# Patient Record
Sex: MALE | Race: WHITE | HISPANIC OR LATINO | Employment: OTHER | URBAN - METROPOLITAN AREA
[De-identification: names, ages, dates, MRNs, and addresses within clinical notes are randomized per-mention and may not be internally consistent; named-entity substitution may affect disease eponyms.]

---

## 2023-03-08 PROBLEM — I10 BENIGN ESSENTIAL HYPERTENSION: Status: ACTIVE | Noted: 2023-03-08

## 2023-03-08 PROBLEM — M19.049 CMC ARTHRITIS: Status: ACTIVE | Noted: 2023-03-08

## 2023-03-08 PROBLEM — J01.90 ACUTE SINUSITIS: Status: ACTIVE | Noted: 2023-03-08

## 2023-03-08 PROBLEM — B07.0 PLANTAR WARTS: Status: ACTIVE | Noted: 2023-03-08

## 2023-03-08 PROBLEM — E78.5 HYPERLIPIDEMIA: Status: ACTIVE | Noted: 2023-03-08

## 2023-03-08 PROBLEM — R35.1 NOCTURIA: Status: ACTIVE | Noted: 2023-03-08

## 2023-03-08 PROBLEM — G56.01 CARPAL TUNNEL SYNDROME OF RIGHT WRIST: Status: ACTIVE | Noted: 2023-03-08

## 2023-03-08 PROBLEM — G56.21 ULNAR NEUROPATHY AT ELBOW, RIGHT: Status: ACTIVE | Noted: 2023-03-08

## 2023-03-08 PROBLEM — E11.9 TYPE 2 DIABETES MELLITUS (MULTI): Status: ACTIVE | Noted: 2023-03-08

## 2023-03-08 PROBLEM — E80.4 GILBERT SYNDROME: Status: ACTIVE | Noted: 2023-03-08

## 2023-03-08 PROBLEM — E78.1 HYPERTRIGLYCERIDEMIA: Status: ACTIVE | Noted: 2023-03-08

## 2023-03-08 PROBLEM — I48.91 ATRIAL FIBRILLATION (MULTI): Status: ACTIVE | Noted: 2023-03-08

## 2023-03-08 PROBLEM — G47.33 OBSTRUCTIVE SLEEP APNEA: Status: ACTIVE | Noted: 2023-03-08

## 2023-03-08 RX ORDER — PEN NEEDLE, DIABETIC 30 GX3/16"
1 NEEDLE, DISPOSABLE MISCELLANEOUS 2 TIMES DAILY
COMMUNITY
End: 2023-11-28 | Stop reason: SDUPTHER

## 2023-03-08 RX ORDER — TAMSULOSIN HYDROCHLORIDE 0.4 MG/1
0.4 CAPSULE ORAL DAILY
COMMUNITY
End: 2023-05-01 | Stop reason: SDUPTHER

## 2023-03-08 RX ORDER — AMLODIPINE BESYLATE 5 MG/1
2 TABLET ORAL DAILY
COMMUNITY
Start: 2020-07-07 | End: 2023-05-19 | Stop reason: SDUPTHER

## 2023-03-08 RX ORDER — BLOOD SUGAR DIAGNOSTIC
1 STRIP MISCELLANEOUS 2 TIMES DAILY
COMMUNITY
Start: 2014-10-13 | End: 2023-03-14 | Stop reason: ALTCHOICE

## 2023-03-08 RX ORDER — HYDROCHLOROTHIAZIDE 12.5 MG/1
1 TABLET ORAL DAILY
COMMUNITY
Start: 2020-06-11 | End: 2023-05-19 | Stop reason: ALTCHOICE

## 2023-03-08 RX ORDER — ATORVASTATIN CALCIUM 40 MG/1
1 TABLET, FILM COATED ORAL NIGHTLY
COMMUNITY
Start: 2017-01-10 | End: 2023-05-19 | Stop reason: SDUPTHER

## 2023-03-08 RX ORDER — PEN NEEDLE, DIABETIC 30 GX3/16"
NEEDLE, DISPOSABLE MISCELLANEOUS
COMMUNITY
End: 2023-03-14 | Stop reason: ALTCHOICE

## 2023-03-08 RX ORDER — BLOOD SUGAR DIAGNOSTIC
1 STRIP MISCELLANEOUS 2 TIMES DAILY
COMMUNITY
End: 2023-10-09 | Stop reason: WASHOUT

## 2023-03-08 RX ORDER — METFORMIN HYDROCHLORIDE 500 MG/1
2 TABLET, EXTENDED RELEASE ORAL 2 TIMES DAILY
COMMUNITY
Start: 2016-10-24 | End: 2023-11-28 | Stop reason: SDUPTHER

## 2023-03-08 RX ORDER — OLMESARTAN MEDOXOMIL 40 MG/1
1 TABLET ORAL DAILY
COMMUNITY
Start: 2018-12-20 | End: 2023-05-19 | Stop reason: SDUPTHER

## 2023-03-08 RX ORDER — LANCETS
1 EACH MISCELLANEOUS 2 TIMES DAILY
COMMUNITY
End: 2023-05-19 | Stop reason: ALTCHOICE

## 2023-03-08 RX ORDER — FLECAINIDE ACETATE 50 MG/1
50 TABLET ORAL 2 TIMES DAILY
COMMUNITY
End: 2023-10-10 | Stop reason: ALTCHOICE

## 2023-03-08 RX ORDER — INSULIN ASPART 100 [IU]/ML
38 INJECTION, SUSPENSION SUBCUTANEOUS 2 TIMES DAILY
COMMUNITY
Start: 2021-06-08 | End: 2023-11-28 | Stop reason: SDUPTHER

## 2023-03-08 RX ORDER — FENOFIBRATE 145 MG/1
1 TABLET, FILM COATED ORAL DAILY
COMMUNITY
Start: 2020-12-17 | End: 2023-12-13 | Stop reason: SDUPTHER

## 2023-03-12 ASSESSMENT — ENCOUNTER SYMPTOMS
HEMATURIA: 0
NAUSEA: 1
VOMITING: 0
WEIGHT LOSS: 0
HEMATOCHEZIA: 0
ABDOMINAL PAIN: 1
FREQUENCY: 1
DIARRHEA: 1
FLATUS: 1
MYALGIAS: 1
ARTHRALGIAS: 0
ANOREXIA: 1
CONSTIPATION: 0
BELCHING: 1
HEADACHES: 1
FEVER: 0
DYSURIA: 1

## 2023-03-14 ENCOUNTER — OFFICE VISIT (OUTPATIENT)
Dept: PRIMARY CARE | Facility: CLINIC | Age: 68
End: 2023-03-14
Payer: MEDICARE

## 2023-03-14 VITALS
DIASTOLIC BLOOD PRESSURE: 70 MMHG | BODY MASS INDEX: 28.56 KG/M2 | HEART RATE: 98 BPM | SYSTOLIC BLOOD PRESSURE: 138 MMHG | HEIGHT: 71 IN | WEIGHT: 204 LBS

## 2023-03-14 DIAGNOSIS — R10.12 LEFT UPPER QUADRANT ABDOMINAL PAIN: ICD-10-CM

## 2023-03-14 DIAGNOSIS — K46.9 HERNIA OF ABDOMINAL CAVITY: ICD-10-CM

## 2023-03-14 DIAGNOSIS — K21.9 GASTROESOPHAGEAL REFLUX DISEASE, UNSPECIFIED WHETHER ESOPHAGITIS PRESENT: Primary | ICD-10-CM

## 2023-03-14 PROCEDURE — 1159F MED LIST DOCD IN RCRD: CPT | Performed by: STUDENT IN AN ORGANIZED HEALTH CARE EDUCATION/TRAINING PROGRAM

## 2023-03-14 PROCEDURE — 99214 OFFICE O/P EST MOD 30 MIN: CPT | Performed by: STUDENT IN AN ORGANIZED HEALTH CARE EDUCATION/TRAINING PROGRAM

## 2023-03-14 PROCEDURE — 3075F SYST BP GE 130 - 139MM HG: CPT | Performed by: STUDENT IN AN ORGANIZED HEALTH CARE EDUCATION/TRAINING PROGRAM

## 2023-03-14 PROCEDURE — 3078F DIAST BP <80 MM HG: CPT | Performed by: STUDENT IN AN ORGANIZED HEALTH CARE EDUCATION/TRAINING PROGRAM

## 2023-03-14 PROCEDURE — 3066F NEPHROPATHY DOC TX: CPT | Performed by: STUDENT IN AN ORGANIZED HEALTH CARE EDUCATION/TRAINING PROGRAM

## 2023-03-14 PROCEDURE — 4010F ACE/ARB THERAPY RXD/TAKEN: CPT | Performed by: STUDENT IN AN ORGANIZED HEALTH CARE EDUCATION/TRAINING PROGRAM

## 2023-03-14 RX ORDER — OMEPRAZOLE 20 MG/1
20 TABLET, DELAYED RELEASE ORAL 2 TIMES DAILY
Qty: 60 TABLET | Refills: 0 | COMMUNITY
Start: 2023-03-14 | End: 2023-03-15 | Stop reason: SDUPTHER

## 2023-03-14 ASSESSMENT — ENCOUNTER SYMPTOMS
BELCHING: 1
DYSURIA: 1
ANOREXIA: 1
ABDOMINAL PAIN: 1
ARTHRALGIAS: 0
FLATUS: 1
HEADACHES: 1
DIARRHEA: 1
HEMATURIA: 0
FREQUENCY: 1
CONSTIPATION: 0
NAUSEA: 1
HEMATOCHEZIA: 0
WEIGHT LOSS: 0
MYALGIAS: 1
FEVER: 0
VOMITING: 0

## 2023-03-14 NOTE — PROGRESS NOTES
Subjective   Patient ID: Bunny Abarca is a 67 y.o. male who presents for No chief complaint on file..  Today he is accompanied by alone.     Abdominal Pain  This is a chronic problem. The current episode started more than 1 year ago. The onset quality is gradual. The problem occurs 2 to 4 times per day. The most recent episode lasted 2 hours. The problem has been gradually worsening. The pain is located in the LLQ, LUQ, epigastric region, generalized abdominal region, periumbilical region, suprapubic region and left flank. The pain is at a severity of 8/10. The quality of the pain is aching, cramping, sharp and tearing. The abdominal pain radiates to the LUQ, epigastric region, periumbilical region, back, left flank and scrotum. Associated symptoms include anorexia, belching, diarrhea, dysuria, flatus, frequency, headaches, myalgias and nausea. Pertinent negatives include no arthralgias, constipation, fever, hematochezia, hematuria, melena, vomiting or weight loss. The pain is aggravated by certain positions, coughing, drinking alcohol, eating, palpation, NSAIDs and urination. The pain is relieved by Belching, certain positions, passing flatus and sitting up. Prior diagnostic workup includes lower endoscopy.       LUQ Abdominal Pain/Hernia  History of previous left inguinal and umbilical hernia repairs.   Complaining of pain in his LUQ since the summer after her struck his abdomen with the handle of a saw.   Pain relieved with advil or tylenol.   Is concerned for another hernia as he occasional with feel a bulge in the area.     GERD  Complaining of several month history of chest burning, LUQ pain and occasional difficulty swallowing.  Symptoms worsen after coffee, spicy foods, or tomatoes.   Symptoms relieved somewhat with Rolaids or famotidine OTC.    Seeking further treatment for his symptoms.     Current Outpatient Medications on File Prior to Visit   Medication Sig Dispense Refill    amLODIPine (Norvasc) 5  "mg tablet Take 2 tablets (10 mg) by mouth once daily.      atorvastatin (Lipitor) 40 mg tablet Take 1 tablet (40 mg) by mouth once daily at bedtime.      blood sugar diagnostic (Accu-Chek Guide test strips) strip 1 each  in the morning and 1 each before bedtime.      blood sugar diagnostic (ReliOn Prime Test Strips) strip 1 strip  in the morning and 1 strip before bedtime.      empagliflozin (Jardiance) 10 mg Take 1 tablet (10 mg) by mouth once daily.      fenofibrate (Tricor) 145 mg tablet Take 1 tablet (145 mg) by mouth once daily. WITH FOOD      flecainide (Tambocor) 50 mg tablet Take 1 tablet (50 mg) by mouth in the morning and 1 tablet (50 mg) before bedtime.      hydroCHLOROthiazide (HYDRODiuril) 12.5 mg tablet Take 1 tablet (12.5 mg) by mouth once daily.      insulin asp prt-insulin aspart (NovoLOG Mix 70-30FlexPen U-100) 100 unit/mL (70-30) injection Inject 38 Units under the skin in the morning and 38 Units before bedtime.      lancets (Accu-Chek Fastclix Lancet Drum) misc 1 each  in the morning and 1 each before bedtime.      metFORMIN XR (Glucophage-XR) 500 mg 24 hr tablet Take 2 tablets (1,000 mg) by mouth in the morning and 2 tablets (1,000 mg) before bedtime.      olmesartan (BENIcar) 40 mg tablet Take 1 tablet (40 mg) by mouth once daily.      pen needle, diabetic (BD Ultra-Fine Short Pen Needle) 31 gauge x 5/16\" needle 1 each  in the morning and 1 each before bedtime.      pen needle, diabetic (Pen Needle) 31 gauge x 5/16\" needle Use once a day with Levemir      tamsulosin (Flomax) 0.4 mg 24 hr capsule Take 1 capsule (0.4 mg) by mouth once daily.       No current facility-administered medications on file prior to visit.          Review of Systems   Constitutional:  Negative for fever and weight loss.   Gastrointestinal:  Positive for abdominal pain, anorexia, diarrhea, flatus and nausea. Negative for constipation, hematochezia, melena and vomiting.   Genitourinary:  Positive for dysuria and " frequency. Negative for hematuria.   Musculoskeletal:  Positive for myalgias. Negative for arthralgias.   Neurological:  Positive for headaches.     All pertinent positive symptoms are included in the history of present illness.  All other systems have been reviewed and are negative and noncontributory to this patient's current ailments.     Objective   There were no vitals taken for this visit.  BSA: There is no height or weight on file to calculate BSA.  No visits with results within 1 Month(s) from this visit.   Latest known visit with results is:   [facility] Encounter on 12/22/2022   Component Date Value Ref Range Status    POCT Glucose 12/22/2022 166 (H)  74 - 99 mg/dL Final       Physical Exam  CONSTITUTIONAL - well nourished, well developed, looks like stated age, in no acute distress, not ill-appearing, and not tired appearing  SKIN - normal skin color and pigmentation, normal skin turgor without rash, lesions, or nodules visualized  HEAD - no trauma, normocephalic  EYES - pupils are equal and reactive to light, extraocular muscles are intact, and normal external exam  NECK - supple without rigidity, no neck mass was observed,  CHEST - clear to auscultation, no wheezing, no crackles and no rales, good effort  CARDIAC - regular rate and regular rhythm, no skipped beats, no murmur  ABDOMEN - no organomegaly, soft, tender LUQ, nondistended, normal bowel sounds, no guarding/rebound/rigidity, negative McBurney sign and negative Patel sign  EXTREMITIES - no edema, no deformities  NEUROLOGICAL - normal gait, normal balance, normal motor, no ataxia  PSYCHIATRIC - alert, pleasant and cordial, age-appropriate      Assessment/Plan   LUQ Abdominal Pain/Hernia  We provided you with a referral for a general surgeon today to evaluate for your possible hernia.  Please schedule with them at your earliest mutual convenience.     GERD  We prescribed you omeprazole 20 mg to take once daily.   Please let us know how this changes  your symptoms.     Please call the office if you have any questions or concerns regarding your care.

## 2023-03-15 RX ORDER — OMEPRAZOLE 20 MG/1
20 TABLET, DELAYED RELEASE ORAL 2 TIMES DAILY
Qty: 60 TABLET | Refills: 0 | Status: SHIPPED | OUTPATIENT
Start: 2023-03-15 | End: 2023-03-16

## 2023-03-16 DIAGNOSIS — K21.9 GASTROESOPHAGEAL REFLUX DISEASE WITHOUT ESOPHAGITIS: Primary | ICD-10-CM

## 2023-03-16 RX ORDER — OMEPRAZOLE 20 MG/1
20 CAPSULE, DELAYED RELEASE ORAL 2 TIMES DAILY
Qty: 60 CAPSULE | Refills: 1 | Status: SHIPPED | OUTPATIENT
Start: 2023-03-16 | End: 2023-03-17 | Stop reason: SDUPTHER

## 2023-03-17 DIAGNOSIS — K21.9 GASTROESOPHAGEAL REFLUX DISEASE WITHOUT ESOPHAGITIS: ICD-10-CM

## 2023-03-17 RX ORDER — OMEPRAZOLE 20 MG/1
20 CAPSULE, DELAYED RELEASE ORAL 2 TIMES DAILY
Qty: 60 CAPSULE | Refills: 1 | Status: SHIPPED | OUTPATIENT
Start: 2023-03-17 | End: 2023-05-23

## 2023-05-01 DIAGNOSIS — R35.1 NOCTURIA: ICD-10-CM

## 2023-05-01 RX ORDER — TAMSULOSIN HYDROCHLORIDE 0.4 MG/1
0.4 CAPSULE ORAL DAILY
Qty: 30 CAPSULE | Refills: 0 | Status: SHIPPED | OUTPATIENT
Start: 2023-05-01 | End: 2023-05-19 | Stop reason: ALTCHOICE

## 2023-05-12 LAB
ANION GAP IN SER/PLAS: 13 MMOL/L (ref 10–20)
CALCIUM (MG/DL) IN SER/PLAS: 9.5 MG/DL (ref 8.6–10.3)
CARBON DIOXIDE, TOTAL (MMOL/L) IN SER/PLAS: 27 MMOL/L (ref 21–32)
CHLORIDE (MMOL/L) IN SER/PLAS: 100 MMOL/L (ref 98–107)
CREATININE (MG/DL) IN SER/PLAS: 1.39 MG/DL (ref 0.5–1.3)
ESTIMATED AVERAGE GLUCOSE FOR HBA1C: 177 MG/DL
GFR MALE: 56 ML/MIN/1.73M2
GLUCOSE (MG/DL) IN SER/PLAS: 95 MG/DL (ref 74–99)
HEMOGLOBIN A1C/HEMOGLOBIN TOTAL IN BLOOD: 7.8 %
POTASSIUM (MMOL/L) IN SER/PLAS: 4 MMOL/L (ref 3.5–5.3)
SODIUM (MMOL/L) IN SER/PLAS: 136 MMOL/L (ref 136–145)
UREA NITROGEN (MG/DL) IN SER/PLAS: 24 MG/DL (ref 6–23)

## 2023-05-15 LAB — CARCINOEMBRYONIC AG (NG/ML) IN SER/PLAS: <0.5 UG/L

## 2023-05-18 ENCOUNTER — APPOINTMENT (OUTPATIENT)
Dept: PRIMARY CARE | Facility: CLINIC | Age: 68
End: 2023-05-18
Payer: MEDICARE

## 2023-05-19 ENCOUNTER — OFFICE VISIT (OUTPATIENT)
Dept: PRIMARY CARE | Facility: CLINIC | Age: 68
End: 2023-05-19
Payer: MEDICARE

## 2023-05-19 VITALS
HEIGHT: 71 IN | BODY MASS INDEX: 28.48 KG/M2 | DIASTOLIC BLOOD PRESSURE: 70 MMHG | WEIGHT: 203.4 LBS | HEART RATE: 100 BPM | SYSTOLIC BLOOD PRESSURE: 120 MMHG

## 2023-05-19 DIAGNOSIS — J02.9 PHARYNGITIS, UNSPECIFIED ETIOLOGY: Primary | ICD-10-CM

## 2023-05-19 DIAGNOSIS — E78.2 MIXED HYPERLIPIDEMIA: ICD-10-CM

## 2023-05-19 DIAGNOSIS — R35.1 NOCTURIA: ICD-10-CM

## 2023-05-19 DIAGNOSIS — C80.1 ADENOCARCINOMA (MULTI): ICD-10-CM

## 2023-05-19 DIAGNOSIS — I48.91 ATRIAL FIBRILLATION, UNSPECIFIED TYPE (MULTI): ICD-10-CM

## 2023-05-19 DIAGNOSIS — I10 HYPERTENSION, UNSPECIFIED TYPE: ICD-10-CM

## 2023-05-19 DIAGNOSIS — E11.9 TYPE 2 DIABETES MELLITUS WITHOUT COMPLICATION, WITHOUT LONG-TERM CURRENT USE OF INSULIN (MULTI): ICD-10-CM

## 2023-05-19 PROBLEM — D12.1 ADENOMA OF APPENDIX: Status: ACTIVE | Noted: 2023-05-19

## 2023-05-19 PROBLEM — D12.1 ADENOMA OF APPENDIX: Status: RESOLVED | Noted: 2023-05-19 | Resolved: 2023-05-19

## 2023-05-19 PROCEDURE — 3074F SYST BP LT 130 MM HG: CPT | Performed by: STUDENT IN AN ORGANIZED HEALTH CARE EDUCATION/TRAINING PROGRAM

## 2023-05-19 PROCEDURE — 1036F TOBACCO NON-USER: CPT | Performed by: STUDENT IN AN ORGANIZED HEALTH CARE EDUCATION/TRAINING PROGRAM

## 2023-05-19 PROCEDURE — 3066F NEPHROPATHY DOC TX: CPT | Performed by: STUDENT IN AN ORGANIZED HEALTH CARE EDUCATION/TRAINING PROGRAM

## 2023-05-19 PROCEDURE — 3051F HG A1C>EQUAL 7.0%<8.0%: CPT | Performed by: STUDENT IN AN ORGANIZED HEALTH CARE EDUCATION/TRAINING PROGRAM

## 2023-05-19 PROCEDURE — 3078F DIAST BP <80 MM HG: CPT | Performed by: STUDENT IN AN ORGANIZED HEALTH CARE EDUCATION/TRAINING PROGRAM

## 2023-05-19 PROCEDURE — 99215 OFFICE O/P EST HI 40 MIN: CPT | Performed by: STUDENT IN AN ORGANIZED HEALTH CARE EDUCATION/TRAINING PROGRAM

## 2023-05-19 PROCEDURE — 1159F MED LIST DOCD IN RCRD: CPT | Performed by: STUDENT IN AN ORGANIZED HEALTH CARE EDUCATION/TRAINING PROGRAM

## 2023-05-19 PROCEDURE — 4010F ACE/ARB THERAPY RXD/TAKEN: CPT | Performed by: STUDENT IN AN ORGANIZED HEALTH CARE EDUCATION/TRAINING PROGRAM

## 2023-05-19 RX ORDER — OLMESARTAN MEDOXOMIL 40 MG/1
40 TABLET ORAL DAILY
Qty: 90 TABLET | Refills: 1 | Status: SHIPPED | OUTPATIENT
Start: 2023-05-19 | End: 2023-12-13 | Stop reason: SDUPTHER

## 2023-05-19 RX ORDER — AMLODIPINE BESYLATE 5 MG/1
10 TABLET ORAL DAILY
Qty: 180 TABLET | Refills: 1 | Status: SHIPPED | OUTPATIENT
Start: 2023-05-19 | End: 2023-10-10 | Stop reason: WASHOUT

## 2023-05-19 RX ORDER — ATORVASTATIN CALCIUM 40 MG/1
40 TABLET, FILM COATED ORAL NIGHTLY
Qty: 90 TABLET | Refills: 1 | Status: SHIPPED | OUTPATIENT
Start: 2023-05-19 | End: 2023-12-13 | Stop reason: SDUPTHER

## 2023-05-19 RX ORDER — DOXAZOSIN 1 MG/1
1 TABLET ORAL NIGHTLY
Qty: 90 TABLET | Refills: 0 | Status: SHIPPED | OUTPATIENT
Start: 2023-05-19 | End: 2023-07-18 | Stop reason: SDUPTHER

## 2023-05-19 RX ORDER — AMOXICILLIN AND CLAVULANATE POTASSIUM 875; 125 MG/1; MG/1
875 TABLET, FILM COATED ORAL 2 TIMES DAILY
Qty: 14 TABLET | Refills: 0 | Status: CANCELLED | OUTPATIENT
Start: 2023-05-19 | End: 2023-05-26

## 2023-05-19 NOTE — PROGRESS NOTES
Subjective   Patient ID: Bunny Abarca is a 67 y.o. male who presents for Medication discussion.  Today he is accompanied by alone.     HPI     1. Hypertension  Currently on Amlodipine 5 mg BID, HCTZ 12.5 mg, and Olmesartan 40 mg. Tolerating well.   BP today in triage showed 120/70.  Home blood pressure readings showed systolic around 110-135 and diastolic around 69-80.   Denies any headaches, dizziness, vision changes, SOB, GARDUNO, LE edema, or any other complaints.  He does get some chest pressure and palpitations due to his atrial fibrillation.     2. Hyperlipidemia and hypertriglyceridemia  Currently on Atorvastatin 40 mg daily and fenofibrate 145 mg daily.  Tolerating well and denies side effects.     3. Type II Diabetes Mellitus  Takes Metformin 500 mg 2 tablets twice a day and on NovoLog.  He also takes Jardiance 25 mg daily.  Follows with Endocrinology.  Last A1c in May showing 7.8%.     4. Atrial fibrillation  Follows with cardiology regularly who discontinued metoprolol due to his bradycardia.  He is currently on Flecainide Acetate 50 mg twice daily.  He is tolerating the medication well.     5. Nocturia  He is experiencing insomnia due to having to get up to urinate several times throughout the night.  He believes this is due to Jardiance and HCTZ.  He was prescribed Flomax in Dec. 2022 which did not help.  Desires to discontinue hydrochlorothiazide if his blood pressure will allow it.    6. Adenocarcinoma of Appendix  Follows with colorectal surgeon.  Currently scheduled to get repeat CT C/A/P and to see a genetic specialist.  Right Hemicolectomy is being discussed with Colorectal surgeon scheduled tentatively scheduled for June 2023.    7. Pharyngitis  Had a sore throat last week that migrated to his right maxillary and frontal sinuses. Denies fever or chills.  Feels his symptoms have slightly improved.    Current Outpatient Medications on File Prior to Visit   Medication Sig Dispense Refill    blood  "sugar diagnostic (ReliOn Prime Test Strips) strip 1 strip  in the morning and 1 strip before bedtime.      empagliflozin (Jardiance) 10 mg Take 1 tablet (10 mg) by mouth once daily.      fenofibrate (Tricor) 145 mg tablet Take 1 tablet (145 mg) by mouth once daily. WITH FOOD      flecainide (Tambocor) 50 mg tablet Take 1 tablet (50 mg) by mouth in the morning and 1 tablet (50 mg) before bedtime.      insulin asp prt-insulin aspart (NovoLOG Mix 70-30FlexPen U-100) 100 unit/mL (70-30) injection Inject 38 Units under the skin in the morning and 38 Units before bedtime.      lancets (Accu-Chek Fastclix Lancet Drum) misc 1 each  in the morning and 1 each before bedtime.      metFORMIN XR (Glucophage-XR) 500 mg 24 hr tablet Take 2 tablets (1,000 mg) by mouth in the morning and 2 tablets (1,000 mg) before bedtime.      omeprazole (PriLOSEC) 20 mg DR capsule Take 1 capsule (20 mg) by mouth in the morning and 1 capsule (20 mg) before bedtime. Do not crush or chew.. 60 capsule 1    pen needle, diabetic (BD Ultra-Fine Short Pen Needle) 31 gauge x 5/16\" needle 1 each  in the morning and 1 each before bedtime.      [DISCONTINUED] amLODIPine (Norvasc) 5 mg tablet Take 2 tablets (10 mg) by mouth once daily.      [DISCONTINUED] atorvastatin (Lipitor) 40 mg tablet Take 1 tablet (40 mg) by mouth once daily at bedtime.      [DISCONTINUED] hydroCHLOROthiazide (HYDRODiuril) 12.5 mg tablet Take 1 tablet (12.5 mg) by mouth once daily.      [DISCONTINUED] olmesartan (BENIcar) 40 mg tablet Take 1 tablet (40 mg) by mouth once daily.      [DISCONTINUED] tamsulosin (Flomax) 0.4 mg 24 hr capsule Take 1 capsule (0.4 mg) by mouth once daily. 30 capsule 0     No current facility-administered medications on file prior to visit.        Allergies   Allergen Reactions    Bydureon [Exenatide Microspheres] Itching       Immunization History   Administered Date(s) Administered    Influenza, seasonal, injectable 10/04/2022    Moderna SARS-CoV-2 " "Vaccination 04/16/2022    Moderna Sars-cov-2 Bivalent Booster 10/11/2022    Pfizer Purple Cap SARS-CoV-2 03/05/2021, 03/25/2021, 10/21/2021    Tdap 11/10/2017    Zoster, Recombinant 08/24/2021, 10/25/2021         Review of Systems  All pertinent positive symptoms are included in the history of present illness.  All other systems have been reviewed and are negative and noncontributory to this patient's current ailments.     Objective   /70 (BP Location: Left arm, Patient Position: Sitting, BP Cuff Size: Adult)   Pulse 100   Ht 1.803 m (5' 11\")   Wt 92.3 kg (203 lb 6.4 oz)   BMI 28.37 kg/m²   BSA: 2.15 meters squared  Orders Only on 05/15/2023   Component Date Value Ref Range Status    CARCINOEMBRYONIC AG (NG/ML) IN SER* 05/15/2023 <0.5  ug/L Final    Comment:  CEA testing is performed by chemiluminescent immunoassay   using the Siemens Atellica. Values obtained with   different analytic methods cannot be used interchangeably.  .   Serum CEA measurement is intended for use as an aid in   the management of patients previously treated for cancer.   This assay is not intended for screening or diagnosis of   cancer in the general population. The results must not   be used as the sole means for clinical diagnosis or   patient management decisions.  REF VALUES  NONSMOKERS 0-2.5  SMOKERS    0-5.0     Orders Only on 05/12/2023   Component Date Value Ref Range Status    Hemoglobin A1C 05/12/2023 7.8 (A)  % Final    Comment:      Diagnosis of Diabetes-Adults   Non-Diabetic: < or = 5.6%   Increased risk for developing diabetes: 5.7-6.4%   Diagnostic of diabetes: > or = 6.5%  .       Monitoring of Diabetes                Age (y)     Therapeutic Goal (%)   Adults:          >18           <7.0   Pediatrics:    13-18           <7.5                   7-12           <8.0                   0- 6            7.5-8.5   American Diabetes Association. Diabetes Care 33(S1), Jan 2010.      Estimated Average Glucose 05/12/2023 177  " MG/DL Final   Orders Only on 05/12/2023   Component Date Value Ref Range Status    Glucose 05/12/2023 95  74 - 99 mg/dL Final    Sodium 05/12/2023 136  136 - 145 mmol/L Final    Potassium 05/12/2023 4.0  3.5 - 5.3 mmol/L Final    Chloride 05/12/2023 100  98 - 107 mmol/L Final    Bicarbonate 05/12/2023 27  21 - 32 mmol/L Final    Anion Gap 05/12/2023 13  10 - 20 mmol/L Final    Urea Nitrogen 05/12/2023 24 (H)  6 - 23 mg/dL Final    Creatinine 05/12/2023 1.39 (H)  0.50 - 1.30 mg/dL Final    GFR MALE 05/12/2023 56 (A)  >90 mL/min/1.73m2 Final    Comment:  CALCULATIONS OF ESTIMATED GFR ARE PERFORMED   USING THE 2021 CKD-EPI STUDY REFIT EQUATION   WITHOUT THE RACE VARIABLE FOR THE IDMS-TRACEABLE   CREATININE METHODS.    https://jasn.asnjournals.org/content/early/2021/09/22/ASN.9370057922      Calcium 05/12/2023 9.5  8.6 - 10.3 mg/dL Final   [facility]acy Encounter on 05/05/2023   Component Date Value Ref Range Status    Pathology Report 05/05/2023    Final                    Value:Name TORREY HITCHCOCK                                                                                                   Accession #: [id number]            Pathologist:                   KYUNG UMANZOR MD  Date of Procedure:    5/5/2023  Date Received:          5/5/2023  Date Reported           5/10/2023  Submitting Physician:   KIMBERLI CACERES MD  Location:                    [facility]   Copy To/Referring/Attending:  GISELLE DUARTE MD Other External #     Procedures/Addenda Present                                                               FINAL DIAGNOSIS  A.  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                                                                                                                                                                                                                                                                                                                                                                                                                                                                                                                Electronically Signed Out By KYUNG UMANZOR MD/[facility]  By the signature on this report, the individual or group listed as making the  Final Interpretation/Diagnosis certifies that they have reviewed this case.  Diagnostic interpretation performed at [facility] [address] [facility] [address]           Addendum/Procedures:  Special Oncology Report     Date Ordered:     5/10/2023     Status:  Signed Out       Date Complete:     5/10/2023             Date Reported:     5/10/2023                  Addendum Diagnosis       MISMATCH REPAIR PROTEIN EXPRESSION:     Tumor type/ specimen source:      Cecal mass  Paraffin block number:      A1     Protein:        Result:  MLH-1           Intact Nuclear Expression  PMS-2           Intact Nuclear Expression  MSH-2           Intact Nuclear Expression  MSH-6           Intact Nuclear                           Expression     INTERPRETATION: Colon neoplasm with normal mismatch  repair protein expression.     The immunohistochemistry study of DNA mismatch repair  protein expression reveals the normal presence of hMLH-1,  hMSH2, hMSH6 and hPMS2 in the tumor (normal internal  controls stain appropriately).     The findings do not exclude underlying Rodriguez Syndrome  (HNPCC) because some mutations may result in intact  protein expression. In addition, rare alterations can exist  in other mismatch proteins, which have not been tested.  In addition, some hereditary colorectal cancers are caused  by alteration in other pathways unrelated to DNA Mismatch Repair.     NOTE:  Immunohistochemical staining (IHC) is used to  determine the presence or absence of protein expression  MLH1, MSH2, MSH6 and PMS2.  Lymphocytes and  normal epithelial cells exhibit strong nuclear  staining  and serve as positive internal controls for staining  of these proteins. Infiltrating carcinoma cells exhibiting  nuclear staini                          ng show intact nuclear expression.     The stated protein mouse and rabbit monoclonal  antibodies (MLH1-clone M1([employer]),  MSH2- clone [id number]([employer]), MSH6-clone 44  ([employer]), and  PMS2- clone  lone [id number]([employer])) staining are performed  on formalin fixed paraffin embedded specimens.  The method employed was  a standard peroxidase  labeled-polymer detection system from  [employer] (ultraView Holden DAB).  Each assay was performed using appropriate  positive and negative controls, as well as evaluation  of internal controls.     LDT:  One or more of the reagents used to perform  assays  on this specimen MAY have contained components  considered to be Laboratory Developed Tests (LDT).  LDT's have not been cleared or approved by the  U.S.Food and Drug Administration. These assays/tests  were developed and their performance characteristics  determined by the Department of Pathology  Immunohistochemistry Lab at [facility]. The FDA does not require this test to go  through premarket FDA review. This test is used for  clinical purposes. It should not be regarded as  investigational or for research. This laboratory is certified  under the Clinical Laboratory Improvement Amendments  (CLIA) as qualified to perform high complexity clinical  laboratory testing. The assays/tests were performed  with appropriate positive and negative controls, which  stained appropriately.     CAUTIONS:  Test results should be interpreted in context  of clinical findings, family history, and other laboratory  data.  If results obtained do not match other clinical or  laboratory findings, please contact the laboratory  for possible interpretation.   "Misinterpretation of results  may occur if the information provided is inaccurate  or incomplete.     REFERENCE:  1. Lukasz Cabrera. Immunohistochemistry versus microsatellite  microsatellite instability testing for screening colorectal  CANCER patients at                           risk for Hereditary Nonpolyposis  Colorectal Cancer Syndrome.  Part1:  The utility of immunohistochemistry.  J  Mol Diag 10(4):293-300, 2008.     2. CLYDE Varela, [facility].  Colorectal cancer due to  deficiency in DNA mismatch repair function:  a review.  Adv. Izabel Pathol 16: 405-17, 2009.             Electronically Signed Out By KYUNG UMANZOR MD/[facility] [place]  By the signature on this report, the individual or group listed as making the  Final Interpretation/Diagnosis certifies that they have reviewed this case.  Addendum signed out at [place] [address].           Clinical History:  abdominal pain in the right lower quadrant, abnormal CT of the GI tract     Specimens Submitted As:  A: COLON, CECUM MASS, APPENDICEAL ORIFICE MASS, COLD FORCEP     Gross Description:  Received in formalin, labeled with the patient's name and hospital number and  \"A, colon, cecum mass, appendiceal orifice mass, cold forcep\", are multiple  fragments of tan, soft tissue ag                          gregating to 1.6 x 0.2 x 0.2 cm. The specimen  is submitted in toto in one cassette.  AE      aey/5/5/2023           The assays/tests were performed with appropriate positive and negative controls  which stained appropriately.    [facility]  Department of Pathology   [address] [place] [address]           Physical Exam  CONSTITUTIONAL - INAD. Not ill appearing.  SKIN - No lesions or rashes visualized. Good skin turgor.  HEAD - Atraumatic, normocephalic..  RESP - CTAB. No wheezing, rhonchi, or crackles.   CARDIAC - RRR. No murmurs, gallops, or rubs.      Assessment/Plan   1. " Hypertension  We will discontinue the hydrocholrothiazide and start Doxazosin 1 mg daily.  Continue your olmesartan 40 mg daily and Amlodipine 5 mg twice daily.  Recommend to continue monitoring your blood pressure at home.  Follow up in 1 month.     2. Hyperlipidemia and hypertriglyceridemia  Your last lipid panel was great.  Continue Atorvastatin 40 mg and fenofibrate 145 mg daily.     3. Type II Diabetes Mellitus  Recommend to continue to follow with your endocrinologist.  Your A1c did show improvement.     4. Atrial fibrillation  Recommend to continue to follow with your cardiologist.  No changes recommended.     5. Nocturia  We will discontinue the tamsulosin and the hydrochlorothiazide.  Start Doxazosin 1 mg daily.    6. Adenocarcinoma of Appendix  Recommend to continue to follow up with your colorectal surgeon.  Let us know if there is anything we can do to help.    7. Pharyngitis  You do have slight erythema of your throat and tenderness over your right maxillary sinsus.   Please continue monitoring symptoms and notify us if this gets worse 100    Follow up in 1 month.

## 2023-05-22 DIAGNOSIS — K21.9 GASTROESOPHAGEAL REFLUX DISEASE WITHOUT ESOPHAGITIS: ICD-10-CM

## 2023-05-23 RX ORDER — OMEPRAZOLE 20 MG/1
20 CAPSULE, DELAYED RELEASE ORAL 2 TIMES DAILY
Qty: 180 CAPSULE | Refills: 1 | Status: SHIPPED | OUTPATIENT
Start: 2023-05-23 | End: 2023-10-10 | Stop reason: WASHOUT

## 2023-06-06 LAB
ANION GAP IN SER/PLAS: 14 MMOL/L (ref 10–20)
BASOPHILS (10*3/UL) IN BLOOD BY AUTOMATED COUNT: 0.04 X10E9/L (ref 0–0.1)
BASOPHILS/100 LEUKOCYTES IN BLOOD BY AUTOMATED COUNT: 0.7 % (ref 0–2)
CALCIUM (MG/DL) IN SER/PLAS: 9.3 MG/DL (ref 8.6–10.3)
CARBON DIOXIDE, TOTAL (MMOL/L) IN SER/PLAS: 26 MMOL/L (ref 21–32)
CHLORIDE (MMOL/L) IN SER/PLAS: 102 MMOL/L (ref 98–107)
CREATININE (MG/DL) IN SER/PLAS: 1.27 MG/DL (ref 0.5–1.3)
EOSINOPHILS (10*3/UL) IN BLOOD BY AUTOMATED COUNT: 0.15 X10E9/L (ref 0–0.7)
EOSINOPHILS/100 LEUKOCYTES IN BLOOD BY AUTOMATED COUNT: 2.8 % (ref 0–6)
ERYTHROCYTE DISTRIBUTION WIDTH (RATIO) BY AUTOMATED COUNT: 13.6 % (ref 11.5–14.5)
ERYTHROCYTE MEAN CORPUSCULAR HEMOGLOBIN CONCENTRATION (G/DL) BY AUTOMATED: 31.8 G/DL (ref 32–36)
ERYTHROCYTE MEAN CORPUSCULAR VOLUME (FL) BY AUTOMATED COUNT: 85 FL (ref 80–100)
ERYTHROCYTES (10*6/UL) IN BLOOD BY AUTOMATED COUNT: 5.38 X10E12/L (ref 4.5–5.9)
GFR MALE: 62 ML/MIN/1.73M2
GLUCOSE (MG/DL) IN SER/PLAS: 123 MG/DL (ref 74–99)
HEMATOCRIT (%) IN BLOOD BY AUTOMATED COUNT: 45.6 % (ref 41–52)
HEMOGLOBIN (G/DL) IN BLOOD: 14.5 G/DL (ref 13.5–17.5)
IMMATURE GRANULOCYTES/100 LEUKOCYTES IN BLOOD BY AUTOMATED COUNT: 0.4 % (ref 0–0.9)
LEUKOCYTES (10*3/UL) IN BLOOD BY AUTOMATED COUNT: 5.4 X10E9/L (ref 4.4–11.3)
LYMPHOCYTES (10*3/UL) IN BLOOD BY AUTOMATED COUNT: 1.49 X10E9/L (ref 1.2–4.8)
LYMPHOCYTES/100 LEUKOCYTES IN BLOOD BY AUTOMATED COUNT: 27.4 % (ref 13–44)
MONOCYTES (10*3/UL) IN BLOOD BY AUTOMATED COUNT: 0.42 X10E9/L (ref 0.1–1)
MONOCYTES/100 LEUKOCYTES IN BLOOD BY AUTOMATED COUNT: 7.7 % (ref 2–10)
NEUTROPHILS (10*3/UL) IN BLOOD BY AUTOMATED COUNT: 3.32 X10E9/L (ref 1.2–7.7)
NEUTROPHILS/100 LEUKOCYTES IN BLOOD BY AUTOMATED COUNT: 61 % (ref 40–80)
PLATELETS (10*3/UL) IN BLOOD AUTOMATED COUNT: 379 X10E9/L (ref 150–450)
POTASSIUM (MMOL/L) IN SER/PLAS: 4.4 MMOL/L (ref 3.5–5.3)
SODIUM (MMOL/L) IN SER/PLAS: 138 MMOL/L (ref 136–145)
UREA NITROGEN (MG/DL) IN SER/PLAS: 24 MG/DL (ref 6–23)

## 2023-06-08 LAB — STAPH/MRSA SCREEN, CULTURE: ABNORMAL

## 2023-06-30 ENCOUNTER — PATIENT OUTREACH (OUTPATIENT)
Dept: CARE COORDINATION | Facility: CLINIC | Age: 68
End: 2023-06-30
Payer: MEDICARE

## 2023-07-18 ENCOUNTER — OFFICE VISIT (OUTPATIENT)
Dept: PRIMARY CARE | Facility: CLINIC | Age: 68
End: 2023-07-18
Payer: MEDICARE

## 2023-07-18 VITALS
BODY MASS INDEX: 28.42 KG/M2 | DIASTOLIC BLOOD PRESSURE: 70 MMHG | HEART RATE: 93 BPM | WEIGHT: 203 LBS | HEIGHT: 71 IN | SYSTOLIC BLOOD PRESSURE: 142 MMHG

## 2023-07-18 DIAGNOSIS — C80.1 ADENOCARCINOMA (MULTI): ICD-10-CM

## 2023-07-18 DIAGNOSIS — K21.9 GASTROESOPHAGEAL REFLUX DISEASE, UNSPECIFIED WHETHER ESOPHAGITIS PRESENT: ICD-10-CM

## 2023-07-18 DIAGNOSIS — E78.2 MIXED HYPERLIPIDEMIA: ICD-10-CM

## 2023-07-18 DIAGNOSIS — R35.1 NOCTURIA: ICD-10-CM

## 2023-07-18 DIAGNOSIS — I10 HYPERTENSION, UNSPECIFIED TYPE: Primary | ICD-10-CM

## 2023-07-18 PROCEDURE — 1125F AMNT PAIN NOTED PAIN PRSNT: CPT | Performed by: STUDENT IN AN ORGANIZED HEALTH CARE EDUCATION/TRAINING PROGRAM

## 2023-07-18 PROCEDURE — 3066F NEPHROPATHY DOC TX: CPT | Performed by: STUDENT IN AN ORGANIZED HEALTH CARE EDUCATION/TRAINING PROGRAM

## 2023-07-18 PROCEDURE — 4010F ACE/ARB THERAPY RXD/TAKEN: CPT | Performed by: STUDENT IN AN ORGANIZED HEALTH CARE EDUCATION/TRAINING PROGRAM

## 2023-07-18 PROCEDURE — 3078F DIAST BP <80 MM HG: CPT | Performed by: STUDENT IN AN ORGANIZED HEALTH CARE EDUCATION/TRAINING PROGRAM

## 2023-07-18 PROCEDURE — 3008F BODY MASS INDEX DOCD: CPT | Performed by: STUDENT IN AN ORGANIZED HEALTH CARE EDUCATION/TRAINING PROGRAM

## 2023-07-18 PROCEDURE — 3077F SYST BP >= 140 MM HG: CPT | Performed by: STUDENT IN AN ORGANIZED HEALTH CARE EDUCATION/TRAINING PROGRAM

## 2023-07-18 PROCEDURE — 1036F TOBACCO NON-USER: CPT | Performed by: STUDENT IN AN ORGANIZED HEALTH CARE EDUCATION/TRAINING PROGRAM

## 2023-07-18 PROCEDURE — 99214 OFFICE O/P EST MOD 30 MIN: CPT | Performed by: STUDENT IN AN ORGANIZED HEALTH CARE EDUCATION/TRAINING PROGRAM

## 2023-07-18 PROCEDURE — 1159F MED LIST DOCD IN RCRD: CPT | Performed by: STUDENT IN AN ORGANIZED HEALTH CARE EDUCATION/TRAINING PROGRAM

## 2023-07-18 PROCEDURE — 3051F HG A1C>EQUAL 7.0%<8.0%: CPT | Performed by: STUDENT IN AN ORGANIZED HEALTH CARE EDUCATION/TRAINING PROGRAM

## 2023-07-18 RX ORDER — DOXAZOSIN 2 MG/1
2 TABLET ORAL NIGHTLY
Qty: 90 TABLET | Refills: 1 | Status: SHIPPED | OUTPATIENT
Start: 2023-07-18 | End: 2023-12-13 | Stop reason: SDUPTHER

## 2023-07-18 NOTE — PROGRESS NOTES
Subjective   Patient ID: Michael Abarca is a 68 y.o. male who presents for Hypertension.  Today he is accompanied by alone.     HPI    Hypertension, unspecified  Pt started on doxazosin 1 mg daily last visit, which he is tolerating well.    Home BP log showing elevated systolic values.    Continues taking olmesartan 40 mg daily and Amlodipine 5 mg twice daily with no issues.   No reported chest pain, palpitation, or flutters.   Occasional dizziness when he stands up too quickly.     Hyperlpidemia and hypertriglyceridemia  He is still taking Atorvastatin 40 mg and fenofibrate 145 mg daily.   Continues to tolerate these medications and dosages well, no myalgia reported.     3.  Nocturia   Improved since discontinuing Flomax and hydrochlorothiazide and starting doxazosin 1 mg daily.  Wishes to discuss this further at today's visit    4. 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   Currently under consideration on what he needs to do in relation to chemotherapy and further treatments  Wishes to update me today    5.  GERD  Wishes to slowly discontinue and wean off of his omeprazole if possible  Continues to take 20 mg twice daily and now wondering if it would be appropriate to decrease to only once daily    Current Outpatient Medications on File Prior to Visit   Medication Sig Dispense Refill    amLODIPine (Norvasc) 5 mg tablet Take 2 tablets (10 mg) by mouth once daily. 180 tablet 1    atorvastatin (Lipitor) 40 mg tablet Take 1 tablet (40 mg) by mouth once daily at bedtime. 90 tablet 1    blood sugar diagnostic (ReliOn Prime Test Strips) strip 1 strip  in the morning and 1 strip before bedtime.      doxazosin (Cardura) 1 mg tablet Take 1 tablet (1 mg) by mouth once daily at bedtime. 90 tablet 0    fenofibrate (Tricor) 145 mg tablet Take 1 tablet (145 mg) by mouth once daily. WITH FOOD      flecainide (Tambocor)  "50 mg tablet Take 1 tablet (50 mg) by mouth in the morning and 1 tablet (50 mg) before bedtime.      insulin asp prt-insulin aspart (NovoLOG Mix 70-30FlexPen U-100) 100 unit/mL (70-30) injection Inject 38 Units under the skin in the morning and 38 Units before bedtime.      metFORMIN XR (Glucophage-XR) 500 mg 24 hr tablet Take 2 tablets (1,000 mg) by mouth in the morning and 2 tablets (1,000 mg) before bedtime.      olmesartan (BENIcar) 40 mg tablet Take 1 tablet (40 mg) by mouth once daily. 90 tablet 1    omeprazole (PriLOSEC) 20 mg DR capsule Take 1 capsule (20 mg) by mouth in the morning and 1 capsule (20 mg) before bedtime. Do not crush or chew.. 180 capsule 1    pen needle, diabetic (BD Ultra-Fine Short Pen Needle) 31 gauge x 5/16\" needle 1 each  in the morning and 1 each before bedtime.       No current facility-administered medications on file prior to visit.        Allergies   Allergen Reactions    Bydureon [Exenatide Microspheres] Itching       Immunization History   Administered Date(s) Administered    Influenza, seasonal, injectable 10/04/2022    Moderna SARS-CoV-2 Vaccination 04/16/2022    Moderna Sars-cov-2 Bivalent Booster 10/11/2022    Pfizer Purple Cap SARS-CoV-2 03/05/2021, 03/25/2021, 10/21/2021    Tdap 11/10/2017    Zoster, Recombinant 08/24/2021, 10/25/2021         Review of Systems  All pertinent positive symptoms are included in the history of present illness.  All other systems have been reviewed and are negative and noncontributory to this patient's current ailments.     Objective   /70 (BP Location: Left arm, Patient Position: Sitting, BP Cuff Size: Adult)   Pulse 93   Ht 1.803 m (5' 11\")   Wt 92.1 kg (203 lb)   BMI 28.31 kg/m²   BSA: 2.15 meters squared      Physical Exam  CONSTITUTIONAL - well nourished, well developed, looks like stated age, in no acute distress, not ill-appearing, and not tired appearing  SKIN - normal skin color and pigmentation, normal skin turgor without " rash, lesions, or nodules visualized  HEAD - no trauma, normocephalic  EYES - normal external exam  CHEST -no distressed breathing, good effort, heart regular rate and rhythm, no murmurs, rubs, or gallops, lungs clear to auscultation bilaterally  Abdominal -no distention, soft, healing wound  EXTREMITIES - no edema, no deformities  NEUROLOGICAL - normal balance, normal motor, no ataxia  PSYCHIATRIC - alert, pleasant and cordial, age-appropriate    Assessment/Plan   Hypertension, unspecified  We will increase doxazosin to 2 mg daily  This was sent to your local pharmacy and I also recommend you continue taking the olmesartan 40 mg alongside amlodipine 5 mg twice daily  I would like to have you monitor and record blood pressures at home  Blood pressure goal should be below 130/80, ideally 120/80    2. Hyperlpidemia and hypertriglyceridemia  Continue atorvastatin 40 mg daily  We will repeat lab work at your [facility] visit in December    3.  Nocturia   I am happy to hear the nocturia is doing much better  This should improve further when we increase the doxazosin  Please keep us in the loop    4. Adenocarcinoma of the appendix  Congratulations on the successful surgery  Please keep me in the loop on your further care/treatment at this time    5.  GERD  You may decrease omeprazole only to once daily and then ultimately after a few week you can start weaning yourself off completely    Please follow-up closely and I would like you to have your [facility] visit in December of this year

## 2023-07-23 DIAGNOSIS — I10 ESSENTIAL (PRIMARY) HYPERTENSION: ICD-10-CM

## 2023-07-27 ENCOUNTER — PATIENT OUTREACH (OUTPATIENT)
Dept: CARE COORDINATION | Facility: CLINIC | Age: 68
End: 2023-07-27
Payer: MEDICARE

## 2023-07-27 NOTE — PROGRESS NOTES
Outreach call to patient to support a smooth transition of care from recent admission.  Spoke with patient, reviewed discharge medications, discharge instructions, assessed social needs, and provided education on importance of follow-up appointment with provider. Enrolled patient in [employer] for additional support and education through transition period.  Will continue to monitor through transition period.   Engagement  Call Start Time: 1348 (7/27/2023  1:47 PM)    Medications  Medications reviewed with patient/caregiver?: Yes (7/27/2023  1:47 PM)  Is the patient having any side effects they believe may be caused by any medication additions or changes?: No (7/27/2023  1:47 PM)  Does the patient have all medications ordered at discharge?: Yes (7/27/2023  1:47 PM)  Is the patient taking all medications as directed (includes completed medication regime)?: Yes (7/27/2023  1:47 PM)    Appointments  Does the patient have a primary care provider?: Yes (7/27/2023  1:47 PM)  Care Management Interventions: Educated patient on importance of making appointment; Advised patient to make appointment (7/27/2023  1:47 PM)    Patient Teaching  Does the patient have access to their discharge instructions?: Yes (7/27/2023  1:47 PM)  What is the patient's perception of their health status since discharge?: Improving (7/27/2023  1:47 PM)  Is the patient/caregiver able to teach back the hierarchy of who to call/visit for symptoms/problems? PCP, Specialist, Home Health nurse, Urgent Care, ED, 911: Yes (7/27/2023  1:47 PM)    Wrap Up  Call End Time: 1353 (7/27/2023  1:47 PM)

## 2023-07-28 ENCOUNTER — PATIENT OUTREACH (OUTPATIENT)
Dept: CARE COORDINATION | Facility: CLINIC | Age: 68
End: 2023-07-28
Payer: MEDICARE

## 2023-07-28 NOTE — PROGRESS NOTES
Covering  for email notification of [facility] alert .   [employer] alert system is not functional.   Outreach to patient for not knowing plan for recovery.    Patient states he has appt with PCP on 8/2  and questions need for such.  Patient inquires what actions he can take to boost immune system.   answered all questions to patient understanding.     Lila MOSES RN CCM  RN Care Coordinator  [facility]  Phone [phone]

## 2023-08-02 ENCOUNTER — OFFICE VISIT (OUTPATIENT)
Dept: PRIMARY CARE | Facility: CLINIC | Age: 68
End: 2023-08-02
Payer: MEDICARE

## 2023-08-02 ENCOUNTER — LAB (OUTPATIENT)
Dept: LAB | Facility: LAB | Age: 68
End: 2023-08-02
Payer: MEDICARE

## 2023-08-02 VITALS
HEIGHT: 71 IN | HEART RATE: 71 BPM | BODY MASS INDEX: 27.16 KG/M2 | WEIGHT: 194 LBS | SYSTOLIC BLOOD PRESSURE: 132 MMHG | DIASTOLIC BLOOD PRESSURE: 70 MMHG

## 2023-08-02 DIAGNOSIS — K21.9 GASTROESOPHAGEAL REFLUX DISEASE WITHOUT ESOPHAGITIS: ICD-10-CM

## 2023-08-02 DIAGNOSIS — Z09 HOSPITAL DISCHARGE FOLLOW-UP: ICD-10-CM

## 2023-08-02 DIAGNOSIS — C80.1 ADENOCARCINOMA (MULTI): ICD-10-CM

## 2023-08-02 DIAGNOSIS — A08.4 VIRAL GASTROENTERITIS: ICD-10-CM

## 2023-08-02 DIAGNOSIS — Z09 HOSPITAL DISCHARGE FOLLOW-UP: Primary | ICD-10-CM

## 2023-08-02 DIAGNOSIS — I10 HYPERTENSION, UNSPECIFIED TYPE: ICD-10-CM

## 2023-08-02 LAB
ALANINE AMINOTRANSFERASE (SGPT) (U/L) IN SER/PLAS: 23 U/L (ref 10–52)
ALBUMIN (G/DL) IN SER/PLAS: 4.3 G/DL (ref 3.4–5)
ALKALINE PHOSPHATASE (U/L) IN SER/PLAS: 55 U/L (ref 33–136)
ANION GAP IN SER/PLAS: 17 MMOL/L (ref 10–20)
ASPARTATE AMINOTRANSFERASE (SGOT) (U/L) IN SER/PLAS: 19 U/L (ref 9–39)
BILIRUBIN TOTAL (MG/DL) IN SER/PLAS: 0.5 MG/DL (ref 0–1.2)
CALCIUM (MG/DL) IN SER/PLAS: 9.3 MG/DL (ref 8.6–10.3)
CARBON DIOXIDE, TOTAL (MMOL/L) IN SER/PLAS: 24 MMOL/L (ref 21–32)
CHLORIDE (MMOL/L) IN SER/PLAS: 102 MMOL/L (ref 98–107)
CREATININE (MG/DL) IN SER/PLAS: 1.01 MG/DL (ref 0.5–1.3)
ERYTHROCYTE DISTRIBUTION WIDTH (RATIO) BY AUTOMATED COUNT: 13.7 % (ref 11.5–14.5)
ERYTHROCYTE MEAN CORPUSCULAR HEMOGLOBIN CONCENTRATION (G/DL) BY AUTOMATED: 30.9 G/DL (ref 32–36)
ERYTHROCYTE MEAN CORPUSCULAR VOLUME (FL) BY AUTOMATED COUNT: 87 FL (ref 80–100)
ERYTHROCYTES (10*6/UL) IN BLOOD BY AUTOMATED COUNT: 4.85 X10E12/L (ref 4.5–5.9)
GFR MALE: 81 ML/MIN/1.73M2
GLUCOSE (MG/DL) IN SER/PLAS: 124 MG/DL (ref 74–99)
HEMATOCRIT (%) IN BLOOD BY AUTOMATED COUNT: 42.1 % (ref 41–52)
HEMOGLOBIN (G/DL) IN BLOOD: 13 G/DL (ref 13.5–17.5)
LEUKOCYTES (10*3/UL) IN BLOOD BY AUTOMATED COUNT: 7.9 X10E9/L (ref 4.4–11.3)
PLATELETS (10*3/UL) IN BLOOD AUTOMATED COUNT: 561 X10E9/L (ref 150–450)
POTASSIUM (MMOL/L) IN SER/PLAS: 4.5 MMOL/L (ref 3.5–5.3)
PROTEIN TOTAL: 7.6 G/DL (ref 6.4–8.2)
SODIUM (MMOL/L) IN SER/PLAS: 138 MMOL/L (ref 136–145)
UREA NITROGEN (MG/DL) IN SER/PLAS: 21 MG/DL (ref 6–23)

## 2023-08-02 PROCEDURE — 1159F MED LIST DOCD IN RCRD: CPT | Performed by: STUDENT IN AN ORGANIZED HEALTH CARE EDUCATION/TRAINING PROGRAM

## 2023-08-02 PROCEDURE — 99496 TRANSJ CARE MGMT HIGH F2F 7D: CPT | Performed by: STUDENT IN AN ORGANIZED HEALTH CARE EDUCATION/TRAINING PROGRAM

## 2023-08-02 PROCEDURE — 1036F TOBACCO NON-USER: CPT | Performed by: STUDENT IN AN ORGANIZED HEALTH CARE EDUCATION/TRAINING PROGRAM

## 2023-08-02 PROCEDURE — 85027 COMPLETE CBC AUTOMATED: CPT

## 2023-08-02 PROCEDURE — 3075F SYST BP GE 130 - 139MM HG: CPT | Performed by: STUDENT IN AN ORGANIZED HEALTH CARE EDUCATION/TRAINING PROGRAM

## 2023-08-02 PROCEDURE — 36415 COLL VENOUS BLD VENIPUNCTURE: CPT

## 2023-08-02 PROCEDURE — 3078F DIAST BP <80 MM HG: CPT | Performed by: STUDENT IN AN ORGANIZED HEALTH CARE EDUCATION/TRAINING PROGRAM

## 2023-08-02 PROCEDURE — 3008F BODY MASS INDEX DOCD: CPT | Performed by: STUDENT IN AN ORGANIZED HEALTH CARE EDUCATION/TRAINING PROGRAM

## 2023-08-02 PROCEDURE — 4010F ACE/ARB THERAPY RXD/TAKEN: CPT | Performed by: STUDENT IN AN ORGANIZED HEALTH CARE EDUCATION/TRAINING PROGRAM

## 2023-08-02 PROCEDURE — 3051F HG A1C>EQUAL 7.0%<8.0%: CPT | Performed by: STUDENT IN AN ORGANIZED HEALTH CARE EDUCATION/TRAINING PROGRAM

## 2023-08-02 PROCEDURE — 1125F AMNT PAIN NOTED PAIN PRSNT: CPT | Performed by: STUDENT IN AN ORGANIZED HEALTH CARE EDUCATION/TRAINING PROGRAM

## 2023-08-02 PROCEDURE — 80053 COMPREHEN METABOLIC PANEL: CPT

## 2023-08-02 PROCEDURE — 3066F NEPHROPATHY DOC TX: CPT | Performed by: STUDENT IN AN ORGANIZED HEALTH CARE EDUCATION/TRAINING PROGRAM

## 2023-08-02 RX ORDER — HYDROCHLOROTHIAZIDE 12.5 MG/1
12.5 TABLET ORAL DAILY
Qty: 90 TABLET | Refills: 1 | OUTPATIENT
Start: 2023-08-02

## 2023-08-02 NOTE — PROGRESS NOTES
"Subjective   Patient ID: Michael Abarca is a 68 y.o. male who presents for Hospital Follow-up.  Today he is accompanied by alone.     HPI    Hospital discharge, Adenocarcinoma of appendix, & viral gastroenteritis   H/o Stage 3 adenocarcinoma of the appendix, incidentally found during hiatal surgery 6 weeks ago.   Surgery was reverted into appendectomy with partial colon resection (cecum/ascending colon).     Hospital admit (7/23/2023) after presenting with generalized abdominal pain (\"12/10) and nausea.    Abdomen pain developing after returning from trip, during which he ate fried food and casseroles.   Labs during admit showing anemia (Hgb's of 12.5, 11.8, 12.9, and then 12.1).   BMP showing hyponatemia at 134 on 7/26/2023.   Abdomen/pelvis CT (7/24/2023) showing inflammatory changes of the distal ileum with possibility of hernia strangulation.   NG tube was attempted but ineffective \"worst part of my stay.\"   Laxative suppository helping with constipation and overall symptom improvement.   Medications were temporary discontinued during admit, pt restarting these same day as discharge.     Today reports significant improvement of pain (4/10) with pt taking Tylenol.   Following hospital instructions of smaller, simpler meals, and requesting advice on Fiber.   Reports mild fatigue since hospital visit, attributing this to ongoing recovery.     2. GERD  Requesting advice on how to wean off Prilosec 20 mg from BID to once daily.     3. Hypertension & Atrial fibrillation   Established h/o atrial fibrillation, followed by Cardiology for this with annual visit in 1 month.   BP reading of 132/70 today with pt taking amlodipine 5 mg and olmesartan 40 mg both once daily.  Systolic home BP log showing values under 150.   Denies chest pain, palpitations, or dizziness.   Shortness of breath only with exercise \"especially climbing stairs\"      Current Outpatient Medications on File Prior to Visit   Medication Sig Dispense Refill " "   amLODIPine (Norvasc) 5 mg tablet Take 2 tablets (10 mg) by mouth once daily. 180 tablet 1    atorvastatin (Lipitor) 40 mg tablet Take 1 tablet (40 mg) by mouth once daily at bedtime. 90 tablet 1    blood sugar diagnostic (ReliOn Prime Test Strips) strip 1 strip  in the morning and 1 strip before bedtime.      doxazosin (Cardura) 2 mg tablet Take 1 tablet (2 mg) by mouth once daily at bedtime. 90 tablet 1    fenofibrate (Tricor) 145 mg tablet Take 1 tablet (145 mg) by mouth once daily. WITH FOOD      flecainide (Tambocor) 50 mg tablet Take 1 tablet (50 mg) by mouth in the morning and 1 tablet (50 mg) before bedtime.      insulin asp prt-insulin aspart (NovoLOG Mix 70-30FlexPen U-100) 100 unit/mL (70-30) injection Inject 38 Units under the skin in the morning and 38 Units before bedtime.      metFORMIN XR (Glucophage-XR) 500 mg 24 hr tablet Take 2 tablets (1,000 mg) by mouth in the morning and 2 tablets (1,000 mg) before bedtime.      olmesartan (BENIcar) 40 mg tablet Take 1 tablet (40 mg) by mouth once daily. 90 tablet 1    omeprazole (PriLOSEC) 20 mg DR capsule Take 1 capsule (20 mg) by mouth in the morning and 1 capsule (20 mg) before bedtime. Do not crush or chew.. 180 capsule 1    pen needle, diabetic (BD Ultra-Fine Short Pen Needle) 31 gauge x 5/16\" needle 1 each  in the morning and 1 each before bedtime.       No current facility-administered medications on file prior to visit.        No Known Allergies      Immunization History   Administered Date(s) Administered    Influenza, seasonal, injectable 10/04/2022    Moderna COVID-19 vaccine, bivalent, blue cap/gray label *Check age/dose* 10/11/2022    Moderna SARS-CoV-2 Vaccination 04/16/2022    Pfizer Purple Cap SARS-CoV-2 03/05/2021, 03/25/2021, 10/21/2021    Tdap vaccine, age 10 years and older (BOOSTRIX) 11/10/2017    Zoster vaccine, recombinant, adult (SHINGRIX) 08/24/2021, 10/25/2021         Review of Systems  All pertinent positive symptoms are included " "in the history of present illness.  All other systems have been reviewed and are negative and noncontributory to this patient's current ailments.     Objective   /70 (BP Location: Left arm, Patient Position: Sitting, BP Cuff Size: Adult)   Pulse 71   Ht 1.803 m (5' 11\")   Wt 88 kg (194 lb)   BMI 27.06 kg/m²   BSA: 2.1 meters squared  No visits with results within 1 Month(s) from this visit.   Latest known visit with results is:   Orders Only on 06/06/2023   Component Date Value Ref Range Status    Glucose 06/06/2023 123 (H)  74 - 99 mg/dL Final    Sodium 06/06/2023 138  136 - 145 mmol/L Final    Potassium 06/06/2023 4.4  3.5 - 5.3 mmol/L Final    Chloride 06/06/2023 102  98 - 107 mmol/L Final    Bicarbonate 06/06/2023 26  21 - 32 mmol/L Final    Anion Gap 06/06/2023 14  10 - 20 mmol/L Final    Urea Nitrogen 06/06/2023 24 (H)  6 - 23 mg/dL Final    Creatinine 06/06/2023 1.27  0.50 - 1.30 mg/dL Final    GFR MALE 06/06/2023 62  >90 mL/min/1.73m2 Final    Comment:  CALCULATIONS OF ESTIMATED GFR ARE PERFORMED   USING THE 2021 CKD-EPI STUDY REFIT EQUATION   WITHOUT THE RACE VARIABLE FOR THE IDMS-TRACEABLE   CREATININE METHODS.    https://jasn.asnjournals.org/content/early/2021/09/22/ASN.0138290903      Calcium 06/06/2023 9.3  8.6 - 10.3 mg/dL Final    WBC 06/06/2023 5.4  4.4 - 11.3 x10E9/L Final    RBC 06/06/2023 5.38  4.50 - 5.90 x10E12/L Final    Hemoglobin 06/06/2023 14.5  13.5 - 17.5 g/dL Final    Hematocrit 06/06/2023 45.6  41.0 - 52.0 % Final    MCV 06/06/2023 85  80 - 100 fL Final    MCHC 06/06/2023 31.8 (L)  32.0 - 36.0 g/dL Final    Platelets 06/06/2023 379  150 - 450 x10E9/L Final    RDW 06/06/2023 13.6  11.5 - 14.5 % Final    Neutrophils % 06/06/2023 61.0  40.0 - 80.0 % Final    Immature Granulocytes %, Automated 06/06/2023 0.4  0.0 - 0.9 % Final    Comment:  Immature Granulocyte Count (IG) includes promyelocytes,    myelocytes and metamyelocytes but does not include bands.   Percent differential " counts (%) should be interpreted in the   context of the absolute cell counts (cells/L).      Lymphocytes % 06/06/2023 27.4  13.0 - 44.0 % Final    Monocytes % 06/06/2023 7.7  2.0 - 10.0 % Final    Eosinophils % 06/06/2023 2.8  0.0 - 6.0 % Final    Basophils % 06/06/2023 0.7  0.0 - 2.0 % Final    Neutrophils Absolute 06/06/2023 3.32  1.20 - 7.70 x10E9/L Final    Lymphocytes Absolute 06/06/2023 1.49  1.20 - 4.80 x10E9/L Final    Monocytes Absolute 06/06/2023 0.42  0.10 - 1.00 x10E9/L Final    Eosinophils Absolute 06/06/2023 0.15  0.00 - 0.70 x10E9/L Final    Basophils Absolute 06/06/2023 0.04  0.00 - 0.10 x10E9/L Final    Staph/MRSA Screen Culture 06/06/2023 Staphylococcus aureus (A)   Final    METHICILLIN SENSITIVE STAPHYLOCOCCUS AUREUS (MSSA)       Physical Exam  CONSTITUTIONAL - well nourished, well developed, looks like stated age, in no acute distress, not ill-appearing, and not tired appearing  SKIN - normal skin color and pigmentation, normal skin turgor without rash, lesions, or nodules visualized  HEAD - no trauma, normocephalic  EYES - normal external exam  CHEST - clear to auscultation, no wheezing, no crackles and no rales, good effort  CARDIAC - regular rate and regular rhythm, no skipped beats, no murmur  ABDOMEN - no organomegaly, soft, nontender, nondistended, normal bowel sounds, no guarding/rebound/rigidity, negative McBurney sign and negative Patel sign  EXTREMITIES - no edema, no deformities  NEUROLOGICAL - normal balance, normal motor, no ataxia  PSYCHIATRIC - alert, pleasant and cordial, age-appropriate    Assessment/Plan   Hospital discharge, Adenocarcinoma of appendix, & viral gastroenteritis   Reviewed ED/hospital course of action and discharge summary as stated above.   Confirmed pt may start introducing small amounts of fiber back into his diet as tolerated.   Continue close management of this with GI/Oncology at upcoming follow-up with them.   If Abdominal pain, nausea, vomiting, or  severe constipation returns, return to ED immediately.     2. GERD  If you would like to wean off Prilosec 20 mg from BID to once daily, I recommend taking this only in the evening as most people have acid reflux at night.   However, you may take in the morning instead pending personal preference.     3. Hypertension & Atrial fibrillation   Established h/o atrial fibrillation, followed by Cardiology for this with annual visit in 1 month.   Continue taking amlodipine 5 mg and olmesartan 40 mg both once daily.  Reviewed home BP log with systolic BP values under 150 and manual BP reading today of 132/70.  I would like to have you monitor and record blood pressures at home   Blood pressure goal should be below 130/80, ideally 120/80 .

## 2023-08-03 ENCOUNTER — PATIENT OUTREACH (OUTPATIENT)
Dept: CARE COORDINATION | Facility: CLINIC | Age: 68
End: 2023-08-03
Payer: MEDICARE

## 2023-08-03 NOTE — TELEPHONE ENCOUNTER
----- Message from Claudia Jha, DO sent at 8/3/2023  6:45 AM EDT -----  Sugar elevated which was expected at 124 but otherwise liver, kidneys, electrodes are all within normal limits    Complete blood cell count continues to show a slight anemia but overall this is almost normalized and actually shows improvement compared to 8 days ago in the hospital    I would recommend he follows up in December for his next visit but otherwise keep me in the loop on how he is doing

## 2023-08-03 NOTE — RESULT ENCOUNTER NOTE
Sugar elevated which was expected at 124 but otherwise liver, kidneys, electrodes are all within normal limits    Complete blood cell count continues to show a slight anemia but overall this is almost normalized and actually shows improvement compared to 8 days ago in the hospital    I would recommend he follows up in December for his next visit but otherwise keep me in the loop on how he is doing

## 2023-08-03 NOTE — PROGRESS NOTES
Called pt to fu on PCP fu visit after discharge, I do see in the pt's chart that he did attend his PCP appt. I lvm.

## 2023-08-22 ENCOUNTER — HOSPITAL ENCOUNTER (OUTPATIENT)
Dept: DATA CONVERSION | Facility: HOSPITAL | Age: 68
End: 2023-08-22
Attending: STUDENT IN AN ORGANIZED HEALTH CARE EDUCATION/TRAINING PROGRAM | Admitting: STUDENT IN AN ORGANIZED HEALTH CARE EDUCATION/TRAINING PROGRAM
Payer: MEDICARE

## 2023-08-22 DIAGNOSIS — C18.1 MALIGNANT NEOPLASM OF APPENDIX (MULTI): ICD-10-CM

## 2023-09-05 ENCOUNTER — PATIENT OUTREACH (OUTPATIENT)
Dept: CARE COORDINATION | Facility: CLINIC | Age: 68
End: 2023-09-05
Payer: MEDICARE

## 2023-09-05 LAB
ATRIAL RATE: 108 BPM
Q ONSET: 218 MS
QRS COUNT: 19 BEATS
QRS DURATION: 90 MS
QT INTERVAL: 440 MS
QTC CALCULATION(BAZETT): 613 MS
QTC FREDERICIA: 550 MS
R AXIS: 67 DEGREES
T AXIS: 71 DEGREES
T OFFSET: 438 MS
VENTRICULAR RATE: 117 BPM

## 2023-09-11 ENCOUNTER — PATIENT OUTREACH (OUTPATIENT)
Dept: CARE COORDINATION | Facility: CLINIC | Age: 68
End: 2023-09-11
Payer: MEDICARE

## 2023-09-28 ENCOUNTER — DOCUMENTATION (OUTPATIENT)
Dept: CARE COORDINATION | Facility: CLINIC | Age: 68
End: 2023-09-28
Payer: MEDICARE

## 2023-09-28 ENCOUNTER — PATIENT OUTREACH (OUTPATIENT)
Dept: CARE COORDINATION | Facility: CLINIC | Age: 68
End: 2023-09-28
Payer: MEDICARE

## 2023-09-28 NOTE — PROGRESS NOTES
Several outreach calls with voicemail's left for patient with no call back. Follow up with cardiology and PCP schedule for future appointments.     CHARLES

## 2023-09-29 VITALS — WEIGHT: 198.41 LBS | HEIGHT: 70 IN | BODY MASS INDEX: 28.41 KG/M2

## 2023-10-03 ENCOUNTER — TELEPHONE (OUTPATIENT)
Dept: ADMISSION | Facility: HOSPITAL | Age: 68
End: 2023-10-03
Payer: MEDICARE

## 2023-10-03 DIAGNOSIS — R06.02 SHORTNESS OF BREATH: ICD-10-CM

## 2023-10-03 DIAGNOSIS — C18.1 PRIMARY MALIGNANT NEOPLASM OF APPENDIX (MULTI): ICD-10-CM

## 2023-10-03 DIAGNOSIS — R07.9 CHEST PAIN, UNSPECIFIED TYPE: ICD-10-CM

## 2023-10-03 DIAGNOSIS — I26.99 PULMONARY EMBOLISM AND INFARCTION (MULTI): ICD-10-CM

## 2023-10-03 NOTE — TELEPHONE ENCOUNTER
The patient is calling to get his treatment scheduled, there are no orders. This RN does not have access as this time to place any scheduling orders. Pt reports he needs his chemotherapy next Monday. Please advise, this has mavis routed to the team.

## 2023-10-03 NOTE — TELEPHONE ENCOUNTER
New scheduling orders placed in [facility]. Contacted patient to provide update: Monday's appt. Is in process of being scheduled,  will reach out regarding time. 10/10 and 10/12 infusion appointments are scheduled for 02:00 at [facility]. Provided call back number if they do not see the visits scheduled in [facility] and are not informed of Monday's appt. Patient verbalized understanding.

## 2023-10-04 ENCOUNTER — TELEPHONE (OUTPATIENT)
Dept: RESEARCH | Facility: HOSPITAL | Age: 68
End: 2023-10-04

## 2023-10-04 ENCOUNTER — APPOINTMENT (OUTPATIENT)
Dept: CARDIOLOGY | Facility: CLINIC | Age: 68
End: 2023-10-04
Payer: MEDICARE

## 2023-10-04 ENCOUNTER — HOSPITAL ENCOUNTER (OUTPATIENT)
Dept: CARDIOLOGY | Facility: CLINIC | Age: 68
Discharge: HOME | End: 2023-10-04
Payer: MEDICARE

## 2023-10-04 DIAGNOSIS — I26.99 PULMONARY EMBOLISM AND INFARCTION (MULTI): ICD-10-CM

## 2023-10-04 DIAGNOSIS — R06.02 SHORTNESS OF BREATH: ICD-10-CM

## 2023-10-04 DIAGNOSIS — R07.9 CHEST PAIN, UNSPECIFIED TYPE: ICD-10-CM

## 2023-10-04 LAB — EJECTION FRACTION APICAL 4 CHAMBER: 68.2

## 2023-10-04 PROCEDURE — 93306 TTE W/DOPPLER COMPLETE: CPT | Performed by: INTERNAL MEDICINE

## 2023-10-04 PROCEDURE — 93306 TTE W/DOPPLER COMPLETE: CPT

## 2023-10-05 ASSESSMENT — PATIENT HEALTH QUESTIONNAIRE - PHQ9
10. IF YOU CHECKED OFF ANY PROBLEMS, HOW DIFFICULT HAVE THESE PROBLEMS MADE IT FOR YOU TO DO YOUR WORK, TAKE CARE OF THINGS AT HOME, OR GET ALONG WITH OTHER PEOPLE: SOMEWHAT DIFFICULT
5. POOR APPETITE OR OVEREATING: NOT AT ALL
10. IF YOU CHECKED OFF ANY PROBLEMS, HOW DIFFICULT HAVE THESE PROBLEMS MADE IT FOR YOU TO DO YOUR WORK, TAKE CARE OF THINGS AT HOME, OR GET ALONG WITH OTHER PEOPLE: SOMEWHAT DIFFICULT
7. TROUBLE CONCENTRATING ON THINGS, SUCH AS READING THE NEWSPAPER OR WATCHING TELEVISION: SEVERAL DAYS
4. FEELING TIRED OR HAVING LITTLE ENERGY: NEARLY EVERY DAY
4. FEELING TIRED OR HAVING LITTLE ENERGY: 3
1. LITTLE INTEREST OR PLEASURE IN DOING THINGS: NOT AT ALL
10. IF YOU CHECKED OFF ANY PROBLEMS, HOW DIFFICULT HAVE THESE PROBLEMS MADE IT FOR YOU TO DO YOUR WORK, TAKE CARE OF THINGS AT HOME, OR GET ALONG WITH OTHER PEOPLE: SOMEWHAT DIFFICULT
2. FEELING DOWN, DEPRESSED, IRRITABLE, OR HOPELESS: NOT AT ALL
SUM OF ALL RESPONSES TO PHQ QUESTIONS 1-9: 8
1. LITTLE INTEREST OR PLEASURE IN DOING THINGS: NOT AT ALL
4. FEELING TIRED OR HAVING LITTLE ENERGY: NEARLY EVERY DAY
7. TROUBLE CONCENTRATING ON THINGS, SUCH AS READING THE NEWSPAPER OR WATCHING TELEVISION: SEVERAL DAYS
3. TROUBLE FALLING OR STAYING ASLEEP OR SLEEPING TOO MUCH: 1
9. THOUGHTS THAT YOU WOULD BE BETTER OFF DEAD, OR OF HURTING YOURSELF: NOT AT ALL
2. FEELING DOWN, DEPRESSED, IRRITABLE, OR HOPELESS: 0
4. FEELING TIRED OR HAVING LITTLE ENERGY: NEARLY EVERY DAY
8. MOVING OR SPEAKING SO SLOWLY THAT OTHER PEOPLE COULD HAVE NOTICED. OR THE OPPOSITE, BEING SO FIGETY OR RESTLESS THAT YOU HAVE BEEN MOVING AROUND A LOT MORE THAN USUAL: NEARLY EVERY DAY
3. TROUBLE FALLING OR STAYING ASLEEP OR SLEEPING TOO MUCH: SEVERAL DAYS
9. THOUGHTS THAT YOU WOULD BE BETTER OFF DEAD, OR OF HURTING YOURSELF: NOT AT ALL
6. FEELING BAD ABOUT YOURSELF - OR THAT YOU ARE A FAILURE OR HAVE LET YOURSELF OR YOUR FAMILY DOWN: NOT AT ALL
7. TROUBLE CONCENTRATING ON THINGS, SUCH AS READING THE NEWSPAPER OR WATCHING TELEVISION: SEVERAL DAYS
1. LITTLE INTEREST OR PLEASURE IN DOING THINGS: 0
9. THOUGHTS THAT YOU WOULD BE BETTER OFF DEAD, OR OF HURTING YOURSELF: NOT AT ALL
5. POOR APPETITE OR OVEREATING: NOT AT ALL
5. POOR APPETITE OR OVEREATING: 0
SUM OF ALL RESPONSES TO PHQ QUESTIONS 1-9: 8
1. LITTLE INTEREST OR PLEASURE IN DOING THINGS: NOT AT ALL
3. TROUBLE FALLING OR STAYING ASLEEP OR SLEEPING TOO MUCH: SEVERAL DAYS
7. TROUBLE CONCENTRATING ON THINGS, SUCH AS READING THE NEWSPAPER OR WATCHING TELEVISION: 1
10. IF YOU CHECKED OFF ANY PROBLEMS, HOW DIFFICULT HAVE THESE PROBLEMS MADE IT FOR YOU TO DO YOUR WORK, TAKE CARE OF THINGS AT HOME, OR GET ALONG WITH OTHER PEOPLE: SOMEWHAT DIFFICULT
SUM OF ALL RESPONSES TO PHQ QUESTIONS 1-9: 8
2. FEELING DOWN, DEPRESSED, IRRITABLE, OR HOPELESS: 0
SUM OF ALL RESPONSES TO PHQ QUESTIONS 1-9: 8
8. MOVING OR SPEAKING SO SLOWLY THAT OTHER PEOPLE COULD HAVE NOTICED. OR THE OPPOSITE, BEING SO FIGETY OR RESTLESS THAT YOU HAVE BEEN MOVING AROUND A LOT MORE THAN USUAL: NEARLY EVERY DAY
3. TROUBLE FALLING OR STAYING ASLEEP OR SLEEPING TOO MUCH: SEVERAL DAYS
2. FEELING DOWN, DEPRESSED OR HOPELESS: NOT AT ALL
6. FEELING BAD ABOUT YOURSELF - OR THAT YOU ARE A FAILURE OR HAVE LET YOURSELF OR YOUR FAMILY DOWN: NOT AT ALL
2. FEELING DOWN, DEPRESSED OR HOPELESS: NOT AT ALL
5. POOR APPETITE OR OVEREATING: NOT AT ALL
6. FEELING BAD ABOUT YOURSELF - OR THAT YOU ARE A FAILURE OR HAVE LET YOURSELF OR YOUR FAMILY DOWN: NOT AT ALL
6. FEELING BAD ABOUT YOURSELF - OR THAT YOU ARE A FAILURE OR HAVE LET YOURSELF OR YOUR FAMILY DOWN: NOT AT ALL
1. LITTLE INTEREST OR PLEASURE IN DOING THINGS: 0
4. FEELING TIRED OR HAVING LITTLE ENERGY: 3
3. TROUBLE FALLING OR STAYING ASLEEP OR SLEEPING TOO MUCH: SEVERAL DAYS
8. MOVING OR SPEAKING SO SLOWLY THAT OTHER PEOPLE COULD HAVE NOTICED. OR THE OPPOSITE, BEING SO FIGETY OR RESTLESS THAT YOU HAVE BEEN MOVING AROUND A LOT MORE THAN USUAL: NEARLY EVERY DAY
6. FEELING BAD ABOUT YOURSELF - OR THAT YOU ARE A FAILURE OR HAVE LET YOURSELF OR YOUR FAMILY DOWN: NOT AT ALL
5. POOR APPETITE OR OVEREATING: NOT AT ALL
9. THOUGHTS THAT YOU WOULD BE BETTER OFF DEAD, OR OF HURTING YOURSELF: NOT AT ALL
5. POOR APPETITE OR OVEREATING: NOT AT ALL
1. LITTLE INTEREST OR PLEASURE IN DOING THINGS: NOT AT ALL
2. FEELING DOWN, DEPRESSED, IRRITABLE, OR HOPELESS: NOT AT ALL
3. TROUBLE FALLING OR STAYING ASLEEP OR SLEEPING TOO MUCH: 1
4. FEELING TIRED OR HAVING LITTLE ENERGY: 3
6. FEELING BAD ABOUT YOURSELF - OR THAT YOU ARE A FAILURE OR HAVE LET YOURSELF OR YOUR FAMILY DOWN: NOT AT ALL
2. FEELING DOWN, DEPRESSED OR HOPELESS: NOT AT ALL
1. LITTLE INTEREST OR PLEASURE IN DOING THINGS: NOT AT ALL
8. MOVING OR SPEAKING SO SLOWLY THAT OTHER PEOPLE COULD HAVE NOTICED. OR THE OPPOSITE, BEING SO FIGETY OR RESTLESS THAT YOU HAVE BEEN MOVING AROUND A LOT MORE THAN USUAL: NEARLY EVERY DAY
1. LITTLE INTEREST OR PLEASURE IN DOING THINGS: NOT AT ALL
6. FEELING BAD ABOUT YOURSELF - OR THAT YOU ARE A FAILURE OR HAVE LET YOURSELF OR YOUR FAMILY DOWN: 0
4. FEELING TIRED OR HAVING LITTLE ENERGY: NEARLY EVERY DAY
5. POOR APPETITE OR OVEREATING: NOT AT ALL
8. MOVING OR SPEAKING SO SLOWLY THAT OTHER PEOPLE COULD HAVE NOTICED. OR THE OPPOSITE, BEING SO FIGETY OR RESTLESS THAT YOU HAVE BEEN MOVING AROUND A LOT MORE THAN USUAL: 3
3. TROUBLE FALLING OR STAYING ASLEEP OR SLEEPING TOO MUCH: SEVERAL DAYS
10. IF YOU CHECKED OFF ANY PROBLEMS, HOW DIFFICULT HAVE THESE PROBLEMS MADE IT FOR YOU TO DO YOUR WORK, TAKE CARE OF THINGS AT HOME, OR GET ALONG WITH OTHER PEOPLE: SOMEWHAT DIFFICULT
7. TROUBLE CONCENTRATING ON THINGS, SUCH AS READING THE NEWSPAPER OR WATCHING TELEVISION: SEVERAL DAYS
4. FEELING TIRED OR HAVING LITTLE ENERGY: NEARLY EVERY DAY
6. FEELING BAD ABOUT YOURSELF - OR THAT YOU ARE A FAILURE OR HAVE LET YOURSELF OR YOUR FAMILY DOWN: NOT AT ALL
8. MOVING OR SPEAKING SO SLOWLY THAT OTHER PEOPLE COULD HAVE NOTICED. OR THE OPPOSITE, BEING SO FIGETY OR RESTLESS THAT YOU HAVE BEEN MOVING AROUND A LOT MORE THAN USUAL: NEARLY EVERY DAY
SUM OF ALL RESPONSES TO PHQ QUESTIONS 1-9: 8
5. POOR APPETITE OR OVEREATING: 0
8. MOVING OR SPEAKING SO SLOWLY THAT OTHER PEOPLE COULD HAVE NOTICED. OR THE OPPOSITE, BEING SO FIGETY OR RESTLESS THAT YOU HAVE BEEN MOVING AROUND A LOT MORE THAN USUAL: NEARLY EVERY DAY
6. FEELING BAD ABOUT YOURSELF - OR THAT YOU ARE A FAILURE OR HAVE LET YOURSELF OR YOUR FAMILY DOWN: 0
6. FEELING BAD ABOUT YOURSELF - OR THAT YOU ARE A FAILURE OR HAVE LET YOURSELF OR YOUR FAMILY DOWN: 0
SUM OF ALL RESPONSES TO PHQ QUESTIONS 1-9: 8
2. FEELING DOWN, DEPRESSED OR HOPELESS: NOT AT ALL
7. TROUBLE CONCENTRATING ON THINGS, SUCH AS READING THE NEWSPAPER OR WATCHING TELEVISION: 1
3. TROUBLE FALLING OR STAYING ASLEEP OR SLEEPING TOO MUCH: SEVERAL DAYS
10. IF YOU CHECKED OFF ANY PROBLEMS, HOW DIFFICULT HAVE THESE PROBLEMS MADE IT FOR YOU TO DO YOUR WORK, TAKE CARE OF THINGS AT HOME, OR GET ALONG WITH OTHER PEOPLE: SOMEWHAT DIFFICULT
7. TROUBLE CONCENTRATING ON THINGS, SUCH AS READING THE NEWSPAPER OR WATCHING TELEVISION: SEVERAL DAYS
4. FEELING TIRED OR HAVING LITTLE ENERGY: NEARLY EVERY DAY
3. TROUBLE FALLING OR STAYING ASLEEP OR SLEEPING TOO MUCH: 1
9. THOUGHTS THAT YOU WOULD BE BETTER OFF DEAD, OR OF HURTING YOURSELF: NOT AT ALL
9. THOUGHTS THAT YOU WOULD BE BETTER OFF DEAD, OR OF HURTING YOURSELF: NOT AT ALL
3. TROUBLE FALLING OR STAYING ASLEEP OR SLEEPING TOO MUCH: SEVERAL DAYS
2. FEELING DOWN, DEPRESSED, IRRITABLE, OR HOPELESS: NOT AT ALL
8. MOVING OR SPEAKING SO SLOWLY THAT OTHER PEOPLE COULD HAVE NOTICED. OR THE OPPOSITE, BEING SO FIGETY OR RESTLESS THAT YOU HAVE BEEN MOVING AROUND A LOT MORE THAN USUAL: NEARLY EVERY DAY
2. FEELING DOWN, DEPRESSED, IRRITABLE, OR HOPELESS: 0
7. TROUBLE CONCENTRATING ON THINGS, SUCH AS READING THE NEWSPAPER OR WATCHING TELEVISION: SEVERAL DAYS
8. MOVING OR SPEAKING SO SLOWLY THAT OTHER PEOPLE COULD HAVE NOTICED. OR THE OPPOSITE, BEING SO FIGETY OR RESTLESS THAT YOU HAVE BEEN MOVING AROUND A LOT MORE THAN USUAL: 3
7. TROUBLE CONCENTRATING ON THINGS, SUCH AS READING THE NEWSPAPER OR WATCHING TELEVISION: SEVERAL DAYS
9. THOUGHTS THAT YOU WOULD BE BETTER OFF DEAD, OR OF HURTING YOURSELF: 0
4. FEELING TIRED OR HAVING LITTLE ENERGY: NEARLY EVERY DAY
10. IF YOU CHECKED OFF ANY PROBLEMS, HOW DIFFICULT HAVE THESE PROBLEMS MADE IT FOR YOU TO DO YOUR WORK, TAKE CARE OF THINGS AT HOME, OR GET ALONG WITH OTHER PEOPLE: SOMEWHAT DIFFICULT
5. POOR APPETITE OR OVEREATING: 0
9. THOUGHTS THAT YOU WOULD BE BETTER OFF DEAD, OR OF HURTING YOURSELF: NOT AT ALL
9. THOUGHTS THAT YOU WOULD BE BETTER OFF DEAD, OR OF HURTING YOURSELF: 0
SUM OF ALL RESPONSES TO PHQ QUESTIONS 1-9: 8
1. LITTLE INTEREST OR PLEASURE IN DOING THINGS: 0
7. TROUBLE CONCENTRATING ON THINGS, SUCH AS READING THE NEWSPAPER OR WATCHING TELEVISION: 1
1. LITTLE INTEREST OR PLEASURE IN DOING THINGS: NOT AT ALL
5. POOR APPETITE OR OVEREATING: NOT AT ALL
8. MOVING OR SPEAKING SO SLOWLY THAT OTHER PEOPLE COULD HAVE NOTICED. OR THE OPPOSITE, BEING SO FIGETY OR RESTLESS THAT YOU HAVE BEEN MOVING AROUND A LOT MORE THAN USUAL: 3
9. THOUGHTS THAT YOU WOULD BE BETTER OFF DEAD, OR OF HURTING YOURSELF: 0

## 2023-10-06 ENCOUNTER — APPOINTMENT (OUTPATIENT)
Dept: CARDIOLOGY | Facility: CLINIC | Age: 68
End: 2023-10-06
Payer: MEDICARE

## 2023-10-09 ENCOUNTER — DOCUMENTATION (OUTPATIENT)
Dept: RESEARCH | Facility: HOSPITAL | Age: 68
End: 2023-10-09
Payer: MEDICARE

## 2023-10-09 ENCOUNTER — CLINICAL SUPPORT (OUTPATIENT)
Dept: HEMATOLOGY/ONCOLOGY | Facility: HOSPITAL | Age: 68
End: 2023-10-09
Payer: MEDICARE

## 2023-10-09 ENCOUNTER — OFFICE VISIT (OUTPATIENT)
Dept: HEMATOLOGY/ONCOLOGY | Facility: HOSPITAL | Age: 68
End: 2023-10-09
Payer: MEDICARE

## 2023-10-09 VITALS
TEMPERATURE: 97.9 F | HEART RATE: 111 BPM | SYSTOLIC BLOOD PRESSURE: 125 MMHG | BODY MASS INDEX: 27.93 KG/M2 | RESPIRATION RATE: 16 BRPM | HEIGHT: 71 IN | DIASTOLIC BLOOD PRESSURE: 67 MMHG | WEIGHT: 199.5 LBS | OXYGEN SATURATION: 97 %

## 2023-10-09 DIAGNOSIS — C18.1 PRIMARY MALIGNANT NEOPLASM OF APPENDIX (MULTI): ICD-10-CM

## 2023-10-09 DIAGNOSIS — C18.1 PRIMARY MALIGNANT NEOPLASM OF APPENDIX (MULTI): Primary | ICD-10-CM

## 2023-10-09 LAB
ALBUMIN SERPL BCP-MCNC: 4.3 G/DL (ref 3.4–5)
ALP SERPL-CCNC: 44 U/L (ref 33–136)
ALT SERPL W P-5'-P-CCNC: 20 U/L (ref 10–52)
ANION GAP SERPL CALC-SCNC: 16 MMOL/L (ref 10–20)
AST SERPL W P-5'-P-CCNC: 19 U/L (ref 9–39)
BASOPHILS # BLD AUTO: 0.03 X10*3/UL (ref 0–0.1)
BASOPHILS NFR BLD AUTO: 0.5 %
BILIRUB SERPL-MCNC: 0.6 MG/DL (ref 0–1.2)
BUN SERPL-MCNC: 26 MG/DL (ref 6–23)
CALCIUM SERPL-MCNC: 9.4 MG/DL (ref 8.6–10.3)
CHLORIDE SERPL-SCNC: 102 MMOL/L (ref 98–107)
CO2 SERPL-SCNC: 24 MMOL/L (ref 21–32)
CREAT SERPL-MCNC: 1.04 MG/DL (ref 0.5–1.3)
EOSINOPHIL # BLD AUTO: 0.06 X10*3/UL (ref 0–0.7)
EOSINOPHIL NFR BLD AUTO: 1 %
ERYTHROCYTE [DISTWIDTH] IN BLOOD BY AUTOMATED COUNT: 14.6 % (ref 11.5–14.5)
GFR SERPL CREATININE-BSD FRML MDRD: 79 ML/MIN/1.73M*2
GLUCOSE SERPL-MCNC: 182 MG/DL (ref 74–99)
HCT VFR BLD AUTO: 35.5 % (ref 41–52)
HGB BLD-MCNC: 11.4 G/DL (ref 13.5–17.5)
IMM GRANULOCYTES # BLD AUTO: 0.01 X10*3/UL (ref 0–0.7)
IMM GRANULOCYTES NFR BLD AUTO: 0.2 % (ref 0–0.9)
LYMPHOCYTES # BLD AUTO: 1.55 X10*3/UL (ref 1.2–4.8)
LYMPHOCYTES NFR BLD AUTO: 25.1 %
MCH RBC QN AUTO: 26.3 PG (ref 26–34)
MCHC RBC AUTO-ENTMCNC: 32.1 G/DL (ref 32–36)
MCV RBC AUTO: 82 FL (ref 80–100)
MONOCYTES # BLD AUTO: 0.5 X10*3/UL (ref 0.1–1)
MONOCYTES NFR BLD AUTO: 8.1 %
NEUTROPHILS # BLD AUTO: 4.03 X10*3/UL (ref 1.2–7.7)
NEUTROPHILS NFR BLD AUTO: 65.1 %
NRBC BLD-RTO: 0 /100 WBCS (ref 0–0)
PLATELET # BLD AUTO: 358 X10*3/UL (ref 150–450)
PMV BLD AUTO: 9.2 FL (ref 7.5–11.5)
POTASSIUM SERPL-SCNC: 4 MMOL/L (ref 3.5–5.3)
PROT SERPL-MCNC: 7.1 G/DL (ref 6.4–8.2)
RBC # BLD AUTO: 4.33 X10*6/UL (ref 4.5–5.9)
SODIUM SERPL-SCNC: 138 MMOL/L (ref 136–145)
WBC # BLD AUTO: 6.2 X10*3/UL (ref 4.4–11.3)

## 2023-10-09 PROCEDURE — 1126F AMNT PAIN NOTED NONE PRSNT: CPT | Performed by: STUDENT IN AN ORGANIZED HEALTH CARE EDUCATION/TRAINING PROGRAM

## 2023-10-09 PROCEDURE — 3074F SYST BP LT 130 MM HG: CPT | Performed by: STUDENT IN AN ORGANIZED HEALTH CARE EDUCATION/TRAINING PROGRAM

## 2023-10-09 PROCEDURE — 1160F RVW MEDS BY RX/DR IN RCRD: CPT | Performed by: STUDENT IN AN ORGANIZED HEALTH CARE EDUCATION/TRAINING PROGRAM

## 2023-10-09 PROCEDURE — 1036F TOBACCO NON-USER: CPT | Performed by: STUDENT IN AN ORGANIZED HEALTH CARE EDUCATION/TRAINING PROGRAM

## 2023-10-09 PROCEDURE — 36591 DRAW BLOOD OFF VENOUS DEVICE: CPT

## 2023-10-09 PROCEDURE — 1159F MED LIST DOCD IN RCRD: CPT | Performed by: STUDENT IN AN ORGANIZED HEALTH CARE EDUCATION/TRAINING PROGRAM

## 2023-10-09 PROCEDURE — 4010F ACE/ARB THERAPY RXD/TAKEN: CPT | Performed by: STUDENT IN AN ORGANIZED HEALTH CARE EDUCATION/TRAINING PROGRAM

## 2023-10-09 PROCEDURE — 85025 COMPLETE CBC W/AUTO DIFF WBC: CPT

## 2023-10-09 PROCEDURE — 99214 OFFICE O/P EST MOD 30 MIN: CPT | Performed by: STUDENT IN AN ORGANIZED HEALTH CARE EDUCATION/TRAINING PROGRAM

## 2023-10-09 PROCEDURE — 3051F HG A1C>EQUAL 7.0%<8.0%: CPT | Performed by: STUDENT IN AN ORGANIZED HEALTH CARE EDUCATION/TRAINING PROGRAM

## 2023-10-09 PROCEDURE — 3066F NEPHROPATHY DOC TX: CPT | Performed by: STUDENT IN AN ORGANIZED HEALTH CARE EDUCATION/TRAINING PROGRAM

## 2023-10-09 PROCEDURE — 3008F BODY MASS INDEX DOCD: CPT | Performed by: STUDENT IN AN ORGANIZED HEALTH CARE EDUCATION/TRAINING PROGRAM

## 2023-10-09 PROCEDURE — 80053 COMPREHEN METABOLIC PANEL: CPT

## 2023-10-09 PROCEDURE — 3078F DIAST BP <80 MM HG: CPT | Performed by: STUDENT IN AN ORGANIZED HEALTH CARE EDUCATION/TRAINING PROGRAM

## 2023-10-09 PROCEDURE — 96523 IRRIG DRUG DELIVERY DEVICE: CPT

## 2023-10-09 RX ORDER — ACETAMINOPHEN 325 MG/1
650 TABLET ORAL EVERY 6 HOURS PRN
COMMUNITY
End: 2023-11-05 | Stop reason: WASHOUT

## 2023-10-09 RX ORDER — HEPARIN 100 UNIT/ML
500 SYRINGE INTRAVENOUS AS NEEDED
Status: CANCELLED | OUTPATIENT
Start: 2023-10-09

## 2023-10-09 RX ORDER — EMPAGLIFLOZIN 25 MG/1
25 TABLET, FILM COATED ORAL DAILY
COMMUNITY
Start: 2023-10-01 | End: 2023-11-28 | Stop reason: SDUPTHER

## 2023-10-09 RX ORDER — HEPARIN SODIUM,PORCINE/PF 10 UNIT/ML
50 SYRINGE (ML) INTRAVENOUS AS NEEDED
Status: CANCELLED | OUTPATIENT
Start: 2023-10-09

## 2023-10-09 ASSESSMENT — ENCOUNTER SYMPTOMS
LOSS OF SENSATION IN FEET: 1
DEPRESSION: 0
OCCASIONAL FEELINGS OF UNSTEADINESS: 0

## 2023-10-09 ASSESSMENT — PATIENT HEALTH QUESTIONNAIRE - PHQ9
7. TROUBLE CONCENTRATING ON THINGS, SUCH AS READING THE NEWSPAPER OR WATCHING TELEVISION: 1
3. TROUBLE FALLING OR STAYING ASLEEP OR SLEEPING TOO MUCH: 1
SUM OF ALL RESPONSES TO PHQ QUESTIONS 1-9: 8
6. FEELING BAD ABOUT YOURSELF - OR THAT YOU ARE A FAILURE OR HAVE LET YOURSELF OR YOUR FAMILY DOWN: NOT AT ALL
1. LITTLE INTEREST OR PLEASURE IN DOING THINGS: 0
5. POOR APPETITE OR OVEREATING: NOT AT ALL
8. MOVING OR SPEAKING SO SLOWLY THAT OTHER PEOPLE COULD HAVE NOTICED. OR THE OPPOSITE, BEING SO FIGETY OR RESTLESS THAT YOU HAVE BEEN MOVING AROUND A LOT MORE THAN USUAL: 3
SUM OF ALL RESPONSES TO PHQ QUESTIONS 1-9: 8
9. THOUGHTS THAT YOU WOULD BE BETTER OFF DEAD, OR OF HURTING YOURSELF: 0
8. MOVING OR SPEAKING SO SLOWLY THAT OTHER PEOPLE COULD HAVE NOTICED. OR THE OPPOSITE, BEING SO FIGETY OR RESTLESS THAT YOU HAVE BEEN MOVING AROUND A LOT MORE THAN USUAL: NEARLY EVERY DAY
9. THOUGHTS THAT YOU WOULD BE BETTER OFF DEAD, OR OF HURTING YOURSELF: NOT AT ALL
2. FEELING DOWN, DEPRESSED, IRRITABLE, OR HOPELESS: NOT AT ALL
10. IF YOU CHECKED OFF ANY PROBLEMS, HOW DIFFICULT HAVE THESE PROBLEMS MADE IT FOR YOU TO DO YOUR WORK, TAKE CARE OF THINGS AT HOME, OR GET ALONG WITH OTHER PEOPLE: SOMEWHAT DIFFICULT
8. MOVING OR SPEAKING SO SLOWLY THAT OTHER PEOPLE COULD HAVE NOTICED. OR THE OPPOSITE, BEING SO FIGETY OR RESTLESS THAT YOU HAVE BEEN MOVING AROUND A LOT MORE THAN USUAL: NEARLY EVERY DAY
4. FEELING TIRED OR HAVING LITTLE ENERGY: 3
8. MOVING OR SPEAKING SO SLOWLY THAT OTHER PEOPLE COULD HAVE NOTICED. OR THE OPPOSITE, BEING SO FIGETY OR RESTLESS THAT YOU HAVE BEEN MOVING AROUND A LOT MORE THAN USUAL: NEARLY EVERY DAY
3. TROUBLE FALLING OR STAYING ASLEEP OR SLEEPING TOO MUCH: SEVERAL DAYS
2. FEELING DOWN, DEPRESSED OR HOPELESS: NOT AT ALL
SUM OF ALL RESPONSES TO PHQ QUESTIONS 1-9: 8
8. MOVING OR SPEAKING SO SLOWLY THAT OTHER PEOPLE COULD HAVE NOTICED. OR THE OPPOSITE, BEING SO FIGETY OR RESTLESS THAT YOU HAVE BEEN MOVING AROUND A LOT MORE THAN USUAL: 3
7. TROUBLE CONCENTRATING ON THINGS, SUCH AS READING THE NEWSPAPER OR WATCHING TELEVISION: 1
2. FEELING DOWN, DEPRESSED, IRRITABLE, OR HOPELESS: 0
2. FEELING DOWN, DEPRESSED, IRRITABLE, OR HOPELESS: NOT AT ALL
1. LITTLE INTEREST OR PLEASURE IN DOING THINGS: NOT AT ALL
7. TROUBLE CONCENTRATING ON THINGS, SUCH AS READING THE NEWSPAPER OR WATCHING TELEVISION: SEVERAL DAYS
10. IF YOU CHECKED OFF ANY PROBLEMS, HOW DIFFICULT HAVE THESE PROBLEMS MADE IT FOR YOU TO DO YOUR WORK, TAKE CARE OF THINGS AT HOME, OR GET ALONG WITH OTHER PEOPLE: SOMEWHAT DIFFICULT
7. TROUBLE CONCENTRATING ON THINGS, SUCH AS READING THE NEWSPAPER OR WATCHING TELEVISION: SEVERAL DAYS
1. LITTLE INTEREST OR PLEASURE IN DOING THINGS: NOT AT ALL
3. TROUBLE FALLING OR STAYING ASLEEP OR SLEEPING TOO MUCH: SEVERAL DAYS
9. THOUGHTS THAT YOU WOULD BE BETTER OFF DEAD, OR OF HURTING YOURSELF: NOT AT ALL
SUM OF ALL RESPONSES TO PHQ QUESTIONS 1-9: 8
5. POOR APPETITE OR OVEREATING: 0
6. FEELING BAD ABOUT YOURSELF - OR THAT YOU ARE A FAILURE OR HAVE LET YOURSELF OR YOUR FAMILY DOWN: NOT AT ALL
5. POOR APPETITE OR OVEREATING: NOT AT ALL
6. FEELING BAD ABOUT YOURSELF - OR THAT YOU ARE A FAILURE OR HAVE LET YOURSELF OR YOUR FAMILY DOWN: NOT AT ALL
9. THOUGHTS THAT YOU WOULD BE BETTER OFF DEAD, OR OF HURTING YOURSELF: NOT AT ALL
9. THOUGHTS THAT YOU WOULD BE BETTER OFF DEAD, OR OF HURTING YOURSELF: NOT AT ALL
5. POOR APPETITE OR OVEREATING: 0
2. FEELING DOWN, DEPRESSED, IRRITABLE, OR HOPELESS: 0
1. LITTLE INTEREST OR PLEASURE IN DOING THINGS: 0
5. POOR APPETITE OR OVEREATING: NOT AT ALL
6. FEELING BAD ABOUT YOURSELF - OR THAT YOU ARE A FAILURE OR HAVE LET YOURSELF OR YOUR FAMILY DOWN: NOT AT ALL
3. TROUBLE FALLING OR STAYING ASLEEP OR SLEEPING TOO MUCH: SEVERAL DAYS
10. IF YOU CHECKED OFF ANY PROBLEMS, HOW DIFFICULT HAVE THESE PROBLEMS MADE IT FOR YOU TO DO YOUR WORK, TAKE CARE OF THINGS AT HOME, OR GET ALONG WITH OTHER PEOPLE: SOMEWHAT DIFFICULT
5. POOR APPETITE OR OVEREATING: NOT AT ALL
1. LITTLE INTEREST OR PLEASURE IN DOING THINGS: NOT AT ALL
4. FEELING TIRED OR HAVING LITTLE ENERGY: 3
4. FEELING TIRED OR HAVING LITTLE ENERGY: NEARLY EVERY DAY
3. TROUBLE FALLING OR STAYING ASLEEP OR SLEEPING TOO MUCH: 1
9. THOUGHTS THAT YOU WOULD BE BETTER OFF DEAD, OR OF HURTING YOURSELF: 0
1. LITTLE INTEREST OR PLEASURE IN DOING THINGS: NOT AT ALL
3. TROUBLE FALLING OR STAYING ASLEEP OR SLEEPING TOO MUCH: SEVERAL DAYS
10. IF YOU CHECKED OFF ANY PROBLEMS, HOW DIFFICULT HAVE THESE PROBLEMS MADE IT FOR YOU TO DO YOUR WORK, TAKE CARE OF THINGS AT HOME, OR GET ALONG WITH OTHER PEOPLE: SOMEWHAT DIFFICULT
8. MOVING OR SPEAKING SO SLOWLY THAT OTHER PEOPLE COULD HAVE NOTICED. OR THE OPPOSITE, BEING SO FIGETY OR RESTLESS THAT YOU HAVE BEEN MOVING AROUND A LOT MORE THAN USUAL: NEARLY EVERY DAY
2. FEELING DOWN, DEPRESSED OR HOPELESS: NOT AT ALL
4. FEELING TIRED OR HAVING LITTLE ENERGY: NEARLY EVERY DAY
7. TROUBLE CONCENTRATING ON THINGS, SUCH AS READING THE NEWSPAPER OR WATCHING TELEVISION: SEVERAL DAYS
7. TROUBLE CONCENTRATING ON THINGS, SUCH AS READING THE NEWSPAPER OR WATCHING TELEVISION: SEVERAL DAYS
6. FEELING BAD ABOUT YOURSELF - OR THAT YOU ARE A FAILURE OR HAVE LET YOURSELF OR YOUR FAMILY DOWN: 0
4. FEELING TIRED OR HAVING LITTLE ENERGY: NEARLY EVERY DAY
6. FEELING BAD ABOUT YOURSELF - OR THAT YOU ARE A FAILURE OR HAVE LET YOURSELF OR YOUR FAMILY DOWN: 0
4. FEELING TIRED OR HAVING LITTLE ENERGY: NEARLY EVERY DAY

## 2023-10-09 ASSESSMENT — COLUMBIA-SUICIDE SEVERITY RATING SCALE - C-SSRS
2. HAVE YOU ACTUALLY HAD ANY THOUGHTS OF KILLING YOURSELF?: NO
1. IN THE PAST MONTH, HAVE YOU WISHED YOU WERE DEAD OR WISHED YOU COULD GO TO SLEEP AND NOT WAKE UP?: NO
6. HAVE YOU EVER DONE ANYTHING, STARTED TO DO ANYTHING, OR PREPARED TO DO ANYTHING TO END YOUR LIFE?: NO

## 2023-10-09 ASSESSMENT — PAIN SCALES - GENERAL: PAINLEVEL: 0-NO PAIN

## 2023-10-09 NOTE — PROGRESS NOTES
Mr. Bunny CAMPOVERDE Shadedonaldo reports the knot to the access site at right groin from 9/5/23 procedure is larger than a BB, smaller than a pea. Pain rated 3/10. Per Dr. Preston, does not sound concerning. Patient updated.

## 2023-10-09 NOTE — PROGRESS NOTES
Bunny Abarca's appointment on 10/3/2023 with Dr. Sulaiman Preston was noted to have been cancelled. Patient contacted by telephone to complete his 30-day follow up visit for the ENGULF study. Current medication list was reviewed and Medications were cleaned up in his medical record per his request. He reports he feels well and has had no ED visits or hospitalizations since he was discharged from the hospital. He reports he still feels some shortness of breath while climbing a flight of stairs and his heart rate goes up to 120-130, but otherwise he feels well (aside from side effects of nausea and fatigue from the chemotherapy). He reports he does feel a knot and some soreness at the right groin access site. No current bruising - formerly bruised but healed. He states he is also feeling right testicular pain and wonders if this may be due to a right inguinal hernia he has. Message sent to Dr. Preston via Secure Chat. Awaiting reply.

## 2023-10-10 ENCOUNTER — APPOINTMENT (OUTPATIENT)
Dept: HEMATOLOGY/ONCOLOGY | Facility: HOSPITAL | Age: 68
End: 2023-10-10
Payer: MEDICARE

## 2023-10-10 ENCOUNTER — INFUSION (OUTPATIENT)
Dept: HEMATOLOGY/ONCOLOGY | Facility: CLINIC | Age: 68
End: 2023-10-10
Payer: MEDICARE

## 2023-10-10 VITALS
HEART RATE: 106 BPM | BODY MASS INDEX: 28.4 KG/M2 | SYSTOLIC BLOOD PRESSURE: 139 MMHG | TEMPERATURE: 97.3 F | OXYGEN SATURATION: 96 % | DIASTOLIC BLOOD PRESSURE: 79 MMHG | WEIGHT: 200.84 LBS

## 2023-10-10 VITALS — BODY MASS INDEX: 27.93 KG/M2 | WEIGHT: 199.52 LBS | HEIGHT: 71 IN

## 2023-10-10 DIAGNOSIS — C18.1 PRIMARY MALIGNANT NEOPLASM OF APPENDIX (MULTI): ICD-10-CM

## 2023-10-10 PROCEDURE — 2500000004 HC RX 250 GENERAL PHARMACY W/ HCPCS (ALT 636 FOR OP/ED): Performed by: STUDENT IN AN ORGANIZED HEALTH CARE EDUCATION/TRAINING PROGRAM

## 2023-10-10 PROCEDURE — 96523 IRRIG DRUG DELIVERY DEVICE: CPT

## 2023-10-10 PROCEDURE — 96375 TX/PRO/DX INJ NEW DRUG ADDON: CPT

## 2023-10-10 PROCEDURE — 96415 CHEMO IV INFUSION ADDL HR: CPT

## 2023-10-10 PROCEDURE — 96416 CHEMO PROLONG INFUSE W/PUMP: CPT

## 2023-10-10 PROCEDURE — 96413 CHEMO IV INFUSION 1 HR: CPT

## 2023-10-10 RX ORDER — FAMOTIDINE 10 MG/ML
20 INJECTION INTRAVENOUS ONCE AS NEEDED
Status: DISCONTINUED | OUTPATIENT
Start: 2023-10-10 | End: 2023-10-10 | Stop reason: HOSPADM

## 2023-10-10 RX ORDER — FAMOTIDINE 10 MG/ML
20 INJECTION INTRAVENOUS ONCE AS NEEDED
Status: CANCELLED | OUTPATIENT
Start: 2023-10-10

## 2023-10-10 RX ORDER — LORAZEPAM 2 MG/ML
1 INJECTION INTRAMUSCULAR AS NEEDED
Status: DISCONTINUED | OUTPATIENT
Start: 2023-10-10 | End: 2023-10-10 | Stop reason: HOSPADM

## 2023-10-10 RX ORDER — PALONOSETRON 0.05 MG/ML
0.25 INJECTION, SOLUTION INTRAVENOUS ONCE
Status: COMPLETED | OUTPATIENT
Start: 2023-10-10 | End: 2023-10-10

## 2023-10-10 RX ORDER — EPINEPHRINE 0.3 MG/.3ML
0.3 INJECTION SUBCUTANEOUS EVERY 5 MIN PRN
Status: DISCONTINUED | OUTPATIENT
Start: 2023-10-10 | End: 2023-10-10 | Stop reason: HOSPADM

## 2023-10-10 RX ORDER — PROCHLORPERAZINE EDISYLATE 5 MG/ML
10 INJECTION INTRAMUSCULAR; INTRAVENOUS EVERY 6 HOURS PRN
Status: DISCONTINUED | OUTPATIENT
Start: 2023-10-10 | End: 2023-10-10 | Stop reason: HOSPADM

## 2023-10-10 RX ORDER — EPINEPHRINE 0.3 MG/.3ML
0.3 INJECTION SUBCUTANEOUS EVERY 5 MIN PRN
Status: CANCELLED | OUTPATIENT
Start: 2023-10-10

## 2023-10-10 RX ORDER — LORAZEPAM 2 MG/ML
1 INJECTION INTRAMUSCULAR AS NEEDED
Status: CANCELLED | OUTPATIENT
Start: 2023-10-10

## 2023-10-10 RX ORDER — PROCHLORPERAZINE EDISYLATE 5 MG/ML
10 INJECTION INTRAMUSCULAR; INTRAVENOUS EVERY 6 HOURS PRN
Status: CANCELLED | OUTPATIENT
Start: 2023-10-10

## 2023-10-10 RX ORDER — PROCHLORPERAZINE MALEATE 10 MG
10 TABLET ORAL EVERY 6 HOURS PRN
Status: DISCONTINUED | OUTPATIENT
Start: 2023-10-10 | End: 2023-10-10 | Stop reason: HOSPADM

## 2023-10-10 RX ORDER — DIPHENHYDRAMINE HYDROCHLORIDE 50 MG/ML
50 INJECTION INTRAMUSCULAR; INTRAVENOUS AS NEEDED
Status: CANCELLED | OUTPATIENT
Start: 2023-10-10

## 2023-10-10 RX ORDER — PALONOSETRON 0.05 MG/ML
0.25 INJECTION, SOLUTION INTRAVENOUS ONCE
Status: CANCELLED | OUTPATIENT
Start: 2023-10-10

## 2023-10-10 RX ORDER — DIPHENHYDRAMINE HYDROCHLORIDE 50 MG/ML
50 INJECTION INTRAMUSCULAR; INTRAVENOUS AS NEEDED
Status: DISCONTINUED | OUTPATIENT
Start: 2023-10-10 | End: 2023-10-10 | Stop reason: HOSPADM

## 2023-10-10 RX ORDER — PROCHLORPERAZINE MALEATE 10 MG
10 TABLET ORAL EVERY 6 HOURS PRN
Status: CANCELLED | OUTPATIENT
Start: 2023-10-10

## 2023-10-10 RX ORDER — ALBUTEROL SULFATE 0.83 MG/ML
3 SOLUTION RESPIRATORY (INHALATION) AS NEEDED
Status: CANCELLED | OUTPATIENT
Start: 2023-10-10

## 2023-10-10 RX ORDER — ALBUTEROL SULFATE 0.83 MG/ML
3 SOLUTION RESPIRATORY (INHALATION) AS NEEDED
Status: DISCONTINUED | OUTPATIENT
Start: 2023-10-10 | End: 2023-10-10 | Stop reason: HOSPADM

## 2023-10-10 RX ADMIN — OXALIPLATIN 180 MG: 100 INJECTION, SOLUTION, CONCENTRATE INTRAVENOUS at 13:47

## 2023-10-10 RX ADMIN — PALONOSETRON HYDROCHLORIDE 250 MCG: 0.25 INJECTION INTRAVENOUS at 13:17

## 2023-10-10 RX ADMIN — DEXAMETHASONE SODIUM PHOSPHATE 12 MG: 10 INJECTION, SOLUTION INTRAMUSCULAR; INTRAVENOUS at 13:19

## 2023-10-10 RX ADMIN — FLUOROURACIL 5100 MG: 50 INJECTION, SOLUTION INTRAVENOUS at 16:15

## 2023-10-10 ASSESSMENT — PAIN SCALES - GENERAL: PAINLEVEL: 2

## 2023-10-10 NOTE — PROGRESS NOTES
Patient ID: Bunny Abarca is a 68 y.o. male.  DIAGNOSIS: Appendiceal adenocarcinoma    STAGING: pT4a pN1a Mx    CURRENT SITES OF DISEASE:    MOLECULAR/GENOMICS:  MMR-intact    CURRENT THERAPY:  Adjuvant FOLFOX every 2 weeks x 12 cycles started on 8/29/23    PREVIOUS THERAPY:  6/20/23: S/p R hemicolectomy    ONCOLOGIC ISSUES:    PROVIDERS:  CRS: Jazmin Faulkner    HISTORY:   4/2023: presented with R inguinal pain. Thought to have a hernia.  4/17/23: CT abdomen pelvis showed indeterminate masslike lesion at the cecum posteriorly at the expected level of the appendix measuring 3 x 4 cm in dimension.  Also within the left pelvis near the origin of the left inguinal canal there is an indeterminate cavitary mass measuring 2.5 x 2 cm  5/5/23: Underwent colonoscopy.  Report not available.  Pathology of cecal/appendiceal orifice mass biopsy showed poorly differentiated adenocarcinoma with signet ring cell differentiation  5/17/23: CT chest abdomen pelvis showed no evidence of metastatic disease, again noted soft tissue mass near the base of the cecum measuring 4.2 x 2.8 cm, compatible with reported history of appendiceal carcinoma  6/20/23: Underwent right hemicolectomy.  Final pathology showed appendiceal invasive moderately differentiated mucinous adenocarcinoma measuring 5 cm.  Tumor invades the visceral peritoneum.  No lymphovascular or perineural invasion. 1/44 LNs involved.  MMR protein expression intact    PMH: HTN, Gilbert's disease, HLD, DM  SH: , no tobacco, EtOH, illicits  FH: Sister and nephew with Rodriguez syndrome.      Subjective    Here for C3 FOLFOX. Notices more fatigue  + cold sensitivity but it's not debilitating  + nausea on day 3, antiemetics made him dizzy.   Took Tylenol which seemed to help pain  Diarrhea 2 days after pump disconnect, didn't take imodium and it resolved on its own    No fevers, chills, chest pain, shortness of breath, vomiting, rashes or masses.     ROS as above. Remainder of 10  "point review of systems elicited and otherwise unremarkable.              Objective    BSA: 2.12 meters squared  /67 (BP Location: Left arm, Patient Position: Sitting, BP Cuff Size: Large adult)   Pulse (!) 111   Temp 36.6 °C (97.9 °F)   Resp 16   Ht (S) 1.791 m (5' 10.51\")   Wt 90.5 kg (199 lb 8 oz)   SpO2 97%   BMI 28.21 kg/m²      Physical Exam  Gen: A&O, NAD  Head: Normocephalic, atraumatic  Eyes: no scleral icterus  ENT: mucous membranes moist, no oropharyngeal lesions  Resp: Lungs CTAB  Cardiac: Normal rate, regular rhythm, no murmurs appreciated  Abdomen: Soft, nondistended, nontender, +BS  Neuro: CNII-XII grossly intact  Psych: appropriate mood & affect  Skin: warm, dry, no apparent rashes   Performance Status:  Symptomatic; fully ambulatory      Assessment/Plan    Mr. 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 Faulkner.     He presents today for discussion of adjuvant chemotherapy. I personally reviewed the images from the recent CT, which show no evidence of metastatic disease.  I reviewed the results of his pathology synoptic report, which show pT4a pN1a, Stage III cancer.     I discussed with him and his wife at length that given the stage of his appendiceal cancer, I recommend systemic chemotherapy.  The options are for FOLFOX versus capecitabine plus oxaliplatin.  Given the high risk nature of the diagnosis, my preference is for 6 months of adjuvant chemotherapy, and FOLFOX is better tolerated for this duration.    I discussed the risks and benefits and side effect profile of FOLFOX chemotherapy, and he agrees to proceed.    After 1st cycle of FOLFOX, found to have extensive PE throughout B/L lungs including saddle embolus of main pulmonary artery. On 9/5/23 he had aspiration thrombectomy and was placed on Apixaban. Discussed admission with Dr. Webb. Ok to proceed with chemotherapy as long as patient is feeling well.     10/9/23: " After 2nd cycle, he experienced fatigue, cold sensitivity, nausea and diarrhea.    Plan:  Stage III appendiceal cancer  - Plan for 6mo goal of adjuvant chemotherapy with FOLFOX  - Proceed with C3 FOLFOX tomorrow, no dose limiting side effects  - RTC 2 weeks for C4    Pulmonary Embolism including saddle embolism  - continue on Apixaban  - has follow up with vascular who will manage apixaban       Cancer Staging   No matching staging information was found for the patient.    Oncology History    No history exists.                 Socorro Kate, APRN-CNP

## 2023-10-12 ENCOUNTER — INFUSION (OUTPATIENT)
Dept: HEMATOLOGY/ONCOLOGY | Facility: CLINIC | Age: 68
End: 2023-10-12
Payer: MEDICARE

## 2023-10-12 VITALS
OXYGEN SATURATION: 94 % | TEMPERATURE: 98.6 F | HEART RATE: 98 BPM | SYSTOLIC BLOOD PRESSURE: 112 MMHG | DIASTOLIC BLOOD PRESSURE: 57 MMHG | RESPIRATION RATE: 18 BRPM

## 2023-10-12 DIAGNOSIS — C18.1 PRIMARY MALIGNANT NEOPLASM OF APPENDIX (MULTI): ICD-10-CM

## 2023-10-12 PROCEDURE — 96523 IRRIG DRUG DELIVERY DEVICE: CPT

## 2023-10-12 PROCEDURE — 2500000004 HC RX 250 GENERAL PHARMACY W/ HCPCS (ALT 636 FOR OP/ED): Performed by: STUDENT IN AN ORGANIZED HEALTH CARE EDUCATION/TRAINING PROGRAM

## 2023-10-12 RX ORDER — HEPARIN SODIUM,PORCINE/PF 10 UNIT/ML
50 SYRINGE (ML) INTRAVENOUS AS NEEDED
Status: CANCELLED | OUTPATIENT
Start: 2023-10-12

## 2023-10-12 RX ORDER — HEPARIN SODIUM,PORCINE/PF 10 UNIT/ML
50 SYRINGE (ML) INTRAVENOUS AS NEEDED
Status: DISCONTINUED | OUTPATIENT
Start: 2023-10-12 | End: 2023-10-12 | Stop reason: HOSPADM

## 2023-10-12 RX ORDER — HEPARIN 100 UNIT/ML
500 SYRINGE INTRAVENOUS AS NEEDED
Status: DISCONTINUED | OUTPATIENT
Start: 2023-10-12 | End: 2023-10-12 | Stop reason: HOSPADM

## 2023-10-12 RX ORDER — HEPARIN 100 UNIT/ML
500 SYRINGE INTRAVENOUS AS NEEDED
Status: CANCELLED | OUTPATIENT
Start: 2023-10-12

## 2023-10-12 RX ADMIN — HEPARIN 500 UNITS: 100 SYRINGE at 14:24

## 2023-10-12 ASSESSMENT — PAIN SCALES - GENERAL: PAINLEVEL: 0-NO PAIN

## 2023-10-16 ENCOUNTER — OFFICE VISIT (OUTPATIENT)
Dept: CARDIOLOGY | Facility: CLINIC | Age: 68
End: 2023-10-16
Payer: MEDICARE

## 2023-10-16 VITALS
HEIGHT: 70 IN | OXYGEN SATURATION: 97 % | SYSTOLIC BLOOD PRESSURE: 134 MMHG | DIASTOLIC BLOOD PRESSURE: 75 MMHG | BODY MASS INDEX: 28.38 KG/M2 | WEIGHT: 198.25 LBS | HEART RATE: 118 BPM

## 2023-10-16 DIAGNOSIS — T88.8XXA OTHER SPECIFIED COMPLICATIONS OF SURGICAL AND MEDICAL CARE, NOT ELSEWHERE CLASSIFIED, INITIAL ENCOUNTER: ICD-10-CM

## 2023-10-16 DIAGNOSIS — I26.02 ACUTE SADDLE PULMONARY EMBOLISM WITH ACUTE COR PULMONALE (MULTI): Primary | ICD-10-CM

## 2023-10-16 DIAGNOSIS — R10.31 RIGHT GROIN PAIN: ICD-10-CM

## 2023-10-16 PROCEDURE — 1126F AMNT PAIN NOTED NONE PRSNT: CPT | Performed by: INTERNAL MEDICINE

## 2023-10-16 PROCEDURE — 99214 OFFICE O/P EST MOD 30 MIN: CPT | Performed by: INTERNAL MEDICINE

## 2023-10-16 PROCEDURE — 99214 OFFICE O/P EST MOD 30 MIN: CPT | Mod: PO | Performed by: INTERNAL MEDICINE

## 2023-10-16 PROCEDURE — 3066F NEPHROPATHY DOC TX: CPT | Performed by: INTERNAL MEDICINE

## 2023-10-16 PROCEDURE — 1160F RVW MEDS BY RX/DR IN RCRD: CPT | Performed by: INTERNAL MEDICINE

## 2023-10-16 PROCEDURE — 1036F TOBACCO NON-USER: CPT | Performed by: INTERNAL MEDICINE

## 2023-10-16 PROCEDURE — 3008F BODY MASS INDEX DOCD: CPT | Performed by: INTERNAL MEDICINE

## 2023-10-16 PROCEDURE — 4010F ACE/ARB THERAPY RXD/TAKEN: CPT | Performed by: INTERNAL MEDICINE

## 2023-10-16 PROCEDURE — 1159F MED LIST DOCD IN RCRD: CPT | Performed by: INTERNAL MEDICINE

## 2023-10-16 PROCEDURE — 3075F SYST BP GE 130 - 139MM HG: CPT | Performed by: INTERNAL MEDICINE

## 2023-10-16 PROCEDURE — 3051F HG A1C>EQUAL 7.0%<8.0%: CPT | Performed by: INTERNAL MEDICINE

## 2023-10-16 PROCEDURE — 3078F DIAST BP <80 MM HG: CPT | Performed by: INTERNAL MEDICINE

## 2023-10-16 ASSESSMENT — PATIENT HEALTH QUESTIONNAIRE - PHQ9
5. POOR APPETITE OR OVEREATING: SEVERAL DAYS
6. FEELING BAD ABOUT YOURSELF - OR THAT YOU ARE A FAILURE OR HAVE LET YOURSELF OR YOUR FAMILY DOWN: 0
8. MOVING OR SPEAKING SO SLOWLY THAT OTHER PEOPLE COULD HAVE NOTICED. OR THE OPPOSITE, BEING SO FIGETY OR RESTLESS THAT YOU HAVE BEEN MOVING AROUND A LOT MORE THAN USUAL: NEARLY EVERY DAY
4. FEELING TIRED OR HAVING LITTLE ENERGY: 3
SUM OF ALL RESPONSES TO PHQ QUESTIONS 1-9: 9
2. FEELING DOWN, DEPRESSED, IRRITABLE, OR HOPELESS: NOT AT ALL
7. TROUBLE CONCENTRATING ON THINGS, SUCH AS READING THE NEWSPAPER OR WATCHING TELEVISION: NOT AT ALL
8. MOVING OR SPEAKING SO SLOWLY THAT OTHER PEOPLE COULD HAVE NOTICED. OR THE OPPOSITE, BEING SO FIGETY OR RESTLESS THAT YOU HAVE BEEN MOVING AROUND A LOT MORE THAN USUAL: 3
9. THOUGHTS THAT YOU WOULD BE BETTER OFF DEAD, OR OF HURTING YOURSELF: NOT AT ALL
1. LITTLE INTEREST OR PLEASURE IN DOING THINGS: 0
7. TROUBLE CONCENTRATING ON THINGS, SUCH AS READING THE NEWSPAPER OR WATCHING TELEVISION: 0
10. IF YOU CHECKED OFF ANY PROBLEMS, HOW DIFFICULT HAVE THESE PROBLEMS MADE IT FOR YOU TO DO YOUR WORK, TAKE CARE OF THINGS AT HOME, OR GET ALONG WITH OTHER PEOPLE: VERY DIFFICULT
3. TROUBLE FALLING OR STAYING ASLEEP OR SLEEPING TOO MUCH: MORE THAN HALF THE DAYS
9. THOUGHTS THAT YOU WOULD BE BETTER OFF DEAD, OR OF HURTING YOURSELF: 0
7. TROUBLE CONCENTRATING ON THINGS, SUCH AS READING THE NEWSPAPER OR WATCHING TELEVISION: NOT AT ALL
3. TROUBLE FALLING OR STAYING ASLEEP OR SLEEPING TOO MUCH: MORE THAN HALF THE DAYS
2. FEELING DOWN, DEPRESSED OR HOPELESS: NOT AT ALL
2. FEELING DOWN, DEPRESSED, IRRITABLE, OR HOPELESS: 0
6. FEELING BAD ABOUT YOURSELF - OR THAT YOU ARE A FAILURE OR HAVE LET YOURSELF OR YOUR FAMILY DOWN: NOT AT ALL
9. THOUGHTS THAT YOU WOULD BE BETTER OFF DEAD, OR OF HURTING YOURSELF: 0
10. IF YOU CHECKED OFF ANY PROBLEMS, HOW DIFFICULT HAVE THESE PROBLEMS MADE IT FOR YOU TO DO YOUR WORK, TAKE CARE OF THINGS AT HOME, OR GET ALONG WITH OTHER PEOPLE: VERY DIFFICULT
SUM OF ALL RESPONSES TO PHQ QUESTIONS 1-9: 9
2. FEELING DOWN, DEPRESSED, IRRITABLE, OR HOPELESS: 0
4. FEELING TIRED OR HAVING LITTLE ENERGY: NEARLY EVERY DAY
9. THOUGHTS THAT YOU WOULD BE BETTER OFF DEAD, OR OF HURTING YOURSELF: NOT AT ALL
5. POOR APPETITE OR OVEREATING: SEVERAL DAYS
4. FEELING TIRED OR HAVING LITTLE ENERGY: NEARLY EVERY DAY
5. POOR APPETITE OR OVEREATING: SEVERAL DAYS
3. TROUBLE FALLING OR STAYING ASLEEP OR SLEEPING TOO MUCH: MORE THAN HALF THE DAYS
2. FEELING DOWN, DEPRESSED, IRRITABLE, OR HOPELESS: NOT AT ALL
3. TROUBLE FALLING OR STAYING ASLEEP OR SLEEPING TOO MUCH: 2
8. MOVING OR SPEAKING SO SLOWLY THAT OTHER PEOPLE COULD HAVE NOTICED. OR THE OPPOSITE, BEING SO FIGETY OR RESTLESS THAT YOU HAVE BEEN MOVING AROUND A LOT MORE THAN USUAL: NEARLY EVERY DAY
3. TROUBLE FALLING OR STAYING ASLEEP OR SLEEPING TOO MUCH: 2
8. MOVING OR SPEAKING SO SLOWLY THAT OTHER PEOPLE COULD HAVE NOTICED. OR THE OPPOSITE, BEING SO FIGETY OR RESTLESS THAT YOU HAVE BEEN MOVING AROUND A LOT MORE THAN USUAL: NEARLY EVERY DAY
5. POOR APPETITE OR OVEREATING: SEVERAL DAYS
8. MOVING OR SPEAKING SO SLOWLY THAT OTHER PEOPLE COULD HAVE NOTICED. OR THE OPPOSITE, BEING SO FIGETY OR RESTLESS THAT YOU HAVE BEEN MOVING AROUND A LOT MORE THAN USUAL: 3
4. FEELING TIRED OR HAVING LITTLE ENERGY: NEARLY EVERY DAY
3. TROUBLE FALLING OR STAYING ASLEEP OR SLEEPING TOO MUCH: MORE THAN HALF THE DAYS
SUM OF ALL RESPONSES TO PHQ QUESTIONS 1-9: 9
1. LITTLE INTEREST OR PLEASURE IN DOING THINGS: NOT AT ALL
SUM OF ALL RESPONSES TO PHQ QUESTIONS 1-9: 9
1. LITTLE INTEREST OR PLEASURE IN DOING THINGS: NOT AT ALL
2. FEELING DOWN, DEPRESSED OR HOPELESS: NOT AT ALL
8. MOVING OR SPEAKING SO SLOWLY THAT OTHER PEOPLE COULD HAVE NOTICED. OR THE OPPOSITE, BEING SO FIGETY OR RESTLESS THAT YOU HAVE BEEN MOVING AROUND A LOT MORE THAN USUAL: NEARLY EVERY DAY
5. POOR APPETITE OR OVEREATING: 1
10. IF YOU CHECKED OFF ANY PROBLEMS, HOW DIFFICULT HAVE THESE PROBLEMS MADE IT FOR YOU TO DO YOUR WORK, TAKE CARE OF THINGS AT HOME, OR GET ALONG WITH OTHER PEOPLE: VERY DIFFICULT
10. IF YOU CHECKED OFF ANY PROBLEMS, HOW DIFFICULT HAVE THESE PROBLEMS MADE IT FOR YOU TO DO YOUR WORK, TAKE CARE OF THINGS AT HOME, OR GET ALONG WITH OTHER PEOPLE: VERY DIFFICULT
7. TROUBLE CONCENTRATING ON THINGS, SUCH AS READING THE NEWSPAPER OR WATCHING TELEVISION: NOT AT ALL
4. FEELING TIRED OR HAVING LITTLE ENERGY: 3
1. LITTLE INTEREST OR PLEASURE IN DOING THINGS: NOT AT ALL
7. TROUBLE CONCENTRATING ON THINGS, SUCH AS READING THE NEWSPAPER OR WATCHING TELEVISION: 0
4. FEELING TIRED OR HAVING LITTLE ENERGY: NEARLY EVERY DAY
9. THOUGHTS THAT YOU WOULD BE BETTER OFF DEAD, OR OF HURTING YOURSELF: NOT AT ALL
1. LITTLE INTEREST OR PLEASURE IN DOING THINGS: 0
9. THOUGHTS THAT YOU WOULD BE BETTER OFF DEAD, OR OF HURTING YOURSELF: NOT AT ALL
6. FEELING BAD ABOUT YOURSELF - OR THAT YOU ARE A FAILURE OR HAVE LET YOURSELF OR YOUR FAMILY DOWN: 0
7. TROUBLE CONCENTRATING ON THINGS, SUCH AS READING THE NEWSPAPER OR WATCHING TELEVISION: NOT AT ALL
5. POOR APPETITE OR OVEREATING: 1
6. FEELING BAD ABOUT YOURSELF - OR THAT YOU ARE A FAILURE OR HAVE LET YOURSELF OR YOUR FAMILY DOWN: NOT AT ALL
1. LITTLE INTEREST OR PLEASURE IN DOING THINGS: NOT AT ALL

## 2023-10-16 ASSESSMENT — PAIN SCALES - GENERAL: PAINLEVEL: 0-NO PAIN

## 2023-10-16 ASSESSMENT — ENCOUNTER SYMPTOMS
OCCASIONAL FEELINGS OF UNSTEADINESS: 1
DEPRESSION: 0
LOSS OF SENSATION IN FEET: 1

## 2023-10-16 NOTE — PROGRESS NOTES
"Chief Complaint:   Hospital follow-up for pulmonary embolism     History of Present Illness:    Bunny Abarca is a 68 y.o. male who follows up after hospitalization for cancer-associated large PE and left common femoral, femoral, popliteal, and calf DVT. He underwent mechanical thrombectomy with evidence of some chronicity of thrombus.     He was treated with heparin and eventual transition to Eliquis 10 mg bid for 7 days followed by Eliquis 5 mg bid.    Repeat echocardiogram done a month later showed decrease in RV size and improvement in RV function, but with persistently elevated RV systolic pressures.    Still with racing heart beat when he climbs the stairs. Feels short of breath when he exerts himself.    Still undergoing chemo; he has 9 sessions to go (so about 18 weeks).    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 He has never used smokeless tobacco. No history on file for alcohol use and drug use.    Family History:  Family History   Problem Relation Name Age of Onset    Congenital heart disease Mother " "     Hyperlipidemia Father      Hypertension Father      Colon cancer Sister      Other (Multiple System Atrophy) Sister      Other (Cystic Fibrosis Gene Carrier) Sister      Other (Malignant Neoplasm Of Ovary) Sibling          Allergies:  Patient has no known allergies.    Outpatient Medications:  Current Outpatient Medications   Medication Instructions    acetaminophen (TYLENOL) 650 mg, oral, Every 6 hours PRN    apixaban (Eliquis) 5 mg tablet TAKE 1 TABLET BY MOUTH TWO TIMES A DAY    atorvastatin (LIPITOR) 40 mg, oral, Nightly    doxazosin (CARDURA) 2 mg, oral, Nightly    fenofibrate (Tricor) 145 mg tablet 1 tablet, oral, Daily, WITH FOOD    insulin asp prt-insulin aspart (NovoLOG Mix 70-30FlexPen U-100) 100 unit/mL (70-30) injection 38 Units, subcutaneous, 2 times daily    Jardiance 25 mg, oral, Daily    metFORMIN XR (Glucophage-XR) 500 mg 24 hr tablet 2 tablets, oral, 2 times daily    olmesartan (BENICAR) 40 mg, oral, Daily    pen needle, diabetic (BD Ultra-Fine Short Pen Needle) 31 gauge x 5/16\" needle 1 each, 2 times daily       Physical Exam:  No distress  No JVD or carotid bruits  Lungs clear bilaterally  Heart regular and without murmurs  Abdomen soft and non-tender  No leg swelling  Pulses intact       Last Labs:  CBC -  Lab Results   Component Value Date    WBC 6.2 10/09/2023    HGB 11.4 (L) 10/09/2023    HCT 35.5 (L) 10/09/2023    MCV 82 10/09/2023     10/09/2023       CMP -  Lab Results   Component Value Date    CALCIUM 9.4 10/09/2023    PHOS 2.9 09/07/2023    PROT 7.1 10/09/2023    ALBUMIN 4.3 10/09/2023    AST 19 10/09/2023    ALT 20 10/09/2023    ALKPHOS 44 10/09/2023    BILITOT 0.6 10/09/2023       LIPID PANEL -   Lab Results   Component Value Date    CHOL 148 12/14/2022    TRIG 137 12/14/2022    HDL 42.8 12/14/2022    CHHDL 3.5 12/14/2022    LDLF 78 12/14/2022    VLDL 27 12/14/2022    NHDL 112 08/11/2022       RENAL FUNCTION PANEL -   Lab Results   Component Value Date    GLUCOSE 182 (H) " 10/09/2023     10/09/2023    K 4.0 10/09/2023     10/09/2023    CO2 24 10/09/2023    ANIONGAP 16 10/09/2023    BUN 26 (H) 10/09/2023    CREATININE 1.04 10/09/2023    GFRMALE 70 09/25/2023    CALCIUM 9.4 10/09/2023    PHOS 2.9 09/07/2023    ALBUMIN 4.3 10/09/2023        Lab Results   Component Value Date    BNP 65 09/04/2023    HGBA1C 7.8 (A) 05/12/2023       Last Cardiology Tests:  CT chest 9/4/2023  IMPRESSION:  Extensive pulmonary emboli demonstrated throughout the pulmonary  arterial tree bilaterally as described including saddle embolus in the  main pulmonary artery.  No distinct evidence for right heart strain at  this time.    Echocardiogram 9/5/2023  CONCLUSIONS:  1. Poorly visualized anatomical structures due to suboptimal image quality.  2. Left ventricular systolic function was not well visualized.  3. Limited view of LV appears to be small with overall preserved LV systolic funciton. No obvious regional wall motion abnormalities were seen however many walls not visualized.  4. RV is dilated with severely reduced RV systolic function with a McConnel's sign consistent with PE.  5. Severely enlarged right ventricle.  6. There is severely reduced right ventricular systolic function.  7. Mild to moderate tricuspid regurgitation visualized.  8. Moderately elevated right ventricular systolic pressure.  9. Limited and technically difficult fellow bedside echo.  10. Compared wtih exercise stress echo from 8/16/2021, the severely dilated RV wtih severely reduced systolic function is new.      Assessment/Plan   Diagnoses and all orders for this visit:  Acute saddle pulmonary embolism with acute cor pulmonale (CMS/HCC)  Right groin pain  -     Vascular US lower extremity pseudoaneurysm evaluation duplex right; Future  Other specified complications of surgical and medical care, not elsewhere classified, initial encounter  -     Vascular US lower extremity pseudoaneurysm evaluation duplex right;  Future        Beatriz Sanchez MD

## 2023-10-16 NOTE — PATIENT INSTRUCTIONS
I would like you to continue the Eliquis as you've been doing.    I want you to get scheduled for an ultrasound of the right groin to rule out a small injury called a pseudoaneurysm. I will let you know the results via [employer].    You can schedule that test when you check out here today.    Since you do not currently have a cardiologist, you can get on to see Dr. Ibanez in follow-up. He sees patients one day a week in [place].    I want to see you back in about 3 months. We'll talk then about timing of subsequent echocardiogram and any other testing for your shortness of breath and racing heartbeat.

## 2023-10-17 ASSESSMENT — PATIENT HEALTH QUESTIONNAIRE - PHQ9
1. LITTLE INTEREST OR PLEASURE IN DOING THINGS: NEARLY EVERY DAY
5. POOR APPETITE OR OVEREATING: 0
10. IF YOU CHECKED OFF ANY PROBLEMS, HOW DIFFICULT HAVE THESE PROBLEMS MADE IT FOR YOU TO DO YOUR WORK, TAKE CARE OF THINGS AT HOME, OR GET ALONG WITH OTHER PEOPLE: EXTREMELY DIFFICULT
6. FEELING BAD ABOUT YOURSELF - OR THAT YOU ARE A FAILURE OR HAVE LET YOURSELF OR YOUR FAMILY DOWN: NOT AT ALL
SUM OF ALL RESPONSES TO PHQ QUESTIONS 1-9: 9
1. LITTLE INTEREST OR PLEASURE IN DOING THINGS: NEARLY EVERY DAY
1. LITTLE INTEREST OR PLEASURE IN DOING THINGS: 3
8. MOVING OR SPEAKING SO SLOWLY THAT OTHER PEOPLE COULD HAVE NOTICED. OR THE OPPOSITE, BEING SO FIGETY OR RESTLESS THAT YOU HAVE BEEN MOVING AROUND A LOT MORE THAN USUAL: NEARLY EVERY DAY
10. IF YOU CHECKED OFF ANY PROBLEMS, HOW DIFFICULT HAVE THESE PROBLEMS MADE IT FOR YOU TO DO YOUR WORK, TAKE CARE OF THINGS AT HOME, OR GET ALONG WITH OTHER PEOPLE: EXTREMELY DIFFICULT
7. TROUBLE CONCENTRATING ON THINGS, SUCH AS READING THE NEWSPAPER OR WATCHING TELEVISION: NOT AT ALL
9. THOUGHTS THAT YOU WOULD BE BETTER OFF DEAD, OR OF HURTING YOURSELF: 0
2. FEELING DOWN, DEPRESSED OR HOPELESS: NOT AT ALL
8. MOVING OR SPEAKING SO SLOWLY THAT OTHER PEOPLE COULD HAVE NOTICED. OR THE OPPOSITE, BEING SO FIGETY OR RESTLESS THAT YOU HAVE BEEN MOVING AROUND A LOT MORE THAN USUAL: NEARLY EVERY DAY
9. THOUGHTS THAT YOU WOULD BE BETTER OFF DEAD, OR OF HURTING YOURSELF: NOT AT ALL
9. THOUGHTS THAT YOU WOULD BE BETTER OFF DEAD, OR OF HURTING YOURSELF: NOT AT ALL
4. FEELING TIRED OR HAVING LITTLE ENERGY: 3
8. MOVING OR SPEAKING SO SLOWLY THAT OTHER PEOPLE COULD HAVE NOTICED. OR THE OPPOSITE, BEING SO FIGETY OR RESTLESS THAT YOU HAVE BEEN MOVING AROUND A LOT MORE THAN USUAL: 3
2. FEELING DOWN, DEPRESSED, IRRITABLE, OR HOPELESS: NOT AT ALL
3. TROUBLE FALLING OR STAYING ASLEEP OR SLEEPING TOO MUCH: NOT AT ALL
4. FEELING TIRED OR HAVING LITTLE ENERGY: NEARLY EVERY DAY
6. FEELING BAD ABOUT YOURSELF - OR THAT YOU ARE A FAILURE OR HAVE LET YOURSELF OR YOUR FAMILY DOWN: 0
7. TROUBLE CONCENTRATING ON THINGS, SUCH AS READING THE NEWSPAPER OR WATCHING TELEVISION: NOT AT ALL
5. POOR APPETITE OR OVEREATING: NOT AT ALL
5. POOR APPETITE OR OVEREATING: NOT AT ALL
6. FEELING BAD ABOUT YOURSELF - OR THAT YOU ARE A FAILURE OR HAVE LET YOURSELF OR YOUR FAMILY DOWN: NOT AT ALL
7. TROUBLE CONCENTRATING ON THINGS, SUCH AS READING THE NEWSPAPER OR WATCHING TELEVISION: 0
3. TROUBLE FALLING OR STAYING ASLEEP OR SLEEPING TOO MUCH: 0
2. FEELING DOWN, DEPRESSED, IRRITABLE, OR HOPELESS: 0
3. TROUBLE FALLING OR STAYING ASLEEP OR SLEEPING TOO MUCH: NOT AT ALL
SUM OF ALL RESPONSES TO PHQ QUESTIONS 1-9: 9
4. FEELING TIRED OR HAVING LITTLE ENERGY: NEARLY EVERY DAY

## 2023-10-20 ASSESSMENT — PATIENT HEALTH QUESTIONNAIRE - PHQ9
4. FEELING TIRED OR HAVING LITTLE ENERGY: NEARLY EVERY DAY
7. TROUBLE CONCENTRATING ON THINGS, SUCH AS READING THE NEWSPAPER OR WATCHING TELEVISION: NEARLY EVERY DAY
1. LITTLE INTEREST OR PLEASURE IN DOING THINGS: NOT AT ALL
10. IF YOU CHECKED OFF ANY PROBLEMS, HOW DIFFICULT HAVE THESE PROBLEMS MADE IT FOR YOU TO DO YOUR WORK, TAKE CARE OF THINGS AT HOME, OR GET ALONG WITH OTHER PEOPLE: EXTREMELY DIFFICULT
9. THOUGHTS THAT YOU WOULD BE BETTER OFF DEAD, OR OF HURTING YOURSELF: 0
3. TROUBLE FALLING OR STAYING ASLEEP OR SLEEPING TOO MUCH: SEVERAL DAYS
2. FEELING DOWN, DEPRESSED, IRRITABLE, OR HOPELESS: NOT AT ALL
2. FEELING DOWN, DEPRESSED OR HOPELESS: NOT AT ALL
1. LITTLE INTEREST OR PLEASURE IN DOING THINGS: 0
1. LITTLE INTEREST OR PLEASURE IN DOING THINGS: NOT AT ALL
4. FEELING TIRED OR HAVING LITTLE ENERGY: NEARLY EVERY DAY
6. FEELING BAD ABOUT YOURSELF - OR THAT YOU ARE A FAILURE OR HAVE LET YOURSELF OR YOUR FAMILY DOWN: NOT AT ALL
9. THOUGHTS THAT YOU WOULD BE BETTER OFF DEAD, OR OF HURTING YOURSELF: NOT AT ALL
8. MOVING OR SPEAKING SO SLOWLY THAT OTHER PEOPLE COULD HAVE NOTICED. OR THE OPPOSITE, BEING SO FIGETY OR RESTLESS THAT YOU HAVE BEEN MOVING AROUND A LOT MORE THAN USUAL: 3
SUM OF ALL RESPONSES TO PHQ QUESTIONS 1-9: 13
5. POOR APPETITE OR OVEREATING: NEARLY EVERY DAY
8. MOVING OR SPEAKING SO SLOWLY THAT OTHER PEOPLE COULD HAVE NOTICED. OR THE OPPOSITE, BEING SO FIGETY OR RESTLESS THAT YOU HAVE BEEN MOVING AROUND A LOT MORE THAN USUAL: NEARLY EVERY DAY
7. TROUBLE CONCENTRATING ON THINGS, SUCH AS READING THE NEWSPAPER OR WATCHING TELEVISION: NEARLY EVERY DAY
4. FEELING TIRED OR HAVING LITTLE ENERGY: 3
7. TROUBLE CONCENTRATING ON THINGS, SUCH AS READING THE NEWSPAPER OR WATCHING TELEVISION: 3
10. IF YOU CHECKED OFF ANY PROBLEMS, HOW DIFFICULT HAVE THESE PROBLEMS MADE IT FOR YOU TO DO YOUR WORK, TAKE CARE OF THINGS AT HOME, OR GET ALONG WITH OTHER PEOPLE: EXTREMELY DIFFICULT
6. FEELING BAD ABOUT YOURSELF - OR THAT YOU ARE A FAILURE OR HAVE LET YOURSELF OR YOUR FAMILY DOWN: 0
8. MOVING OR SPEAKING SO SLOWLY THAT OTHER PEOPLE COULD HAVE NOTICED. OR THE OPPOSITE, BEING SO FIGETY OR RESTLESS THAT YOU HAVE BEEN MOVING AROUND A LOT MORE THAN USUAL: NEARLY EVERY DAY
2. FEELING DOWN, DEPRESSED, IRRITABLE, OR HOPELESS: 0
SUM OF ALL RESPONSES TO PHQ QUESTIONS 1-9: 13
3. TROUBLE FALLING OR STAYING ASLEEP OR SLEEPING TOO MUCH: SEVERAL DAYS
5. POOR APPETITE OR OVEREATING: 3
9. THOUGHTS THAT YOU WOULD BE BETTER OFF DEAD, OR OF HURTING YOURSELF: NOT AT ALL
5. POOR APPETITE OR OVEREATING: NEARLY EVERY DAY
6. FEELING BAD ABOUT YOURSELF - OR THAT YOU ARE A FAILURE OR HAVE LET YOURSELF OR YOUR FAMILY DOWN: NOT AT ALL
3. TROUBLE FALLING OR STAYING ASLEEP OR SLEEPING TOO MUCH: 1

## 2023-10-23 ENCOUNTER — APPOINTMENT (OUTPATIENT)
Dept: HEMATOLOGY/ONCOLOGY | Facility: HOSPITAL | Age: 68
End: 2023-10-23
Payer: MEDICARE

## 2023-10-23 ENCOUNTER — OFFICE VISIT (OUTPATIENT)
Dept: HEMATOLOGY/ONCOLOGY | Facility: HOSPITAL | Age: 68
End: 2023-10-23
Payer: MEDICARE

## 2023-10-23 ENCOUNTER — LAB (OUTPATIENT)
Dept: HEMATOLOGY/ONCOLOGY | Facility: HOSPITAL | Age: 68
End: 2023-10-23
Payer: MEDICARE

## 2023-10-23 VITALS
BODY MASS INDEX: 28.56 KG/M2 | RESPIRATION RATE: 20 BRPM | TEMPERATURE: 98.1 F | SYSTOLIC BLOOD PRESSURE: 148 MMHG | DIASTOLIC BLOOD PRESSURE: 80 MMHG | WEIGHT: 199.08 LBS | OXYGEN SATURATION: 99 % | HEART RATE: 90 BPM

## 2023-10-23 DIAGNOSIS — C18.1 PRIMARY MALIGNANT NEOPLASM OF APPENDIX (MULTI): ICD-10-CM

## 2023-10-23 DIAGNOSIS — C77.2 CANCER OF APPENDIX METASTATIC TO INTRA-ABDOMINAL LYMPH NODE (MULTI): ICD-10-CM

## 2023-10-23 DIAGNOSIS — C18.1 CANCER OF APPENDIX METASTATIC TO INTRA-ABDOMINAL LYMPH NODE (MULTI): ICD-10-CM

## 2023-10-23 DIAGNOSIS — C18.1 PRIMARY MALIGNANT NEOPLASM OF APPENDIX (MULTI): Primary | ICD-10-CM

## 2023-10-23 LAB
ALBUMIN SERPL BCP-MCNC: 4.2 G/DL (ref 3.4–5)
ALP SERPL-CCNC: 45 U/L (ref 33–136)
ALT SERPL W P-5'-P-CCNC: 22 U/L (ref 10–52)
ANION GAP SERPL CALC-SCNC: 15 MMOL/L (ref 10–20)
AST SERPL W P-5'-P-CCNC: 21 U/L (ref 9–39)
BASOPHILS # BLD AUTO: 0.04 X10*3/UL (ref 0–0.1)
BASOPHILS NFR BLD AUTO: 0.6 %
BILIRUB SERPL-MCNC: 0.5 MG/DL (ref 0–1.2)
BUN SERPL-MCNC: 22 MG/DL (ref 6–23)
CALCIUM SERPL-MCNC: 9.2 MG/DL (ref 8.6–10.3)
CHLORIDE SERPL-SCNC: 104 MMOL/L (ref 98–107)
CO2 SERPL-SCNC: 23 MMOL/L (ref 21–32)
CREAT SERPL-MCNC: 1.12 MG/DL (ref 0.5–1.3)
EOSINOPHIL # BLD AUTO: 0.05 X10*3/UL (ref 0–0.7)
EOSINOPHIL NFR BLD AUTO: 0.8 %
ERYTHROCYTE [DISTWIDTH] IN BLOOD BY AUTOMATED COUNT: 14.9 % (ref 11.5–14.5)
GFR SERPL CREATININE-BSD FRML MDRD: 72 ML/MIN/1.73M*2
GLUCOSE SERPL-MCNC: 187 MG/DL (ref 74–99)
HCT VFR BLD AUTO: 34.7 % (ref 41–52)
HGB BLD-MCNC: 10.9 G/DL (ref 13.5–17.5)
IMM GRANULOCYTES # BLD AUTO: 0.02 X10*3/UL (ref 0–0.7)
IMM GRANULOCYTES NFR BLD AUTO: 0.3 % (ref 0–0.9)
LYMPHOCYTES # BLD AUTO: 1.38 X10*3/UL (ref 1.2–4.8)
LYMPHOCYTES NFR BLD AUTO: 21 %
MCH RBC QN AUTO: 25.7 PG (ref 26–34)
MCHC RBC AUTO-ENTMCNC: 31.4 G/DL (ref 32–36)
MCV RBC AUTO: 82 FL (ref 80–100)
MONOCYTES # BLD AUTO: 0.66 X10*3/UL (ref 0.1–1)
MONOCYTES NFR BLD AUTO: 10 %
NEUTROPHILS # BLD AUTO: 4.43 X10*3/UL (ref 1.2–7.7)
NEUTROPHILS NFR BLD AUTO: 67.3 %
NRBC BLD-RTO: 0 /100 WBCS (ref 0–0)
PLATELET # BLD AUTO: 201 X10*3/UL (ref 150–450)
PMV BLD AUTO: 8.6 FL (ref 7.5–11.5)
POTASSIUM SERPL-SCNC: 4.1 MMOL/L (ref 3.5–5.3)
PROT SERPL-MCNC: 6.8 G/DL (ref 6.4–8.2)
RBC # BLD AUTO: 4.24 X10*6/UL (ref 4.5–5.9)
SODIUM SERPL-SCNC: 138 MMOL/L (ref 136–145)
WBC # BLD AUTO: 6.6 X10*3/UL (ref 4.4–11.3)

## 2023-10-23 PROCEDURE — 84075 ASSAY ALKALINE PHOSPHATASE: CPT

## 2023-10-23 PROCEDURE — 1036F TOBACCO NON-USER: CPT | Performed by: STUDENT IN AN ORGANIZED HEALTH CARE EDUCATION/TRAINING PROGRAM

## 2023-10-23 PROCEDURE — 3079F DIAST BP 80-89 MM HG: CPT | Performed by: STUDENT IN AN ORGANIZED HEALTH CARE EDUCATION/TRAINING PROGRAM

## 2023-10-23 PROCEDURE — 99214 OFFICE O/P EST MOD 30 MIN: CPT | Performed by: STUDENT IN AN ORGANIZED HEALTH CARE EDUCATION/TRAINING PROGRAM

## 2023-10-23 PROCEDURE — 85025 COMPLETE CBC W/AUTO DIFF WBC: CPT

## 2023-10-23 PROCEDURE — 96523 IRRIG DRUG DELIVERY DEVICE: CPT

## 2023-10-23 PROCEDURE — 1125F AMNT PAIN NOTED PAIN PRSNT: CPT | Performed by: STUDENT IN AN ORGANIZED HEALTH CARE EDUCATION/TRAINING PROGRAM

## 2023-10-23 PROCEDURE — 3066F NEPHROPATHY DOC TX: CPT | Performed by: STUDENT IN AN ORGANIZED HEALTH CARE EDUCATION/TRAINING PROGRAM

## 2023-10-23 PROCEDURE — 3008F BODY MASS INDEX DOCD: CPT | Performed by: STUDENT IN AN ORGANIZED HEALTH CARE EDUCATION/TRAINING PROGRAM

## 2023-10-23 PROCEDURE — 1160F RVW MEDS BY RX/DR IN RCRD: CPT | Performed by: STUDENT IN AN ORGANIZED HEALTH CARE EDUCATION/TRAINING PROGRAM

## 2023-10-23 PROCEDURE — 3077F SYST BP >= 140 MM HG: CPT | Performed by: STUDENT IN AN ORGANIZED HEALTH CARE EDUCATION/TRAINING PROGRAM

## 2023-10-23 PROCEDURE — 3051F HG A1C>EQUAL 7.0%<8.0%: CPT | Performed by: STUDENT IN AN ORGANIZED HEALTH CARE EDUCATION/TRAINING PROGRAM

## 2023-10-23 PROCEDURE — 1159F MED LIST DOCD IN RCRD: CPT | Performed by: STUDENT IN AN ORGANIZED HEALTH CARE EDUCATION/TRAINING PROGRAM

## 2023-10-23 PROCEDURE — 4010F ACE/ARB THERAPY RXD/TAKEN: CPT | Performed by: STUDENT IN AN ORGANIZED HEALTH CARE EDUCATION/TRAINING PROGRAM

## 2023-10-23 PROCEDURE — 36591 DRAW BLOOD OFF VENOUS DEVICE: CPT

## 2023-10-23 RX ORDER — PROCHLORPERAZINE EDISYLATE 5 MG/ML
10 INJECTION INTRAMUSCULAR; INTRAVENOUS EVERY 6 HOURS PRN
Status: CANCELLED | OUTPATIENT
Start: 2023-10-24

## 2023-10-23 RX ORDER — PROCHLORPERAZINE MALEATE 10 MG
10 TABLET ORAL EVERY 6 HOURS PRN
Status: CANCELLED | OUTPATIENT
Start: 2023-10-24

## 2023-10-23 RX ORDER — PALONOSETRON 0.05 MG/ML
0.25 INJECTION, SOLUTION INTRAVENOUS ONCE
Status: CANCELLED | OUTPATIENT
Start: 2023-10-24

## 2023-10-23 RX ORDER — EPINEPHRINE 0.3 MG/.3ML
0.3 INJECTION SUBCUTANEOUS EVERY 5 MIN PRN
Status: CANCELLED | OUTPATIENT
Start: 2023-10-24

## 2023-10-23 RX ORDER — FAMOTIDINE 10 MG/ML
20 INJECTION INTRAVENOUS ONCE AS NEEDED
Status: CANCELLED | OUTPATIENT
Start: 2023-10-24

## 2023-10-23 RX ORDER — DIPHENHYDRAMINE HYDROCHLORIDE 50 MG/ML
50 INJECTION INTRAMUSCULAR; INTRAVENOUS AS NEEDED
Status: CANCELLED | OUTPATIENT
Start: 2023-10-24

## 2023-10-23 RX ORDER — LORAZEPAM 2 MG/ML
1 INJECTION INTRAMUSCULAR AS NEEDED
Status: CANCELLED | OUTPATIENT
Start: 2023-10-24

## 2023-10-23 RX ORDER — ALBUTEROL SULFATE 0.83 MG/ML
3 SOLUTION RESPIRATORY (INHALATION) AS NEEDED
Status: CANCELLED | OUTPATIENT
Start: 2023-10-24

## 2023-10-23 ASSESSMENT — COLUMBIA-SUICIDE SEVERITY RATING SCALE - C-SSRS
1. IN THE PAST MONTH, HAVE YOU WISHED YOU WERE DEAD OR WISHED YOU COULD GO TO SLEEP AND NOT WAKE UP?: NO
2. HAVE YOU ACTUALLY HAD ANY THOUGHTS OF KILLING YOURSELF?: NO
6. HAVE YOU EVER DONE ANYTHING, STARTED TO DO ANYTHING, OR PREPARED TO DO ANYTHING TO END YOUR LIFE?: NO

## 2023-10-23 ASSESSMENT — PATIENT HEALTH QUESTIONNAIRE - PHQ9
SUM OF ALL RESPONSES TO PHQ9 QUESTIONS 1 AND 2: 0
2. FEELING DOWN, DEPRESSED OR HOPELESS: NOT AT ALL
1. LITTLE INTEREST OR PLEASURE IN DOING THINGS: NOT AT ALL

## 2023-10-23 ASSESSMENT — PAIN SCALES - GENERAL: PAINLEVEL: 2

## 2023-10-23 ASSESSMENT — ENCOUNTER SYMPTOMS
DEPRESSION: 0
LOSS OF SENSATION IN FEET: 0
OCCASIONAL FEELINGS OF UNSTEADINESS: 0

## 2023-10-24 ENCOUNTER — INFUSION (OUTPATIENT)
Dept: HEMATOLOGY/ONCOLOGY | Facility: CLINIC | Age: 68
End: 2023-10-24
Payer: MEDICARE

## 2023-10-24 VITALS
SYSTOLIC BLOOD PRESSURE: 138 MMHG | WEIGHT: 201.17 LBS | HEART RATE: 94 BPM | BODY MASS INDEX: 28.86 KG/M2 | DIASTOLIC BLOOD PRESSURE: 74 MMHG | TEMPERATURE: 97.5 F | RESPIRATION RATE: 16 BRPM

## 2023-10-24 DIAGNOSIS — C18.1 PRIMARY MALIGNANT NEOPLASM OF APPENDIX (MULTI): ICD-10-CM

## 2023-10-24 PROCEDURE — 2500000004 HC RX 250 GENERAL PHARMACY W/ HCPCS (ALT 636 FOR OP/ED): Performed by: STUDENT IN AN ORGANIZED HEALTH CARE EDUCATION/TRAINING PROGRAM

## 2023-10-24 PROCEDURE — 96415 CHEMO IV INFUSION ADDL HR: CPT

## 2023-10-24 PROCEDURE — 96375 TX/PRO/DX INJ NEW DRUG ADDON: CPT | Mod: INF

## 2023-10-24 PROCEDURE — 96413 CHEMO IV INFUSION 1 HR: CPT

## 2023-10-24 PROCEDURE — 96416 CHEMO PROLONG INFUSE W/PUMP: CPT

## 2023-10-24 PROCEDURE — 2500000004 HC RX 250 GENERAL PHARMACY W/ HCPCS (ALT 636 FOR OP/ED): Mod: JZ | Performed by: STUDENT IN AN ORGANIZED HEALTH CARE EDUCATION/TRAINING PROGRAM

## 2023-10-24 RX ORDER — PALONOSETRON 0.05 MG/ML
0.25 INJECTION, SOLUTION INTRAVENOUS ONCE
Status: COMPLETED | OUTPATIENT
Start: 2023-10-24 | End: 2023-10-24

## 2023-10-24 RX ORDER — FAMOTIDINE 10 MG/ML
20 INJECTION INTRAVENOUS ONCE AS NEEDED
Status: DISCONTINUED | OUTPATIENT
Start: 2023-10-24 | End: 2023-10-24 | Stop reason: HOSPADM

## 2023-10-24 RX ORDER — EPINEPHRINE 0.3 MG/.3ML
0.3 INJECTION SUBCUTANEOUS EVERY 5 MIN PRN
Status: DISCONTINUED | OUTPATIENT
Start: 2023-10-24 | End: 2023-10-24 | Stop reason: HOSPADM

## 2023-10-24 RX ORDER — LORAZEPAM 2 MG/ML
1 INJECTION INTRAMUSCULAR AS NEEDED
Status: DISCONTINUED | OUTPATIENT
Start: 2023-10-24 | End: 2023-10-24 | Stop reason: HOSPADM

## 2023-10-24 RX ORDER — ALBUTEROL SULFATE 0.83 MG/ML
3 SOLUTION RESPIRATORY (INHALATION) AS NEEDED
Status: DISCONTINUED | OUTPATIENT
Start: 2023-10-24 | End: 2023-10-24 | Stop reason: HOSPADM

## 2023-10-24 RX ORDER — PROCHLORPERAZINE MALEATE 10 MG
10 TABLET ORAL EVERY 6 HOURS PRN
Status: DISCONTINUED | OUTPATIENT
Start: 2023-10-24 | End: 2023-10-24 | Stop reason: HOSPADM

## 2023-10-24 RX ORDER — PROCHLORPERAZINE EDISYLATE 5 MG/ML
10 INJECTION INTRAMUSCULAR; INTRAVENOUS EVERY 6 HOURS PRN
Status: DISCONTINUED | OUTPATIENT
Start: 2023-10-24 | End: 2023-10-24 | Stop reason: HOSPADM

## 2023-10-24 RX ORDER — DIPHENHYDRAMINE HYDROCHLORIDE 50 MG/ML
50 INJECTION INTRAMUSCULAR; INTRAVENOUS AS NEEDED
Status: DISCONTINUED | OUTPATIENT
Start: 2023-10-24 | End: 2023-10-24 | Stop reason: HOSPADM

## 2023-10-24 RX ADMIN — FLUOROURACIL 5100 MG: 50 INJECTION, SOLUTION INTRAVENOUS at 17:09

## 2023-10-24 RX ADMIN — OXALIPLATIN 160 MG: 5 INJECTION, SOLUTION INTRAVENOUS at 14:48

## 2023-10-24 RX ADMIN — DEXAMETHASONE SODIUM PHOSPHATE 12 MG: 10 INJECTION, SOLUTION INTRAMUSCULAR; INTRAVENOUS at 13:57

## 2023-10-24 RX ADMIN — PALONOSETRON HYDROCHLORIDE 250 MCG: 0.25 INJECTION INTRAVENOUS at 13:56

## 2023-10-24 ASSESSMENT — PAIN SCALES - GENERAL: PAINLEVEL: 0-NO PAIN

## 2023-10-24 NOTE — SIGNIFICANT EVENT
10/24/23 6619   Prechemo Checklist   Has the patient been in the hospital, ED, or urgent care since last date of service No   Chemo/Immuno Consent Signed Yes   Protocol/Indications Verified Yes   Confirmed to previous date/time of medication Yes   Compared to previous dose Yes   All medications are dated accurately Yes   Pregnancy Test Negative Not applicable   Parameters Met Yes   BSA/Weight-Height Verified Yes   Dose Calculations Verified Yes

## 2023-10-26 ENCOUNTER — INFUSION (OUTPATIENT)
Dept: HEMATOLOGY/ONCOLOGY | Facility: CLINIC | Age: 68
End: 2023-10-26
Payer: MEDICARE

## 2023-10-26 VITALS
DIASTOLIC BLOOD PRESSURE: 67 MMHG | RESPIRATION RATE: 18 BRPM | TEMPERATURE: 96.3 F | HEART RATE: 98 BPM | SYSTOLIC BLOOD PRESSURE: 135 MMHG

## 2023-10-26 DIAGNOSIS — C18.1 PRIMARY MALIGNANT NEOPLASM OF APPENDIX (MULTI): ICD-10-CM

## 2023-10-26 PROCEDURE — 96523 IRRIG DRUG DELIVERY DEVICE: CPT

## 2023-10-26 PROCEDURE — 2500000004 HC RX 250 GENERAL PHARMACY W/ HCPCS (ALT 636 FOR OP/ED): Performed by: STUDENT IN AN ORGANIZED HEALTH CARE EDUCATION/TRAINING PROGRAM

## 2023-10-26 PROCEDURE — 36591 DRAW BLOOD OFF VENOUS DEVICE: CPT

## 2023-10-26 RX ORDER — HEPARIN 100 UNIT/ML
500 SYRINGE INTRAVENOUS AS NEEDED
Status: CANCELLED | OUTPATIENT
Start: 2023-10-26

## 2023-10-26 RX ORDER — HEPARIN 100 UNIT/ML
500 SYRINGE INTRAVENOUS AS NEEDED
Status: DISCONTINUED | OUTPATIENT
Start: 2023-10-26 | End: 2023-10-26 | Stop reason: HOSPADM

## 2023-10-26 RX ORDER — HEPARIN SODIUM,PORCINE/PF 10 UNIT/ML
50 SYRINGE (ML) INTRAVENOUS AS NEEDED
Status: CANCELLED | OUTPATIENT
Start: 2023-10-26

## 2023-10-26 RX ADMIN — HEPARIN 500 UNITS: 100 SYRINGE at 15:16

## 2023-10-26 ASSESSMENT — PAIN SCALES - GENERAL: PAINLEVEL: 2

## 2023-10-30 ENCOUNTER — HOSPITAL ENCOUNTER (OUTPATIENT)
Dept: VASCULAR MEDICINE | Facility: CLINIC | Age: 68
Discharge: HOME | End: 2023-10-30
Payer: MEDICARE

## 2023-10-30 DIAGNOSIS — I82.4Z2 ACUTE EMBOLISM AND THROMBOSIS OF UNSPECIFIED DEEP VEINS OF LEFT DISTAL LOWER EXTREMITY (MULTI): ICD-10-CM

## 2023-10-30 DIAGNOSIS — I82.492 ACUTE EMBOLISM AND THROMBOSIS OF OTHER SPECIFIED DEEP VEIN OF LEFT LOWER EXTREMITY (MULTI): ICD-10-CM

## 2023-10-30 DIAGNOSIS — T88.8XXA OTHER SPECIFIED COMPLICATIONS OF SURGICAL AND MEDICAL CARE, NOT ELSEWHERE CLASSIFIED, INITIAL ENCOUNTER: ICD-10-CM

## 2023-10-30 DIAGNOSIS — R10.31 RIGHT GROIN PAIN: ICD-10-CM

## 2023-10-30 PROCEDURE — 93926 LOWER EXTREMITY STUDY: CPT | Performed by: STUDENT IN AN ORGANIZED HEALTH CARE EDUCATION/TRAINING PROGRAM

## 2023-10-30 PROCEDURE — 93926 LOWER EXTREMITY STUDY: CPT

## 2023-10-30 ASSESSMENT — PATIENT HEALTH QUESTIONNAIRE - PHQ9
8. MOVING OR SPEAKING SO SLOWLY THAT OTHER PEOPLE COULD HAVE NOTICED. OR THE OPPOSITE, BEING SO FIGETY OR RESTLESS THAT YOU HAVE BEEN MOVING AROUND A LOT MORE THAN USUAL: 3
1. LITTLE INTEREST OR PLEASURE IN DOING THINGS: NOT AT ALL
1. LITTLE INTEREST OR PLEASURE IN DOING THINGS: NOT AT ALL
2. FEELING DOWN, DEPRESSED, IRRITABLE, OR HOPELESS: NOT AT ALL
5. POOR APPETITE OR OVEREATING: NEARLY EVERY DAY
SUM OF ALL RESPONSES TO PHQ QUESTIONS 1-9: 15
6. FEELING BAD ABOUT YOURSELF - OR THAT YOU ARE A FAILURE OR HAVE LET YOURSELF OR YOUR FAMILY DOWN: NOT AT ALL
10. IF YOU CHECKED OFF ANY PROBLEMS, HOW DIFFICULT HAVE THESE PROBLEMS MADE IT FOR YOU TO DO YOUR WORK, TAKE CARE OF THINGS AT HOME, OR GET ALONG WITH OTHER PEOPLE: EXTREMELY DIFFICULT
3. TROUBLE FALLING OR STAYING ASLEEP OR SLEEPING TOO MUCH: NEARLY EVERY DAY
6. FEELING BAD ABOUT YOURSELF - OR THAT YOU ARE A FAILURE OR HAVE LET YOURSELF OR YOUR FAMILY DOWN: NOT AT ALL
3. TROUBLE FALLING OR STAYING ASLEEP OR SLEEPING TOO MUCH: NEARLY EVERY DAY
8. MOVING OR SPEAKING SO SLOWLY THAT OTHER PEOPLE COULD HAVE NOTICED. OR THE OPPOSITE, BEING SO FIGETY OR RESTLESS THAT YOU HAVE BEEN MOVING AROUND A LOT MORE THAN USUAL: NEARLY EVERY DAY
2. FEELING DOWN, DEPRESSED, IRRITABLE, OR HOPELESS: 0
4. FEELING TIRED OR HAVING LITTLE ENERGY: 3
7. TROUBLE CONCENTRATING ON THINGS, SUCH AS READING THE NEWSPAPER OR WATCHING TELEVISION: 3
4. FEELING TIRED OR HAVING LITTLE ENERGY: NEARLY EVERY DAY
3. TROUBLE FALLING OR STAYING ASLEEP OR SLEEPING TOO MUCH: NEARLY EVERY DAY
9. THOUGHTS THAT YOU WOULD BE BETTER OFF DEAD, OR OF HURTING YOURSELF: 0
9. THOUGHTS THAT YOU WOULD BE BETTER OFF DEAD, OR OF HURTING YOURSELF: NOT AT ALL
1. LITTLE INTEREST OR PLEASURE IN DOING THINGS: NOT AT ALL
9. THOUGHTS THAT YOU WOULD BE BETTER OFF DEAD, OR OF HURTING YOURSELF: NOT AT ALL
3. TROUBLE FALLING OR STAYING ASLEEP OR SLEEPING TOO MUCH: 3
2. FEELING DOWN, DEPRESSED OR HOPELESS: NOT AT ALL
2. FEELING DOWN, DEPRESSED OR HOPELESS: NOT AT ALL
10. IF YOU CHECKED OFF ANY PROBLEMS, HOW DIFFICULT HAVE THESE PROBLEMS MADE IT FOR YOU TO DO YOUR WORK, TAKE CARE OF THINGS AT HOME, OR GET ALONG WITH OTHER PEOPLE: EXTREMELY DIFFICULT
6. FEELING BAD ABOUT YOURSELF - OR THAT YOU ARE A FAILURE OR HAVE LET YOURSELF OR YOUR FAMILY DOWN: 0
10. IF YOU CHECKED OFF ANY PROBLEMS, HOW DIFFICULT HAVE THESE PROBLEMS MADE IT FOR YOU TO DO YOUR WORK, TAKE CARE OF THINGS AT HOME, OR GET ALONG WITH OTHER PEOPLE: EXTREMELY DIFFICULT
7. TROUBLE CONCENTRATING ON THINGS, SUCH AS READING THE NEWSPAPER OR WATCHING TELEVISION: NEARLY EVERY DAY
9. THOUGHTS THAT YOU WOULD BE BETTER OFF DEAD, OR OF HURTING YOURSELF: 0
1. LITTLE INTEREST OR PLEASURE IN DOING THINGS: NOT AT ALL
3. TROUBLE FALLING OR STAYING ASLEEP OR SLEEPING TOO MUCH: NEARLY EVERY DAY
5. POOR APPETITE OR OVEREATING: NEARLY EVERY DAY
7. TROUBLE CONCENTRATING ON THINGS, SUCH AS READING THE NEWSPAPER OR WATCHING TELEVISION: NEARLY EVERY DAY
4. FEELING TIRED OR HAVING LITTLE ENERGY: 3
9. THOUGHTS THAT YOU WOULD BE BETTER OFF DEAD, OR OF HURTING YOURSELF: NOT AT ALL
5. POOR APPETITE OR OVEREATING: NEARLY EVERY DAY
5. POOR APPETITE OR OVEREATING: 3
7. TROUBLE CONCENTRATING ON THINGS, SUCH AS READING THE NEWSPAPER OR WATCHING TELEVISION: NEARLY EVERY DAY
5. POOR APPETITE OR OVEREATING: 3
SUM OF ALL RESPONSES TO PHQ QUESTIONS 1-9: 15
4. FEELING TIRED OR HAVING LITTLE ENERGY: NEARLY EVERY DAY
6. FEELING BAD ABOUT YOURSELF - OR THAT YOU ARE A FAILURE OR HAVE LET YOURSELF OR YOUR FAMILY DOWN: NOT AT ALL
10. IF YOU CHECKED OFF ANY PROBLEMS, HOW DIFFICULT HAVE THESE PROBLEMS MADE IT FOR YOU TO DO YOUR WORK, TAKE CARE OF THINGS AT HOME, OR GET ALONG WITH OTHER PEOPLE: EXTREMELY DIFFICULT
8. MOVING OR SPEAKING SO SLOWLY THAT OTHER PEOPLE COULD HAVE NOTICED. OR THE OPPOSITE, BEING SO FIGETY OR RESTLESS THAT YOU HAVE BEEN MOVING AROUND A LOT MORE THAN USUAL: NEARLY EVERY DAY
SUM OF ALL RESPONSES TO PHQ QUESTIONS 1-9: 15
7. TROUBLE CONCENTRATING ON THINGS, SUCH AS READING THE NEWSPAPER OR WATCHING TELEVISION: NEARLY EVERY DAY
6. FEELING BAD ABOUT YOURSELF - OR THAT YOU ARE A FAILURE OR HAVE LET YOURSELF OR YOUR FAMILY DOWN: NOT AT ALL
8. MOVING OR SPEAKING SO SLOWLY THAT OTHER PEOPLE COULD HAVE NOTICED. OR THE OPPOSITE, BEING SO FIGETY OR RESTLESS THAT YOU HAVE BEEN MOVING AROUND A LOT MORE THAN USUAL: NEARLY EVERY DAY
1. LITTLE INTEREST OR PLEASURE IN DOING THINGS: 0
6. FEELING BAD ABOUT YOURSELF - OR THAT YOU ARE A FAILURE OR HAVE LET YOURSELF OR YOUR FAMILY DOWN: 0
SUM OF ALL RESPONSES TO PHQ QUESTIONS 1-9: 15
3. TROUBLE FALLING OR STAYING ASLEEP OR SLEEPING TOO MUCH: 3
2. FEELING DOWN, DEPRESSED, IRRITABLE, OR HOPELESS: NOT AT ALL
4. FEELING TIRED OR HAVING LITTLE ENERGY: NEARLY EVERY DAY
8. MOVING OR SPEAKING SO SLOWLY THAT OTHER PEOPLE COULD HAVE NOTICED. OR THE OPPOSITE, BEING SO FIGETY OR RESTLESS THAT YOU HAVE BEEN MOVING AROUND A LOT MORE THAN USUAL: 3
7. TROUBLE CONCENTRATING ON THINGS, SUCH AS READING THE NEWSPAPER OR WATCHING TELEVISION: 3
5. POOR APPETITE OR OVEREATING: NEARLY EVERY DAY
4. FEELING TIRED OR HAVING LITTLE ENERGY: NEARLY EVERY DAY
1. LITTLE INTEREST OR PLEASURE IN DOING THINGS: 0
9. THOUGHTS THAT YOU WOULD BE BETTER OFF DEAD, OR OF HURTING YOURSELF: NOT AT ALL
8. MOVING OR SPEAKING SO SLOWLY THAT OTHER PEOPLE COULD HAVE NOTICED. OR THE OPPOSITE, BEING SO FIGETY OR RESTLESS THAT YOU HAVE BEEN MOVING AROUND A LOT MORE THAN USUAL: NEARLY EVERY DAY
2. FEELING DOWN, DEPRESSED, IRRITABLE, OR HOPELESS: 0

## 2023-10-31 ASSESSMENT — PATIENT HEALTH QUESTIONNAIRE - PHQ9
SUM OF ALL RESPONSES TO PHQ QUESTIONS 1-9: 18
6. FEELING BAD ABOUT YOURSELF - OR THAT YOU ARE A FAILURE OR HAVE LET YOURSELF OR YOUR FAMILY DOWN: NOT AT ALL
1. LITTLE INTEREST OR PLEASURE IN DOING THINGS: NEARLY EVERY DAY
3. TROUBLE FALLING OR STAYING ASLEEP OR SLEEPING TOO MUCH: NEARLY EVERY DAY
SUM OF ALL RESPONSES TO PHQ QUESTIONS 1-9: 18
2. FEELING DOWN, DEPRESSED, IRRITABLE, OR HOPELESS: 0
6. FEELING BAD ABOUT YOURSELF - OR THAT YOU ARE A FAILURE OR HAVE LET YOURSELF OR YOUR FAMILY DOWN: 0
9. THOUGHTS THAT YOU WOULD BE BETTER OFF DEAD, OR OF HURTING YOURSELF: NOT AT ALL
5. POOR APPETITE OR OVEREATING: NEARLY EVERY DAY
6. FEELING BAD ABOUT YOURSELF - OR THAT YOU ARE A FAILURE OR HAVE LET YOURSELF OR YOUR FAMILY DOWN: NOT AT ALL
8. MOVING OR SPEAKING SO SLOWLY THAT OTHER PEOPLE COULD HAVE NOTICED. OR THE OPPOSITE, BEING SO FIGETY OR RESTLESS THAT YOU HAVE BEEN MOVING AROUND A LOT MORE THAN USUAL: NEARLY EVERY DAY
3. TROUBLE FALLING OR STAYING ASLEEP OR SLEEPING TOO MUCH: 3
1. LITTLE INTEREST OR PLEASURE IN DOING THINGS: 3
2. FEELING DOWN, DEPRESSED, IRRITABLE, OR HOPELESS: NOT AT ALL
5. POOR APPETITE OR OVEREATING: 3
7. TROUBLE CONCENTRATING ON THINGS, SUCH AS READING THE NEWSPAPER OR WATCHING TELEVISION: 3
8. MOVING OR SPEAKING SO SLOWLY THAT OTHER PEOPLE COULD HAVE NOTICED. OR THE OPPOSITE, BEING SO FIGETY OR RESTLESS THAT YOU HAVE BEEN MOVING AROUND A LOT MORE THAN USUAL: 3
7. TROUBLE CONCENTRATING ON THINGS, SUCH AS READING THE NEWSPAPER OR WATCHING TELEVISION: NEARLY EVERY DAY
4. FEELING TIRED OR HAVING LITTLE ENERGY: NEARLY EVERY DAY
8. MOVING OR SPEAKING SO SLOWLY THAT OTHER PEOPLE COULD HAVE NOTICED. OR THE OPPOSITE, BEING SO FIGETY OR RESTLESS THAT YOU HAVE BEEN MOVING AROUND A LOT MORE THAN USUAL: NEARLY EVERY DAY
7. TROUBLE CONCENTRATING ON THINGS, SUCH AS READING THE NEWSPAPER OR WATCHING TELEVISION: NEARLY EVERY DAY
10. IF YOU CHECKED OFF ANY PROBLEMS, HOW DIFFICULT HAVE THESE PROBLEMS MADE IT FOR YOU TO DO YOUR WORK, TAKE CARE OF THINGS AT HOME, OR GET ALONG WITH OTHER PEOPLE: SOMEWHAT DIFFICULT
3. TROUBLE FALLING OR STAYING ASLEEP OR SLEEPING TOO MUCH: NEARLY EVERY DAY
1. LITTLE INTEREST OR PLEASURE IN DOING THINGS: NEARLY EVERY DAY
4. FEELING TIRED OR HAVING LITTLE ENERGY: 3
9. THOUGHTS THAT YOU WOULD BE BETTER OFF DEAD, OR OF HURTING YOURSELF: NOT AT ALL
4. FEELING TIRED OR HAVING LITTLE ENERGY: NEARLY EVERY DAY
5. POOR APPETITE OR OVEREATING: NEARLY EVERY DAY
10. IF YOU CHECKED OFF ANY PROBLEMS, HOW DIFFICULT HAVE THESE PROBLEMS MADE IT FOR YOU TO DO YOUR WORK, TAKE CARE OF THINGS AT HOME, OR GET ALONG WITH OTHER PEOPLE: SOMEWHAT DIFFICULT
9. THOUGHTS THAT YOU WOULD BE BETTER OFF DEAD, OR OF HURTING YOURSELF: 0
2. FEELING DOWN, DEPRESSED OR HOPELESS: NOT AT ALL

## 2023-11-06 ENCOUNTER — OFFICE VISIT (OUTPATIENT)
Dept: HEMATOLOGY/ONCOLOGY | Facility: HOSPITAL | Age: 68
End: 2023-11-06
Payer: MEDICARE

## 2023-11-06 ENCOUNTER — TELEPHONE (OUTPATIENT)
Dept: ADMISSION | Facility: HOSPITAL | Age: 68
End: 2023-11-06
Payer: MEDICARE

## 2023-11-06 ENCOUNTER — LAB (OUTPATIENT)
Dept: HEMATOLOGY/ONCOLOGY | Facility: HOSPITAL | Age: 68
End: 2023-11-06
Payer: MEDICARE

## 2023-11-06 VITALS
BODY MASS INDEX: 28.34 KG/M2 | DIASTOLIC BLOOD PRESSURE: 68 MMHG | WEIGHT: 197.5 LBS | HEART RATE: 99 BPM | TEMPERATURE: 97.3 F | RESPIRATION RATE: 18 BRPM | OXYGEN SATURATION: 99 % | SYSTOLIC BLOOD PRESSURE: 140 MMHG

## 2023-11-06 DIAGNOSIS — C18.1 PRIMARY MALIGNANT NEOPLASM OF APPENDIX (MULTI): Primary | ICD-10-CM

## 2023-11-06 DIAGNOSIS — C18.1 PRIMARY MALIGNANT NEOPLASM OF APPENDIX (MULTI): ICD-10-CM

## 2023-11-06 LAB
ALBUMIN SERPL BCP-MCNC: 4.1 G/DL (ref 3.4–5)
ALP SERPL-CCNC: 44 U/L (ref 33–136)
ALT SERPL W P-5'-P-CCNC: 20 U/L (ref 10–52)
ANION GAP SERPL CALC-SCNC: 13 MMOL/L (ref 10–20)
AST SERPL W P-5'-P-CCNC: 22 U/L (ref 9–39)
BASOPHILS # BLD AUTO: 0.01 X10*3/UL (ref 0–0.1)
BASOPHILS NFR BLD AUTO: 0.3 %
BILIRUB SERPL-MCNC: 0.5 MG/DL (ref 0–1.2)
BUN SERPL-MCNC: 17 MG/DL (ref 6–23)
CALCIUM SERPL-MCNC: 9 MG/DL (ref 8.6–10.3)
CHLORIDE SERPL-SCNC: 104 MMOL/L (ref 98–107)
CO2 SERPL-SCNC: 25 MMOL/L (ref 21–32)
CREAT SERPL-MCNC: 1.04 MG/DL (ref 0.5–1.3)
EOSINOPHIL # BLD AUTO: 0.08 X10*3/UL (ref 0–0.7)
EOSINOPHIL NFR BLD AUTO: 2.4 %
ERYTHROCYTE [DISTWIDTH] IN BLOOD BY AUTOMATED COUNT: 15.5 % (ref 11.5–14.5)
GFR SERPL CREATININE-BSD FRML MDRD: 79 ML/MIN/1.73M*2
GLUCOSE SERPL-MCNC: 222 MG/DL (ref 74–99)
HCT VFR BLD AUTO: 35.8 % (ref 41–52)
HGB BLD-MCNC: 11.5 G/DL (ref 13.5–17.5)
IMM GRANULOCYTES # BLD AUTO: 0.01 X10*3/UL (ref 0–0.7)
IMM GRANULOCYTES NFR BLD AUTO: 0.3 % (ref 0–0.9)
LYMPHOCYTES # BLD AUTO: 0.72 X10*3/UL (ref 1.2–4.8)
LYMPHOCYTES NFR BLD AUTO: 21.2 %
MCH RBC QN AUTO: 26 PG (ref 26–34)
MCHC RBC AUTO-ENTMCNC: 32.1 G/DL (ref 32–36)
MCV RBC AUTO: 81 FL (ref 80–100)
MONOCYTES # BLD AUTO: 0.65 X10*3/UL (ref 0.1–1)
MONOCYTES NFR BLD AUTO: 19.1 %
NEUTROPHILS # BLD AUTO: 1.93 X10*3/UL (ref 1.2–7.7)
NEUTROPHILS NFR BLD AUTO: 56.7 %
NRBC BLD-RTO: 0 /100 WBCS (ref 0–0)
PLATELET # BLD AUTO: 186 X10*3/UL (ref 150–450)
POTASSIUM SERPL-SCNC: 4 MMOL/L (ref 3.5–5.3)
PROT SERPL-MCNC: 6.7 G/DL (ref 6.4–8.2)
RBC # BLD AUTO: 4.43 X10*6/UL (ref 4.5–5.9)
SODIUM SERPL-SCNC: 138 MMOL/L (ref 136–145)
WBC # BLD AUTO: 3.4 X10*3/UL (ref 4.4–11.3)

## 2023-11-06 PROCEDURE — 1126F AMNT PAIN NOTED NONE PRSNT: CPT | Performed by: STUDENT IN AN ORGANIZED HEALTH CARE EDUCATION/TRAINING PROGRAM

## 2023-11-06 PROCEDURE — 1036F TOBACCO NON-USER: CPT | Performed by: STUDENT IN AN ORGANIZED HEALTH CARE EDUCATION/TRAINING PROGRAM

## 2023-11-06 PROCEDURE — 96523 IRRIG DRUG DELIVERY DEVICE: CPT

## 2023-11-06 PROCEDURE — 3078F DIAST BP <80 MM HG: CPT | Performed by: STUDENT IN AN ORGANIZED HEALTH CARE EDUCATION/TRAINING PROGRAM

## 2023-11-06 PROCEDURE — 99214 OFFICE O/P EST MOD 30 MIN: CPT | Performed by: STUDENT IN AN ORGANIZED HEALTH CARE EDUCATION/TRAINING PROGRAM

## 2023-11-06 PROCEDURE — 3008F BODY MASS INDEX DOCD: CPT | Performed by: STUDENT IN AN ORGANIZED HEALTH CARE EDUCATION/TRAINING PROGRAM

## 2023-11-06 PROCEDURE — 85025 COMPLETE CBC W/AUTO DIFF WBC: CPT

## 2023-11-06 PROCEDURE — 3066F NEPHROPATHY DOC TX: CPT | Performed by: STUDENT IN AN ORGANIZED HEALTH CARE EDUCATION/TRAINING PROGRAM

## 2023-11-06 PROCEDURE — 2500000004 HC RX 250 GENERAL PHARMACY W/ HCPCS (ALT 636 FOR OP/ED): Performed by: STUDENT IN AN ORGANIZED HEALTH CARE EDUCATION/TRAINING PROGRAM

## 2023-11-06 PROCEDURE — 1159F MED LIST DOCD IN RCRD: CPT | Performed by: STUDENT IN AN ORGANIZED HEALTH CARE EDUCATION/TRAINING PROGRAM

## 2023-11-06 PROCEDURE — 4010F ACE/ARB THERAPY RXD/TAKEN: CPT | Performed by: STUDENT IN AN ORGANIZED HEALTH CARE EDUCATION/TRAINING PROGRAM

## 2023-11-06 PROCEDURE — 1160F RVW MEDS BY RX/DR IN RCRD: CPT | Performed by: STUDENT IN AN ORGANIZED HEALTH CARE EDUCATION/TRAINING PROGRAM

## 2023-11-06 PROCEDURE — 3077F SYST BP >= 140 MM HG: CPT | Performed by: STUDENT IN AN ORGANIZED HEALTH CARE EDUCATION/TRAINING PROGRAM

## 2023-11-06 PROCEDURE — 3051F HG A1C>EQUAL 7.0%<8.0%: CPT | Performed by: STUDENT IN AN ORGANIZED HEALTH CARE EDUCATION/TRAINING PROGRAM

## 2023-11-06 PROCEDURE — 80053 COMPREHEN METABOLIC PANEL: CPT

## 2023-11-06 RX ORDER — HEPARIN SODIUM,PORCINE/PF 10 UNIT/ML
50 SYRINGE (ML) INTRAVENOUS AS NEEDED
Status: CANCELLED | OUTPATIENT
Start: 2023-11-06

## 2023-11-06 RX ORDER — PROCHLORPERAZINE EDISYLATE 5 MG/ML
10 INJECTION INTRAMUSCULAR; INTRAVENOUS EVERY 6 HOURS PRN
Status: CANCELLED | OUTPATIENT
Start: 2023-11-07

## 2023-11-06 RX ORDER — DIPHENHYDRAMINE HYDROCHLORIDE 50 MG/ML
50 INJECTION INTRAMUSCULAR; INTRAVENOUS AS NEEDED
Status: CANCELLED | OUTPATIENT
Start: 2023-11-07

## 2023-11-06 RX ORDER — LORAZEPAM 2 MG/ML
1 INJECTION INTRAMUSCULAR AS NEEDED
Status: CANCELLED | OUTPATIENT
Start: 2023-11-07

## 2023-11-06 RX ORDER — HEPARIN 100 UNIT/ML
500 SYRINGE INTRAVENOUS AS NEEDED
Status: DISCONTINUED | OUTPATIENT
Start: 2023-11-06 | End: 2023-11-29 | Stop reason: HOSPADM

## 2023-11-06 RX ORDER — ALBUTEROL SULFATE 0.83 MG/ML
3 SOLUTION RESPIRATORY (INHALATION) AS NEEDED
Status: CANCELLED | OUTPATIENT
Start: 2023-11-07

## 2023-11-06 RX ORDER — FAMOTIDINE 10 MG/ML
20 INJECTION INTRAVENOUS ONCE AS NEEDED
Status: CANCELLED | OUTPATIENT
Start: 2023-11-07

## 2023-11-06 RX ORDER — PROCHLORPERAZINE MALEATE 10 MG
10 TABLET ORAL EVERY 6 HOURS PRN
Status: CANCELLED | OUTPATIENT
Start: 2023-11-07

## 2023-11-06 RX ORDER — EPINEPHRINE 0.3 MG/.3ML
0.3 INJECTION SUBCUTANEOUS EVERY 5 MIN PRN
Status: CANCELLED | OUTPATIENT
Start: 2023-11-07

## 2023-11-06 RX ORDER — PALONOSETRON 0.05 MG/ML
0.25 INJECTION, SOLUTION INTRAVENOUS ONCE
Status: CANCELLED | OUTPATIENT
Start: 2023-11-07

## 2023-11-06 RX ORDER — HEPARIN 100 UNIT/ML
500 SYRINGE INTRAVENOUS AS NEEDED
Status: CANCELLED | OUTPATIENT
Start: 2023-11-06

## 2023-11-06 RX ADMIN — SODIUM CHLORIDE, PRESERVATIVE FREE 500 UNITS: 5 INJECTION INTRAVENOUS at 10:19

## 2023-11-06 ASSESSMENT — PATIENT HEALTH QUESTIONNAIRE - PHQ9
3. TROUBLE FALLING OR STAYING ASLEEP OR SLEEPING TOO MUCH: 2
5. POOR APPETITE OR OVEREATING: MORE THAN HALF THE DAYS
3. TROUBLE FALLING OR STAYING ASLEEP OR SLEEPING TOO MUCH: MORE THAN HALF THE DAYS
4. FEELING TIRED OR HAVING LITTLE ENERGY: NEARLY EVERY DAY
2. FEELING DOWN, DEPRESSED, IRRITABLE, OR HOPELESS: NOT AT ALL
5. POOR APPETITE OR OVEREATING: MORE THAN HALF THE DAYS
10. IF YOU CHECKED OFF ANY PROBLEMS, HOW DIFFICULT HAVE THESE PROBLEMS MADE IT FOR YOU TO DO YOUR WORK, TAKE CARE OF THINGS AT HOME, OR GET ALONG WITH OTHER PEOPLE: EXTREMELY DIFFICULT
10. IF YOU CHECKED OFF ANY PROBLEMS, HOW DIFFICULT HAVE THESE PROBLEMS MADE IT FOR YOU TO DO YOUR WORK, TAKE CARE OF THINGS AT HOME, OR GET ALONG WITH OTHER PEOPLE: EXTREMELY DIFFICULT
7. TROUBLE CONCENTRATING ON THINGS, SUCH AS READING THE NEWSPAPER OR WATCHING TELEVISION: NEARLY EVERY DAY
4. FEELING TIRED OR HAVING LITTLE ENERGY: NEARLY EVERY DAY
1. LITTLE INTEREST OR PLEASURE IN DOING THINGS: 3
7. TROUBLE CONCENTRATING ON THINGS, SUCH AS READING THE NEWSPAPER OR WATCHING TELEVISION: 3
8. MOVING OR SPEAKING SO SLOWLY THAT OTHER PEOPLE COULD HAVE NOTICED. OR THE OPPOSITE, BEING SO FIGETY OR RESTLESS THAT YOU HAVE BEEN MOVING AROUND A LOT MORE THAN USUAL: 3
SUM OF ALL RESPONSES TO PHQ QUESTIONS 1-9: 16
6. FEELING BAD ABOUT YOURSELF - OR THAT YOU ARE A FAILURE OR HAVE LET YOURSELF OR YOUR FAMILY DOWN: NOT AT ALL
1. LITTLE INTEREST OR PLEASURE IN DOING THINGS: NEARLY EVERY DAY
2. FEELING DOWN, DEPRESSED OR HOPELESS: NOT AT ALL
8. MOVING OR SPEAKING SO SLOWLY THAT OTHER PEOPLE COULD HAVE NOTICED. OR THE OPPOSITE, BEING SO FIGETY OR RESTLESS THAT YOU HAVE BEEN MOVING AROUND A LOT MORE THAN USUAL: NEARLY EVERY DAY
4. FEELING TIRED OR HAVING LITTLE ENERGY: 3
8. MOVING OR SPEAKING SO SLOWLY THAT OTHER PEOPLE COULD HAVE NOTICED. OR THE OPPOSITE, BEING SO FIGETY OR RESTLESS THAT YOU HAVE BEEN MOVING AROUND A LOT MORE THAN USUAL: NEARLY EVERY DAY
3. TROUBLE FALLING OR STAYING ASLEEP OR SLEEPING TOO MUCH: MORE THAN HALF THE DAYS
9. THOUGHTS THAT YOU WOULD BE BETTER OFF DEAD, OR OF HURTING YOURSELF: NOT AT ALL
6. FEELING BAD ABOUT YOURSELF - OR THAT YOU ARE A FAILURE OR HAVE LET YOURSELF OR YOUR FAMILY DOWN: NOT AT ALL
7. TROUBLE CONCENTRATING ON THINGS, SUCH AS READING THE NEWSPAPER OR WATCHING TELEVISION: NEARLY EVERY DAY
SUM OF ALL RESPONSES TO PHQ QUESTIONS 1-9: 16
1. LITTLE INTEREST OR PLEASURE IN DOING THINGS: NEARLY EVERY DAY
2. FEELING DOWN, DEPRESSED, IRRITABLE, OR HOPELESS: 0
5. POOR APPETITE OR OVEREATING: 2
9. THOUGHTS THAT YOU WOULD BE BETTER OFF DEAD, OR OF HURTING YOURSELF: NOT AT ALL
6. FEELING BAD ABOUT YOURSELF - OR THAT YOU ARE A FAILURE OR HAVE LET YOURSELF OR YOUR FAMILY DOWN: 0
9. THOUGHTS THAT YOU WOULD BE BETTER OFF DEAD, OR OF HURTING YOURSELF: 0

## 2023-11-06 ASSESSMENT — PAIN SCALES - GENERAL: PAINLEVEL: 0-NO PAIN

## 2023-11-06 NOTE — PROGRESS NOTES
Patient ID: Bunny Abarca is a 68 y.o. male.  DIAGNOSIS: Appendiceal adenocarcinoma    STAGING: pT4a pN1a Mx    CURRENT SITES OF DISEASE:    MOLECULAR/GENOMICS:  MMR-intact    CURRENT THERAPY:  Adjuvant FOLFOX every 2 weeks x 12 cycles started on 8/29/23    PREVIOUS THERAPY:  6/20/23: S/p R hemicolectomy    ONCOLOGIC ISSUES:    PROVIDERS:  CRS: Jazmin Faulkner    HISTORY:   4/2023: presented with R inguinal pain. Thought to have a hernia.  4/17/23: CT abdomen pelvis showed indeterminate masslike lesion at the cecum posteriorly at the expected level of the appendix measuring 3 x 4 cm in dimension.  Also within the left pelvis near the origin of the left inguinal canal there is an indeterminate cavitary mass measuring 2.5 x 2 cm  5/5/23: Underwent colonoscopy.  Report not available.  Pathology of cecal/appendiceal orifice mass biopsy showed poorly differentiated adenocarcinoma with signet ring cell differentiation  5/17/23: CT chest abdomen pelvis showed no evidence of metastatic disease, again noted soft tissue mass near the base of the cecum measuring 4.2 x 2.8 cm, compatible with reported history of appendiceal carcinoma  6/20/23: Underwent right hemicolectomy.  Final pathology showed appendiceal invasive moderately differentiated mucinous adenocarcinoma measuring 5 cm.  Tumor invades the visceral peritoneum.  No lymphovascular or perineural invasion. 1/44 LNs involved.  MMR protein expression intact    PMH: HTN, Gilbert's disease, HLD, DM  SH: , no tobacco, EtOH, illicits  FH: Sister and nephew with Rodriguez syndrome.      Subjective    Here for C4 FOLFOX.   Notices more cold and light sensitivity  More nausea, compazine helped. Nausea started on Friday  Notices more fatigue    No fevers, chills, chest pain, shortness of breath, vomiting, rashes or masses.     ROS as above. Remainder of 10 point review of systems elicited and otherwise unremarkable.              Objective    Vital signs reviewed     Physical  Exam  Gen: A&O, NAD  Head: Normocephalic, atraumatic  Eyes: no scleral icterus  ENT: mucous membranes moist, no oropharyngeal lesions  Resp: Lungs CTAB  Cardiac: Tachycardiac, regular rhythm, no murmurs appreciated  Abdomen: Soft, nondistended, nontender, +BS  Neuro: CNII-XII grossly intact  Psych: appropriate mood & affect  Skin: warm, dry, no apparent rashes     Performance Status:  Symptomatic; fully ambulatory      Assessment/Plan    Mr. 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 Faulkner.     He presents today for discussion of adjuvant chemotherapy. I personally reviewed the images from the recent CT, which show no evidence of metastatic disease.  I reviewed the results of his pathology synoptic report, which show pT4a pN1a, Stage III cancer.     I discussed with him and his wife at length that given the stage of his appendiceal cancer, I recommend systemic chemotherapy.  The options are for FOLFOX versus capecitabine plus oxaliplatin.  Given the high risk nature of the diagnosis, my preference is for 6 months of adjuvant chemotherapy, and FOLFOX is better tolerated for this duration.    I discussed the risks and benefits and side effect profile of FOLFOX chemotherapy, and he agrees to proceed.    After 1st cycle of FOLFOX, found to have extensive PE throughout B/L lungs including saddle embolus of main pulmonary artery. On 9/5/23 he had aspiration thrombectomy and was placed on Apixaban. Discussed admission with Dr. Webb. Ok to proceed with chemotherapy as long as patient is feeling well.     10/9/23: After 2nd cycle, he experienced fatigue, cold sensitivity, nausea and diarrhea.    10/23/23: Having more cold and now light sensitivity. Will decrease dose of Oxaliplatin.     Plan:  Stage III appendiceal cancer  - Plan for 6mo goal of adjuvant chemotherapy with FOLFOX  - Proceed with C4 FOLFOX tomorrow with dose decrease of Oxaliplatin by 10%  - RTC 2  weeks for C5    Pulmonary Embolism including saddle embolism  - continue on Apixaban  - has follow up with vascular who will manage apixaban       MARILY Garcia-CNP

## 2023-11-06 NOTE — TELEPHONE ENCOUNTER
Patient states CEA wasn't drawn today, inquiring if order can be placed for tomorrow. Or if it can be drawn at follow up on 11/20/2023

## 2023-11-07 ENCOUNTER — INFUSION (OUTPATIENT)
Dept: HEMATOLOGY/ONCOLOGY | Facility: CLINIC | Age: 68
End: 2023-11-07
Payer: MEDICARE

## 2023-11-07 ENCOUNTER — APPOINTMENT (OUTPATIENT)
Dept: LAB | Facility: CLINIC | Age: 68
End: 2023-11-07
Payer: MEDICARE

## 2023-11-07 VITALS
BODY MASS INDEX: 28.03 KG/M2 | OXYGEN SATURATION: 96 % | HEART RATE: 111 BPM | SYSTOLIC BLOOD PRESSURE: 106 MMHG | WEIGHT: 195.33 LBS | TEMPERATURE: 98.1 F | RESPIRATION RATE: 18 BRPM | DIASTOLIC BLOOD PRESSURE: 63 MMHG

## 2023-11-07 DIAGNOSIS — C18.1 PRIMARY MALIGNANT NEOPLASM OF APPENDIX (MULTI): ICD-10-CM

## 2023-11-07 LAB — CEA SERPL-MCNC: <0.5 UG/L

## 2023-11-07 PROCEDURE — 82378 CARCINOEMBRYONIC ANTIGEN: CPT | Performed by: STUDENT IN AN ORGANIZED HEALTH CARE EDUCATION/TRAINING PROGRAM

## 2023-11-07 PROCEDURE — 96375 TX/PRO/DX INJ NEW DRUG ADDON: CPT | Mod: INF

## 2023-11-07 PROCEDURE — 2500000004 HC RX 250 GENERAL PHARMACY W/ HCPCS (ALT 636 FOR OP/ED): Mod: JZ | Performed by: STUDENT IN AN ORGANIZED HEALTH CARE EDUCATION/TRAINING PROGRAM

## 2023-11-07 PROCEDURE — 96413 CHEMO IV INFUSION 1 HR: CPT

## 2023-11-07 PROCEDURE — 2500000004 HC RX 250 GENERAL PHARMACY W/ HCPCS (ALT 636 FOR OP/ED): Performed by: STUDENT IN AN ORGANIZED HEALTH CARE EDUCATION/TRAINING PROGRAM

## 2023-11-07 PROCEDURE — 96415 CHEMO IV INFUSION ADDL HR: CPT

## 2023-11-07 PROCEDURE — 96416 CHEMO PROLONG INFUSE W/PUMP: CPT

## 2023-11-07 RX ORDER — HEPARIN SODIUM,PORCINE/PF 10 UNIT/ML
50 SYRINGE (ML) INTRAVENOUS AS NEEDED
Status: CANCELLED | OUTPATIENT
Start: 2023-11-07

## 2023-11-07 RX ORDER — LORAZEPAM 2 MG/ML
1 INJECTION INTRAMUSCULAR AS NEEDED
Status: DISCONTINUED | OUTPATIENT
Start: 2023-11-07 | End: 2023-11-07 | Stop reason: HOSPADM

## 2023-11-07 RX ORDER — PROCHLORPERAZINE MALEATE 10 MG
10 TABLET ORAL EVERY 6 HOURS PRN
Status: DISCONTINUED | OUTPATIENT
Start: 2023-11-07 | End: 2023-11-07 | Stop reason: HOSPADM

## 2023-11-07 RX ORDER — FLUTICASONE PROPIONATE 50 MCG
1 SPRAY, SUSPENSION (ML) NASAL DAILY
COMMUNITY
End: 2023-11-21 | Stop reason: ALTCHOICE

## 2023-11-07 RX ORDER — FAMOTIDINE 10 MG/ML
20 INJECTION INTRAVENOUS ONCE AS NEEDED
Status: DISCONTINUED | OUTPATIENT
Start: 2023-11-07 | End: 2023-11-07 | Stop reason: HOSPADM

## 2023-11-07 RX ORDER — HEPARIN 100 UNIT/ML
500 SYRINGE INTRAVENOUS AS NEEDED
Status: DISCONTINUED | OUTPATIENT
Start: 2023-11-07 | End: 2023-11-07 | Stop reason: HOSPADM

## 2023-11-07 RX ORDER — DIPHENHYDRAMINE HYDROCHLORIDE 50 MG/ML
50 INJECTION INTRAMUSCULAR; INTRAVENOUS AS NEEDED
Status: DISCONTINUED | OUTPATIENT
Start: 2023-11-07 | End: 2023-11-07 | Stop reason: HOSPADM

## 2023-11-07 RX ORDER — HEPARIN SODIUM,PORCINE/PF 10 UNIT/ML
50 SYRINGE (ML) INTRAVENOUS AS NEEDED
Status: DISCONTINUED | OUTPATIENT
Start: 2023-11-07 | End: 2023-11-07 | Stop reason: HOSPADM

## 2023-11-07 RX ORDER — EPINEPHRINE 0.3 MG/.3ML
0.3 INJECTION SUBCUTANEOUS EVERY 5 MIN PRN
Status: DISCONTINUED | OUTPATIENT
Start: 2023-11-07 | End: 2023-11-07 | Stop reason: HOSPADM

## 2023-11-07 RX ORDER — PROCHLORPERAZINE EDISYLATE 5 MG/ML
10 INJECTION INTRAMUSCULAR; INTRAVENOUS EVERY 6 HOURS PRN
Status: DISCONTINUED | OUTPATIENT
Start: 2023-11-07 | End: 2023-11-07 | Stop reason: HOSPADM

## 2023-11-07 RX ORDER — PALONOSETRON 0.05 MG/ML
0.25 INJECTION, SOLUTION INTRAVENOUS ONCE
Status: COMPLETED | OUTPATIENT
Start: 2023-11-07 | End: 2023-11-07

## 2023-11-07 RX ORDER — HEPARIN 100 UNIT/ML
500 SYRINGE INTRAVENOUS AS NEEDED
Status: CANCELLED | OUTPATIENT
Start: 2023-11-07

## 2023-11-07 RX ORDER — ALBUTEROL SULFATE 0.83 MG/ML
3 SOLUTION RESPIRATORY (INHALATION) AS NEEDED
Status: DISCONTINUED | OUTPATIENT
Start: 2023-11-07 | End: 2023-11-07 | Stop reason: HOSPADM

## 2023-11-07 RX ADMIN — OXALIPLATIN 160 MG: 5 INJECTION, SOLUTION INTRAVENOUS at 11:33

## 2023-11-07 RX ADMIN — DEXAMETHASONE SODIUM PHOSPHATE 12 MG: 10 INJECTION, SOLUTION INTRAMUSCULAR; INTRAVENOUS at 10:55

## 2023-11-07 RX ADMIN — FLUOROURACIL 5100 MG: 50 INJECTION, SOLUTION INTRAVENOUS at 14:01

## 2023-11-07 RX ADMIN — PALONOSETRON HYDROCHLORIDE 250 MCG: 0.25 INJECTION INTRAVENOUS at 10:52

## 2023-11-07 ASSESSMENT — PAIN SCALES - GENERAL: PAINLEVEL: 0-NO PAIN

## 2023-11-07 NOTE — TELEPHONE ENCOUNTER
Per provider  I ordered it for tomorrow. We won't always order it every cycle in the adjuvant setting but ordered it for them to draw it tomorrow       Discussed with patient, verbalized understanding.

## 2023-11-09 ENCOUNTER — APPOINTMENT (OUTPATIENT)
Dept: HEMATOLOGY/ONCOLOGY | Facility: CLINIC | Age: 68
End: 2023-11-09
Payer: COMMERCIAL

## 2023-11-09 ENCOUNTER — INFUSION (OUTPATIENT)
Dept: HEMATOLOGY/ONCOLOGY | Facility: CLINIC | Age: 68
End: 2023-11-09
Payer: MEDICARE

## 2023-11-09 VITALS
RESPIRATION RATE: 18 BRPM | SYSTOLIC BLOOD PRESSURE: 145 MMHG | BODY MASS INDEX: 27.99 KG/M2 | TEMPERATURE: 98.2 F | DIASTOLIC BLOOD PRESSURE: 74 MMHG | OXYGEN SATURATION: 97 % | WEIGHT: 195.11 LBS | HEART RATE: 79 BPM

## 2023-11-09 DIAGNOSIS — C18.1 PRIMARY MALIGNANT NEOPLASM OF APPENDIX (MULTI): ICD-10-CM

## 2023-11-09 PROCEDURE — 96523 IRRIG DRUG DELIVERY DEVICE: CPT

## 2023-11-09 PROCEDURE — 2500000004 HC RX 250 GENERAL PHARMACY W/ HCPCS (ALT 636 FOR OP/ED): Performed by: STUDENT IN AN ORGANIZED HEALTH CARE EDUCATION/TRAINING PROGRAM

## 2023-11-09 RX ORDER — HEPARIN 100 UNIT/ML
500 SYRINGE INTRAVENOUS AS NEEDED
Status: DISCONTINUED | OUTPATIENT
Start: 2023-11-09 | End: 2023-11-09 | Stop reason: HOSPADM

## 2023-11-09 RX ORDER — HEPARIN 100 UNIT/ML
500 SYRINGE INTRAVENOUS AS NEEDED
Status: CANCELLED | OUTPATIENT
Start: 2023-11-09

## 2023-11-09 RX ORDER — HEPARIN SODIUM,PORCINE/PF 10 UNIT/ML
50 SYRINGE (ML) INTRAVENOUS AS NEEDED
Status: CANCELLED | OUTPATIENT
Start: 2023-11-09

## 2023-11-09 RX ORDER — HEPARIN SODIUM,PORCINE/PF 10 UNIT/ML
50 SYRINGE (ML) INTRAVENOUS AS NEEDED
Status: DISCONTINUED | OUTPATIENT
Start: 2023-11-09 | End: 2023-11-09 | Stop reason: HOSPADM

## 2023-11-09 RX ADMIN — HEPARIN 500 UNITS: 100 SYRINGE at 12:10

## 2023-11-09 ASSESSMENT — PAIN SCALES - GENERAL: PAINLEVEL: 0-NO PAIN

## 2023-11-13 ENCOUNTER — TELEPHONE (OUTPATIENT)
Dept: ADMISSION | Facility: HOSPITAL | Age: 68
End: 2023-11-13
Payer: MEDICARE

## 2023-11-13 ENCOUNTER — PATIENT MESSAGE (OUTPATIENT)
Dept: HEMATOLOGY/ONCOLOGY | Facility: HOSPITAL | Age: 68
End: 2023-11-13
Payer: MEDICARE

## 2023-11-13 ASSESSMENT — PATIENT HEALTH QUESTIONNAIRE - PHQ9
9. THOUGHTS THAT YOU WOULD BE BETTER OFF DEAD, OR OF HURTING YOURSELF: 0
2. FEELING DOWN, DEPRESSED, IRRITABLE, OR HOPELESS: 0
9. THOUGHTS THAT YOU WOULD BE BETTER OFF DEAD, OR OF HURTING YOURSELF: NOT AT ALL
2. FEELING DOWN, DEPRESSED OR HOPELESS: NOT AT ALL
5. POOR APPETITE OR OVEREATING: MORE THAN HALF THE DAYS
7. TROUBLE CONCENTRATING ON THINGS, SUCH AS READING THE NEWSPAPER OR WATCHING TELEVISION: NEARLY EVERY DAY
3. TROUBLE FALLING OR STAYING ASLEEP OR SLEEPING TOO MUCH: NEARLY EVERY DAY
3. TROUBLE FALLING OR STAYING ASLEEP OR SLEEPING TOO MUCH: 3
2. FEELING DOWN, DEPRESSED OR HOPELESS: NOT AT ALL
8. MOVING OR SPEAKING SO SLOWLY THAT OTHER PEOPLE COULD HAVE NOTICED. OR THE OPPOSITE, BEING SO FIGETY OR RESTLESS THAT YOU HAVE BEEN MOVING AROUND A LOT MORE THAN USUAL: NEARLY EVERY DAY
7. TROUBLE CONCENTRATING ON THINGS, SUCH AS READING THE NEWSPAPER OR WATCHING TELEVISION: NEARLY EVERY DAY
1. LITTLE INTEREST OR PLEASURE IN DOING THINGS: 3
SUM OF ALL RESPONSES TO PHQ QUESTIONS 1-9: 17
7. TROUBLE CONCENTRATING ON THINGS, SUCH AS READING THE NEWSPAPER OR WATCHING TELEVISION: NEARLY EVERY DAY
7. TROUBLE CONCENTRATING ON THINGS, SUCH AS READING THE NEWSPAPER OR WATCHING TELEVISION: NEARLY EVERY DAY
3. TROUBLE FALLING OR STAYING ASLEEP OR SLEEPING TOO MUCH: NEARLY EVERY DAY
6. FEELING BAD ABOUT YOURSELF - OR THAT YOU ARE A FAILURE OR HAVE LET YOURSELF OR YOUR FAMILY DOWN: NOT AT ALL
1. LITTLE INTEREST OR PLEASURE IN DOING THINGS: NEARLY EVERY DAY
SUM OF ALL RESPONSES TO PHQ QUESTIONS 1-9: 17
5. POOR APPETITE OR OVEREATING: 2
9. THOUGHTS THAT YOU WOULD BE BETTER OFF DEAD, OR OF HURTING YOURSELF: NOT AT ALL
10. IF YOU CHECKED OFF ANY PROBLEMS, HOW DIFFICULT HAVE THESE PROBLEMS MADE IT FOR YOU TO DO YOUR WORK, TAKE CARE OF THINGS AT HOME, OR GET ALONG WITH OTHER PEOPLE: EXTREMELY DIFFICULT
2. FEELING DOWN, DEPRESSED, IRRITABLE, OR HOPELESS: 0
9. THOUGHTS THAT YOU WOULD BE BETTER OFF DEAD, OR OF HURTING YOURSELF: 0
8. MOVING OR SPEAKING SO SLOWLY THAT OTHER PEOPLE COULD HAVE NOTICED. OR THE OPPOSITE, BEING SO FIGETY OR RESTLESS THAT YOU HAVE BEEN MOVING AROUND A LOT MORE THAN USUAL: 3
7. TROUBLE CONCENTRATING ON THINGS, SUCH AS READING THE NEWSPAPER OR WATCHING TELEVISION: NEARLY EVERY DAY
SUM OF ALL RESPONSES TO PHQ QUESTIONS 1-9: 17
7. TROUBLE CONCENTRATING ON THINGS, SUCH AS READING THE NEWSPAPER OR WATCHING TELEVISION: 3
6. FEELING BAD ABOUT YOURSELF - OR THAT YOU ARE A FAILURE OR HAVE LET YOURSELF OR YOUR FAMILY DOWN: NOT AT ALL
1. LITTLE INTEREST OR PLEASURE IN DOING THINGS: 3
4. FEELING TIRED OR HAVING LITTLE ENERGY: NEARLY EVERY DAY
8. MOVING OR SPEAKING SO SLOWLY THAT OTHER PEOPLE COULD HAVE NOTICED. OR THE OPPOSITE, BEING SO FIGETY OR RESTLESS THAT YOU HAVE BEEN MOVING AROUND A LOT MORE THAN USUAL: NEARLY EVERY DAY
10. IF YOU CHECKED OFF ANY PROBLEMS, HOW DIFFICULT HAVE THESE PROBLEMS MADE IT FOR YOU TO DO YOUR WORK, TAKE CARE OF THINGS AT HOME, OR GET ALONG WITH OTHER PEOPLE: EXTREMELY DIFFICULT
1. LITTLE INTEREST OR PLEASURE IN DOING THINGS: NEARLY EVERY DAY
6. FEELING BAD ABOUT YOURSELF - OR THAT YOU ARE A FAILURE OR HAVE LET YOURSELF OR YOUR FAMILY DOWN: 0
5. POOR APPETITE OR OVEREATING: 2
8. MOVING OR SPEAKING SO SLOWLY THAT OTHER PEOPLE COULD HAVE NOTICED. OR THE OPPOSITE, BEING SO FIGETY OR RESTLESS THAT YOU HAVE BEEN MOVING AROUND A LOT MORE THAN USUAL: 3
9. THOUGHTS THAT YOU WOULD BE BETTER OFF DEAD, OR OF HURTING YOURSELF: NOT AT ALL
10. IF YOU CHECKED OFF ANY PROBLEMS, HOW DIFFICULT HAVE THESE PROBLEMS MADE IT FOR YOU TO DO YOUR WORK, TAKE CARE OF THINGS AT HOME, OR GET ALONG WITH OTHER PEOPLE: EXTREMELY DIFFICULT
3. TROUBLE FALLING OR STAYING ASLEEP OR SLEEPING TOO MUCH: NEARLY EVERY DAY
9. THOUGHTS THAT YOU WOULD BE BETTER OFF DEAD, OR OF HURTING YOURSELF: NOT AT ALL
6. FEELING BAD ABOUT YOURSELF - OR THAT YOU ARE A FAILURE OR HAVE LET YOURSELF OR YOUR FAMILY DOWN: NOT AT ALL
4. FEELING TIRED OR HAVING LITTLE ENERGY: NEARLY EVERY DAY
5. POOR APPETITE OR OVEREATING: MORE THAN HALF THE DAYS
3. TROUBLE FALLING OR STAYING ASLEEP OR SLEEPING TOO MUCH: 3
8. MOVING OR SPEAKING SO SLOWLY THAT OTHER PEOPLE COULD HAVE NOTICED. OR THE OPPOSITE, BEING SO FIGETY OR RESTLESS THAT YOU HAVE BEEN MOVING AROUND A LOT MORE THAN USUAL: NEARLY EVERY DAY
6. FEELING BAD ABOUT YOURSELF - OR THAT YOU ARE A FAILURE OR HAVE LET YOURSELF OR YOUR FAMILY DOWN: 0
4. FEELING TIRED OR HAVING LITTLE ENERGY: NEARLY EVERY DAY
1. LITTLE INTEREST OR PLEASURE IN DOING THINGS: NEARLY EVERY DAY
2. FEELING DOWN, DEPRESSED OR HOPELESS: NOT AT ALL
4. FEELING TIRED OR HAVING LITTLE ENERGY: NEARLY EVERY DAY
7. TROUBLE CONCENTRATING ON THINGS, SUCH AS READING THE NEWSPAPER OR WATCHING TELEVISION: 3
6. FEELING BAD ABOUT YOURSELF - OR THAT YOU ARE A FAILURE OR HAVE LET YOURSELF OR YOUR FAMILY DOWN: NOT AT ALL
6. FEELING BAD ABOUT YOURSELF - OR THAT YOU ARE A FAILURE OR HAVE LET YOURSELF OR YOUR FAMILY DOWN: 0
4. FEELING TIRED OR HAVING LITTLE ENERGY: 3
1. LITTLE INTEREST OR PLEASURE IN DOING THINGS: 3
8. MOVING OR SPEAKING SO SLOWLY THAT OTHER PEOPLE COULD HAVE NOTICED. OR THE OPPOSITE, BEING SO FIGETY OR RESTLESS THAT YOU HAVE BEEN MOVING AROUND A LOT MORE THAN USUAL: 3
3. TROUBLE FALLING OR STAYING ASLEEP OR SLEEPING TOO MUCH: NEARLY EVERY DAY
5. POOR APPETITE OR OVEREATING: MORE THAN HALF THE DAYS
1. LITTLE INTEREST OR PLEASURE IN DOING THINGS: NEARLY EVERY DAY
8. MOVING OR SPEAKING SO SLOWLY THAT OTHER PEOPLE COULD HAVE NOTICED. OR THE OPPOSITE, BEING SO FIGETY OR RESTLESS THAT YOU HAVE BEEN MOVING AROUND A LOT MORE THAN USUAL: NEARLY EVERY DAY
SUM OF ALL RESPONSES TO PHQ QUESTIONS 1-9: 17
10. IF YOU CHECKED OFF ANY PROBLEMS, HOW DIFFICULT HAVE THESE PROBLEMS MADE IT FOR YOU TO DO YOUR WORK, TAKE CARE OF THINGS AT HOME, OR GET ALONG WITH OTHER PEOPLE: EXTREMELY DIFFICULT
5. POOR APPETITE OR OVEREATING: MORE THAN HALF THE DAYS
7. TROUBLE CONCENTRATING ON THINGS, SUCH AS READING THE NEWSPAPER OR WATCHING TELEVISION: NEARLY EVERY DAY
4. FEELING TIRED OR HAVING LITTLE ENERGY: NEARLY EVERY DAY
3. TROUBLE FALLING OR STAYING ASLEEP OR SLEEPING TOO MUCH: NEARLY EVERY DAY
8. MOVING OR SPEAKING SO SLOWLY THAT OTHER PEOPLE COULD HAVE NOTICED. OR THE OPPOSITE, BEING SO FIGETY OR RESTLESS THAT YOU HAVE BEEN MOVING AROUND A LOT MORE THAN USUAL: NEARLY EVERY DAY
9. THOUGHTS THAT YOU WOULD BE BETTER OFF DEAD, OR OF HURTING YOURSELF: NOT AT ALL
7. TROUBLE CONCENTRATING ON THINGS, SUCH AS READING THE NEWSPAPER OR WATCHING TELEVISION: 3
10. IF YOU CHECKED OFF ANY PROBLEMS, HOW DIFFICULT HAVE THESE PROBLEMS MADE IT FOR YOU TO DO YOUR WORK, TAKE CARE OF THINGS AT HOME, OR GET ALONG WITH OTHER PEOPLE: EXTREMELY DIFFICULT
1. LITTLE INTEREST OR PLEASURE IN DOING THINGS: NEARLY EVERY DAY
3. TROUBLE FALLING OR STAYING ASLEEP OR SLEEPING TOO MUCH: NEARLY EVERY DAY
2. FEELING DOWN, DEPRESSED, IRRITABLE, OR HOPELESS: 0
10. IF YOU CHECKED OFF ANY PROBLEMS, HOW DIFFICULT HAVE THESE PROBLEMS MADE IT FOR YOU TO DO YOUR WORK, TAKE CARE OF THINGS AT HOME, OR GET ALONG WITH OTHER PEOPLE: EXTREMELY DIFFICULT
9. THOUGHTS THAT YOU WOULD BE BETTER OFF DEAD, OR OF HURTING YOURSELF: 0
6. FEELING BAD ABOUT YOURSELF - OR THAT YOU ARE A FAILURE OR HAVE LET YOURSELF OR YOUR FAMILY DOWN: NOT AT ALL
6. FEELING BAD ABOUT YOURSELF - OR THAT YOU ARE A FAILURE OR HAVE LET YOURSELF OR YOUR FAMILY DOWN: NOT AT ALL
2. FEELING DOWN, DEPRESSED, IRRITABLE, OR HOPELESS: NOT AT ALL
SUM OF ALL RESPONSES TO PHQ QUESTIONS 1-9: 17
2. FEELING DOWN, DEPRESSED, IRRITABLE, OR HOPELESS: NOT AT ALL
4. FEELING TIRED OR HAVING LITTLE ENERGY: NEARLY EVERY DAY
8. MOVING OR SPEAKING SO SLOWLY THAT OTHER PEOPLE COULD HAVE NOTICED. OR THE OPPOSITE, BEING SO FIGETY OR RESTLESS THAT YOU HAVE BEEN MOVING AROUND A LOT MORE THAN USUAL: NEARLY EVERY DAY
SUM OF ALL RESPONSES TO PHQ QUESTIONS 1-9: 17
9. THOUGHTS THAT YOU WOULD BE BETTER OFF DEAD, OR OF HURTING YOURSELF: NOT AT ALL
3. TROUBLE FALLING OR STAYING ASLEEP OR SLEEPING TOO MUCH: 3
5. POOR APPETITE OR OVEREATING: MORE THAN HALF THE DAYS
5. POOR APPETITE OR OVEREATING: MORE THAN HALF THE DAYS
5. POOR APPETITE OR OVEREATING: 2
1. LITTLE INTEREST OR PLEASURE IN DOING THINGS: NEARLY EVERY DAY
4. FEELING TIRED OR HAVING LITTLE ENERGY: 3
4. FEELING TIRED OR HAVING LITTLE ENERGY: 3
2. FEELING DOWN, DEPRESSED, IRRITABLE, OR HOPELESS: NOT AT ALL

## 2023-11-13 NOTE — TELEPHONE ENCOUNTER
"Patients spouse Marielle is requesting a call back from team to discuss appointments in December. She states he should have a port draw, nurse visit and follow up with [place] or Dr Webb, the follow up is scheduled, she states it is for the wrong length of time, it is normally a longer visit. She also states there is no disconnect scheduled. Advised her that is placed once patient is hooked up on 12/5, she states there has always been an order in there in the past \"On Hold\"  "

## 2023-11-17 ASSESSMENT — PATIENT HEALTH QUESTIONNAIRE - PHQ9
1. LITTLE INTEREST OR PLEASURE IN DOING THINGS: NEARLY EVERY DAY
SUM OF ALL RESPONSES TO PHQ QUESTIONS 1-9: 18
5. POOR APPETITE OR OVEREATING: 3
4. FEELING TIRED OR HAVING LITTLE ENERGY: NEARLY EVERY DAY
8. MOVING OR SPEAKING SO SLOWLY THAT OTHER PEOPLE COULD HAVE NOTICED. OR THE OPPOSITE, BEING SO FIGETY OR RESTLESS THAT YOU HAVE BEEN MOVING AROUND A LOT MORE THAN USUAL: NEARLY EVERY DAY
5. POOR APPETITE OR OVEREATING: NEARLY EVERY DAY
SUM OF ALL RESPONSES TO PHQ QUESTIONS 1-9: 18
4. FEELING TIRED OR HAVING LITTLE ENERGY: NEARLY EVERY DAY
7. TROUBLE CONCENTRATING ON THINGS, SUCH AS READING THE NEWSPAPER OR WATCHING TELEVISION: NEARLY EVERY DAY
2. FEELING DOWN, DEPRESSED, IRRITABLE, OR HOPELESS: NOT AT ALL
6. FEELING BAD ABOUT YOURSELF - OR THAT YOU ARE A FAILURE OR HAVE LET YOURSELF OR YOUR FAMILY DOWN: 0
4. FEELING TIRED OR HAVING LITTLE ENERGY: 3
1. LITTLE INTEREST OR PLEASURE IN DOING THINGS: 3
7. TROUBLE CONCENTRATING ON THINGS, SUCH AS READING THE NEWSPAPER OR WATCHING TELEVISION: NEARLY EVERY DAY
3. TROUBLE FALLING OR STAYING ASLEEP OR SLEEPING TOO MUCH: NEARLY EVERY DAY
2. FEELING DOWN, DEPRESSED, IRRITABLE, OR HOPELESS: 0
9. THOUGHTS THAT YOU WOULD BE BETTER OFF DEAD, OR OF HURTING YOURSELF: NOT AT ALL
1. LITTLE INTEREST OR PLEASURE IN DOING THINGS: NEARLY EVERY DAY
8. MOVING OR SPEAKING SO SLOWLY THAT OTHER PEOPLE COULD HAVE NOTICED. OR THE OPPOSITE, BEING SO FIGETY OR RESTLESS THAT YOU HAVE BEEN MOVING AROUND A LOT MORE THAN USUAL: 3
3. TROUBLE FALLING OR STAYING ASLEEP OR SLEEPING TOO MUCH: 3
9. THOUGHTS THAT YOU WOULD BE BETTER OFF DEAD, OR OF HURTING YOURSELF: 0
6. FEELING BAD ABOUT YOURSELF - OR THAT YOU ARE A FAILURE OR HAVE LET YOURSELF OR YOUR FAMILY DOWN: NOT AT ALL
6. FEELING BAD ABOUT YOURSELF - OR THAT YOU ARE A FAILURE OR HAVE LET YOURSELF OR YOUR FAMILY DOWN: NOT AT ALL
10. IF YOU CHECKED OFF ANY PROBLEMS, HOW DIFFICULT HAVE THESE PROBLEMS MADE IT FOR YOU TO DO YOUR WORK, TAKE CARE OF THINGS AT HOME, OR GET ALONG WITH OTHER PEOPLE: EXTREMELY DIFFICULT
2. FEELING DOWN, DEPRESSED OR HOPELESS: NOT AT ALL
3. TROUBLE FALLING OR STAYING ASLEEP OR SLEEPING TOO MUCH: NEARLY EVERY DAY
10. IF YOU CHECKED OFF ANY PROBLEMS, HOW DIFFICULT HAVE THESE PROBLEMS MADE IT FOR YOU TO DO YOUR WORK, TAKE CARE OF THINGS AT HOME, OR GET ALONG WITH OTHER PEOPLE: EXTREMELY DIFFICULT
7. TROUBLE CONCENTRATING ON THINGS, SUCH AS READING THE NEWSPAPER OR WATCHING TELEVISION: 3
9. THOUGHTS THAT YOU WOULD BE BETTER OFF DEAD, OR OF HURTING YOURSELF: NOT AT ALL
5. POOR APPETITE OR OVEREATING: NEARLY EVERY DAY
8. MOVING OR SPEAKING SO SLOWLY THAT OTHER PEOPLE COULD HAVE NOTICED. OR THE OPPOSITE, BEING SO FIGETY OR RESTLESS THAT YOU HAVE BEEN MOVING AROUND A LOT MORE THAN USUAL: NEARLY EVERY DAY

## 2023-11-19 NOTE — PROGRESS NOTES
Patient ID: Bunny Abarca is a 68 y.o. male.  DIAGNOSIS: Appendiceal adenocarcinoma    STAGING: pT4a pN1a Mx    CURRENT SITES OF DISEASE:    MOLECULAR/GENOMICS:  MMR-intact    TUMOR MARKER:  5/15/23 CEA: <0.5  8/14/23 CEA: <0.5  11/7/23 CEA: <0.5    CURRENT THERAPY:  Adjuvant FOLFOX every 2 weeks x 12 cycles started on 8/29/23; Dose reduction of Oxaliplatin with C4 due to lasting cold sensitivity    PREVIOUS THERAPY:  6/20/23: S/p R hemicolectomy    ONCOLOGIC ISSUES:    PROVIDERS:  CRS: Jazmin Faulkner    HISTORY:   4/2023: presented with R inguinal pain. Thought to have a hernia.  4/17/23: CT abdomen pelvis showed indeterminate masslike lesion at the cecum posteriorly at the expected level of the appendix measuring 3 x 4 cm in dimension.  Also within the left pelvis near the origin of the left inguinal canal there is an indeterminate cavitary mass measuring 2.5 x 2 cm  5/5/23: Underwent colonoscopy.  Report not available.  Pathology of cecal/appendiceal orifice mass biopsy showed poorly differentiated adenocarcinoma with signet ring cell differentiation  5/17/23: CT chest abdomen pelvis showed no evidence of metastatic disease, again noted soft tissue mass near the base of the cecum measuring 4.2 x 2.8 cm, compatible with reported history of appendiceal carcinoma  6/20/23: Underwent right hemicolectomy.  Final pathology showed appendiceal invasive moderately differentiated mucinous adenocarcinoma measuring 5 cm.  Tumor invades the visceral peritoneum.  No lymphovascular or perineural invasion. 1/44 LNs involved.  MMR protein expression intact    PMH: HTN, Gilbert's disease, HLD, DM  SH: , no tobacco, EtOH, illicits  FH: Sister and nephew with Rodriguez syndrome.      Subjective    Here for C5 FOLFOX.   With the dose reduction of Oxaliplatin last cycle, noticed less fatigue  More sensitive to taste and smell  Sensitive to cold      No fevers, chills, chest pain, shortness of breath, vomiting, rashes or masses.      ROS as above. Remainder of 10 point review of systems elicited and otherwise unremarkable.        Objective:   BSA: 2.1 meters squared  /68 (BP Location: Left arm, Patient Position: Sitting, BP Cuff Size: Large adult)   Pulse 99   Temp 36.3 °C (97.3 °F)   Resp 18   Wt 89.6 kg (197 lb 8 oz)   SpO2 99%   BMI 28.34 kg/m²      Physical Exam  Gen: A&O, NAD  Head: Normocephalic, atraumatic  Eyes: no scleral icterus  ENT: mucous membranes moist, no oropharyngeal lesions  Resp: Lungs CTAB  Cardiac: Tachycardiac, regular rhythm, no murmurs appreciated  Abdomen: Soft, nondistended, nontender, +BS  Neuro: CNII-XII grossly intact  Psych: appropriate mood & affect  Skin: warm, dry, no apparent rashes     Performance Status:  Symptomatic; fully ambulatory      Assessment/Plan   Mr. 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 Faulkner.     He presents today for discussion of adjuvant chemotherapy. I personally reviewed the images from the recent CT, which show no evidence of metastatic disease.  I reviewed the results of his pathology synoptic report, which show pT4a pN1a, Stage III cancer.     I discussed with him and his wife at length that given the stage of his appendiceal cancer, I recommend systemic chemotherapy.  The options are for FOLFOX versus capecitabine plus oxaliplatin.  Given the high risk nature of the diagnosis, my preference is for 6 months of adjuvant chemotherapy, and FOLFOX is better tolerated for this duration.    I discussed the risks and benefits and side effect profile of FOLFOX chemotherapy, and he agrees to proceed.    After 1st cycle of FOLFOX, found to have extensive PE throughout B/L lungs including saddle embolus of main pulmonary artery. On 9/5/23 he had aspiration thrombectomy and was placed on Apixaban. Discussed admission with Dr. Webb. Ok to proceed with chemotherapy as long as patient is feeling well.     10/9/23: After  2nd cycle, he experienced fatigue, cold sensitivity, nausea and diarrhea.    10/23/23: Having more cold and now light sensitivity. Will decrease dose of Oxaliplatin.     Plan:  Stage III appendiceal cancer  - Plan for 6mo goal of adjuvant chemotherapy with FOLFOX  - Proceed with C5 FOLFOX tomorrow, continue dose decrease of Oxaliplatin by 10%  - RTC 2 weeks for C6    Pulmonary Embolism including saddle embolism  - continue on Apixaban  - has follow up with vascular who will manage apixaban           [place] Kate, MARILY-CNP

## 2023-11-20 ENCOUNTER — NUTRITION (OUTPATIENT)
Dept: HEMATOLOGY/ONCOLOGY | Facility: HOSPITAL | Age: 68
End: 2023-11-20

## 2023-11-20 ENCOUNTER — INFUSION (OUTPATIENT)
Dept: HEMATOLOGY/ONCOLOGY | Facility: HOSPITAL | Age: 68
End: 2023-11-20
Payer: MEDICARE

## 2023-11-20 ENCOUNTER — OFFICE VISIT (OUTPATIENT)
Dept: HEMATOLOGY/ONCOLOGY | Facility: HOSPITAL | Age: 68
End: 2023-11-20
Payer: MEDICARE

## 2023-11-20 ENCOUNTER — LAB (OUTPATIENT)
Dept: HEMATOLOGY/ONCOLOGY | Facility: HOSPITAL | Age: 68
End: 2023-11-20
Payer: MEDICARE

## 2023-11-20 VITALS — HEIGHT: 71 IN | WEIGHT: 195.8 LBS | BODY MASS INDEX: 27.41 KG/M2

## 2023-11-20 VITALS
WEIGHT: 195.8 LBS | HEART RATE: 103 BPM | SYSTOLIC BLOOD PRESSURE: 114 MMHG | DIASTOLIC BLOOD PRESSURE: 66 MMHG | RESPIRATION RATE: 18 BRPM | TEMPERATURE: 97.9 F | BODY MASS INDEX: 28.09 KG/M2

## 2023-11-20 DIAGNOSIS — C18.1 PRIMARY MALIGNANT NEOPLASM OF APPENDIX (MULTI): Primary | ICD-10-CM

## 2023-11-20 DIAGNOSIS — C18.1 PRIMARY MALIGNANT NEOPLASM OF APPENDIX (MULTI): ICD-10-CM

## 2023-11-20 DIAGNOSIS — C18.1 CANCER OF APPENDIX METASTATIC TO INTRA-ABDOMINAL LYMPH NODE (MULTI): ICD-10-CM

## 2023-11-20 DIAGNOSIS — C77.2 CANCER OF APPENDIX METASTATIC TO INTRA-ABDOMINAL LYMPH NODE (MULTI): ICD-10-CM

## 2023-11-20 DIAGNOSIS — J01.10 ACUTE NON-RECURRENT FRONTAL SINUSITIS: ICD-10-CM

## 2023-11-20 LAB
ALBUMIN SERPL BCP-MCNC: 4.3 G/DL (ref 3.4–5)
ALP SERPL-CCNC: 46 U/L (ref 33–136)
ALT SERPL W P-5'-P-CCNC: 20 U/L (ref 10–52)
ANION GAP SERPL CALC-SCNC: 15 MMOL/L (ref 10–20)
AST SERPL W P-5'-P-CCNC: 24 U/L (ref 9–39)
BASOPHILS # BLD AUTO: 0.03 X10*3/UL (ref 0–0.1)
BASOPHILS NFR BLD AUTO: 0.5 %
BILIRUB SERPL-MCNC: 0.6 MG/DL (ref 0–1.2)
BUN SERPL-MCNC: 18 MG/DL (ref 6–23)
CALCIUM SERPL-MCNC: 9.4 MG/DL (ref 8.6–10.3)
CHLORIDE SERPL-SCNC: 102 MMOL/L (ref 98–107)
CO2 SERPL-SCNC: 25 MMOL/L (ref 21–32)
CREAT SERPL-MCNC: 1.08 MG/DL (ref 0.5–1.3)
EOSINOPHIL # BLD AUTO: 0.14 X10*3/UL (ref 0–0.7)
EOSINOPHIL NFR BLD AUTO: 2.3 %
ERYTHROCYTE [DISTWIDTH] IN BLOOD BY AUTOMATED COUNT: 16.2 % (ref 11.5–14.5)
GFR SERPL CREATININE-BSD FRML MDRD: 75 ML/MIN/1.73M*2
GLUCOSE SERPL-MCNC: 242 MG/DL (ref 74–99)
HCT VFR BLD AUTO: 36.7 % (ref 41–52)
HGB BLD-MCNC: 11.5 G/DL (ref 13.5–17.5)
IMM GRANULOCYTES # BLD AUTO: 0.02 X10*3/UL (ref 0–0.7)
IMM GRANULOCYTES NFR BLD AUTO: 0.3 % (ref 0–0.9)
LYMPHOCYTES # BLD AUTO: 0.94 X10*3/UL (ref 1.2–4.8)
LYMPHOCYTES NFR BLD AUTO: 15.5 %
MCH RBC QN AUTO: 25.6 PG (ref 26–34)
MCHC RBC AUTO-ENTMCNC: 31.3 G/DL (ref 32–36)
MCV RBC AUTO: 82 FL (ref 80–100)
MONOCYTES # BLD AUTO: 0.63 X10*3/UL (ref 0.1–1)
MONOCYTES NFR BLD AUTO: 10.4 %
NEUTROPHILS # BLD AUTO: 4.29 X10*3/UL (ref 1.2–7.7)
NEUTROPHILS NFR BLD AUTO: 71 %
NRBC BLD-RTO: 0 /100 WBCS (ref 0–0)
PLATELET # BLD AUTO: 205 X10*3/UL (ref 150–450)
POTASSIUM SERPL-SCNC: 4.1 MMOL/L (ref 3.5–5.3)
PROT SERPL-MCNC: 6.7 G/DL (ref 6.4–8.2)
RBC # BLD AUTO: 4.5 X10*6/UL (ref 4.5–5.9)
SODIUM SERPL-SCNC: 138 MMOL/L (ref 136–145)
WBC # BLD AUTO: 6.1 X10*3/UL (ref 4.4–11.3)

## 2023-11-20 PROCEDURE — 3066F NEPHROPATHY DOC TX: CPT | Performed by: STUDENT IN AN ORGANIZED HEALTH CARE EDUCATION/TRAINING PROGRAM

## 2023-11-20 PROCEDURE — 3078F DIAST BP <80 MM HG: CPT | Performed by: STUDENT IN AN ORGANIZED HEALTH CARE EDUCATION/TRAINING PROGRAM

## 2023-11-20 PROCEDURE — 4010F ACE/ARB THERAPY RXD/TAKEN: CPT | Performed by: STUDENT IN AN ORGANIZED HEALTH CARE EDUCATION/TRAINING PROGRAM

## 2023-11-20 PROCEDURE — 80053 COMPREHEN METABOLIC PANEL: CPT

## 2023-11-20 PROCEDURE — 2500000004 HC RX 250 GENERAL PHARMACY W/ HCPCS (ALT 636 FOR OP/ED): Performed by: STUDENT IN AN ORGANIZED HEALTH CARE EDUCATION/TRAINING PROGRAM

## 2023-11-20 PROCEDURE — 99214 OFFICE O/P EST MOD 30 MIN: CPT | Performed by: STUDENT IN AN ORGANIZED HEALTH CARE EDUCATION/TRAINING PROGRAM

## 2023-11-20 PROCEDURE — 1159F MED LIST DOCD IN RCRD: CPT | Performed by: STUDENT IN AN ORGANIZED HEALTH CARE EDUCATION/TRAINING PROGRAM

## 2023-11-20 PROCEDURE — 3074F SYST BP LT 130 MM HG: CPT | Performed by: STUDENT IN AN ORGANIZED HEALTH CARE EDUCATION/TRAINING PROGRAM

## 2023-11-20 PROCEDURE — 1160F RVW MEDS BY RX/DR IN RCRD: CPT | Performed by: STUDENT IN AN ORGANIZED HEALTH CARE EDUCATION/TRAINING PROGRAM

## 2023-11-20 PROCEDURE — 2500000004 HC RX 250 GENERAL PHARMACY W/ HCPCS (ALT 636 FOR OP/ED): Mod: JZ | Performed by: STUDENT IN AN ORGANIZED HEALTH CARE EDUCATION/TRAINING PROGRAM

## 2023-11-20 PROCEDURE — 3008F BODY MASS INDEX DOCD: CPT | Performed by: STUDENT IN AN ORGANIZED HEALTH CARE EDUCATION/TRAINING PROGRAM

## 2023-11-20 PROCEDURE — 96413 CHEMO IV INFUSION 1 HR: CPT

## 2023-11-20 PROCEDURE — 1126F AMNT PAIN NOTED NONE PRSNT: CPT | Performed by: STUDENT IN AN ORGANIZED HEALTH CARE EDUCATION/TRAINING PROGRAM

## 2023-11-20 PROCEDURE — 96415 CHEMO IV INFUSION ADDL HR: CPT

## 2023-11-20 PROCEDURE — 3051F HG A1C>EQUAL 7.0%<8.0%: CPT | Performed by: STUDENT IN AN ORGANIZED HEALTH CARE EDUCATION/TRAINING PROGRAM

## 2023-11-20 PROCEDURE — 96375 TX/PRO/DX INJ NEW DRUG ADDON: CPT | Mod: INF

## 2023-11-20 PROCEDURE — 1036F TOBACCO NON-USER: CPT | Performed by: STUDENT IN AN ORGANIZED HEALTH CARE EDUCATION/TRAINING PROGRAM

## 2023-11-20 PROCEDURE — 96416 CHEMO PROLONG INFUSE W/PUMP: CPT

## 2023-11-20 PROCEDURE — 85025 COMPLETE CBC W/AUTO DIFF WBC: CPT

## 2023-11-20 PROCEDURE — 96523 IRRIG DRUG DELIVERY DEVICE: CPT

## 2023-11-20 RX ORDER — EPINEPHRINE 0.3 MG/.3ML
0.3 INJECTION SUBCUTANEOUS EVERY 5 MIN PRN
Status: CANCELLED | OUTPATIENT
Start: 2023-11-20

## 2023-11-20 RX ORDER — HEPARIN SODIUM,PORCINE/PF 10 UNIT/ML
50 SYRINGE (ML) INTRAVENOUS AS NEEDED
Status: CANCELLED | OUTPATIENT
Start: 2023-11-20

## 2023-11-20 RX ORDER — PROCHLORPERAZINE MALEATE 10 MG
10 TABLET ORAL EVERY 6 HOURS PRN
Status: DISCONTINUED | OUTPATIENT
Start: 2023-11-20 | End: 2023-11-20 | Stop reason: HOSPADM

## 2023-11-20 RX ORDER — FAMOTIDINE 10 MG/ML
20 INJECTION INTRAVENOUS ONCE AS NEEDED
Status: DISCONTINUED | OUTPATIENT
Start: 2023-11-20 | End: 2023-11-20 | Stop reason: HOSPADM

## 2023-11-20 RX ORDER — ALBUTEROL SULFATE 0.83 MG/ML
3 SOLUTION RESPIRATORY (INHALATION) AS NEEDED
Status: DISCONTINUED | OUTPATIENT
Start: 2023-11-20 | End: 2023-11-20 | Stop reason: HOSPADM

## 2023-11-20 RX ORDER — ALBUTEROL SULFATE 0.83 MG/ML
3 SOLUTION RESPIRATORY (INHALATION) AS NEEDED
Status: CANCELLED | OUTPATIENT
Start: 2023-11-20

## 2023-11-20 RX ORDER — LORAZEPAM 2 MG/ML
1 INJECTION INTRAMUSCULAR AS NEEDED
Status: DISCONTINUED | OUTPATIENT
Start: 2023-11-20 | End: 2023-11-20 | Stop reason: HOSPADM

## 2023-11-20 RX ORDER — HEPARIN 100 UNIT/ML
500 SYRINGE INTRAVENOUS AS NEEDED
Status: CANCELLED | OUTPATIENT
Start: 2023-11-20

## 2023-11-20 RX ORDER — AMOXICILLIN 875 MG/1
875 TABLET, FILM COATED ORAL 2 TIMES DAILY
Qty: 20 TABLET | Refills: 0 | Status: SHIPPED | OUTPATIENT
Start: 2023-11-20 | End: 2023-12-05 | Stop reason: ALTCHOICE

## 2023-11-20 RX ORDER — PALONOSETRON 0.05 MG/ML
0.25 INJECTION, SOLUTION INTRAVENOUS ONCE
Status: COMPLETED | OUTPATIENT
Start: 2023-11-20 | End: 2023-11-20

## 2023-11-20 RX ORDER — PALONOSETRON 0.05 MG/ML
0.25 INJECTION, SOLUTION INTRAVENOUS ONCE
Status: CANCELLED | OUTPATIENT
Start: 2023-11-20

## 2023-11-20 RX ORDER — FAMOTIDINE 10 MG/ML
20 INJECTION INTRAVENOUS ONCE AS NEEDED
Status: CANCELLED | OUTPATIENT
Start: 2023-11-20

## 2023-11-20 RX ORDER — PROCHLORPERAZINE EDISYLATE 5 MG/ML
10 INJECTION INTRAMUSCULAR; INTRAVENOUS EVERY 6 HOURS PRN
Status: CANCELLED | OUTPATIENT
Start: 2023-11-20

## 2023-11-20 RX ORDER — DIPHENHYDRAMINE HYDROCHLORIDE 50 MG/ML
50 INJECTION INTRAMUSCULAR; INTRAVENOUS AS NEEDED
Status: CANCELLED | OUTPATIENT
Start: 2023-11-20

## 2023-11-20 RX ORDER — DIPHENHYDRAMINE HYDROCHLORIDE 50 MG/ML
50 INJECTION INTRAMUSCULAR; INTRAVENOUS AS NEEDED
Status: DISCONTINUED | OUTPATIENT
Start: 2023-11-20 | End: 2023-11-20 | Stop reason: HOSPADM

## 2023-11-20 RX ORDER — EPINEPHRINE 0.3 MG/.3ML
0.3 INJECTION SUBCUTANEOUS EVERY 5 MIN PRN
Status: DISCONTINUED | OUTPATIENT
Start: 2023-11-20 | End: 2023-11-20 | Stop reason: HOSPADM

## 2023-11-20 RX ORDER — LORAZEPAM 2 MG/ML
1 INJECTION INTRAMUSCULAR AS NEEDED
Status: CANCELLED | OUTPATIENT
Start: 2023-11-20

## 2023-11-20 RX ORDER — PROCHLORPERAZINE MALEATE 10 MG
10 TABLET ORAL EVERY 6 HOURS PRN
Status: CANCELLED | OUTPATIENT
Start: 2023-11-20

## 2023-11-20 RX ORDER — PROCHLORPERAZINE EDISYLATE 5 MG/ML
10 INJECTION INTRAMUSCULAR; INTRAVENOUS EVERY 6 HOURS PRN
Status: DISCONTINUED | OUTPATIENT
Start: 2023-11-20 | End: 2023-11-20 | Stop reason: HOSPADM

## 2023-11-20 RX ADMIN — OXALIPLATIN 160 MG: 5 INJECTION, SOLUTION INTRAVENOUS at 12:19

## 2023-11-20 RX ADMIN — FLUOROURACIL 5100 MG: 50 INJECTION, SOLUTION INTRAVENOUS at 14:40

## 2023-11-20 RX ADMIN — PALONOSETRON HYDROCHLORIDE 250 MCG: 0.25 INJECTION INTRAVENOUS at 11:49

## 2023-11-20 RX ADMIN — DEXAMETHASONE SODIUM PHOSPHATE 12 MG: 10 INJECTION, SOLUTION INTRAMUSCULAR; INTRAVENOUS at 11:53

## 2023-11-20 ASSESSMENT — ENCOUNTER SYMPTOMS
OCCASIONAL FEELINGS OF UNSTEADINESS: 0
LOSS OF SENSATION IN FEET: 0
DEPRESSION: 0

## 2023-11-20 ASSESSMENT — PAIN SCALES - GENERAL: PAINLEVEL: 0-NO PAIN

## 2023-11-20 NOTE — PROGRESS NOTES
"NUTRITION Assessment NOTE  Reason for Visit:  Bunny Abarca is a 68 y.o. male who presents for taste changes and treatment related nausea, diarrhea, and constipation. PMHx: Resection of Appendix and cecum. Current treatment: FOLFOX D1C6. Seen in infusion w/ wife, Jinny. Pt appears well but has issues w/ metallic taste to most foods.    Lab Results   Component Value Date/Time    GLUCOSE 242 (H) 11/20/2023 0855     11/20/2023 0855    K 4.1 11/20/2023 0855     11/20/2023 0855    CO2 25 11/20/2023 0855    ANIONGAP 15 11/20/2023 0855    BUN 18 11/20/2023 0855    CREATININE 1.08 11/20/2023 0855    EGFR 75 11/20/2023 0855    CALCIUM 9.4 11/20/2023 0855    ALBUMIN 4.3 11/20/2023 0855    ALKPHOS 46 11/20/2023 0855    PROT 6.7 11/20/2023 0855    AST 24 11/20/2023 0855    BILITOT 0.6 11/20/2023 0855    ALT 20 11/20/2023 0855     No results found for: \"VITD25\"    All pertinent labs have been reviewed.     Nutrition Assessment     Anthropometrics:  Anthropometrics  Height: 179.1 cm (5' 10.51\")  Weight: 88.8 kg (195 lb 12.8 oz)  BMI (Calculated): 27.69  IBW/kg (Dietitian Calculated): 75.5 kg  Percent of IBW: 118 %  Weight Change  Weight History / % Weight Change: Pt states wt has been fluctuating but is ultimately stable.  Significant Weight Loss: No        Wt Readings from Last 20 Encounters:   11/20/23 88.8 kg (195 lb 12.8 oz)   11/20/23 88.8 kg (195 lb 12.8 oz)   11/09/23 88.5 kg (195 lb 1.7 oz)   11/07/23 88.6 kg (195 lb 5.2 oz)   11/06/23 89.6 kg (197 lb 8 oz)   10/24/23 91.3 kg (201 lb 2.7 oz)   10/23/23 90.3 kg (199 lb 1.2 oz)   10/16/23 89.9 kg (198 lb 4 oz)   10/10/23 91.1 kg (200 lb 13.4 oz)   10/09/23 90.5 kg (199 lb 8.3 oz)   10/09/23 90.5 kg (199 lb 8 oz)   08/02/23 88 kg (194 lb)   07/18/23 93 kg (205 lb)   07/18/23 92.1 kg (203 lb)   05/24/23 93.4 kg (206 lb)   05/23/23 92.1 kg (203 lb)   05/19/23 92.3 kg (203 lb 6.4 oz)   05/16/23 91.6 kg (202 lb)   04/03/23 92.5 kg (204 lb)   03/14/23 92.5 kg (204 " "lb)          Food And Nutrient Intake:  Food and Nutrient History  Food and Nutrient History: States appetite is not bad.  Energy Intake: Good > 75 %  Fluid Intake: at least 16 oz w/ each meal, water during the night. (Stays hydrated d/t jardiance.)  Food Allergy: None.  Food Intolerance: Some citrus foods. (Tolerated lemonheads when provided.)  GI Symptoms: nausea, diarrhea, bloating, increased gas (Nausea and Diarrhea after treatment x3 days. Zofran to good effect. Increased gas since after resection.)  GI Symptoms greater than 2 weeks: yes  Oral Problems: dysgeusia (Tasting metallic, oily foods. Sausage, ground beef, turkey, and chicken. Cold sensitivty x2-3 days after. Using ACT mouthwash BID.)     Food Intake  Amount of Food: 2 meals/day - snacks throughout.  Meal 1: 1/2 glass OJ, 2 c. coffee, 1 bowl fiber cereal, Skim milk, 3 pancakes  Meal 2: Skipped yesterday.  Meal 3: Ground beef burritos, tater tots                                       Micronutrient Intake  Vitamin Intake:  (N/A)  Mineral/Element Intake:  (N/A)    Food Supplement Intake  Oral Nutrition Supplements:  (Glucerna (160 kcals, 10 g protein) 2x/day)           Nutrition Focused Physical Exam:  Subcutaneous Fat Loss  Orbital Fat Pads: Well nourshed (slightly bulging fat pads)  Buccal Fat Pads: Well nourished (full, rounded cheeks)  Triceps: Defer  Ribs: Defer  Muscle Wasting  Temporalis: Well nourished (well-defined muscle)  Pectoralis (Clavicular Region): Defer  Deltoid/Trapezius: Defer  Interosseous: Defer  Trapezius/Infraspinatus/Supraspinatus (Scapular Region): Defer  Quadriceps: Defer  Gastrocnemius: Defer  Edema  Edema: none  Physical Findings (Nutrition Deficiency/Toxicity)  Hair: Negative  Eyes: Negative  Skin: Negative        Energy Needs  Calculated Energy Needs Using Equations  Height: 179.1 cm (5' 10.51\")  Weight Used for Equation Calculations: 88.8 kg (195 lb 12.3 oz)  [facility] Equation (Overweight or Obese Patients): " 1677  Equation Chosen to Use by RD: Other (Comment)  Estimated Energy Needs  Total Energy Estimated Needs (kCal):  (5064-3468)  Method for Estimating Needs: 25-28 kcals/kg IBW.  Estimated Fluid Needs  Total Fluid Estimated Needs (mL): 2100 mL  Method for Estimating Needs: 1 mL/kcal  Estimated Protein Needs  Total Protein Estimated Needs (g):  ()  Method for Estimating Needs: 1.2-1.4 g protein/kg IBW.  Weight and Growth Recommendation  Desired Growth Pattern: Maintain current wt.        Nutrition Diagnosis   Malnutrition Diagnosis  Patient has Malnutrition Diagnosis: No  Nutrition Diagnosis  Patient has Nutrition Diagnosis: Yes  Diagnosis Status (1): New  Nutrition Diagnosis 1: Altered GI function  Related to (1): dx of appendix cancer  As Evidenced by (1): resection of appendix and cecum.    Nutrition Interventions/Recommendations   Food and Nutrition Delivery  Meals & Snacks: Other, specify:  Goals: Utilize the strategies for taste changes as able. (Lemon Drops before meals, using plastic/bamboo utensils, fruit w/ meals)  Nutrition Education  Nutrition Education Content: Content related nutrition education  Goals: Provided with education and handouts for Taste changes.  Coordination of Nutrition Care by a Nutrition Professional  Goals: Will continue to follow. Business card provided.        There are no Patient Instructions on file for this visit.    Nutrition Monitoring and Evaluation                 Readiness to Change : Good  Level of Understanding : Good  Anticipated Compliant : Good

## 2023-11-21 NOTE — PROGRESS NOTES
Patient ID: Bunny Abarca is a 68 y.o. male.  DIAGNOSIS: Appendiceal adenocarcinoma    STAGING: pT4a pN1a Mx    CURRENT SITES OF DISEASE:    MOLECULAR/GENOMICS:  MMR-intact    ctDNA Signatera:  8/14/23: (0) negative  9/25/23: (0) negative  11/6/23: (0) negative    TUMOR MARKER:  5/15/23 CEA: <0.5  8/14/23 CEA: <0.5  11/7/23 CEA: <0.5    CURRENT THERAPY:  Adjuvant FOLFOX every 2 weeks x 12 cycles started on 8/29/23; Dose reduction of Oxaliplatin with C4 due to lasting cold sensitivity    PREVIOUS THERAPY:  6/20/23: S/p R hemicolectomy    ONCOLOGIC ISSUES:    PROVIDERS:  CRS: Jazmin Faulkner    HISTORY:   4/2023: presented with R inguinal pain. Thought to have a hernia.  4/17/23: CT abdomen pelvis showed indeterminate masslike lesion at the cecum posteriorly at the expected level of the appendix measuring 3 x 4 cm in dimension.  Also within the left pelvis near the origin of the left inguinal canal there is an indeterminate cavitary mass measuring 2.5 x 2 cm  5/5/23: Underwent colonoscopy.  Report not available.  Pathology of cecal/appendiceal orifice mass biopsy showed poorly differentiated adenocarcinoma with signet ring cell differentiation  5/17/23: CT chest abdomen pelvis showed no evidence of metastatic disease, again noted soft tissue mass near the base of the cecum measuring 4.2 x 2.8 cm, compatible with reported history of appendiceal carcinoma  6/20/23: Underwent right hemicolectomy.  Final pathology showed appendiceal invasive moderately differentiated mucinous adenocarcinoma measuring 5 cm.  Tumor invades the visceral peritoneum.  No lymphovascular or perineural invasion. 1/44 LNs involved.  MMR protein expression intact    PMH: HTN, Gilbert's disease, HLD, DM  SH: , no tobacco, EtOH, illicits  FH: Sister and nephew with Rodriguez syndrome.      Subjective    Here for C6 FOLFOX.   This is the best/most energy he has had with his chemotherapy cycle  Resting heart rate is in the 90s now  Has sinus pressure  in frontal sinus. Usually gets sinus infection this time of the year, Yellow/green drainage, denies fever  Still with taste/smell sensitivities   Sensitive to cold which has not changed or gotten worse      No fevers, chills, chest pain, shortness of breath, vomiting, rashes or masses.     ROS as above. Remainder of 10 point review of systems elicited and otherwise unremarkable.        Objective:   BSA: 2.09 meters squared  /66 (BP Location: Left arm, Patient Position: Sitting, BP Cuff Size: Large adult)   Pulse 103   Temp 36.6 °C (97.9 °F) (Skin)   Resp 18   Wt 88.8 kg (195 lb 12.8 oz)   BMI 28.09 kg/m²      Physical Exam  Gen: A&O, NAD  Head: Normocephalic, atraumatic  Eyes: no scleral icterus  ENT: mucous membranes moist, no oropharyngeal lesions  Resp: Lungs CTAB  Cardiac: Tachycardiac, regular rhythm, no murmurs appreciated  Abdomen: Soft, nondistended, nontender, +BS  Neuro: CNII-XII grossly intact  Psych: appropriate mood & affect  Skin: warm, dry, no apparent rashes     Performance Status:  Symptomatic; fully ambulatory      Assessment/Plan   Mr. 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 Faulkner.     He presents today for discussion of adjuvant chemotherapy. I personally reviewed the images from the recent CT, which show no evidence of metastatic disease.  I reviewed the results of his pathology synoptic report, which show pT4a pN1a, Stage III cancer.     I discussed with him and his wife at length that given the stage of his appendiceal cancer, I recommend systemic chemotherapy.  The options are for FOLFOX versus capecitabine plus oxaliplatin.  Given the high risk nature of the diagnosis, my preference is for 6 months of adjuvant chemotherapy, and FOLFOX is better tolerated for this duration.    I discussed the risks and benefits and side effect profile of FOLFOX chemotherapy, and he agrees to proceed.    After 1st cycle of FOLFOX, found  to have extensive PE throughout B/L lungs including saddle embolus of main pulmonary artery. On 9/5/23 he had aspiration thrombectomy and was placed on Apixaban. Discussed admission with Dr. Webb. Ok to proceed with chemotherapy as long as patient is feeling well.     10/9/23: After 2nd cycle, he experienced fatigue, cold sensitivity, nausea and diarrhea.    10/23/23: Having more cold and now light sensitivity. Will decrease dose of Oxaliplatin.     11/20/23: Improved fatigue. Still with taste and cold sensitivity, which is stable.     Plan:  Stage III appendiceal cancer  - Plan for 6mo goal of adjuvant chemotherapy with FOLFOX  - Rx for amoxicillin for sinus infection  - Proceed with C6 FOLFOX, continue dose decrease of Oxaliplatin by 10%  - RTC 2 weeks for C7    Pulmonary Embolism including saddle embolism  - continue on Apixaban  - has follow up with vascular who will manage apixaban           MARILY Garcia-CNP

## 2023-11-22 ENCOUNTER — INFUSION (OUTPATIENT)
Dept: HEMATOLOGY/ONCOLOGY | Facility: CLINIC | Age: 68
End: 2023-11-22
Payer: MEDICARE

## 2023-11-22 ENCOUNTER — APPOINTMENT (OUTPATIENT)
Dept: HEMATOLOGY/ONCOLOGY | Facility: HOSPITAL | Age: 68
End: 2023-11-22
Payer: COMMERCIAL

## 2023-11-22 VITALS
OXYGEN SATURATION: 95 % | HEART RATE: 93 BPM | TEMPERATURE: 98.1 F | DIASTOLIC BLOOD PRESSURE: 78 MMHG | WEIGHT: 188.27 LBS | BODY MASS INDEX: 26.62 KG/M2 | RESPIRATION RATE: 18 BRPM | SYSTOLIC BLOOD PRESSURE: 103 MMHG

## 2023-11-22 DIAGNOSIS — C18.1 PRIMARY MALIGNANT NEOPLASM OF APPENDIX (MULTI): ICD-10-CM

## 2023-11-22 PROCEDURE — 96523 IRRIG DRUG DELIVERY DEVICE: CPT

## 2023-11-22 PROCEDURE — 2500000004 HC RX 250 GENERAL PHARMACY W/ HCPCS (ALT 636 FOR OP/ED): Performed by: STUDENT IN AN ORGANIZED HEALTH CARE EDUCATION/TRAINING PROGRAM

## 2023-11-22 RX ORDER — HEPARIN SODIUM,PORCINE/PF 10 UNIT/ML
50 SYRINGE (ML) INTRAVENOUS AS NEEDED
Status: DISCONTINUED | OUTPATIENT
Start: 2023-11-22 | End: 2023-11-22 | Stop reason: HOSPADM

## 2023-11-22 RX ORDER — HEPARIN 100 UNIT/ML
500 SYRINGE INTRAVENOUS AS NEEDED
Status: DISCONTINUED | OUTPATIENT
Start: 2023-11-22 | End: 2023-11-22 | Stop reason: HOSPADM

## 2023-11-22 RX ORDER — HEPARIN 100 UNIT/ML
500 SYRINGE INTRAVENOUS AS NEEDED
Status: CANCELLED | OUTPATIENT
Start: 2023-11-22

## 2023-11-22 RX ORDER — HEPARIN SODIUM,PORCINE/PF 10 UNIT/ML
50 SYRINGE (ML) INTRAVENOUS AS NEEDED
Status: CANCELLED | OUTPATIENT
Start: 2023-11-22

## 2023-11-22 RX ADMIN — HEPARIN 500 UNITS: 100 SYRINGE at 12:52

## 2023-11-22 ASSESSMENT — PAIN SCALES - GENERAL: PAINLEVEL: 0-NO PAIN

## 2023-11-22 NOTE — PROGRESS NOTES
Patient here for cadd pump discontinue - pump being sent to [facility]..  Tolerated flush and discontinue well bandaid applied.

## 2023-11-28 ENCOUNTER — OFFICE VISIT (OUTPATIENT)
Dept: ENDOCRINOLOGY | Facility: CLINIC | Age: 68
End: 2023-11-28
Payer: MEDICARE

## 2023-11-28 VITALS
DIASTOLIC BLOOD PRESSURE: 70 MMHG | HEART RATE: 97 BPM | WEIGHT: 195.6 LBS | BODY MASS INDEX: 27.38 KG/M2 | HEIGHT: 71 IN | SYSTOLIC BLOOD PRESSURE: 136 MMHG

## 2023-11-28 DIAGNOSIS — E11.9 TYPE 2 DIABETES MELLITUS WITHOUT COMPLICATION, WITHOUT LONG-TERM CURRENT USE OF INSULIN (MULTI): Primary | ICD-10-CM

## 2023-11-28 DIAGNOSIS — E11.9 TYPE 2 DIABETES MELLITUS WITHOUT COMPLICATION, WITH LONG-TERM CURRENT USE OF INSULIN (MULTI): ICD-10-CM

## 2023-11-28 DIAGNOSIS — Z79.4 TYPE 2 DIABETES MELLITUS WITHOUT COMPLICATION, WITH LONG-TERM CURRENT USE OF INSULIN (MULTI): ICD-10-CM

## 2023-11-28 PROCEDURE — 3051F HG A1C>EQUAL 7.0%<8.0%: CPT | Performed by: INTERNAL MEDICINE

## 2023-11-28 PROCEDURE — 3066F NEPHROPATHY DOC TX: CPT | Performed by: INTERNAL MEDICINE

## 2023-11-28 PROCEDURE — 3008F BODY MASS INDEX DOCD: CPT | Performed by: INTERNAL MEDICINE

## 2023-11-28 PROCEDURE — 1036F TOBACCO NON-USER: CPT | Performed by: INTERNAL MEDICINE

## 2023-11-28 PROCEDURE — 1160F RVW MEDS BY RX/DR IN RCRD: CPT | Performed by: INTERNAL MEDICINE

## 2023-11-28 PROCEDURE — 3075F SYST BP GE 130 - 139MM HG: CPT | Performed by: INTERNAL MEDICINE

## 2023-11-28 PROCEDURE — 1159F MED LIST DOCD IN RCRD: CPT | Performed by: INTERNAL MEDICINE

## 2023-11-28 PROCEDURE — 4010F ACE/ARB THERAPY RXD/TAKEN: CPT | Performed by: INTERNAL MEDICINE

## 2023-11-28 PROCEDURE — 1126F AMNT PAIN NOTED NONE PRSNT: CPT | Performed by: INTERNAL MEDICINE

## 2023-11-28 PROCEDURE — 3078F DIAST BP <80 MM HG: CPT | Performed by: INTERNAL MEDICINE

## 2023-11-28 PROCEDURE — 99214 OFFICE O/P EST MOD 30 MIN: CPT | Performed by: INTERNAL MEDICINE

## 2023-11-28 RX ORDER — PEN NEEDLE, DIABETIC 30 GX3/16"
1 NEEDLE, DISPOSABLE MISCELLANEOUS 2 TIMES DAILY
Qty: 100 EACH | Refills: 11 | Status: SHIPPED | OUTPATIENT
Start: 2023-11-28 | End: 2023-11-28 | Stop reason: SDUPTHER

## 2023-11-28 RX ORDER — PEN NEEDLE, DIABETIC 30 GX3/16"
1 NEEDLE, DISPOSABLE MISCELLANEOUS 2 TIMES DAILY
Qty: 100 EACH | Refills: 11 | Status: SHIPPED | OUTPATIENT
Start: 2023-11-28 | End: 2024-11-27

## 2023-11-28 RX ORDER — INSULIN ASPART 100 [IU]/ML
38 INJECTION, SUSPENSION SUBCUTANEOUS 2 TIMES DAILY
Qty: 22.8 ML | Refills: 5 | Status: SHIPPED | OUTPATIENT
Start: 2023-11-28 | End: 2024-01-15 | Stop reason: SDUPTHER

## 2023-11-28 RX ORDER — METFORMIN HYDROCHLORIDE 500 MG/1
1000 TABLET, EXTENDED RELEASE ORAL 2 TIMES DAILY
Qty: 120 TABLET | Refills: 5 | Status: SHIPPED | OUTPATIENT
Start: 2023-11-28 | End: 2024-04-04 | Stop reason: SDUPTHER

## 2023-11-28 RX ORDER — EMPAGLIFLOZIN 25 MG/1
25 TABLET, FILM COATED ORAL DAILY
Qty: 30 TABLET | Refills: 5 | Status: SHIPPED | OUTPATIENT
Start: 2023-11-28 | End: 2024-04-04 | Stop reason: SDUPTHER

## 2023-11-28 NOTE — PROGRESS NOTES
"Subjective   Bunny Abarca is a 68 y.o. male who presents for follow-up of Type 2 diabetes mellitus. The initial diagnosis of diabetes was made in 1995 .     He underwent surgery and was found to have adenocarcinoma of the appendix.  He then had another admission as he had lower extremity swelling and a syncopal episode.  He was found to have a saddle pulmonary embolus.  He was on flecainide for years but this has been discontinued.  He has started chemotherapy and will complete 12 cycles in total for chemotherapy    Known complications due to diabetes included peripheral neuropathy and chronic kidney disease    Cardiovascular risk factors include advanced age (older than 55 for men, 65 for women), diabetes mellitus, and male gender. The patient is on an ACE inhibitor or angiotensin II receptor blocker.  The patient has not been previously hospitalized due to diabetic ketoacidosis.     Current symptoms/problems include none. His clinical course has been stable.     The patient is currently checking the blood glucose 2 times per day.    Patient is using: glucometer  GLUCOSE LOG DATA:  106-317mg/dL     Hypoglycemia frequency: Denies   Hypoglycemia awareness: N/A     Previous medications tried:  Victoza - ineffective; did lose weight      Current Regimen  NovoLog 70/30 38 units SQ twice daily   Metformin ER 1000 mg BID  Jardiance 25nmg once daily      MEALS: diminshed appetite   Breakfast - fiber one with various berries   Lunch - omits  Dinner - macaroni and cheese, fish  Beverages - coffee with non dairy creamer, filtered water, cranberry juice; Glucerna shakes, whole milk         Review of Systems   Constitutional:  Positive for unexpected weight change. Fever: Lost 20 pounds.  Endocrine: Positive for cold intolerance.   Neurological:  Positive for numbness.       Objective   /70 (BP Location: Right arm, Patient Position: Sitting, BP Cuff Size: Adult)   Pulse 97   Ht 1.791 m (5' 10.5\")   Wt 88.7 kg (195 lb " 9.6 oz)   BMI 27.67 kg/m²   Physical Exam  Vitals and nursing note reviewed.   Constitutional:       General: He is not in acute distress.     Appearance: Normal appearance. He is normal weight.   HENT:      Head: Normocephalic and atraumatic.      Nose: Nose normal.      Mouth/Throat:      Mouth: Mucous membranes are moist.   Eyes:      Extraocular Movements: Extraocular movements intact.   Cardiovascular:      Rate and Rhythm: Normal rate and regular rhythm.      Pulses:           Dorsalis pedis pulses are 2+ on the right side and 2+ on the left side.   Pulmonary:      Effort: Pulmonary effort is normal.      Breath sounds: Normal breath sounds.   Musculoskeletal:         General: Normal range of motion.      Right foot: Normal range of motion. No deformity.      Left foot: Normal range of motion. No deformity.   Feet:      Right foot:      Protective Sensation: 5 sites tested.        Skin integrity: Skin integrity normal. No ulcer or blister.      Toenail Condition: Right toenails are normal.      Left foot:      Protective Sensation: 5 sites tested.        Skin integrity: Skin integrity normal. No ulcer or blister.      Toenail Condition: Left toenails are normal.   Skin:     General: Skin is warm.   Neurological:      Mental Status: He is alert and oriented to person, place, and time.   Psychiatric:         Mood and Affect: Mood normal.         Lab Review  Glucose (mg/dL)   Date Value   11/20/2023 242 (H)   11/06/2023 222 (H)   10/23/2023 187 (H)     Hemoglobin A1C (%)   Date Value   05/12/2023 7.8 (A)   12/14/2022 8.2 (A)   08/11/2022 8.3 (A)     Bicarbonate (mmol/L)   Date Value   11/20/2023 25   11/06/2023 25   10/23/2023 23     Urea Nitrogen (mg/dL)   Date Value   11/20/2023 18   11/06/2023 17   10/23/2023 22     Creatinine (mg/dL)   Date Value   11/20/2023 1.08   11/06/2023 1.04   10/23/2023 1.12       Health Maintenance:   Foot Exam:  Novmber 28, 2023  Eye Exam:  February 2023  Lipid Panel:  December 14,  2022  Urine Albumin: December 14, 2022    Assessment/Plan   68 year old male presents for follow up for type II DM. He is noted to have a positive anti-STEPHAN 65 antibody in 2017 but has had very detectable C peptide values. His blood pressure is at goal today. He is noted to be on a statin. He is noted to be on an ARB.     Type 2 diabetes mellitus without complication, with long-term current use of insulin (CMS/[facility])  To continue Metformin ER 1000 mg twice daily   To continue NovoLog 70/30 38 units SQ twice daily before breakfast and before dinner  To continue Jardiance 25mg once daily  Counseled glycemic control is warranted to prevent microvascular complications  Please check blood sugars 2 times daily and keep a detailed log  Counseled that the goal A1C should be 7% or less  Counseled glycemic control is warranted to prevent microvascular complications  To obtain blood tests at the next blood draw   Counseled that hyperglycemia is expected following glucocorticoid use with chemotherapy  Counseled that it will be problematic if hyperglycemia persists but he seems to be correcting    Long-term insulin use (CMS/[facility])  Please rotate insulin injection sites     For follow up in 4-5 months

## 2023-11-28 NOTE — PATIENT INSTRUCTIONS
Thank you for choosing [facility]  for your health care needs.  If you have any questions, concerns or medical needs, please feel free to contact our office at [phone].    Please ensure you complete your blood work one week before the next scheduled appointment.    To continue Metformin ER 1000 mg twice daily   To continue NovoLog 70/30 38 units SQ twice daily before breakfast and before dinner  To continue Jardiance 25mg once daily  Counseled glycemic control is warranted to prevent microvascular complications  Please check blood sugars 2 times daily and keep a detailed log  Counseled that the goal A1C should be 7% or less  Counseled glycemic control is warranted to prevent microvascular complications  To obtain blood tests at the next blood draw   For follow up in 4-5 months with glucose log

## 2023-11-29 ENCOUNTER — OFFICE VISIT (OUTPATIENT)
Dept: CARDIOLOGY | Facility: CLINIC | Age: 68
End: 2023-11-29
Payer: MEDICARE

## 2023-11-29 VITALS
SYSTOLIC BLOOD PRESSURE: 133 MMHG | BODY MASS INDEX: 28.2 KG/M2 | WEIGHT: 197 LBS | HEIGHT: 70 IN | OXYGEN SATURATION: 97 % | DIASTOLIC BLOOD PRESSURE: 78 MMHG | HEART RATE: 95 BPM

## 2023-11-29 DIAGNOSIS — I10 ESSENTIAL (PRIMARY) HYPERTENSION: ICD-10-CM

## 2023-11-29 DIAGNOSIS — E78.2 MIXED HYPERLIPIDEMIA: ICD-10-CM

## 2023-11-29 DIAGNOSIS — I26.02 ACUTE SADDLE PULMONARY EMBOLISM WITH ACUTE COR PULMONALE (MULTI): ICD-10-CM

## 2023-11-29 DIAGNOSIS — I48.91 ATRIAL FIBRILLATION, UNSPECIFIED TYPE (MULTI): Primary | ICD-10-CM

## 2023-11-29 DIAGNOSIS — E11.9 TYPE 2 DIABETES MELLITUS WITHOUT COMPLICATION, WITHOUT LONG-TERM CURRENT USE OF INSULIN (MULTI): ICD-10-CM

## 2023-11-29 PROBLEM — K44.9 HIATAL HERNIA WITH GASTROESOPHAGEAL REFLUX: Status: ACTIVE | Noted: 2022-12-14

## 2023-11-29 PROBLEM — K40.90 RIGHT INGUINAL HERNIA: Status: ACTIVE | Noted: 2023-04-03

## 2023-11-29 PROBLEM — K56.609 BOWEL OBSTRUCTION (MULTI): Status: ACTIVE | Noted: 2023-11-29

## 2023-11-29 PROBLEM — C77.2: Status: ACTIVE | Noted: 2023-04-17

## 2023-11-29 PROBLEM — K21.9 HIATAL HERNIA WITH GASTROESOPHAGEAL REFLUX: Status: ACTIVE | Noted: 2022-12-14

## 2023-11-29 PROBLEM — B07.0 PLANTAR WARTS: Status: RESOLVED | Noted: 2023-03-08 | Resolved: 2023-11-29

## 2023-11-29 PROBLEM — I48.0 PAROXYSMAL ATRIAL FIBRILLATION (MULTI): Status: ACTIVE | Noted: 2023-03-08

## 2023-11-29 PROBLEM — C18.1: Status: ACTIVE | Noted: 2023-11-29

## 2023-11-29 PROBLEM — R10.32 LEFT GROIN PAIN: Status: ACTIVE | Noted: 2023-11-29

## 2023-11-29 PROBLEM — C18.1: Status: ACTIVE | Noted: 2023-04-17

## 2023-11-29 PROCEDURE — 3066F NEPHROPATHY DOC TX: CPT | Performed by: INTERNAL MEDICINE

## 2023-11-29 PROCEDURE — 93005 ELECTROCARDIOGRAM TRACING: CPT | Mod: PO | Performed by: INTERNAL MEDICINE

## 2023-11-29 PROCEDURE — 1159F MED LIST DOCD IN RCRD: CPT | Performed by: INTERNAL MEDICINE

## 2023-11-29 PROCEDURE — 3008F BODY MASS INDEX DOCD: CPT | Performed by: INTERNAL MEDICINE

## 2023-11-29 PROCEDURE — 3075F SYST BP GE 130 - 139MM HG: CPT | Performed by: INTERNAL MEDICINE

## 2023-11-29 PROCEDURE — 4010F ACE/ARB THERAPY RXD/TAKEN: CPT | Performed by: INTERNAL MEDICINE

## 2023-11-29 PROCEDURE — 1160F RVW MEDS BY RX/DR IN RCRD: CPT | Performed by: INTERNAL MEDICINE

## 2023-11-29 PROCEDURE — 1126F AMNT PAIN NOTED NONE PRSNT: CPT | Performed by: INTERNAL MEDICINE

## 2023-11-29 PROCEDURE — 99214 OFFICE O/P EST MOD 30 MIN: CPT | Mod: 25,PO | Performed by: INTERNAL MEDICINE

## 2023-11-29 PROCEDURE — 99214 OFFICE O/P EST MOD 30 MIN: CPT | Performed by: INTERNAL MEDICINE

## 2023-11-29 PROCEDURE — 3078F DIAST BP <80 MM HG: CPT | Performed by: INTERNAL MEDICINE

## 2023-11-29 PROCEDURE — 1036F TOBACCO NON-USER: CPT | Performed by: INTERNAL MEDICINE

## 2023-11-29 PROCEDURE — 3051F HG A1C>EQUAL 7.0%<8.0%: CPT | Performed by: INTERNAL MEDICINE

## 2023-11-29 RX ORDER — ONDANSETRON HYDROCHLORIDE 8 MG/1
8 TABLET, FILM COATED ORAL EVERY 8 HOURS PRN
COMMUNITY
Start: 2023-11-10 | End: 2023-12-13 | Stop reason: SDUPTHER

## 2023-11-29 RX ORDER — LANCETS
EACH MISCELLANEOUS
COMMUNITY
Start: 2023-10-28 | End: 2024-02-08 | Stop reason: WASHOUT

## 2023-11-29 ASSESSMENT — ENCOUNTER SYMPTOMS
ALLERGIC/IMMUNOLOGIC NEGATIVE: 1
ENDOCRINE NEGATIVE: 1
RESPIRATORY NEGATIVE: 1
HEMATOLOGIC/LYMPHATIC NEGATIVE: 1
NEUROLOGICAL NEGATIVE: 1
OCCASIONAL FEELINGS OF UNSTEADINESS: 0
LOSS OF SENSATION IN FEET: 0
EYES NEGATIVE: 1
CARDIOVASCULAR NEGATIVE: 1
GASTROINTESTINAL NEGATIVE: 1
PSYCHIATRIC NEGATIVE: 1
MUSCULOSKELETAL NEGATIVE: 1
CONSTITUTIONAL NEGATIVE: 1
DEPRESSION: 0

## 2023-11-29 ASSESSMENT — PATIENT HEALTH QUESTIONNAIRE - PHQ9
10. IF YOU CHECKED OFF ANY PROBLEMS, HOW DIFFICULT HAVE THESE PROBLEMS MADE IT FOR YOU TO DO YOUR WORK, TAKE CARE OF THINGS AT HOME, OR GET ALONG WITH OTHER PEOPLE: SOMEWHAT DIFFICULT
SUM OF ALL RESPONSES TO PHQ9 QUESTIONS 1 AND 2: 1
1. LITTLE INTEREST OR PLEASURE IN DOING THINGS: SEVERAL DAYS
2. FEELING DOWN, DEPRESSED OR HOPELESS: NOT AT ALL

## 2023-11-29 ASSESSMENT — PAIN SCALES - GENERAL: PAINLEVEL: 0-NO PAIN

## 2023-11-29 NOTE — PROGRESS NOTES
Preventive Cardiology Clinic Note    Reason for Visit: Post-hospital discharge follow up visit.   Referring Clinician: Marcelle     History of Present Illness: Bunny Abarca is a 68 y.o. male with recently diagnosed appendiceal ca s/p R hemicolectomy 6/20/23 currently on chemotherapy, history of paroxysmal atrial fibrillation previously on flecainide (discontinued in the hospital), HTN, T2DM who presented to  [facility] ED after 1 week of LLE swelling and a syncopal episode at home. Pt was tachycardic and hypotensive in the ED. Found to have extensive pulmonary emboli throughout bilateral lungs including saddle embolus in main PA on CTPE.  Pt was started on heparin  gtt and was transferred to [facility] for mechanical thrombectomy. Biomarker positive, PESI 127, RV strain on TTE with EF 60-65%. Underwent aspiration thrombectomy on 9/5. DVT US showing extensive LLE DVT. Vascular medicine consulted for assistance with anticoagulation.  Recommended against placing an IVC filter. Pt on heparin gtt post procedure and was transitioned on 9/07/23 to apixaban 10mg BID for the first 7 days and will go down to 5mg BID after.     He is here today for post-hospital discharge visit.  Since discharge he has been feeling well and has been gaining strength.  He has noted that his heart rate has been going back down toward normal and he is able to do more exertional activity and he had previously.  He does not have any current exertional chest pain, shortness of breath, palpitations, or syncope.  He does not have any lower extremity swelling.  He is tolerating the Eliquis well without any bleeding episodes.  His blood pressure is also well-controlled with an average of around 120 to 130 mmHg systolic.  Repeat echocardiogram in October showed improvement in RV systolic function and right-sided chamber sizes and reduction in RVSP.    Past Medical History:  He has a past medical history of Atrial fibrillation (CMS/[facility]) (03/08/2023), Benign  "essential hypertension (03/08/2023), Hyperlipidemia (03/08/2023), Obstructive sleep apnea (03/08/2023), Personal history of other diseases of the respiratory system (09/04/2020), Primary malignant neoplasm of appendix (CMS/HCC) (10/03/2023), and Type 2 diabetes mellitus (CMS/[facility]) (03/08/2023).    Past Surgical History:  He has a past surgical history that includes Back surgery (03/28/2016); Hernia repair (03/28/2016); Sinus surgery (03/28/2016); and Cataract extraction (02/15/2018).    Social History:  He reports that he has never smoked. He has never used smokeless tobacco. He reports that he does not currently use alcohol. He reports that he does not currently use drugs.    Family History:  Family History   Problem Relation Name Age of Onset    Congenital heart disease Mother      Hyperlipidemia Father      Hypertension Father      Colon cancer Sister      Other (Multiple System Atrophy) Sister      Other (Cystic Fibrosis Gene Carrier) Sister      Other (Malignant Neoplasm Of Ovary) Sibling         Allergies:  Exenatide microspheres    Outpatient Medications:  Current Outpatient Medications   Medication Instructions    Accu-Chek Fastclix Lancet Drum misc test blood sugar TWICE DAILY    amoxicillin (AMOXIL) 875 mg, oral, 2 times daily    apixaban (Eliquis) 5 mg tablet TAKE 1 TABLET BY MOUTH TWO TIMES A DAY    atorvastatin (LIPITOR) 40 mg, oral, Nightly    doxazosin (CARDURA) 2 mg, oral, Nightly    fenofibrate (Tricor) 145 mg tablet 1 tablet, oral, Daily, WITH FOOD    insulin asp prt-insulin aspart (NovoLOG Mix 70-30FlexPen U-100) 100 unit/mL (70-30) injection 38 Units, subcutaneous, 2 times daily    Jardiance 25 mg, oral, Daily    metFORMIN XR (GLUCOPHAGE-XR) 1,000 mg, oral, 2 times daily    olmesartan (BENICAR) 40 mg, oral, Daily    ondansetron (ZOFRAN) 8 mg, oral, Every 8 hours PRN    pen needle, diabetic (BD Ultra-Fine Short Pen Needle) 31 gauge x 5/16\" needle 1 each, subcutaneous, 2 times daily       Review of " "Systems:  Review of Systems   Constitutional: Negative.   HENT: Negative.     Eyes: Negative.    Cardiovascular: Negative.    Respiratory: Negative.     Endocrine: Negative.    Hematologic/Lymphatic: Negative.    Skin: Negative.    Musculoskeletal: Negative.    Gastrointestinal: Negative.    Genitourinary: Negative.    Neurological: Negative.    Psychiatric/Behavioral: Negative.     Allergic/Immunologic: Negative.        Last Recorded Vitals:  Vitals:    11/29/23 0948   BP: 133/78   BP Location: Left arm   Patient Position: Sitting   BP Cuff Size: Adult   Pulse: 95   SpO2: 97%   Weight: 89.4 kg (197 lb)   Height: 1.778 m (5' 10\")       Physical Examination:  General: Well appearing, well-nourished, in no acute distress.  HEENT: Normocephalic atraumatic, pupils equal and reactive to light, extraocular muscles intact, no conjunctival injection, oropharynx clear without exudates.  Neck: Normal carotid arterial pulses, no arterial bruits, no thyromegaly.  Cardiac: Regular rhythm and normal heart rate.  S1, S2 present and normal.  No murmurs, rubs, or gallops.  PMI is nondisplaced. Jugular venous pulsations are normal.  Pulmonary: Normal breath sounds, no increased work of breathing, no wheezes or crackles.  GI: Normal bowel sounds, abdominal aorta not enlarged, no hepatosplenomegaly, no abdominal bruits.  Lower extremities: No cyanosis, clubbing, or edema.  No xanthelasma present. Normal distal pulses.  Skin: Skin intact. No significant rashes or lesions present.  Neuro: Alert and oriented x 3, normal attention and cognition, no focal motor or sensory neurologic deficits.  Psych: Normal affect and mood.  Musculoskeletal: Normal gait normal muscle tone.    Laboratory Studies:  Lab Results   Component Value Date    GLUCOSE 242 (H) 11/20/2023    CALCIUM 9.4 11/20/2023     11/20/2023    K 4.1 11/20/2023    CO2 25 11/20/2023     11/20/2023    BUN 18 11/20/2023    CREATININE 1.08 11/20/2023     Lab Results " "  Component Value Date    ALT 20 11/20/2023    AST 24 11/20/2023    ALKPHOS 46 11/20/2023    BILITOT 0.6 11/20/2023         Lab Results   Component Value Date    CHOL 148 12/14/2022    CHOL 151 08/11/2022    CHOL 148 12/08/2021     Lab Results   Component Value Date    HDL 42.8 12/14/2022    HDL 39.1 (A) 08/11/2022    HDL 40.3 12/08/2021     No results found for: \"LDLCALC\"  Lab Results   Component Value Date    TRIG 137 12/14/2022    TRIG 213 (H) 08/11/2022    TRIG 152 (H) 12/08/2021     No components found for: \"CHOLHDL\"  Lab Results   Component Value Date    HGBA1C 7.8 (A) 05/12/2023     Cardiology Tests:  ECG:No results found for this or any previous visit from the past 1095 days.  ECG in the office today shows normal sinus rhythm, ventricular rate 95 bpm, 1 PVC, otherwise normal ECG.    Echo:  Transthoracic echo (TTE) complete 10/4/2023   1. Left ventricular systolic function is normal with a 60-65% estimated ejection fraction.   2. Poorly visualized anatomical structures due to suboptimal image quality.   3. Spectral Doppler shows an impaired relaxation pattern of left ventricular diastolic filling.  In comparison to the previous echocardiogram(s): Compared with study from 9/5/2023, RV size is decreased (was reported as severe), and RV function has improved (was severely decreased) and RA size has decreased. RV systolic pressures were moderately increased (~ 55 mmHg); now they are undetectable.    Cardiac Imaging:  Vascular U/S 10/2023  Right Lower Arterial: There is >50% stenosis documented at the common femoral artery. Calcified plaque and elevated velocities noted in the right common femoral artery.Pseudo Aneurysm: No evidence of pseudo aneurysm noted in the right groin. Patient follows with Dr. Beatriz Sanchez from Vascular Medicine    CT Scan 9/7/2023 at discharge:  1.  Interval improvement in volume of central pulmonary  emboli/filling defects. There are persistent occlusive right upper  right lower and " lingular pulmonary emboli.  Slight interval improvement with persistent elevated right-sided  chamber enlargement consistent with a component of residual right  heart strain.    Assessment and Plan:  Problem List Items Addressed This Visit          Cardiac and Vasculature    Essential (primary) hypertension    Hyperlipidemia    Paroxysmal atrial fibrillation (CMS/HCC) - Primary       Coag and Thromboembolic    Acute saddle pulmonary embolism with acute cor pulmonale (CMS/HCC)       Endocrine/Metabolic    DM (diabetes mellitus) (CMS/HCC)     Bunny Abarca is a 68 y.o. male with paroxysmal atrial fibrillation currently in sinus rhythm and recent multi lobar pulmonary embolism status post thrombectomy on anticoagulation who is here for a follow-up visit.  Functionally, he is doing very well and is asymptomatic increasing his activity.  Repeat echocardiogram in October showed improvement in RV systolic function and right-sided chamber sizes and reduction in RVSP.  He is tolerating his anticoagulation well without any bleeding episodes.  I counseled him on the need for lifelong anticoagulation given his history of atrial fibrillation and continued surveillance for atrial fibrillation recurrence.  I do not recommend an antiarrhythmic medication or referral for ablation at this time.  He follows with vascular medicine regarding his anticoagulation and vascular surveillance.  His blood pressure is well-controlled and his heart rate is now in the normal range.  I will see him again in 1 year here in the office routine follow-up.  Please do not hesitate to call or contact me with questions or concerns.    Jerry Ibanez MD, LULY, [facility]  Director,  [facility] Cardiovascular [facility]  Director,  [facility] Program  Associate Professor of Medicine  [employer]

## 2023-11-30 ENCOUNTER — DOCUMENTATION (OUTPATIENT)
Dept: RESEARCH | Facility: HOSPITAL | Age: 68
End: 2023-11-30
Payer: MEDICARE

## 2023-11-30 PROBLEM — Z79.4 LONG-TERM INSULIN USE (MULTI): Status: ACTIVE | Noted: 2023-11-30

## 2023-11-30 PROBLEM — Z79.4 TYPE 2 DIABETES MELLITUS WITHOUT COMPLICATION, WITH LONG-TERM CURRENT USE OF INSULIN (MULTI): Status: ACTIVE | Noted: 2023-03-08

## 2023-11-30 ASSESSMENT — ENCOUNTER SYMPTOMS
UNEXPECTED WEIGHT CHANGE: 1
NUMBNESS: 1

## 2023-11-30 NOTE — ASSESSMENT & PLAN NOTE
To continue Metformin ER 1000 mg twice daily   To continue NovoLog 70/30 38 units SQ twice daily before breakfast and before dinner  To continue Jardiance 25mg once daily  Counseled glycemic control is warranted to prevent microvascular complications  Please check blood sugars 2 times daily and keep a detailed log  Counseled that the goal A1C should be 7% or less  Counseled glycemic control is warranted to prevent microvascular complications  To obtain blood tests at the next blood draw   Counseled that hyperglycemia is expected following glucocorticoid use with chemotherapy  Counseled that it will be problematic if hyperglycemia persists but he seems to be correcting

## 2023-12-04 ENCOUNTER — OFFICE VISIT (OUTPATIENT)
Dept: HEMATOLOGY/ONCOLOGY | Facility: HOSPITAL | Age: 68
End: 2023-12-04
Payer: MEDICARE

## 2023-12-04 ENCOUNTER — LAB (OUTPATIENT)
Dept: HEMATOLOGY/ONCOLOGY | Facility: HOSPITAL | Age: 68
End: 2023-12-04
Payer: MEDICARE

## 2023-12-04 VITALS
WEIGHT: 194.8 LBS | SYSTOLIC BLOOD PRESSURE: 140 MMHG | TEMPERATURE: 98.1 F | BODY MASS INDEX: 27.95 KG/M2 | RESPIRATION RATE: 18 BRPM | DIASTOLIC BLOOD PRESSURE: 66 MMHG | OXYGEN SATURATION: 99 % | HEART RATE: 53 BPM

## 2023-12-04 DIAGNOSIS — C77.2 CANCER OF APPENDIX METASTATIC TO INTRA-ABDOMINAL LYMPH NODE (MULTI): Primary | ICD-10-CM

## 2023-12-04 DIAGNOSIS — C18.1 CANCER OF APPENDIX METASTATIC TO INTRA-ABDOMINAL LYMPH NODE (MULTI): ICD-10-CM

## 2023-12-04 DIAGNOSIS — C77.2 CANCER OF APPENDIX METASTATIC TO INTRA-ABDOMINAL LYMPH NODE (MULTI): ICD-10-CM

## 2023-12-04 DIAGNOSIS — C18.1 CANCER OF APPENDIX METASTATIC TO INTRA-ABDOMINAL LYMPH NODE (MULTI): Primary | ICD-10-CM

## 2023-12-04 DIAGNOSIS — E11.9 TYPE 2 DIABETES MELLITUS WITHOUT COMPLICATION, WITHOUT LONG-TERM CURRENT USE OF INSULIN (MULTI): ICD-10-CM

## 2023-12-04 LAB
ALBUMIN SERPL BCP-MCNC: 4.5 G/DL (ref 3.4–5)
ALP SERPL-CCNC: 45 U/L (ref 33–136)
ALT SERPL W P-5'-P-CCNC: 21 U/L (ref 10–52)
ANION GAP SERPL CALC-SCNC: 13 MMOL/L (ref 10–20)
AST SERPL W P-5'-P-CCNC: 27 U/L (ref 9–39)
BASOPHILS # BLD AUTO: 0.02 X10*3/UL (ref 0–0.1)
BASOPHILS NFR BLD AUTO: 0.4 %
BILIRUB SERPL-MCNC: 0.6 MG/DL (ref 0–1.2)
BUN SERPL-MCNC: 17 MG/DL (ref 6–23)
CALCIUM SERPL-MCNC: 9.3 MG/DL (ref 8.6–10.3)
CHLORIDE SERPL-SCNC: 105 MMOL/L (ref 98–107)
CO2 SERPL-SCNC: 23 MMOL/L (ref 21–32)
CREAT SERPL-MCNC: 0.99 MG/DL (ref 0.5–1.3)
CREAT UR-MCNC: 71.3 MG/DL (ref 20–370)
EOSINOPHIL # BLD AUTO: 0.05 X10*3/UL (ref 0–0.7)
EOSINOPHIL NFR BLD AUTO: 1 %
ERYTHROCYTE [DISTWIDTH] IN BLOOD BY AUTOMATED COUNT: 17.2 % (ref 11.5–14.5)
EST. AVERAGE GLUCOSE BLD GHB EST-MCNC: 189 MG/DL
GFR SERPL CREATININE-BSD FRML MDRD: 83 ML/MIN/1.73M*2
GLUCOSE SERPL-MCNC: 101 MG/DL (ref 74–99)
HBA1C MFR BLD: 8.2 %
HCT VFR BLD AUTO: 38.1 % (ref 41–52)
HGB BLD-MCNC: 12 G/DL (ref 13.5–17.5)
IMM GRANULOCYTES # BLD AUTO: 0.01 X10*3/UL (ref 0–0.7)
IMM GRANULOCYTES NFR BLD AUTO: 0.2 % (ref 0–0.9)
LYMPHOCYTES # BLD AUTO: 0.95 X10*3/UL (ref 1.2–4.8)
LYMPHOCYTES NFR BLD AUTO: 19.8 %
MCH RBC QN AUTO: 25.9 PG (ref 26–34)
MCHC RBC AUTO-ENTMCNC: 31.5 G/DL (ref 32–36)
MCV RBC AUTO: 82 FL (ref 80–100)
MICROALBUMIN UR-MCNC: 12.3 MG/L
MICROALBUMIN/CREAT UR: 17.3 UG/MG CREAT
MONOCYTES # BLD AUTO: 0.55 X10*3/UL (ref 0.1–1)
MONOCYTES NFR BLD AUTO: 11.4 %
NEUTROPHILS # BLD AUTO: 3.23 X10*3/UL (ref 1.2–7.7)
NEUTROPHILS NFR BLD AUTO: 67.2 %
NRBC BLD-RTO: 0 /100 WBCS (ref 0–0)
PLATELET # BLD AUTO: 205 X10*3/UL (ref 150–450)
POTASSIUM SERPL-SCNC: 4 MMOL/L (ref 3.5–5.3)
PROT SERPL-MCNC: 7.2 G/DL (ref 6.4–8.2)
RBC # BLD AUTO: 4.63 X10*6/UL (ref 4.5–5.9)
SODIUM SERPL-SCNC: 137 MMOL/L (ref 136–145)
WBC # BLD AUTO: 4.8 X10*3/UL (ref 4.4–11.3)

## 2023-12-04 PROCEDURE — 82570 ASSAY OF URINE CREATININE: CPT | Performed by: INTERNAL MEDICINE

## 2023-12-04 PROCEDURE — 3066F NEPHROPATHY DOC TX: CPT | Performed by: STUDENT IN AN ORGANIZED HEALTH CARE EDUCATION/TRAINING PROGRAM

## 2023-12-04 PROCEDURE — 36591 DRAW BLOOD OFF VENOUS DEVICE: CPT

## 2023-12-04 PROCEDURE — 1160F RVW MEDS BY RX/DR IN RCRD: CPT | Performed by: STUDENT IN AN ORGANIZED HEALTH CARE EDUCATION/TRAINING PROGRAM

## 2023-12-04 PROCEDURE — 3078F DIAST BP <80 MM HG: CPT | Performed by: STUDENT IN AN ORGANIZED HEALTH CARE EDUCATION/TRAINING PROGRAM

## 2023-12-04 PROCEDURE — 99214 OFFICE O/P EST MOD 30 MIN: CPT | Performed by: STUDENT IN AN ORGANIZED HEALTH CARE EDUCATION/TRAINING PROGRAM

## 2023-12-04 PROCEDURE — 4010F ACE/ARB THERAPY RXD/TAKEN: CPT | Performed by: STUDENT IN AN ORGANIZED HEALTH CARE EDUCATION/TRAINING PROGRAM

## 2023-12-04 PROCEDURE — 3048F LDL-C <100 MG/DL: CPT | Performed by: STUDENT IN AN ORGANIZED HEALTH CARE EDUCATION/TRAINING PROGRAM

## 2023-12-04 PROCEDURE — 1159F MED LIST DOCD IN RCRD: CPT | Performed by: STUDENT IN AN ORGANIZED HEALTH CARE EDUCATION/TRAINING PROGRAM

## 2023-12-04 PROCEDURE — 1036F TOBACCO NON-USER: CPT | Performed by: STUDENT IN AN ORGANIZED HEALTH CARE EDUCATION/TRAINING PROGRAM

## 2023-12-04 PROCEDURE — 83036 HEMOGLOBIN GLYCOSYLATED A1C: CPT | Performed by: INTERNAL MEDICINE

## 2023-12-04 PROCEDURE — 3061F NEG MICROALBUMINURIA REV: CPT | Performed by: STUDENT IN AN ORGANIZED HEALTH CARE EDUCATION/TRAINING PROGRAM

## 2023-12-04 PROCEDURE — 3008F BODY MASS INDEX DOCD: CPT | Performed by: STUDENT IN AN ORGANIZED HEALTH CARE EDUCATION/TRAINING PROGRAM

## 2023-12-04 PROCEDURE — 80053 COMPREHEN METABOLIC PANEL: CPT

## 2023-12-04 PROCEDURE — 2500000004 HC RX 250 GENERAL PHARMACY W/ HCPCS (ALT 636 FOR OP/ED): Performed by: STUDENT IN AN ORGANIZED HEALTH CARE EDUCATION/TRAINING PROGRAM

## 2023-12-04 PROCEDURE — 80061 LIPID PANEL: CPT | Performed by: INTERNAL MEDICINE

## 2023-12-04 PROCEDURE — 3077F SYST BP >= 140 MM HG: CPT | Performed by: STUDENT IN AN ORGANIZED HEALTH CARE EDUCATION/TRAINING PROGRAM

## 2023-12-04 PROCEDURE — 96523 IRRIG DRUG DELIVERY DEVICE: CPT

## 2023-12-04 PROCEDURE — 85025 COMPLETE CBC W/AUTO DIFF WBC: CPT

## 2023-12-04 PROCEDURE — 1126F AMNT PAIN NOTED NONE PRSNT: CPT | Performed by: STUDENT IN AN ORGANIZED HEALTH CARE EDUCATION/TRAINING PROGRAM

## 2023-12-04 PROCEDURE — 3052F HG A1C>EQUAL 8.0%<EQUAL 9.0%: CPT | Performed by: STUDENT IN AN ORGANIZED HEALTH CARE EDUCATION/TRAINING PROGRAM

## 2023-12-04 RX ORDER — LORAZEPAM 2 MG/ML
1 INJECTION INTRAMUSCULAR AS NEEDED
Status: CANCELLED | OUTPATIENT
Start: 2023-12-05

## 2023-12-04 RX ORDER — HEPARIN 100 UNIT/ML
500 SYRINGE INTRAVENOUS AS NEEDED
Status: CANCELLED | OUTPATIENT
Start: 2023-12-04

## 2023-12-04 RX ORDER — PROCHLORPERAZINE MALEATE 10 MG
10 TABLET ORAL EVERY 6 HOURS PRN
Status: CANCELLED | OUTPATIENT
Start: 2023-12-05

## 2023-12-04 RX ORDER — PALONOSETRON 0.05 MG/ML
0.25 INJECTION, SOLUTION INTRAVENOUS ONCE
Status: CANCELLED | OUTPATIENT
Start: 2023-12-05

## 2023-12-04 RX ORDER — ALBUTEROL SULFATE 0.83 MG/ML
3 SOLUTION RESPIRATORY (INHALATION) AS NEEDED
Status: CANCELLED | OUTPATIENT
Start: 2023-12-05

## 2023-12-04 RX ORDER — DIPHENHYDRAMINE HYDROCHLORIDE 50 MG/ML
50 INJECTION INTRAMUSCULAR; INTRAVENOUS AS NEEDED
Status: CANCELLED | OUTPATIENT
Start: 2023-12-05

## 2023-12-04 RX ORDER — HEPARIN 100 UNIT/ML
500 SYRINGE INTRAVENOUS AS NEEDED
Status: DISCONTINUED | OUTPATIENT
Start: 2023-12-04 | End: 2023-12-04 | Stop reason: HOSPADM

## 2023-12-04 RX ORDER — HEPARIN SODIUM,PORCINE/PF 10 UNIT/ML
50 SYRINGE (ML) INTRAVENOUS AS NEEDED
Status: CANCELLED | OUTPATIENT
Start: 2023-12-04

## 2023-12-04 RX ORDER — PROCHLORPERAZINE EDISYLATE 5 MG/ML
10 INJECTION INTRAMUSCULAR; INTRAVENOUS EVERY 6 HOURS PRN
Status: CANCELLED | OUTPATIENT
Start: 2023-12-05

## 2023-12-04 RX ORDER — EPINEPHRINE 0.3 MG/.3ML
0.3 INJECTION SUBCUTANEOUS EVERY 5 MIN PRN
Status: CANCELLED | OUTPATIENT
Start: 2023-12-05

## 2023-12-04 RX ORDER — FAMOTIDINE 10 MG/ML
20 INJECTION INTRAVENOUS ONCE AS NEEDED
Status: CANCELLED | OUTPATIENT
Start: 2023-12-05

## 2023-12-04 RX ADMIN — HEPARIN 500 UNITS: 100 SYRINGE at 09:01

## 2023-12-04 ASSESSMENT — PAIN SCALES - GENERAL: PAINLEVEL: 0-NO PAIN

## 2023-12-05 ENCOUNTER — INFUSION (OUTPATIENT)
Dept: HEMATOLOGY/ONCOLOGY | Facility: CLINIC | Age: 68
End: 2023-12-05
Payer: MEDICARE

## 2023-12-05 VITALS
DIASTOLIC BLOOD PRESSURE: 67 MMHG | SYSTOLIC BLOOD PRESSURE: 128 MMHG | WEIGHT: 194.78 LBS | RESPIRATION RATE: 14 BRPM | BODY MASS INDEX: 27.95 KG/M2 | OXYGEN SATURATION: 98 % | TEMPERATURE: 97.2 F | HEART RATE: 96 BPM

## 2023-12-05 DIAGNOSIS — C77.2 CANCER OF APPENDIX METASTATIC TO INTRA-ABDOMINAL LYMPH NODE (MULTI): ICD-10-CM

## 2023-12-05 DIAGNOSIS — C18.1 CANCER OF APPENDIX METASTATIC TO INTRA-ABDOMINAL LYMPH NODE (MULTI): ICD-10-CM

## 2023-12-05 LAB
CHOLEST SERPL-MCNC: 136 MG/DL (ref 0–199)
CHOLESTEROL/HDL RATIO: 2.4
HDLC SERPL-MCNC: 56.3 MG/DL
LDLC SERPL CALC-MCNC: 43 MG/DL
NON HDL CHOLESTEROL: 80 MG/DL (ref 0–149)
TRIGL SERPL-MCNC: 183 MG/DL (ref 0–149)
VLDL: 37 MG/DL (ref 0–40)

## 2023-12-05 PROCEDURE — 2500000004 HC RX 250 GENERAL PHARMACY W/ HCPCS (ALT 636 FOR OP/ED): Mod: JZ | Performed by: STUDENT IN AN ORGANIZED HEALTH CARE EDUCATION/TRAINING PROGRAM

## 2023-12-05 PROCEDURE — 96361 HYDRATE IV INFUSION ADD-ON: CPT | Mod: INF

## 2023-12-05 PROCEDURE — 96413 CHEMO IV INFUSION 1 HR: CPT

## 2023-12-05 PROCEDURE — 96375 TX/PRO/DX INJ NEW DRUG ADDON: CPT | Mod: INF

## 2023-12-05 PROCEDURE — 96415 CHEMO IV INFUSION ADDL HR: CPT

## 2023-12-05 PROCEDURE — 96416 CHEMO PROLONG INFUSE W/PUMP: CPT

## 2023-12-05 PROCEDURE — 96374 THER/PROPH/DIAG INJ IV PUSH: CPT | Mod: INF

## 2023-12-05 PROCEDURE — 2500000004 HC RX 250 GENERAL PHARMACY W/ HCPCS (ALT 636 FOR OP/ED): Performed by: STUDENT IN AN ORGANIZED HEALTH CARE EDUCATION/TRAINING PROGRAM

## 2023-12-05 RX ORDER — HEPARIN SODIUM,PORCINE/PF 10 UNIT/ML
50 SYRINGE (ML) INTRAVENOUS AS NEEDED
Status: DISCONTINUED | OUTPATIENT
Start: 2023-12-05 | End: 2023-12-05 | Stop reason: HOSPADM

## 2023-12-05 RX ORDER — PALONOSETRON 0.05 MG/ML
0.25 INJECTION, SOLUTION INTRAVENOUS ONCE
Status: COMPLETED | OUTPATIENT
Start: 2023-12-05 | End: 2023-12-05

## 2023-12-05 RX ORDER — HEPARIN 100 UNIT/ML
500 SYRINGE INTRAVENOUS AS NEEDED
Status: DISCONTINUED | OUTPATIENT
Start: 2023-12-05 | End: 2023-12-05 | Stop reason: HOSPADM

## 2023-12-05 RX ORDER — HEPARIN SODIUM,PORCINE/PF 10 UNIT/ML
50 SYRINGE (ML) INTRAVENOUS AS NEEDED
Status: CANCELLED | OUTPATIENT
Start: 2023-12-05

## 2023-12-05 RX ORDER — LORAZEPAM 2 MG/ML
1 INJECTION INTRAMUSCULAR AS NEEDED
Status: DISCONTINUED | OUTPATIENT
Start: 2023-12-05 | End: 2023-12-05 | Stop reason: HOSPADM

## 2023-12-05 RX ORDER — DIPHENHYDRAMINE HYDROCHLORIDE 50 MG/ML
50 INJECTION INTRAMUSCULAR; INTRAVENOUS AS NEEDED
Status: DISCONTINUED | OUTPATIENT
Start: 2023-12-05 | End: 2023-12-05 | Stop reason: HOSPADM

## 2023-12-05 RX ORDER — FAMOTIDINE 10 MG/ML
20 INJECTION INTRAVENOUS ONCE AS NEEDED
Status: DISCONTINUED | OUTPATIENT
Start: 2023-12-05 | End: 2023-12-05 | Stop reason: HOSPADM

## 2023-12-05 RX ORDER — HEPARIN 100 UNIT/ML
500 SYRINGE INTRAVENOUS AS NEEDED
Status: CANCELLED | OUTPATIENT
Start: 2023-12-05

## 2023-12-05 RX ORDER — EPINEPHRINE 0.3 MG/.3ML
0.3 INJECTION SUBCUTANEOUS EVERY 5 MIN PRN
Status: DISCONTINUED | OUTPATIENT
Start: 2023-12-05 | End: 2023-12-05 | Stop reason: HOSPADM

## 2023-12-05 RX ORDER — PROCHLORPERAZINE MALEATE 10 MG
10 TABLET ORAL EVERY 6 HOURS PRN
Status: DISCONTINUED | OUTPATIENT
Start: 2023-12-05 | End: 2023-12-05 | Stop reason: HOSPADM

## 2023-12-05 RX ORDER — ALBUTEROL SULFATE 0.83 MG/ML
3 SOLUTION RESPIRATORY (INHALATION) AS NEEDED
Status: DISCONTINUED | OUTPATIENT
Start: 2023-12-05 | End: 2023-12-05 | Stop reason: HOSPADM

## 2023-12-05 RX ORDER — PROCHLORPERAZINE EDISYLATE 5 MG/ML
10 INJECTION INTRAMUSCULAR; INTRAVENOUS EVERY 6 HOURS PRN
Status: DISCONTINUED | OUTPATIENT
Start: 2023-12-05 | End: 2023-12-05 | Stop reason: HOSPADM

## 2023-12-05 RX ADMIN — FLUOROURACIL 5100 MG: 50 INJECTION, SOLUTION INTRAVENOUS at 16:40

## 2023-12-05 RX ADMIN — OXALIPLATIN 145 MG: 5 INJECTION, SOLUTION, CONCENTRATE INTRAVENOUS at 14:28

## 2023-12-05 RX ADMIN — DEXAMETHASONE SODIUM PHOSPHATE 12 MG: 10 INJECTION, SOLUTION INTRAMUSCULAR; INTRAVENOUS at 13:54

## 2023-12-05 RX ADMIN — PALONOSETRON HYDROCHLORIDE 250 MCG: 0.25 INJECTION INTRAVENOUS at 13:53

## 2023-12-05 ASSESSMENT — PAIN SCALES - GENERAL: PAINLEVEL: 0-NO PAIN

## 2023-12-05 NOTE — PROGRESS NOTES
Cancer History:  DIAGNOSIS: Appendiceal adenocarcinoma    STAGING: pT4a pN1a Mx    CURRENT SITES OF DISEASE:    MOLECULAR/GENOMICS:  MMR-intact    ctDNA Signatera:  8/14/23: (0) negative  9/25/23: (0) negative  11/6/23: (0) negative    TUMOR MARKER:  5/15/23 CEA: <0.5  8/14/23 CEA: <0.5  11/7/23 CEA: <0.5    CURRENT THERAPY:  Adjuvant FOLFOX every 2 weeks x 12 cycles started on 8/29/23; Dose reduction of Oxaliplatin with C4 due to lasting cold sensitivity    PREVIOUS THERAPY:  6/20/23: S/p R hemicolectomy    ONCOLOGIC ISSUES:    PROVIDERS:  CRS: Jazmin Faulkner    HISTORY:   4/2023: presented with R inguinal pain. Thought to have a hernia.  4/17/23: CT abdomen pelvis showed indeterminate masslike lesion at the cecum posteriorly at the expected level of the appendix measuring 3 x 4 cm in dimension.  Also within the left pelvis near the origin of the left inguinal canal there is an indeterminate cavitary mass measuring 2.5 x 2 cm  5/5/23: Underwent colonoscopy.  Report not available.  Pathology of cecal/appendiceal orifice mass biopsy showed poorly differentiated adenocarcinoma with signet ring cell differentiation  5/17/23: CT chest abdomen pelvis showed no evidence of metastatic disease, again noted soft tissue mass near the base of the cecum measuring 4.2 x 2.8 cm, compatible with reported history of appendiceal carcinoma  6/20/23: Underwent right hemicolectomy.  Final pathology showed appendiceal invasive moderately differentiated mucinous adenocarcinoma measuring 5 cm.  Tumor invades the visceral peritoneum.  No lymphovascular or perineural invasion. 1/44 LNs involved.  MMR protein expression intact    PMH: HTN, Gilbert's disease, HLD, DM  SH: , no tobacco, EtOH, illicits  FH: Sister and nephew with Rodriguez syndrome.      Subjective    Continues to have ongoing neuropathy, which seems to be worsening.  No fevers, chills, chest pain, shortness of breath, abdominal pain, nausea, vomiting, diarrhea, or  rashes.    ROS as above. Remainder of 10-point review of systems elicited and negative.     Objective:   BSA: 2.09 meters squared  /66 (BP Location: Left arm, Patient Position: Sitting, BP Cuff Size: Large adult)   Pulse 53   Temp 36.7 °C (98.1 °F)   Resp 18   Wt 88.4 kg (194 lb 12.8 oz)   SpO2 99%   BMI 27.95 kg/m²      Physical Exam  Gen: A&O, NAD  Head: Normocephalic, atraumatic  Eyes: no scleral icterus  ENT: mucous membranes moist, no oropharyngeal lesions  Resp: Lungs CTAB  Cardiac: Tachycardiac, regular rhythm, no murmurs appreciated  Abdomen: Soft, nondistended, nontender, +BS  Neuro: CNII-XII grossly intact  Psych: appropriate mood & affect  Skin: warm, dry, no apparent rashes     ECOG Performance Status: 0     Assessment/Plan   Mr. 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 Faulkner.     He presents today for discussion of adjuvant chemotherapy. I personally reviewed the images from the recent CT, which show no evidence of metastatic disease.  I reviewed the results of his pathology synoptic report, which show pT4a pN1a, Stage III cancer.     I discussed with him and his wife at length that given the stage of his appendiceal cancer, I recommend systemic chemotherapy.  The options are for FOLFOX versus capecitabine plus oxaliplatin.  Given the high risk nature of the diagnosis, my preference is for 6 months of adjuvant chemotherapy, and FOLFOX is better tolerated for this duration.    I discussed the risks and benefits and side effect profile of FOLFOX chemotherapy, and he agrees to proceed.    After 1st cycle of FOLFOX, found to have extensive PE throughout B/L lungs including saddle embolus of main pulmonary artery. On 9/5/23 he had aspiration thrombectomy and was placed on Apixaban. Discussed admission with Dr. Webb. Ok to proceed with chemotherapy as long as patient is feeling well.     10/9/23: After 2nd cycle, he experienced  fatigue, cold sensitivity, nausea and diarrhea.    10/23/23: Having more cold and now light sensitivity. Will decrease dose of Oxaliplatin.     11/20/23: Improved fatigue. Still with taste and cold sensitivity, which is stable.     Plan:  Stage III appendiceal cancer  - Plan for 6mo goal of adjuvant chemotherapy with FOLFOX  - Proceed with C7 FOLFOX, will dose reduce oxaliplatin to 68mg/m2 d/t neuropathy  - RTC 2 weeks for C8    Pulmonary Embolism including saddle embolism  - continue on Apixaban  - has follow up with vascular who will manage apixaban

## 2023-12-05 NOTE — RESULT ENCOUNTER NOTE
Labs have been reviewed.  The A1C was found to be 8.2%.  The triglyceride value is elevated above goal.  There is no spillage of protein in the urine.  Please continue the same dose of medication and follow up as scheduled.

## 2023-12-07 ENCOUNTER — INFUSION (OUTPATIENT)
Dept: HEMATOLOGY/ONCOLOGY | Facility: CLINIC | Age: 68
End: 2023-12-07
Payer: MEDICARE

## 2023-12-07 VITALS
TEMPERATURE: 97.5 F | OXYGEN SATURATION: 96 % | HEART RATE: 84 BPM | DIASTOLIC BLOOD PRESSURE: 65 MMHG | RESPIRATION RATE: 16 BRPM | BODY MASS INDEX: 27.88 KG/M2 | WEIGHT: 194.34 LBS | SYSTOLIC BLOOD PRESSURE: 111 MMHG

## 2023-12-07 DIAGNOSIS — C18.1 CANCER OF APPENDIX METASTATIC TO INTRA-ABDOMINAL LYMPH NODE (MULTI): ICD-10-CM

## 2023-12-07 DIAGNOSIS — C77.2 CANCER OF APPENDIX METASTATIC TO INTRA-ABDOMINAL LYMPH NODE (MULTI): ICD-10-CM

## 2023-12-07 PROCEDURE — 2500000004 HC RX 250 GENERAL PHARMACY W/ HCPCS (ALT 636 FOR OP/ED): Performed by: STUDENT IN AN ORGANIZED HEALTH CARE EDUCATION/TRAINING PROGRAM

## 2023-12-07 RX ORDER — HEPARIN 100 UNIT/ML
500 SYRINGE INTRAVENOUS AS NEEDED
Status: DISCONTINUED | OUTPATIENT
Start: 2023-12-07 | End: 2023-12-07 | Stop reason: HOSPADM

## 2023-12-07 RX ORDER — HEPARIN SODIUM,PORCINE/PF 10 UNIT/ML
50 SYRINGE (ML) INTRAVENOUS AS NEEDED
Status: CANCELLED | OUTPATIENT
Start: 2023-12-07

## 2023-12-07 RX ORDER — HEPARIN 100 UNIT/ML
500 SYRINGE INTRAVENOUS AS NEEDED
Status: CANCELLED | OUTPATIENT
Start: 2023-12-07

## 2023-12-07 RX ADMIN — Medication 500 UNITS: at 14:56

## 2023-12-07 ASSESSMENT — PAIN SCALES - GENERAL: PAINLEVEL: 0-NO PAIN

## 2023-12-07 NOTE — PROGRESS NOTES
CADD pump disconnected, verified total volume infused.  + blood return noted, flushed with 10cc normal saline and 5cc 10 units heparin.  Lopez needle removed and site covered with bandaid.  Pump stored in clinic and bag sent home with patient.  Patient tolerated procedure well.

## 2023-12-11 LAB
ATRIAL RATE: 95 BPM
P AXIS: 56 DEGREES
P OFFSET: 185 MS
P ONSET: 138 MS
PR INTERVAL: 160 MS
Q ONSET: 218 MS
QRS COUNT: 16 BEATS
QRS DURATION: 84 MS
QT INTERVAL: 350 MS
QTC CALCULATION(BAZETT): 439 MS
QTC FREDERICIA: 407 MS
R AXIS: 64 DEGREES
T AXIS: 12 DEGREES
T OFFSET: 393 MS
VENTRICULAR RATE: 95 BPM

## 2023-12-13 ENCOUNTER — OFFICE VISIT (OUTPATIENT)
Dept: PRIMARY CARE | Facility: CLINIC | Age: 68
End: 2023-12-13
Payer: MEDICARE

## 2023-12-13 VITALS
DIASTOLIC BLOOD PRESSURE: 68 MMHG | SYSTOLIC BLOOD PRESSURE: 130 MMHG | HEART RATE: 113 BPM | BODY MASS INDEX: 27.95 KG/M2 | WEIGHT: 194.8 LBS

## 2023-12-13 DIAGNOSIS — H61.23 BILATERAL IMPACTED CERUMEN: ICD-10-CM

## 2023-12-13 DIAGNOSIS — Z79.4 LONG-TERM INSULIN USE (MULTI): ICD-10-CM

## 2023-12-13 DIAGNOSIS — Z79.4 TYPE 2 DIABETES MELLITUS WITHOUT COMPLICATION, WITH LONG-TERM CURRENT USE OF INSULIN (MULTI): ICD-10-CM

## 2023-12-13 DIAGNOSIS — K56.609 INTESTINAL OBSTRUCTION, UNSPECIFIED CAUSE, UNSPECIFIED WHETHER PARTIAL OR COMPLETE (MULTI): ICD-10-CM

## 2023-12-13 DIAGNOSIS — Z00.00 MEDICARE ANNUAL WELLNESS VISIT, SUBSEQUENT: Primary | ICD-10-CM

## 2023-12-13 DIAGNOSIS — Z12.5 PROSTATE CANCER SCREENING: ICD-10-CM

## 2023-12-13 DIAGNOSIS — E11.9 TYPE 2 DIABETES MELLITUS WITHOUT COMPLICATION, WITH LONG-TERM CURRENT USE OF INSULIN (MULTI): ICD-10-CM

## 2023-12-13 DIAGNOSIS — I48.0 PAROXYSMAL ATRIAL FIBRILLATION (MULTI): ICD-10-CM

## 2023-12-13 DIAGNOSIS — C18.1 ADENOCARCINOMA, APPENDIX (MULTI): ICD-10-CM

## 2023-12-13 DIAGNOSIS — E78.2 MIXED HYPERLIPIDEMIA: ICD-10-CM

## 2023-12-13 DIAGNOSIS — C77.2 CANCER OF APPENDIX METASTATIC TO INTRA-ABDOMINAL LYMPH NODE (MULTI): ICD-10-CM

## 2023-12-13 DIAGNOSIS — I10 HYPERTENSION, UNSPECIFIED TYPE: ICD-10-CM

## 2023-12-13 DIAGNOSIS — I26.02 ACUTE SADDLE PULMONARY EMBOLISM WITH ACUTE COR PULMONALE (MULTI): ICD-10-CM

## 2023-12-13 DIAGNOSIS — C18.1 CANCER OF APPENDIX METASTATIC TO INTRA-ABDOMINAL LYMPH NODE (MULTI): ICD-10-CM

## 2023-12-13 DIAGNOSIS — R35.1 NOCTURIA: ICD-10-CM

## 2023-12-13 PROCEDURE — 3061F NEG MICROALBUMINURIA REV: CPT | Performed by: STUDENT IN AN ORGANIZED HEALTH CARE EDUCATION/TRAINING PROGRAM

## 2023-12-13 PROCEDURE — 1126F AMNT PAIN NOTED NONE PRSNT: CPT | Performed by: STUDENT IN AN ORGANIZED HEALTH CARE EDUCATION/TRAINING PROGRAM

## 2023-12-13 PROCEDURE — 69210 REMOVE IMPACTED EAR WAX UNI: CPT | Performed by: STUDENT IN AN ORGANIZED HEALTH CARE EDUCATION/TRAINING PROGRAM

## 2023-12-13 PROCEDURE — 4010F ACE/ARB THERAPY RXD/TAKEN: CPT | Performed by: STUDENT IN AN ORGANIZED HEALTH CARE EDUCATION/TRAINING PROGRAM

## 2023-12-13 PROCEDURE — 1159F MED LIST DOCD IN RCRD: CPT | Performed by: STUDENT IN AN ORGANIZED HEALTH CARE EDUCATION/TRAINING PROGRAM

## 2023-12-13 PROCEDURE — 99215 OFFICE O/P EST HI 40 MIN: CPT | Performed by: STUDENT IN AN ORGANIZED HEALTH CARE EDUCATION/TRAINING PROGRAM

## 2023-12-13 PROCEDURE — 3008F BODY MASS INDEX DOCD: CPT | Performed by: STUDENT IN AN ORGANIZED HEALTH CARE EDUCATION/TRAINING PROGRAM

## 2023-12-13 PROCEDURE — 3052F HG A1C>EQUAL 8.0%<EQUAL 9.0%: CPT | Performed by: STUDENT IN AN ORGANIZED HEALTH CARE EDUCATION/TRAINING PROGRAM

## 2023-12-13 PROCEDURE — 1036F TOBACCO NON-USER: CPT | Performed by: STUDENT IN AN ORGANIZED HEALTH CARE EDUCATION/TRAINING PROGRAM

## 2023-12-13 PROCEDURE — 3078F DIAST BP <80 MM HG: CPT | Performed by: STUDENT IN AN ORGANIZED HEALTH CARE EDUCATION/TRAINING PROGRAM

## 2023-12-13 PROCEDURE — 1170F FXNL STATUS ASSESSED: CPT | Performed by: STUDENT IN AN ORGANIZED HEALTH CARE EDUCATION/TRAINING PROGRAM

## 2023-12-13 PROCEDURE — 3066F NEPHROPATHY DOC TX: CPT | Performed by: STUDENT IN AN ORGANIZED HEALTH CARE EDUCATION/TRAINING PROGRAM

## 2023-12-13 PROCEDURE — G0439 PPPS, SUBSEQ VISIT: HCPCS | Performed by: STUDENT IN AN ORGANIZED HEALTH CARE EDUCATION/TRAINING PROGRAM

## 2023-12-13 PROCEDURE — 3075F SYST BP GE 130 - 139MM HG: CPT | Performed by: STUDENT IN AN ORGANIZED HEALTH CARE EDUCATION/TRAINING PROGRAM

## 2023-12-13 PROCEDURE — 1160F RVW MEDS BY RX/DR IN RCRD: CPT | Performed by: STUDENT IN AN ORGANIZED HEALTH CARE EDUCATION/TRAINING PROGRAM

## 2023-12-13 PROCEDURE — 3048F LDL-C <100 MG/DL: CPT | Performed by: STUDENT IN AN ORGANIZED HEALTH CARE EDUCATION/TRAINING PROGRAM

## 2023-12-13 RX ORDER — FENOFIBRATE 145 MG/1
145 TABLET, FILM COATED ORAL DAILY
Qty: 90 TABLET | Refills: 1 | Status: SHIPPED | OUTPATIENT
Start: 2023-12-13

## 2023-12-13 RX ORDER — OLMESARTAN MEDOXOMIL 40 MG/1
40 TABLET ORAL DAILY
Qty: 90 TABLET | Refills: 1 | Status: SHIPPED | OUTPATIENT
Start: 2023-12-13 | End: 2024-06-05 | Stop reason: SDUPTHER

## 2023-12-13 RX ORDER — DOXAZOSIN 2 MG/1
2 TABLET ORAL NIGHTLY
Qty: 90 TABLET | Refills: 1 | Status: SHIPPED | OUTPATIENT
Start: 2023-12-13 | End: 2024-04-08 | Stop reason: WASHOUT

## 2023-12-13 RX ORDER — ONDANSETRON HYDROCHLORIDE 8 MG/1
8 TABLET, FILM COATED ORAL EVERY 8 HOURS PRN
Qty: 20 TABLET | Refills: 2 | Status: SHIPPED | OUTPATIENT
Start: 2023-12-13 | End: 2024-04-07 | Stop reason: WASHOUT

## 2023-12-13 RX ORDER — ATORVASTATIN CALCIUM 40 MG/1
40 TABLET, FILM COATED ORAL NIGHTLY
Qty: 90 TABLET | Refills: 1 | Status: SHIPPED | OUTPATIENT
Start: 2023-12-13 | End: 2024-06-05 | Stop reason: SDUPTHER

## 2023-12-13 ASSESSMENT — PATIENT HEALTH QUESTIONNAIRE - PHQ9
2. FEELING DOWN, DEPRESSED OR HOPELESS: NOT AT ALL
1. LITTLE INTEREST OR PLEASURE IN DOING THINGS: NOT AT ALL
SUM OF ALL RESPONSES TO PHQ9 QUESTIONS 1 AND 2: 0

## 2023-12-13 ASSESSMENT — ENCOUNTER SYMPTOMS
LOSS OF SENSATION IN FEET: 1
DEPRESSION: 0
OCCASIONAL FEELINGS OF UNSTEADINESS: 1

## 2023-12-13 ASSESSMENT — ACTIVITIES OF DAILY LIVING (ADL)
DOING_HOUSEWORK: INDEPENDENT
GROCERY_SHOPPING: INDEPENDENT
BATHING: INDEPENDENT
DRESSING: INDEPENDENT
MANAGING_FINANCES: INDEPENDENT
TAKING_MEDICATION: INDEPENDENT

## 2023-12-13 NOTE — PROGRESS NOTES
Subjective   Reason for Visit: Bunny Abarca is an 68 y.o. male here for a Medicare Wellness visit.          Reviewed all medications by prescribing practitioner or clinical pharmacist (such as prescriptions, OTCs, herbal therapies and supplements) and documented in the medical record.    HPI    1. Health Maintenance  Overall patient is doing well.   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 Was on flecainide for his atrial fibrillation but was discontinued while inpatient. Cardiologist decided not to start back up outpatient.    He has started chemotherapy and will complete 12 cycles in total for chemotherapy. Started on 8/29/23      3. Hypertension  Established h/o atrial fibrillation, followed by Cardiology   Currently on olmesartan 40 mg daily, doxazosin 2 mg nightly  Tolerating well.   BP reading today 130/68  Denies any headaches, dizziness, vision changes, SOB, GARDUNO, LE edema, or any other complaints.     4. Hyperlipidemia and hypertriglyceridemia  Currently on Atorvastatin 40 mg daily and fenofibrate 145 mg daily.  Tolerating well and denies side effects.     5. Type II Diabetes Mellitus  Takes Metformin 500 mg 2 tablets twice a day and on NovoLog.  He also takes Jardiance 25 mg daily.  Follows with Endocrinology, Dr. Calvillo  Last A1c 12/04/23  showing 8.2%. Performed at Endocrinologist's office.     6. Atrial fibrillation  Follows Cardiology, Dr. Ibanez  Previously on metoprolol and then Flecainide, as of now, cardiology does not recommend any antiarrhythmic medication.   Currently on apixaban 5 mg for anticoagulation, followed by vascular medicine  He is tolerating the medication well.     7. Nocturia  He is experiencing insomnia due to having to get up to urinate several times throughout the night.  He believes this is due to Jardiance and HCTZ.  Requesting something to help with this.    8.  Left Cerumen impaction  Patient complains of hearing loss in his left ear.   Would like to have wax removed.     Allergies   Allergen Reactions    Exenatide Microspheres Itching    Perfume Itching and Rash       Immunization History   Administered Date(s) Administered    Flu vaccine, quadrivalent, high-dose, preservative free, age 65y+ (FLUZONE) 09/23/2023    Influenza, High Dose Seasonal, Preservative Free 10/05/2021, 10/04/2022    Influenza, seasonal, injectable 10/04/2022    Moderna COVID-19 vaccine, Fall 2023, 12 yeasrs and older (50mcg/0.5mL) 11/04/2023    Moderna COVID-19 vaccine, bivalent, blue cap/gray label *Check age/dose* 10/11/2022    Moderna SARS-CoV-2 Vaccination 04/16/2022    Pfizer Purple Cap SARS-CoV-2 03/05/2021, 03/25/2021, 10/21/2021    Pneumococcal conjugate vaccine, 13-valent (PREVNAR 13) 10/05/2021    Pneumococcal polysaccharide vaccine, 23-valent, age 2 years and older (PNEUMOVAX 23) 10/05/2022    RESPIRATORY SYNCYTIAL VIRUS (RSV), ELIGIBLE PREGNANT PTS, 0.5 ML (ABRYSVO) 10/02/2023    Tdap vaccine, age 7 year and older (BOOSTRIX) 11/10/2017    Zoster vaccine, recombinant, adult (SHINGRIX) 08/24/2021, 10/25/2021       Patient Self Assessment of Health Status  Patient Self Assessment: Fair    Nutrition and Exercise  Current Diet: Heart Healthy Diet  Adequate Fluid Intake: Yes  Caffeine: Yes  Exercise Frequency: Infrequently    Functional  Ability/Level of Safety  Cognitive Impairment Observed: Cognitive impairment observed    Home Safety Risk Factors: None    Patient Care Team:  Ante T Pletikosic, DO as PCP - General  Ante T Pletikosic, DO as PCP - [facility] Attributed Provider  Anastasiya Webb MD PhD as Consulting Physician (Hematology and Oncology)     Review of Systems  All pertinent positive symptoms are included in the history of present illness.    All other systems have been reviewed and are negative and noncontributory to this patient's current ailments.    Objective   Vitals:  /68 (BP Location: Left arm, Patient Position: Sitting, BP Cuff Size: Adult)   Pulse (!) 113   Wt 88.4 kg (194 lb 12.8 oz)   BMI 27.95 kg/m²     Lab on 12/04/2023   Component Date Value Ref Range Status    WBC 12/04/2023 4.8  4.4 - 11.3 x10*3/uL Final    nRBC 12/04/2023 0.0  0.0 - 0.0 /100 WBCs Final    RBC 12/04/2023 4.63  4.50 - 5.90 x10*6/uL Final    Hemoglobin 12/04/2023 12.0 (L)  13.5 - 17.5 g/dL Final    Hematocrit 12/04/2023 38.1 (L)  41.0 - 52.0 % Final    MCV 12/04/2023 82  80 - 100 fL Final    MCH 12/04/2023 25.9 (L)  26.0 - 34.0 pg Final    MCHC 12/04/2023 31.5 (L)  32.0 - 36.0 g/dL Final    RDW 12/04/2023 17.2 (H)  11.5 - 14.5 % Final    Platelets 12/04/2023 205  150 - 450 x10*3/uL Final    Neutrophils % 12/04/2023 67.2  40.0 - 80.0 % Final    Immature Granulocytes %, Automated 12/04/2023 0.2  0.0 - 0.9 % Final    Immature Granulocyte Count (IG) includes promyelocytes, myelocytes and metamyelocytes but does not include bands. Percent differential counts (%) should be interpreted in the context of the absolute cell counts (cells/UL).    Lymphocytes % 12/04/2023 19.8  13.0 - 44.0 % Final    Monocytes % 12/04/2023 11.4  2.0 - 10.0 % Final    Eosinophils % 12/04/2023 1.0  0.0 - 6.0 % Final    Basophils % 12/04/2023 0.4  0.0 - 2.0 % Final    Neutrophils Absolute 12/04/2023 3.23  1.20 - 7.70 x10*3/uL Final    Percent differential counts (%) should be  interpreted in the context of the absolute cell counts (cells/uL).    Immature Granulocytes Absolute, Au* 12/04/2023 0.01  0.00 - 0.70 x10*3/uL Final    Lymphocytes Absolute 12/04/2023 0.95 (L)  1.20 - 4.80 x10*3/uL Final    Monocytes Absolute 12/04/2023 0.55  0.10 - 1.00 x10*3/uL Final    Eosinophils Absolute 12/04/2023 0.05  0.00 - 0.70 x10*3/uL Final    Basophils Absolute 12/04/2023 0.02  0.00 - 0.10 x10*3/uL Final    Glucose 12/04/2023 101 (H)  74 - 99 mg/dL Final    Sodium 12/04/2023 137  136 - 145 mmol/L Final    Potassium 12/04/2023 4.0  3.5 - 5.3 mmol/L Final    Chloride 12/04/2023 105  98 - 107 mmol/L Final    Bicarbonate 12/04/2023 23  21 - 32 mmol/L Final    Anion Gap 12/04/2023 13  10 - 20 mmol/L Final    Urea Nitrogen 12/04/2023 17  6 - 23 mg/dL Final    Creatinine 12/04/2023 0.99  0.50 - 1.30 mg/dL Final    eGFR 12/04/2023 83  >60 mL/min/1.73m*2 Final    Calculations of estimated GFR are performed using the 2021 CKD-EPI Study Refit equation without the race variable for the IDMS-Traceable creatinine methods.  https://jasn.asnjournals.org/content/early/2021/09/22/ASN.3471681674    Calcium 12/04/2023 9.3  8.6 - 10.3 mg/dL Final    Albumin 12/04/2023 4.5  3.4 - 5.0 g/dL Final    Alkaline Phosphatase 12/04/2023 45  33 - 136 U/L Final    Total Protein 12/04/2023 7.2  6.4 - 8.2 g/dL Final    AST 12/04/2023 27  9 - 39 U/L Final    Bilirubin, Total 12/04/2023 0.6  0.0 - 1.2 mg/dL Final    ALT 12/04/2023 21  10 - 52 U/L Final    Patients treated with Sulfasalazine may generate falsely decreased results for ALT.    Hemoglobin A1C 12/04/2023 8.2 (H)  see below % Final    Estimated Average Glucose 12/04/2023 189  Not Established mg/dL Final    Cholesterol 12/04/2023 136  0 - 199 mg/dL Final          Age      Desirable   Borderline High   High     0-19 Y     0 - 169       170 - 199     >/= 200    20-24 Y     0 - 189       190 - 224     >/= 225         >24 Y     0 - 199       200 - 239     >/= 240   **All ranges are  based on fasting samples. Specific   therapeutic targets will vary based on patient-specific   cardiac risk.    Pediatric guidelines reference:Pediatrics 2011, 128(S5).Adult guidelines reference: NCEP ATPIII Guidelines,RYAN 2001, 258:2486-97    Venipuncture immediately after or during the administration of Metamizole may lead to falsely low results. Testing should be performed immediately prior to Metamizole dosing.    HDL-Cholesterol 12/04/2023 56.3  mg/dL Final      Age       Very Low   Low     Normal    High    0-19 Y    < 35      < 40     40-45     ----  20-24 Y    ----     < 40      >45      ----        >24 Y      ----     < 40     40-60      >60      Cholesterol/HDL Ratio 12/04/2023 2.4   Final      Ref Values  Desirable  < 3.4  High Risk  > 5.0    LDL Calculated 12/04/2023 43  <=99 mg/dL Final                                Near   Borderline      AGE      Desirable  Optimal    High     High     Very High     0-19 Y     0 - 109     ---    110-129   >/= 130     ----    20-24 Y     0 - 119     ---    120-159   >/= 160     ----      >24 Y     0 -  99   100-129  130-159   160-189     >/=190      VLDL 12/04/2023 37  0 - 40 mg/dL Final    Triglycerides 12/04/2023 183 (H)  0 - 149 mg/dL Final       Age         Desirable   Borderline High   High     Very High   0 D-90 D    19 - 174         ----         ----        ----  91 D- 9 Y     0 -  74        75 -  99     >/= 100      ----    10-19 Y     0 -  89        90 - 129     >/= 130      ----    20-24 Y     0 - 114       115 - 149     >/= 150      ----         >24 Y     0 - 149       150 - 199    200- 499    >/= 500    Venipuncture immediately after or during the administration of Metamizole may lead to falsely low results. Testing should be performed immediately prior to Metamizole dosing.    Non HDL Cholesterol 12/04/2023 80  0 - 149 mg/dL Final          Age       Desirable   Borderline High   High     Very High     0-19 Y     0 - 119       120 - 144     >/= 145    >/=  160    20-24 Y     0 - 149       150 - 189     >/= 190      ----         >24 Y    30 mg/dL above LDL Cholesterol goal      Albumin, Urine Random 12/04/2023 12.3  Not established mg/L Final    Creatinine, Urine Random 12/04/2023 71.3  20.0 - 370.0 mg/dL Final    Albumin/Creatine Ratio 12/04/2023 17.3  <30.0 ug/mg Creat Final   Office Visit on 11/29/2023   Component Date Value Ref Range Status    Ventricular Rate 11/29/2023 95  BPM Final    Atrial Rate 11/29/2023 95  BPM Final    WA Interval 11/29/2023 160  ms Final    QRS Duration 11/29/2023 84  ms Final    QT Interval 11/29/2023 350  ms Final    QTC Calculation(Bazett) 11/29/2023 439  ms Final    P Axis 11/29/2023 56  degrees Final    R Axis 11/29/2023 64  degrees Final    T Axis 11/29/2023 12  degrees Final    QRS Count 11/29/2023 16  beats Final    Q Onset 11/29/2023 218  ms Final    P Onset 11/29/2023 138  ms Final    P Offset 11/29/2023 185  ms Final    T Offset 11/29/2023 393  ms Final    QTC Fredericia 11/29/2023 407  ms Final   Lab on 11/20/2023   Component Date Value Ref Range Status    WBC 11/20/2023 6.1  4.4 - 11.3 x10*3/uL Final    nRBC 11/20/2023 0.0  0.0 - 0.0 /100 WBCs Final    RBC 11/20/2023 4.50  4.50 - 5.90 x10*6/uL Final    Hemoglobin 11/20/2023 11.5 (L)  13.5 - 17.5 g/dL Final    Hematocrit 11/20/2023 36.7 (L)  41.0 - 52.0 % Final    MCV 11/20/2023 82  80 - 100 fL Final    MCH 11/20/2023 25.6 (L)  26.0 - 34.0 pg Final    MCHC 11/20/2023 31.3 (L)  32.0 - 36.0 g/dL Final    RDW 11/20/2023 16.2 (H)  11.5 - 14.5 % Final    Platelets 11/20/2023 205  150 - 450 x10*3/uL Final    Neutrophils % 11/20/2023 71.0  40.0 - 80.0 % Final    Immature Granulocytes %, Automated 11/20/2023 0.3  0.0 - 0.9 % Final    Immature Granulocyte Count (IG) includes promyelocytes, myelocytes and metamyelocytes but does not include bands. Percent differential counts (%) should be interpreted in the context of the absolute cell counts (cells/UL).    Lymphocytes % 11/20/2023 15.5   13.0 - 44.0 % Final    Monocytes % 11/20/2023 10.4  2.0 - 10.0 % Final    Eosinophils % 11/20/2023 2.3  0.0 - 6.0 % Final    Basophils % 11/20/2023 0.5  0.0 - 2.0 % Final    Neutrophils Absolute 11/20/2023 4.29  1.20 - 7.70 x10*3/uL Final    Percent differential counts (%) should be interpreted in the context of the absolute cell counts (cells/uL).    Immature Granulocytes Absolute, Au* 11/20/2023 0.02  0.00 - 0.70 x10*3/uL Final    Lymphocytes Absolute 11/20/2023 0.94 (L)  1.20 - 4.80 x10*3/uL Final    Monocytes Absolute 11/20/2023 0.63  0.10 - 1.00 x10*3/uL Final    Eosinophils Absolute 11/20/2023 0.14  0.00 - 0.70 x10*3/uL Final    Basophils Absolute 11/20/2023 0.03  0.00 - 0.10 x10*3/uL Final    Glucose 11/20/2023 242 (H)  74 - 99 mg/dL Final    Sodium 11/20/2023 138  136 - 145 mmol/L Final    Potassium 11/20/2023 4.1  3.5 - 5.3 mmol/L Final    Chloride 11/20/2023 102  98 - 107 mmol/L Final    Bicarbonate 11/20/2023 25  21 - 32 mmol/L Final    Anion Gap 11/20/2023 15  10 - 20 mmol/L Final    Urea Nitrogen 11/20/2023 18  6 - 23 mg/dL Final    Creatinine 11/20/2023 1.08  0.50 - 1.30 mg/dL Final    eGFR 11/20/2023 75  >60 mL/min/1.73m*2 Final    Calculations of estimated GFR are performed using the 2021 CKD-EPI Study Refit equation without the race variable for the IDMS-Traceable creatinine methods.  https://jasn.asnjournals.org/content/early/2021/09/22/ASN.7189299911    Calcium 11/20/2023 9.4  8.6 - 10.3 mg/dL Final    Albumin 11/20/2023 4.3  3.4 - 5.0 g/dL Final    Alkaline Phosphatase 11/20/2023 46  33 - 136 U/L Final    Total Protein 11/20/2023 6.7  6.4 - 8.2 g/dL Final    AST 11/20/2023 24  9 - 39 U/L Final    Bilirubin, Total 11/20/2023 0.6  0.0 - 1.2 mg/dL Final    ALT 11/20/2023 20  10 - 52 U/L Final    Patients treated with Sulfasalazine may generate falsely decreased results for ALT.   Infusion on 11/07/2023   Component Date Value Ref Range Status    Carcinoembryonic AG 11/07/2023 <0.5  ug/L Final   Lab  on 11/06/2023   Component Date Value Ref Range Status    Glucose 11/06/2023 222 (H)  74 - 99 mg/dL Final    Sodium 11/06/2023 138  136 - 145 mmol/L Final    Potassium 11/06/2023 4.0  3.5 - 5.3 mmol/L Final    Chloride 11/06/2023 104  98 - 107 mmol/L Final    Bicarbonate 11/06/2023 25  21 - 32 mmol/L Final    Anion Gap 11/06/2023 13  10 - 20 mmol/L Final    Urea Nitrogen 11/06/2023 17  6 - 23 mg/dL Final    Creatinine 11/06/2023 1.04  0.50 - 1.30 mg/dL Final    eGFR 11/06/2023 79  >60 mL/min/1.73m*2 Final    Calculations of estimated GFR are performed using the 2021 CKD-EPI Study Refit equation without the race variable for the IDMS-Traceable creatinine methods.  https://jasn.asnjournals.org/content/early/2021/09/22/ASN.1155482605    Calcium 11/06/2023 9.0  8.6 - 10.3 mg/dL Final    Albumin 11/06/2023 4.1  3.4 - 5.0 g/dL Final    Alkaline Phosphatase 11/06/2023 44  33 - 136 U/L Final    Total Protein 11/06/2023 6.7  6.4 - 8.2 g/dL Final    AST 11/06/2023 22  9 - 39 U/L Final    Bilirubin, Total 11/06/2023 0.5  0.0 - 1.2 mg/dL Final    ALT 11/06/2023 20  10 - 52 U/L Final    Patients treated with Sulfasalazine may generate falsely decreased results for ALT.    WBC 11/06/2023 3.4 (L)  4.4 - 11.3 x10*3/uL Final    nRBC 11/06/2023 0.0  0.0 - 0.0 /100 WBCs Final    RBC 11/06/2023 4.43 (L)  4.50 - 5.90 x10*6/uL Final    Hemoglobin 11/06/2023 11.5 (L)  13.5 - 17.5 g/dL Final    Hematocrit 11/06/2023 35.8 (L)  41.0 - 52.0 % Final    MCV 11/06/2023 81  80 - 100 fL Final    MCH 11/06/2023 26.0  26.0 - 34.0 pg Final    MCHC 11/06/2023 32.1  32.0 - 36.0 g/dL Final    RDW 11/06/2023 15.5 (H)  11.5 - 14.5 % Final    Platelets 11/06/2023 186  150 - 450 x10*3/uL Final    Neutrophils % 11/06/2023 56.7  40.0 - 80.0 % Final    Immature Granulocytes %, Automated 11/06/2023 0.3  0.0 - 0.9 % Final    Immature Granulocyte Count (IG) includes promyelocytes, myelocytes and metamyelocytes but does not include bands. Percent differential  counts (%) should be interpreted in the context of the absolute cell counts (cells/UL).    Lymphocytes % 11/06/2023 21.2  13.0 - 44.0 % Final    Monocytes % 11/06/2023 19.1  2.0 - 10.0 % Final    Eosinophils % 11/06/2023 2.4  0.0 - 6.0 % Final    Basophils % 11/06/2023 0.3  0.0 - 2.0 % Final    Neutrophils Absolute 11/06/2023 1.93  1.20 - 7.70 x10*3/uL Final    Percent differential counts (%) should be interpreted in the context of the absolute cell counts (cells/uL).    Immature Granulocytes Absolute, Au* 11/06/2023 0.01  0.00 - 0.70 x10*3/uL Final    Lymphocytes Absolute 11/06/2023 0.72 (L)  1.20 - 4.80 x10*3/uL Final    Monocytes Absolute 11/06/2023 0.65  0.10 - 1.00 x10*3/uL Final    Eosinophils Absolute 11/06/2023 0.08  0.00 - 0.70 x10*3/uL Final    Basophils Absolute 11/06/2023 0.01  0.00 - 0.10 x10*3/uL Final   Lab on 10/23/2023   Component Date Value Ref Range Status    WBC 10/23/2023 6.6  4.4 - 11.3 x10*3/uL Final    nRBC 10/23/2023 0.0  0.0 - 0.0 /100 WBCs Final    RBC 10/23/2023 4.24 (L)  4.50 - 5.90 x10*6/uL Final    Hemoglobin 10/23/2023 10.9 (L)  13.5 - 17.5 g/dL Final    Hematocrit 10/23/2023 34.7 (L)  41.0 - 52.0 % Final    MCV 10/23/2023 82  80 - 100 fL Final    MCH 10/23/2023 25.7 (L)  26.0 - 34.0 pg Final    MCHC 10/23/2023 31.4 (L)  32.0 - 36.0 g/dL Final    RDW 10/23/2023 14.9 (H)  11.5 - 14.5 % Final    Platelets 10/23/2023 201  150 - 450 x10*3/uL Final    MPV 10/23/2023 8.6  7.5 - 11.5 fL Final    Neutrophils % 10/23/2023 67.3  40.0 - 80.0 % Final    Immature Granulocytes %, Automated 10/23/2023 0.3  0.0 - 0.9 % Final    Immature Granulocyte Count (IG) includes promyelocytes, myelocytes and metamyelocytes but does not include bands. Percent differential counts (%) should be interpreted in the context of the absolute cell counts (cells/UL).    Lymphocytes % 10/23/2023 21.0  13.0 - 44.0 % Final    Monocytes % 10/23/2023 10.0  2.0 - 10.0 % Final    Eosinophils % 10/23/2023 0.8  0.0 - 6.0 % Final     Basophils % 10/23/2023 0.6  0.0 - 2.0 % Final    Neutrophils Absolute 10/23/2023 4.43  1.20 - 7.70 x10*3/uL Final    Percent differential counts (%) should be interpreted in the context of the absolute cell counts (cells/uL).    Immature Granulocytes Absolute, Au* 10/23/2023 0.02  0.00 - 0.70 x10*3/uL Final    Lymphocytes Absolute 10/23/2023 1.38  1.20 - 4.80 x10*3/uL Final    Monocytes Absolute 10/23/2023 0.66  0.10 - 1.00 x10*3/uL Final    Eosinophils Absolute 10/23/2023 0.05  0.00 - 0.70 x10*3/uL Final    Basophils Absolute 10/23/2023 0.04  0.00 - 0.10 x10*3/uL Final    Glucose 10/23/2023 187 (H)  74 - 99 mg/dL Final    Sodium 10/23/2023 138  136 - 145 mmol/L Final    Potassium 10/23/2023 4.1  3.5 - 5.3 mmol/L Final    Chloride 10/23/2023 104  98 - 107 mmol/L Final    Bicarbonate 10/23/2023 23  21 - 32 mmol/L Final    Anion Gap 10/23/2023 15  10 - 20 mmol/L Final    Urea Nitrogen 10/23/2023 22  6 - 23 mg/dL Final    Creatinine 10/23/2023 1.12  0.50 - 1.30 mg/dL Final    eGFR 10/23/2023 72  >60 mL/min/1.73m*2 Final    Calculations of estimated GFR are performed using the 2021 CKD-EPI Study Refit equation without the race variable for the IDMS-Traceable creatinine methods.  https://jasn.asnjournals.org/content/early/2021/09/22/ASN.4033336468    Calcium 10/23/2023 9.2  8.6 - 10.3 mg/dL Final    Albumin 10/23/2023 4.2  3.4 - 5.0 g/dL Final    Alkaline Phosphatase 10/23/2023 45  33 - 136 U/L Final    Total Protein 10/23/2023 6.8  6.4 - 8.2 g/dL Final    AST 10/23/2023 21  9 - 39 U/L Final    Bilirubin, Total 10/23/2023 0.5  0.0 - 1.2 mg/dL Final    ALT 10/23/2023 22  10 - 52 U/L Final    Patients treated with Sulfasalazine may generate falsely decreased results for ALT.   Clinical Support on 10/09/2023   Component Date Value Ref Range Status    WBC 10/09/2023 6.2  4.4 - 11.3 x10*3/uL Final    nRBC 10/09/2023 0.0  0.0 - 0.0 /100 WBCs Final    RBC 10/09/2023 4.33 (L)  4.50 - 5.90 x10*6/uL Final    Hemoglobin  10/09/2023 11.4 (L)  13.5 - 17.5 g/dL Final    Hematocrit 10/09/2023 35.5 (L)  41.0 - 52.0 % Final    MCV 10/09/2023 82  80 - 100 fL Final    MCH 10/09/2023 26.3  26.0 - 34.0 pg Final    MCHC 10/09/2023 32.1  32.0 - 36.0 g/dL Final    RDW 10/09/2023 14.6 (H)  11.5 - 14.5 % Final    Platelets 10/09/2023 358  150 - 450 x10*3/uL Final    MPV 10/09/2023 9.2  7.5 - 11.5 fL Final    Neutrophils % 10/09/2023 65.1  40.0 - 80.0 % Final    Immature Granulocytes %, Automated 10/09/2023 0.2  0.0 - 0.9 % Final    Immature Granulocyte Count (IG) includes promyelocytes, myelocytes and metamyelocytes but does not include bands. Percent differential counts (%) should be interpreted in the context of the absolute cell counts (cells/UL).    Lymphocytes % 10/09/2023 25.1  13.0 - 44.0 % Final    Monocytes % 10/09/2023 8.1  2.0 - 10.0 % Final    Eosinophils % 10/09/2023 1.0  0.0 - 6.0 % Final    Basophils % 10/09/2023 0.5  0.0 - 2.0 % Final    Neutrophils Absolute 10/09/2023 4.03  1.20 - 7.70 x10*3/uL Final    Percent differential counts (%) should be interpreted in the context of the absolute cell counts (cells/uL).    Immature Granulocytes Absolute, Au* 10/09/2023 0.01  0.00 - 0.70 x10*3/uL Final    Lymphocytes Absolute 10/09/2023 1.55  1.20 - 4.80 x10*3/uL Final    Monocytes Absolute 10/09/2023 0.50  0.10 - 1.00 x10*3/uL Final    Eosinophils Absolute 10/09/2023 0.06  0.00 - 0.70 x10*3/uL Final    Basophils Absolute 10/09/2023 0.03  0.00 - 0.10 x10*3/uL Final    Glucose 10/09/2023 182 (H)  74 - 99 mg/dL Final    Sodium 10/09/2023 138  136 - 145 mmol/L Final    Potassium 10/09/2023 4.0  3.5 - 5.3 mmol/L Final    Chloride 10/09/2023 102  98 - 107 mmol/L Final    Bicarbonate 10/09/2023 24  21 - 32 mmol/L Final    Anion Gap 10/09/2023 16  10 - 20 mmol/L Final    Urea Nitrogen 10/09/2023 26 (H)  6 - 23 mg/dL Final    Creatinine 10/09/2023 1.04  0.50 - 1.30 mg/dL Final    eGFR 10/09/2023 79  >60 mL/min/1.73m*2 Final    Calculations of  estimated GFR are performed using the 2021 CKD-EPI Study Refit equation without the race variable for the IDMS-Traceable creatinine methods.  https://jasn.asnjournals.org/content/early/2021/09/22/ASN.3551028335    Calcium 10/09/2023 9.4  8.6 - 10.3 mg/dL Final    Albumin 10/09/2023 4.3  3.4 - 5.0 g/dL Final    Alkaline Phosphatase 10/09/2023 44  33 - 136 U/L Final    Total Protein 10/09/2023 7.1  6.4 - 8.2 g/dL Final    AST 10/09/2023 19  9 - 39 U/L Final    Bilirubin, Total 10/09/2023 0.6  0.0 - 1.2 mg/dL Final    ALT 10/09/2023 20  10 - 52 U/L Final    Patients treated with Sulfasalazine may generate falsely decreased results for ALT.   Hospital Outpatient Visit on 10/04/2023   Component Date Value Ref Range Status    LV A4C EF 10/04/2023 68.2   Final   [facility] Encounter on 09/25/2023   Component Date Value Ref Range Status    WBC 09/25/2023 4.8  4.4 - 11.3 x10E9/L Final    RBC 09/25/2023 4.20 (L)  4.50 - 5.90 x10E12/L Final    Hemoglobin 09/25/2023 11.0 (L)  13.5 - 17.5 g/dL Final    Hematocrit 09/25/2023 35.5 (L)  41.0 - 52.0 % Final    MCV 09/25/2023 85  80 - 100 fL Final    MCHC 09/25/2023 31.0 (L)  32.0 - 36.0 g/dL Final    Platelets 09/25/2023 264  150 - 450 x10E9/L Final    RDW 09/25/2023 14.8 (H)  11.5 - 14.5 % Final    Neutrophils % 09/25/2023 69.3  40.0 - 80.0 % Final    Immature Granulocytes %, Automated 09/25/2023 0.4  0.0 - 0.9 % Final    Comment:  Immature Granulocyte Count (IG) includes promyelocytes,    myelocytes and metamyelocytes but does not include bands.   Percent differential counts (%) should be interpreted in the   context of the absolute cell counts (cells/L).      Lymphocytes % 09/25/2023 19.1  13.0 - 44.0 % Final    Monocytes % 09/25/2023 7.7  2.0 - 10.0 % Final    Eosinophils % 09/25/2023 2.9  0.0 - 6.0 % Final    Basophils % 09/25/2023 0.6  0.0 - 2.0 % Final    Neutrophils Absolute 09/25/2023 3.33  1.20 - 7.70 x10E9/L Final    Lymphocytes Absolute 09/25/2023 0.92 (L)  1.20 - 4.80  x10E9/L Final    Monocytes Absolute 09/25/2023 0.37  0.10 - 1.00 x10E9/L Final    Eosinophils Absolute 09/25/2023 0.14  0.00 - 0.70 x10E9/L Final    Basophils Absolute 09/25/2023 0.03  0.00 - 0.10 x10E9/L Final    Glucose 09/25/2023 187 (H)  74 - 99 mg/dL Final    Sodium 09/25/2023 139  136 - 145 mmol/L Final    Potassium 09/25/2023 4.1  3.5 - 5.3 mmol/L Final    Chloride 09/25/2023 104  98 - 107 mmol/L Final    Bicarbonate 09/25/2023 23  21 - 32 mmol/L Final    Anion Gap 09/25/2023 16  10 - 20 mmol/L Final    Urea Nitrogen 09/25/2023 21  6 - 23 mg/dL Final    Creatinine 09/25/2023 1.15  0.50 - 1.30 mg/dL Final    GFR MALE 09/25/2023 70  >90 mL/min/1.73m2 Final    Comment:  CALCULATIONS OF ESTIMATED GFR ARE PERFORMED   USING THE 2021 CKD-EPI STUDY REFIT EQUATION   WITHOUT THE RACE VARIABLE FOR THE IDMS-TRACEABLE   CREATININE METHODS.    https://jasn.asnjournals.org/content/early/2021/09/22/ASN.[email or web address]      Calcium 09/25/2023 8.9  8.6 - 10.3 mg/dL Final    Albumin 09/25/2023 4.2  3.4 - 5.0 g/dL Final    Alkaline Phosphatase 09/25/2023 41  33 - 136 U/L Final    Total Protein 09/25/2023 7.1  6.4 - 8.2 g/dL Final    AST 09/25/2023 17  9 - 39 U/L Final    Total Bilirubin 09/25/2023 0.5  0.0 - 1.2 mg/dL Final    ALT (SGPT) 09/25/2023 16  10 - 52 U/L Final    Comment:  Patients treated with Sulfasalazine may generate    falsely decreased results for ALT.      Name of Sendout Test 09/25/2023 CANCELED   Final-Edited    Result canceled by the ancillary.    MISCELLANEUOUS TEST RESULT 09/25/2023 CANCELED   Final-Edited    Result canceled by the ancillary.   Orders Only on 09/05/2023   Component Date Value Ref Range Status    Ventricular Rate 09/05/2023 117  BPM Final    Atrial Rate 09/05/2023 108  BPM Final    QRS Duration 09/05/2023 90  ms Final    QT Interval 09/05/2023 440  ms Final    QTC Calculation(Bazett) 09/05/2023 613  ms Final    R Axis 09/05/2023 67  degrees Final    T Axis 09/05/2023 71  degrees Final    QRS  Count 09/05/2023 19  beats Final    Q Onset 09/05/2023 218  ms Final    T Offset 09/05/2023 438  ms Final    QTC Fredericia 09/05/2023 550  ms Final   There may be more visits with results that are not included.       Physical Exam  CONSTITUTIONAL - well nourished, well developed, looks like stated age, in no acute distress, not ill-appearing, and not tired appearing  SKIN - normal skin color and pigmentation, normal skin turgor without rash, lesions, or nodules visualized  HEAD - no trauma, normocephalic  EYES - pupils are equal and reactive to light, extraocular muscles are intact, and normal external exam  ENT - impacted ceruman in left ear, no injection, no signs of infection, uvula midline, normal tongue movement and throat normal, no exudate, nasal passage without discharge and patent  NECK - supple without rigidity, no neck mass was observed,   LUNG - clear to auscultation, no wheezing, no crackles and no rales, good effort  CARDIAC - tachy rate and regular rhythm, no skipped beats, no murmur  ABDOMEN - no organomegaly, soft, nontender, nondistended, normal bowel sounds  EXTREMITIES - no edema, no deformities  NEUROLOGICAL - normal gait, normal balance, normal motor, alert, oriented and no focal signs  PSYCHIATRIC - alert, pleasant and cordial, age-appropriate    Procedure;  Risks, benefits, and options of cerumen removal procedure was discussed. The patient had cerumen removed from the affected ear(s) via water syringe and cerumen loop technique in order to visualize the tympanic membrane. No complications and the tympanic membrane was subsequently visualized.    Assessment/Plan     1. Health Maintenance  Complete history and physical examination was performed  We will do prostate antigen screening, discussed your other blood work results  We will notify of test results once available and make treatment recommendations accordingly.  Your immunizations are up to date.  Continue to eat a well balanced diet as  well as regular exercise.    Medicare Wellness  Complete history and physical examination was performed  We will do prostate antigen screening, discussed your other blood work results  We will notify of test results once available and make treatment recommendations accordingly.  Your immunizations are up to date.  Continue to eat a well balanced diet as well as regular exercise.    2. Adenocarcinoma of appendix   Reviewed ED/hospital course of action and discharge summaries  Continue close management of this with GI/Oncology at upcoming follow-up with them.   If Abdominal pain, nausea, vomiting, or severe constipation returns, return to ED immediately.     3. Hypertension  Continue olmesartan 40 mg daily, doxazosin 2 mg nightly  I would like to continue to monitor and record blood pressures at home   Blood pressure goal should be below 130/80, ideally 120/80     4. Hyperlipidemia and hypertriglyceridemia  Continue taking atorvastatin and fenofibrate daily.  We will send refills to your pharmacy as needed.     5. Type II Diabetes Mellitus  Continue taking metformin, insulin and fenofibrate daily and monitoring your blood sugars regularly.  Please continue to follow with your endocrinologist regularly.     6. Atrial fibrillation  Continue taking apixaban as prescribed.  Please continue to follow with your cardiologist regularly.     7. Nocturia  Continue doxazosin 2 mg as prescribed.   Stable, will continue to monitor.     8. Left Cerumen Impaction  Risks, benefits, and options of cerumen removal procedure was discussed. The patient had cerumen removed from the affected ear via water syringe and cerumen loop technique removed from the ear canal in order to visualize the tympanic membrane. No complications and the tympanic membrane was subsequently visualized.       Please contact the office if you have any questions/concerns, otherwise follow-up in the office in 6 months.

## 2023-12-18 ENCOUNTER — APPOINTMENT (OUTPATIENT)
Dept: HEMATOLOGY/ONCOLOGY | Facility: HOSPITAL | Age: 68
End: 2023-12-18
Payer: MEDICARE

## 2023-12-18 ENCOUNTER — OFFICE VISIT (OUTPATIENT)
Dept: HEMATOLOGY/ONCOLOGY | Facility: HOSPITAL | Age: 68
End: 2023-12-18
Payer: MEDICARE

## 2023-12-18 ENCOUNTER — APPOINTMENT (OUTPATIENT)
Dept: PRIMARY CARE | Facility: CLINIC | Age: 68
End: 2023-12-18
Payer: MEDICARE

## 2023-12-18 ENCOUNTER — LAB (OUTPATIENT)
Dept: HEMATOLOGY/ONCOLOGY | Facility: HOSPITAL | Age: 68
End: 2023-12-18
Payer: MEDICARE

## 2023-12-18 VITALS
SYSTOLIC BLOOD PRESSURE: 132 MMHG | BODY MASS INDEX: 27.95 KG/M2 | HEART RATE: 98 BPM | OXYGEN SATURATION: 96 % | RESPIRATION RATE: 16 BRPM | WEIGHT: 194.8 LBS | DIASTOLIC BLOOD PRESSURE: 72 MMHG | TEMPERATURE: 97.7 F

## 2023-12-18 DIAGNOSIS — Z12.5 PROSTATE CANCER SCREENING: ICD-10-CM

## 2023-12-18 DIAGNOSIS — C77.2 CANCER OF APPENDIX METASTATIC TO INTRA-ABDOMINAL LYMPH NODE (MULTI): Primary | ICD-10-CM

## 2023-12-18 DIAGNOSIS — Z00.00 MEDICARE ANNUAL WELLNESS VISIT, SUBSEQUENT: ICD-10-CM

## 2023-12-18 DIAGNOSIS — C18.1 CANCER OF APPENDIX METASTATIC TO INTRA-ABDOMINAL LYMPH NODE (MULTI): Primary | ICD-10-CM

## 2023-12-18 DIAGNOSIS — B37.0 THRUSH OF MOUTH AND ESOPHAGUS (MULTI): ICD-10-CM

## 2023-12-18 DIAGNOSIS — Z51.11 ENCOUNTER FOR ANTINEOPLASTIC CHEMOTHERAPY: ICD-10-CM

## 2023-12-18 DIAGNOSIS — B37.81 THRUSH OF MOUTH AND ESOPHAGUS (MULTI): ICD-10-CM

## 2023-12-18 DIAGNOSIS — C18.1 CANCER OF APPENDIX METASTATIC TO INTRA-ABDOMINAL LYMPH NODE (MULTI): ICD-10-CM

## 2023-12-18 DIAGNOSIS — C77.2 CANCER OF APPENDIX METASTATIC TO INTRA-ABDOMINAL LYMPH NODE (MULTI): ICD-10-CM

## 2023-12-18 LAB
ALBUMIN SERPL BCP-MCNC: 4.2 G/DL (ref 3.4–5)
ALP SERPL-CCNC: 44 U/L (ref 33–136)
ALT SERPL W P-5'-P-CCNC: 21 U/L (ref 10–52)
ANION GAP SERPL CALC-SCNC: 13 MMOL/L (ref 10–20)
AST SERPL W P-5'-P-CCNC: 24 U/L (ref 9–39)
BASOPHILS # BLD AUTO: 0.03 X10*3/UL (ref 0–0.1)
BASOPHILS NFR BLD AUTO: 0.7 %
BILIRUB SERPL-MCNC: 0.6 MG/DL (ref 0–1.2)
BUN SERPL-MCNC: 19 MG/DL (ref 6–23)
CALCIUM SERPL-MCNC: 9.6 MG/DL (ref 8.6–10.3)
CEA SERPL-MCNC: 1.3 UG/L
CHLORIDE SERPL-SCNC: 105 MMOL/L (ref 98–107)
CO2 SERPL-SCNC: 24 MMOL/L (ref 21–32)
CREAT SERPL-MCNC: 0.94 MG/DL (ref 0.5–1.3)
EOSINOPHIL # BLD AUTO: 0.03 X10*3/UL (ref 0–0.7)
EOSINOPHIL NFR BLD AUTO: 0.7 %
ERYTHROCYTE [DISTWIDTH] IN BLOOD BY AUTOMATED COUNT: 18.2 % (ref 11.5–14.5)
GFR SERPL CREATININE-BSD FRML MDRD: 89 ML/MIN/1.73M*2
GLUCOSE SERPL-MCNC: 154 MG/DL (ref 74–99)
HCT VFR BLD AUTO: 36.7 % (ref 41–52)
HGB BLD-MCNC: 11.5 G/DL (ref 13.5–17.5)
IMM GRANULOCYTES # BLD AUTO: 0.04 X10*3/UL (ref 0–0.7)
IMM GRANULOCYTES NFR BLD AUTO: 0.9 % (ref 0–0.9)
LYMPHOCYTES # BLD AUTO: 1.02 X10*3/UL (ref 1.2–4.8)
LYMPHOCYTES NFR BLD AUTO: 24.2 %
MCH RBC QN AUTO: 26.1 PG (ref 26–34)
MCHC RBC AUTO-ENTMCNC: 31.3 G/DL (ref 32–36)
MCV RBC AUTO: 83 FL (ref 80–100)
MONOCYTES # BLD AUTO: 0.61 X10*3/UL (ref 0.1–1)
MONOCYTES NFR BLD AUTO: 14.5 %
NEUTROPHILS # BLD AUTO: 2.49 X10*3/UL (ref 1.2–7.7)
NEUTROPHILS NFR BLD AUTO: 59 %
NRBC BLD-RTO: 0 /100 WBCS (ref 0–0)
PLATELET # BLD AUTO: 171 X10*3/UL (ref 150–450)
POTASSIUM SERPL-SCNC: 4.1 MMOL/L (ref 3.5–5.3)
PROT SERPL-MCNC: 7 G/DL (ref 6.4–8.2)
PSA SERPL-MCNC: 0.62 NG/ML
RBC # BLD AUTO: 4.41 X10*6/UL (ref 4.5–5.9)
SODIUM SERPL-SCNC: 138 MMOL/L (ref 136–145)
WBC # BLD AUTO: 4.2 X10*3/UL (ref 4.4–11.3)

## 2023-12-18 PROCEDURE — 1159F MED LIST DOCD IN RCRD: CPT | Performed by: STUDENT IN AN ORGANIZED HEALTH CARE EDUCATION/TRAINING PROGRAM

## 2023-12-18 PROCEDURE — 84153 ASSAY OF PSA TOTAL: CPT | Performed by: STUDENT IN AN ORGANIZED HEALTH CARE EDUCATION/TRAINING PROGRAM

## 2023-12-18 PROCEDURE — 96523 IRRIG DRUG DELIVERY DEVICE: CPT

## 2023-12-18 PROCEDURE — 3061F NEG MICROALBUMINURIA REV: CPT | Performed by: STUDENT IN AN ORGANIZED HEALTH CARE EDUCATION/TRAINING PROGRAM

## 2023-12-18 PROCEDURE — 36591 DRAW BLOOD OFF VENOUS DEVICE: CPT

## 2023-12-18 PROCEDURE — 3008F BODY MASS INDEX DOCD: CPT | Performed by: STUDENT IN AN ORGANIZED HEALTH CARE EDUCATION/TRAINING PROGRAM

## 2023-12-18 PROCEDURE — 1160F RVW MEDS BY RX/DR IN RCRD: CPT | Performed by: STUDENT IN AN ORGANIZED HEALTH CARE EDUCATION/TRAINING PROGRAM

## 2023-12-18 PROCEDURE — 1036F TOBACCO NON-USER: CPT | Performed by: STUDENT IN AN ORGANIZED HEALTH CARE EDUCATION/TRAINING PROGRAM

## 2023-12-18 PROCEDURE — 3048F LDL-C <100 MG/DL: CPT | Performed by: STUDENT IN AN ORGANIZED HEALTH CARE EDUCATION/TRAINING PROGRAM

## 2023-12-18 PROCEDURE — 3052F HG A1C>EQUAL 8.0%<EQUAL 9.0%: CPT | Performed by: STUDENT IN AN ORGANIZED HEALTH CARE EDUCATION/TRAINING PROGRAM

## 2023-12-18 PROCEDURE — 99214 OFFICE O/P EST MOD 30 MIN: CPT | Performed by: STUDENT IN AN ORGANIZED HEALTH CARE EDUCATION/TRAINING PROGRAM

## 2023-12-18 PROCEDURE — 3075F SYST BP GE 130 - 139MM HG: CPT | Performed by: STUDENT IN AN ORGANIZED HEALTH CARE EDUCATION/TRAINING PROGRAM

## 2023-12-18 PROCEDURE — 4010F ACE/ARB THERAPY RXD/TAKEN: CPT | Performed by: STUDENT IN AN ORGANIZED HEALTH CARE EDUCATION/TRAINING PROGRAM

## 2023-12-18 PROCEDURE — 3066F NEPHROPATHY DOC TX: CPT | Performed by: STUDENT IN AN ORGANIZED HEALTH CARE EDUCATION/TRAINING PROGRAM

## 2023-12-18 PROCEDURE — 85025 COMPLETE CBC W/AUTO DIFF WBC: CPT

## 2023-12-18 PROCEDURE — 1126F AMNT PAIN NOTED NONE PRSNT: CPT | Performed by: STUDENT IN AN ORGANIZED HEALTH CARE EDUCATION/TRAINING PROGRAM

## 2023-12-18 PROCEDURE — 82378 CARCINOEMBRYONIC ANTIGEN: CPT | Performed by: STUDENT IN AN ORGANIZED HEALTH CARE EDUCATION/TRAINING PROGRAM

## 2023-12-18 PROCEDURE — 2500000004 HC RX 250 GENERAL PHARMACY W/ HCPCS (ALT 636 FOR OP/ED): Performed by: STUDENT IN AN ORGANIZED HEALTH CARE EDUCATION/TRAINING PROGRAM

## 2023-12-18 PROCEDURE — 3078F DIAST BP <80 MM HG: CPT | Performed by: STUDENT IN AN ORGANIZED HEALTH CARE EDUCATION/TRAINING PROGRAM

## 2023-12-18 PROCEDURE — 82247 BILIRUBIN TOTAL: CPT

## 2023-12-18 RX ORDER — PALONOSETRON 0.05 MG/ML
0.25 INJECTION, SOLUTION INTRAVENOUS ONCE
Status: CANCELLED | OUTPATIENT
Start: 2023-12-19

## 2023-12-18 RX ORDER — ALBUTEROL SULFATE 0.83 MG/ML
3 SOLUTION RESPIRATORY (INHALATION) AS NEEDED
Status: CANCELLED | OUTPATIENT
Start: 2023-12-19

## 2023-12-18 RX ORDER — DIPHENHYDRAMINE HYDROCHLORIDE 50 MG/ML
50 INJECTION INTRAMUSCULAR; INTRAVENOUS AS NEEDED
Status: CANCELLED | OUTPATIENT
Start: 2023-12-19

## 2023-12-18 RX ORDER — LORAZEPAM 2 MG/ML
1 INJECTION INTRAMUSCULAR AS NEEDED
Status: CANCELLED | OUTPATIENT
Start: 2023-12-19

## 2023-12-18 RX ORDER — FAMOTIDINE 10 MG/ML
20 INJECTION INTRAVENOUS ONCE AS NEEDED
Status: CANCELLED | OUTPATIENT
Start: 2023-12-19

## 2023-12-18 RX ORDER — HEPARIN SODIUM,PORCINE/PF 10 UNIT/ML
50 SYRINGE (ML) INTRAVENOUS AS NEEDED
Status: CANCELLED | OUTPATIENT
Start: 2023-12-18

## 2023-12-18 RX ORDER — NYSTATIN 100000 [USP'U]/ML
5 SUSPENSION ORAL 4 TIMES DAILY
Qty: 473 ML | Refills: 3 | Status: SHIPPED | OUTPATIENT
Start: 2023-12-18 | End: 2023-12-25

## 2023-12-18 RX ORDER — HEPARIN 100 UNIT/ML
500 SYRINGE INTRAVENOUS AS NEEDED
Status: DISCONTINUED | OUTPATIENT
Start: 2023-12-18 | End: 2023-12-18 | Stop reason: HOSPADM

## 2023-12-18 RX ORDER — PROCHLORPERAZINE MALEATE 10 MG
10 TABLET ORAL EVERY 6 HOURS PRN
Status: CANCELLED | OUTPATIENT
Start: 2023-12-19

## 2023-12-18 RX ORDER — HEPARIN 100 UNIT/ML
500 SYRINGE INTRAVENOUS AS NEEDED
Status: CANCELLED | OUTPATIENT
Start: 2023-12-18

## 2023-12-18 RX ORDER — PROCHLORPERAZINE EDISYLATE 5 MG/ML
10 INJECTION INTRAMUSCULAR; INTRAVENOUS EVERY 6 HOURS PRN
Status: CANCELLED | OUTPATIENT
Start: 2023-12-19

## 2023-12-18 RX ORDER — EPINEPHRINE 0.3 MG/.3ML
0.3 INJECTION SUBCUTANEOUS EVERY 5 MIN PRN
Status: CANCELLED | OUTPATIENT
Start: 2023-12-19

## 2023-12-18 RX ADMIN — HEPARIN 500 UNITS: 100 SYRINGE at 09:38

## 2023-12-18 ASSESSMENT — PAIN SCALES - GENERAL: PAINLEVEL: 0-NO PAIN

## 2023-12-19 ENCOUNTER — INFUSION (OUTPATIENT)
Dept: HEMATOLOGY/ONCOLOGY | Facility: CLINIC | Age: 68
End: 2023-12-19
Payer: MEDICARE

## 2023-12-19 VITALS
WEIGHT: 197.09 LBS | BODY MASS INDEX: 28.28 KG/M2 | OXYGEN SATURATION: 97 % | RESPIRATION RATE: 14 BRPM | SYSTOLIC BLOOD PRESSURE: 124 MMHG | HEART RATE: 88 BPM | TEMPERATURE: 97 F | DIASTOLIC BLOOD PRESSURE: 71 MMHG

## 2023-12-19 DIAGNOSIS — C18.1 CANCER OF APPENDIX METASTATIC TO INTRA-ABDOMINAL LYMPH NODE (MULTI): ICD-10-CM

## 2023-12-19 DIAGNOSIS — C77.2 CANCER OF APPENDIX METASTATIC TO INTRA-ABDOMINAL LYMPH NODE (MULTI): ICD-10-CM

## 2023-12-19 PROCEDURE — 96416 CHEMO PROLONG INFUSE W/PUMP: CPT

## 2023-12-19 PROCEDURE — 2500000004 HC RX 250 GENERAL PHARMACY W/ HCPCS (ALT 636 FOR OP/ED): Mod: JZ | Performed by: STUDENT IN AN ORGANIZED HEALTH CARE EDUCATION/TRAINING PROGRAM

## 2023-12-19 PROCEDURE — 96413 CHEMO IV INFUSION 1 HR: CPT

## 2023-12-19 PROCEDURE — 2500000004 HC RX 250 GENERAL PHARMACY W/ HCPCS (ALT 636 FOR OP/ED): Performed by: STUDENT IN AN ORGANIZED HEALTH CARE EDUCATION/TRAINING PROGRAM

## 2023-12-19 PROCEDURE — 96415 CHEMO IV INFUSION ADDL HR: CPT

## 2023-12-19 PROCEDURE — 96375 TX/PRO/DX INJ NEW DRUG ADDON: CPT | Mod: INF

## 2023-12-19 RX ORDER — PALONOSETRON 0.05 MG/ML
0.25 INJECTION, SOLUTION INTRAVENOUS ONCE
Status: COMPLETED | OUTPATIENT
Start: 2023-12-19 | End: 2023-12-19

## 2023-12-19 RX ORDER — ALBUTEROL SULFATE 0.83 MG/ML
3 SOLUTION RESPIRATORY (INHALATION) AS NEEDED
Status: DISCONTINUED | OUTPATIENT
Start: 2023-12-19 | End: 2023-12-19 | Stop reason: HOSPADM

## 2023-12-19 RX ORDER — HEPARIN 100 UNIT/ML
500 SYRINGE INTRAVENOUS AS NEEDED
Status: DISCONTINUED | OUTPATIENT
Start: 2023-12-19 | End: 2023-12-19 | Stop reason: HOSPADM

## 2023-12-19 RX ORDER — LORAZEPAM 2 MG/ML
1 INJECTION INTRAMUSCULAR AS NEEDED
Status: DISCONTINUED | OUTPATIENT
Start: 2023-12-19 | End: 2023-12-19 | Stop reason: HOSPADM

## 2023-12-19 RX ORDER — DIPHENHYDRAMINE HYDROCHLORIDE 50 MG/ML
50 INJECTION INTRAMUSCULAR; INTRAVENOUS AS NEEDED
Status: DISCONTINUED | OUTPATIENT
Start: 2023-12-19 | End: 2023-12-19 | Stop reason: HOSPADM

## 2023-12-19 RX ORDER — HEPARIN SODIUM,PORCINE/PF 10 UNIT/ML
50 SYRINGE (ML) INTRAVENOUS AS NEEDED
Status: CANCELLED | OUTPATIENT
Start: 2023-12-19

## 2023-12-19 RX ORDER — PROCHLORPERAZINE MALEATE 10 MG
10 TABLET ORAL EVERY 6 HOURS PRN
Status: DISCONTINUED | OUTPATIENT
Start: 2023-12-19 | End: 2023-12-19 | Stop reason: HOSPADM

## 2023-12-19 RX ORDER — EPINEPHRINE 0.3 MG/.3ML
0.3 INJECTION SUBCUTANEOUS EVERY 5 MIN PRN
Status: DISCONTINUED | OUTPATIENT
Start: 2023-12-19 | End: 2023-12-19 | Stop reason: HOSPADM

## 2023-12-19 RX ORDER — PROCHLORPERAZINE EDISYLATE 5 MG/ML
10 INJECTION INTRAMUSCULAR; INTRAVENOUS EVERY 6 HOURS PRN
Status: DISCONTINUED | OUTPATIENT
Start: 2023-12-19 | End: 2023-12-19 | Stop reason: HOSPADM

## 2023-12-19 RX ORDER — FAMOTIDINE 10 MG/ML
20 INJECTION INTRAVENOUS ONCE AS NEEDED
Status: DISCONTINUED | OUTPATIENT
Start: 2023-12-19 | End: 2023-12-19 | Stop reason: HOSPADM

## 2023-12-19 RX ORDER — HEPARIN 100 UNIT/ML
500 SYRINGE INTRAVENOUS AS NEEDED
Status: CANCELLED | OUTPATIENT
Start: 2023-12-19

## 2023-12-19 RX ADMIN — FLUOROURACIL 5100 MG: 50 INJECTION, SOLUTION INTRAVENOUS at 11:50

## 2023-12-19 RX ADMIN — PALONOSETRON HYDROCHLORIDE 250 MCG: 0.25 INJECTION INTRAVENOUS at 09:07

## 2023-12-19 RX ADMIN — OXALIPLATIN 145 MG: 5 INJECTION, SOLUTION, CONCENTRATE INTRAVENOUS at 09:28

## 2023-12-19 RX ADMIN — DEXAMETHASONE SODIUM PHOSPHATE 12 MG: 10 INJECTION, SOLUTION INTRAMUSCULAR; INTRAVENOUS at 09:09

## 2023-12-19 ASSESSMENT — PAIN SCALES - GENERAL: PAINLEVEL: 0-NO PAIN

## 2023-12-21 ENCOUNTER — INFUSION (OUTPATIENT)
Dept: HEMATOLOGY/ONCOLOGY | Facility: CLINIC | Age: 68
End: 2023-12-21
Payer: MEDICARE

## 2023-12-21 VITALS
HEART RATE: 83 BPM | DIASTOLIC BLOOD PRESSURE: 69 MMHG | OXYGEN SATURATION: 98 % | SYSTOLIC BLOOD PRESSURE: 130 MMHG | RESPIRATION RATE: 18 BRPM | TEMPERATURE: 97 F

## 2023-12-21 DIAGNOSIS — C77.2 CANCER OF APPENDIX METASTATIC TO INTRA-ABDOMINAL LYMPH NODE (MULTI): ICD-10-CM

## 2023-12-21 DIAGNOSIS — C18.1 CANCER OF APPENDIX METASTATIC TO INTRA-ABDOMINAL LYMPH NODE (MULTI): ICD-10-CM

## 2023-12-21 PROCEDURE — 2500000004 HC RX 250 GENERAL PHARMACY W/ HCPCS (ALT 636 FOR OP/ED): Performed by: STUDENT IN AN ORGANIZED HEALTH CARE EDUCATION/TRAINING PROGRAM

## 2023-12-21 PROCEDURE — 96523 IRRIG DRUG DELIVERY DEVICE: CPT

## 2023-12-21 RX ORDER — HEPARIN 100 UNIT/ML
500 SYRINGE INTRAVENOUS AS NEEDED
Status: DISCONTINUED | OUTPATIENT
Start: 2023-12-21 | End: 2023-12-21 | Stop reason: HOSPADM

## 2023-12-21 RX ORDER — HEPARIN 100 UNIT/ML
500 SYRINGE INTRAVENOUS AS NEEDED
Status: CANCELLED | OUTPATIENT
Start: 2023-12-21

## 2023-12-21 RX ORDER — HEPARIN SODIUM,PORCINE/PF 10 UNIT/ML
50 SYRINGE (ML) INTRAVENOUS AS NEEDED
Status: CANCELLED | OUTPATIENT
Start: 2023-12-21

## 2023-12-21 RX ORDER — HEPARIN SODIUM,PORCINE/PF 10 UNIT/ML
50 SYRINGE (ML) INTRAVENOUS AS NEEDED
Status: DISCONTINUED | OUTPATIENT
Start: 2023-12-21 | End: 2023-12-21 | Stop reason: HOSPADM

## 2023-12-21 RX ADMIN — Medication 500 UNITS: at 10:54

## 2023-12-21 ASSESSMENT — PAIN SCALES - GENERAL: PAINLEVEL: 0-NO PAIN

## 2023-12-31 PROBLEM — Z51.11 ENCOUNTER FOR ANTINEOPLASTIC CHEMOTHERAPY: Status: ACTIVE | Noted: 2023-12-31

## 2023-12-31 NOTE — PROGRESS NOTES
Cancer History:  DIAGNOSIS: Appendiceal adenocarcinoma    STAGING: pT4a pN1a Mx    CURRENT SITES OF DISEASE:    MOLECULAR/GENOMICS:  MMR-intact    ctDNA Signatera:  8/14/23: (0) negative  9/25/23: (0) negative  11/6/23: (0) negative    TUMOR MARKER:  5/15/23 CEA: <0.5  8/14/23 CEA: <0.5  11/7/23 CEA: <0.5  12/18/23 CEA: 1.3    CURRENT THERAPY:  Adjuvant FOLFOX every 2 weeks x 12 cycles started on 8/29/23; Dose reduction of Oxaliplatin with C4 due to lasting cold sensitivity. Dose reduced Oxaliplatin with C7 due to increasing neuropathy    PREVIOUS THERAPY:  6/20/23: S/p R hemicolectomy    ONCOLOGIC ISSUES:    PROVIDERS:  CRS: Jazmin Faulkner    HISTORY:   4/2023: presented with R inguinal pain. Thought to have a hernia.  4/17/23: CT abdomen pelvis showed indeterminate masslike lesion at the cecum posteriorly at the expected level of the appendix measuring 3 x 4 cm in dimension.  Also within the left pelvis near the origin of the left inguinal canal there is an indeterminate cavitary mass measuring 2.5 x 2 cm  5/5/23: Underwent colonoscopy.  Report not available.  Pathology of cecal/appendiceal orifice mass biopsy showed poorly differentiated adenocarcinoma with signet ring cell differentiation  5/17/23: CT chest abdomen pelvis showed no evidence of metastatic disease, again noted soft tissue mass near the base of the cecum measuring 4.2 x 2.8 cm, compatible with reported history of appendiceal carcinoma  6/20/23: Underwent right hemicolectomy.  Final pathology showed appendiceal invasive moderately differentiated mucinous adenocarcinoma measuring 5 cm.  Tumor invades the visceral peritoneum.  No lymphovascular or perineural invasion. 1/44 LNs involved.  MMR protein expression intact    PMH: HTN, Gilbert's disease, HLD, DM  SH: , no tobacco, EtOH, illicits  FH: Sister and nephew with Rodriguez syndrome.      Subjective    Neuropathy is better after reducing the Oxaliplatin again last cycle  More taste problems  Had  more energy this Monday. Bounced back faster  Feels lungs are improving    No fevers, chills, chest pain, shortness of breath, abdominal pain, nausea, vomiting, diarrhea, or rashes.    ROS as above. Remainder of 10-point review of systems elicited and negative.     Objective:   BSA: 2.09 meters squared  /72 (BP Location: Left arm, Patient Position: Sitting, BP Cuff Size: Large adult)   Pulse 98   Temp 36.5 °C (97.7 °F)   Resp 16   Wt 88.4 kg (194 lb 12.8 oz)   SpO2 96%   BMI 27.95 kg/m²      Physical Exam  Gen: A&O, NAD  Head: Normocephalic, atraumatic  Eyes: no scleral icterus  ENT: mucous membranes moist, no oropharyngeal lesions, + thrush  Resp: Lungs CTAB  Cardiac: Tachycardiac, regular rhythm, no murmurs appreciated  Abdomen: Soft, nondistended, nontender, +BS  Neuro: CNII-XII grossly intact  Psych: appropriate mood & affect  Skin: warm, dry, no apparent rashes     ECOG Performance Status: 0     Assessment/Plan   Mr. 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 Faulkner.     He presents today for discussion of adjuvant chemotherapy. I personally reviewed the images from the recent CT, which show no evidence of metastatic disease.  I reviewed the results of his pathology synoptic report, which show pT4a pN1a, Stage III cancer.     I discussed with him and his wife at length that given the stage of his appendiceal cancer, I recommend systemic chemotherapy.  The options are for FOLFOX versus capecitabine plus oxaliplatin.  Given the high risk nature of the diagnosis, my preference is for 6 months of adjuvant chemotherapy, and FOLFOX is better tolerated for this duration.    I discussed the risks and benefits and side effect profile of FOLFOX chemotherapy, and he agrees to proceed.    After 1st cycle of FOLFOX, found to have extensive PE throughout B/L lungs including saddle embolus of main pulmonary artery. On 9/5/23 he had aspiration thrombectomy  and was placed on Apixaban. Discussed admission with Dr. Webb. Ok to proceed with chemotherapy as long as patient is feeling well.     10/9/23: After 2nd cycle, he experienced fatigue, cold sensitivity, nausea and diarrhea.    10/23/23: Having more cold and now light sensitivity. Will decrease dose of Oxaliplatin.     11/20/23: Improved fatigue. Still with taste and cold sensitivity, which is stable.   12/18/23: Improving energy, taste is worse, + thrush    Plan:  Stage III appendiceal cancer  - Plan for 6mo goal of adjuvant chemotherapy with FOLFOX  - Proceed with C8 FOLFOX, continue dose reduction oxaliplatin to 68mg/m2 d/t neuropathy  - RTC 2 weeks for C9    Oral thrush  - worsening dysgeusia  - will start nystatin    Pulmonary Embolism including saddle embolism  - continue on Apixaban  - has follow up with vascular who will manage apixaban

## 2024-01-02 ENCOUNTER — LAB (OUTPATIENT)
Dept: HEMATOLOGY/ONCOLOGY | Facility: HOSPITAL | Age: 69
End: 2024-01-02
Payer: MEDICARE

## 2024-01-02 ENCOUNTER — OFFICE VISIT (OUTPATIENT)
Dept: HEMATOLOGY/ONCOLOGY | Facility: HOSPITAL | Age: 69
End: 2024-01-02
Payer: MEDICARE

## 2024-01-02 ENCOUNTER — INFUSION (OUTPATIENT)
Dept: HEMATOLOGY/ONCOLOGY | Facility: HOSPITAL | Age: 69
End: 2024-01-02
Payer: MEDICARE

## 2024-01-02 VITALS
SYSTOLIC BLOOD PRESSURE: 156 MMHG | WEIGHT: 195.77 LBS | BODY MASS INDEX: 28.09 KG/M2 | RESPIRATION RATE: 16 BRPM | DIASTOLIC BLOOD PRESSURE: 79 MMHG | OXYGEN SATURATION: 99 % | TEMPERATURE: 98.2 F | HEART RATE: 68 BPM

## 2024-01-02 DIAGNOSIS — C77.2 CANCER OF APPENDIX METASTATIC TO INTRA-ABDOMINAL LYMPH NODE (MULTI): ICD-10-CM

## 2024-01-02 DIAGNOSIS — B37.0 THRUSH OF MOUTH AND ESOPHAGUS (MULTI): ICD-10-CM

## 2024-01-02 DIAGNOSIS — C18.1 CANCER OF APPENDIX METASTATIC TO INTRA-ABDOMINAL LYMPH NODE (MULTI): ICD-10-CM

## 2024-01-02 DIAGNOSIS — Z51.11 ENCOUNTER FOR ANTINEOPLASTIC CHEMOTHERAPY: ICD-10-CM

## 2024-01-02 DIAGNOSIS — C77.2 CANCER OF APPENDIX METASTATIC TO INTRA-ABDOMINAL LYMPH NODE (MULTI): Primary | ICD-10-CM

## 2024-01-02 DIAGNOSIS — B37.81 THRUSH OF MOUTH AND ESOPHAGUS (MULTI): ICD-10-CM

## 2024-01-02 DIAGNOSIS — C18.1 CANCER OF APPENDIX METASTATIC TO INTRA-ABDOMINAL LYMPH NODE (MULTI): Primary | ICD-10-CM

## 2024-01-02 LAB
ALBUMIN SERPL BCP-MCNC: 4.4 G/DL (ref 3.4–5)
ALP SERPL-CCNC: 47 U/L (ref 33–136)
ALT SERPL W P-5'-P-CCNC: 21 U/L (ref 10–52)
ANION GAP SERPL CALC-SCNC: 13 MMOL/L (ref 10–20)
AST SERPL W P-5'-P-CCNC: 27 U/L (ref 9–39)
BASOPHILS # BLD AUTO: 0.03 X10*3/UL (ref 0–0.1)
BASOPHILS NFR BLD AUTO: 0.6 %
BILIRUB SERPL-MCNC: 0.6 MG/DL (ref 0–1.2)
BUN SERPL-MCNC: 20 MG/DL (ref 6–23)
CALCIUM SERPL-MCNC: 9.6 MG/DL (ref 8.6–10.3)
CHLORIDE SERPL-SCNC: 105 MMOL/L (ref 98–107)
CO2 SERPL-SCNC: 26 MMOL/L (ref 21–32)
CREAT SERPL-MCNC: 1.03 MG/DL (ref 0.5–1.3)
EOSINOPHIL # BLD AUTO: 0.04 X10*3/UL (ref 0–0.7)
EOSINOPHIL NFR BLD AUTO: 0.8 %
ERYTHROCYTE [DISTWIDTH] IN BLOOD BY AUTOMATED COUNT: 18.9 % (ref 11.5–14.5)
GFR SERPL CREATININE-BSD FRML MDRD: 80 ML/MIN/1.73M*2
GLUCOSE SERPL-MCNC: 148 MG/DL (ref 74–99)
HCT VFR BLD AUTO: 37.6 % (ref 41–52)
HGB BLD-MCNC: 11.9 G/DL (ref 13.5–17.5)
IMM GRANULOCYTES # BLD AUTO: 0.02 X10*3/UL (ref 0–0.7)
IMM GRANULOCYTES NFR BLD AUTO: 0.4 % (ref 0–0.9)
LYMPHOCYTES # BLD AUTO: 1.17 X10*3/UL (ref 1.2–4.8)
LYMPHOCYTES NFR BLD AUTO: 23.4 %
MCH RBC QN AUTO: 26.5 PG (ref 26–34)
MCHC RBC AUTO-ENTMCNC: 31.6 G/DL (ref 32–36)
MCV RBC AUTO: 84 FL (ref 80–100)
MONOCYTES # BLD AUTO: 0.71 X10*3/UL (ref 0.1–1)
MONOCYTES NFR BLD AUTO: 14.2 %
NEUTROPHILS # BLD AUTO: 3.03 X10*3/UL (ref 1.2–7.7)
NEUTROPHILS NFR BLD AUTO: 60.6 %
NRBC BLD-RTO: 0 /100 WBCS (ref 0–0)
PLATELET # BLD AUTO: 193 X10*3/UL (ref 150–450)
POTASSIUM SERPL-SCNC: 4.3 MMOL/L (ref 3.5–5.3)
PROT SERPL-MCNC: 7.2 G/DL (ref 6.4–8.2)
RBC # BLD AUTO: 4.49 X10*6/UL (ref 4.5–5.9)
SODIUM SERPL-SCNC: 140 MMOL/L (ref 136–145)
WBC # BLD AUTO: 5 X10*3/UL (ref 4.4–11.3)

## 2024-01-02 PROCEDURE — 2500000004 HC RX 250 GENERAL PHARMACY W/ HCPCS (ALT 636 FOR OP/ED): Performed by: STUDENT IN AN ORGANIZED HEALTH CARE EDUCATION/TRAINING PROGRAM

## 2024-01-02 PROCEDURE — 2500000004 HC RX 250 GENERAL PHARMACY W/ HCPCS (ALT 636 FOR OP/ED): Mod: JZ | Performed by: STUDENT IN AN ORGANIZED HEALTH CARE EDUCATION/TRAINING PROGRAM

## 2024-01-02 PROCEDURE — 3078F DIAST BP <80 MM HG: CPT | Performed by: STUDENT IN AN ORGANIZED HEALTH CARE EDUCATION/TRAINING PROGRAM

## 2024-01-02 PROCEDURE — 96415 CHEMO IV INFUSION ADDL HR: CPT

## 2024-01-02 PROCEDURE — 4010F ACE/ARB THERAPY RXD/TAKEN: CPT | Performed by: STUDENT IN AN ORGANIZED HEALTH CARE EDUCATION/TRAINING PROGRAM

## 2024-01-02 PROCEDURE — 96375 TX/PRO/DX INJ NEW DRUG ADDON: CPT | Mod: INF

## 2024-01-02 PROCEDURE — 1159F MED LIST DOCD IN RCRD: CPT | Performed by: STUDENT IN AN ORGANIZED HEALTH CARE EDUCATION/TRAINING PROGRAM

## 2024-01-02 PROCEDURE — 36591 DRAW BLOOD OFF VENOUS DEVICE: CPT

## 2024-01-02 PROCEDURE — 84075 ASSAY ALKALINE PHOSPHATASE: CPT

## 2024-01-02 PROCEDURE — 96374 THER/PROPH/DIAG INJ IV PUSH: CPT | Mod: INF

## 2024-01-02 PROCEDURE — 96413 CHEMO IV INFUSION 1 HR: CPT

## 2024-01-02 PROCEDURE — 96416 CHEMO PROLONG INFUSE W/PUMP: CPT

## 2024-01-02 PROCEDURE — 99214 OFFICE O/P EST MOD 30 MIN: CPT | Performed by: STUDENT IN AN ORGANIZED HEALTH CARE EDUCATION/TRAINING PROGRAM

## 2024-01-02 PROCEDURE — 3008F BODY MASS INDEX DOCD: CPT | Performed by: STUDENT IN AN ORGANIZED HEALTH CARE EDUCATION/TRAINING PROGRAM

## 2024-01-02 PROCEDURE — 1126F AMNT PAIN NOTED NONE PRSNT: CPT | Performed by: STUDENT IN AN ORGANIZED HEALTH CARE EDUCATION/TRAINING PROGRAM

## 2024-01-02 PROCEDURE — 1036F TOBACCO NON-USER: CPT | Performed by: STUDENT IN AN ORGANIZED HEALTH CARE EDUCATION/TRAINING PROGRAM

## 2024-01-02 PROCEDURE — 96523 IRRIG DRUG DELIVERY DEVICE: CPT

## 2024-01-02 PROCEDURE — 82378 CARCINOEMBRYONIC ANTIGEN: CPT | Performed by: STUDENT IN AN ORGANIZED HEALTH CARE EDUCATION/TRAINING PROGRAM

## 2024-01-02 PROCEDURE — 3077F SYST BP >= 140 MM HG: CPT | Performed by: STUDENT IN AN ORGANIZED HEALTH CARE EDUCATION/TRAINING PROGRAM

## 2024-01-02 PROCEDURE — 85025 COMPLETE CBC W/AUTO DIFF WBC: CPT

## 2024-01-02 RX ORDER — HEPARIN SODIUM,PORCINE/PF 10 UNIT/ML
50 SYRINGE (ML) INTRAVENOUS AS NEEDED
Status: CANCELLED | OUTPATIENT
Start: 2024-01-02

## 2024-01-02 RX ORDER — PROCHLORPERAZINE MALEATE 10 MG
10 TABLET ORAL EVERY 6 HOURS PRN
Status: CANCELLED | OUTPATIENT
Start: 2024-01-02

## 2024-01-02 RX ORDER — FAMOTIDINE 10 MG/ML
20 INJECTION INTRAVENOUS ONCE AS NEEDED
Status: DISCONTINUED | OUTPATIENT
Start: 2024-01-02 | End: 2024-01-02 | Stop reason: HOSPADM

## 2024-01-02 RX ORDER — LORAZEPAM 2 MG/ML
1 INJECTION INTRAMUSCULAR AS NEEDED
Status: CANCELLED | OUTPATIENT
Start: 2024-01-02

## 2024-01-02 RX ORDER — HEPARIN 100 UNIT/ML
500 SYRINGE INTRAVENOUS AS NEEDED
Status: CANCELLED | OUTPATIENT
Start: 2024-01-02

## 2024-01-02 RX ORDER — DIPHENHYDRAMINE HYDROCHLORIDE 50 MG/ML
50 INJECTION INTRAMUSCULAR; INTRAVENOUS AS NEEDED
Status: CANCELLED | OUTPATIENT
Start: 2024-01-02

## 2024-01-02 RX ORDER — EPINEPHRINE 0.3 MG/.3ML
0.3 INJECTION SUBCUTANEOUS EVERY 5 MIN PRN
Status: DISCONTINUED | OUTPATIENT
Start: 2024-01-02 | End: 2024-01-02 | Stop reason: HOSPADM

## 2024-01-02 RX ORDER — DIPHENHYDRAMINE HYDROCHLORIDE 50 MG/ML
50 INJECTION INTRAMUSCULAR; INTRAVENOUS AS NEEDED
Status: DISCONTINUED | OUTPATIENT
Start: 2024-01-02 | End: 2024-01-02 | Stop reason: HOSPADM

## 2024-01-02 RX ORDER — ALBUTEROL SULFATE 0.83 MG/ML
3 SOLUTION RESPIRATORY (INHALATION) AS NEEDED
Status: CANCELLED | OUTPATIENT
Start: 2024-01-02

## 2024-01-02 RX ORDER — PALONOSETRON 0.05 MG/ML
0.25 INJECTION, SOLUTION INTRAVENOUS ONCE
Status: CANCELLED | OUTPATIENT
Start: 2024-01-02

## 2024-01-02 RX ORDER — PROCHLORPERAZINE MALEATE 10 MG
10 TABLET ORAL EVERY 6 HOURS PRN
Status: DISCONTINUED | OUTPATIENT
Start: 2024-01-02 | End: 2024-01-02 | Stop reason: HOSPADM

## 2024-01-02 RX ORDER — EPINEPHRINE 0.3 MG/.3ML
0.3 INJECTION SUBCUTANEOUS EVERY 5 MIN PRN
Status: CANCELLED | OUTPATIENT
Start: 2024-01-02

## 2024-01-02 RX ORDER — LORAZEPAM 2 MG/ML
1 INJECTION INTRAMUSCULAR AS NEEDED
Status: DISCONTINUED | OUTPATIENT
Start: 2024-01-02 | End: 2024-01-02 | Stop reason: HOSPADM

## 2024-01-02 RX ORDER — FAMOTIDINE 10 MG/ML
20 INJECTION INTRAVENOUS ONCE AS NEEDED
Status: CANCELLED | OUTPATIENT
Start: 2024-01-02

## 2024-01-02 RX ORDER — PALONOSETRON 0.05 MG/ML
0.25 INJECTION, SOLUTION INTRAVENOUS ONCE
Status: COMPLETED | OUTPATIENT
Start: 2024-01-02 | End: 2024-01-02

## 2024-01-02 RX ORDER — PROCHLORPERAZINE EDISYLATE 5 MG/ML
10 INJECTION INTRAMUSCULAR; INTRAVENOUS EVERY 6 HOURS PRN
Status: CANCELLED | OUTPATIENT
Start: 2024-01-02

## 2024-01-02 RX ORDER — PROCHLORPERAZINE EDISYLATE 5 MG/ML
10 INJECTION INTRAMUSCULAR; INTRAVENOUS EVERY 6 HOURS PRN
Status: DISCONTINUED | OUTPATIENT
Start: 2024-01-02 | End: 2024-01-02 | Stop reason: HOSPADM

## 2024-01-02 RX ORDER — ALBUTEROL SULFATE 0.83 MG/ML
3 SOLUTION RESPIRATORY (INHALATION) AS NEEDED
Status: DISCONTINUED | OUTPATIENT
Start: 2024-01-02 | End: 2024-01-02 | Stop reason: HOSPADM

## 2024-01-02 RX ADMIN — OXALIPLATIN 145 MG: 5 INJECTION, SOLUTION INTRAVENOUS at 16:01

## 2024-01-02 RX ADMIN — PALONOSETRON 250 MCG: 0.05 INJECTION, SOLUTION INTRAVENOUS at 15:36

## 2024-01-02 RX ADMIN — DEXAMETHASONE SODIUM PHOSPHATE 12 MG: 10 INJECTION, SOLUTION INTRAMUSCULAR; INTRAVENOUS at 15:36

## 2024-01-02 RX ADMIN — FLUOROURACIL 5100 MG: 50 INJECTION, SOLUTION INTRAVENOUS at 18:20

## 2024-01-02 ASSESSMENT — ENCOUNTER SYMPTOMS
DEPRESSION: 0
LOSS OF SENSATION IN FEET: 0
OCCASIONAL FEELINGS OF UNSTEADINESS: 0

## 2024-01-02 ASSESSMENT — PAIN SCALES - GENERAL: PAINLEVEL: 0-NO PAIN

## 2024-01-03 PROBLEM — R10.32 LEFT GROIN PAIN: Status: RESOLVED | Noted: 2023-11-29 | Resolved: 2024-01-03

## 2024-01-03 PROBLEM — I26.99 PULMONARY EMBOLUS (MULTI): Status: ACTIVE | Noted: 2024-01-03

## 2024-01-03 PROBLEM — R55 SYNCOPE AND COLLAPSE: Status: ACTIVE | Noted: 2023-09-05

## 2024-01-03 PROBLEM — D64.9 ANEMIA: Status: ACTIVE | Noted: 2024-01-03

## 2024-01-03 PROBLEM — E66.9 OBESITY, UNSPECIFIED: Status: ACTIVE | Noted: 2023-06-06

## 2024-01-03 PROBLEM — S63.509A SPRAIN OF WRIST: Status: ACTIVE | Noted: 2024-01-03

## 2024-01-03 PROBLEM — E80.4 GILBERT SYNDROME: Status: RESOLVED | Noted: 2023-03-08 | Resolved: 2024-01-03

## 2024-01-03 PROBLEM — R20.0 NUMBNESS OF HAND: Status: ACTIVE | Noted: 2024-01-03

## 2024-01-03 PROBLEM — T50.905A ADVERSE EFFECT OF DRUG: Status: ACTIVE | Noted: 2024-01-03

## 2024-01-03 PROBLEM — I48.0 PAROXYSMAL ATRIAL FIBRILLATION (MULTI): Status: RESOLVED | Noted: 2023-03-08 | Resolved: 2024-01-03

## 2024-01-03 PROBLEM — I82.452: Status: ACTIVE | Noted: 2024-01-03

## 2024-01-03 PROBLEM — I25.10 ATHEROSCLEROTIC HEART DISEASE OF NATIVE CORONARY ARTERY WITHOUT ANGINA PECTORIS: Status: ACTIVE | Noted: 2022-12-22

## 2024-01-03 PROBLEM — I82.462: Status: ACTIVE | Noted: 2023-09-07

## 2024-01-03 PROBLEM — I82.409 DEEP VEIN THROMBOSIS (DVT) OF LOWER EXTREMITY (MULTI): Status: ACTIVE | Noted: 2023-09-07

## 2024-01-03 PROBLEM — J01.90 ACUTE SINUSITIS: Status: RESOLVED | Noted: 2023-03-08 | Resolved: 2024-01-03

## 2024-01-03 PROBLEM — C80.1 ADENOCARCINOMA (MULTI): Status: ACTIVE | Noted: 2023-07-18

## 2024-01-03 PROBLEM — C78.6 SECONDARY MALIGNANT NEOPLASM OF RETROPERITONEUM AND PERITONEUM (MULTI): Status: ACTIVE | Noted: 2023-06-22

## 2024-01-03 PROBLEM — F17.200 NICOTINE DEPENDENCE: Status: ACTIVE | Noted: 2023-06-22

## 2024-01-03 PROBLEM — C77.2 SECONDARY AND UNSPECIFIED MALIGNANT NEOPLASM OF INTRA-ABDOMINAL LYMPH NODES (MULTI): Status: ACTIVE | Noted: 2023-06-22

## 2024-01-03 PROBLEM — R00.2 PALPITATIONS: Status: ACTIVE | Noted: 2023-09-05

## 2024-01-03 PROBLEM — B37.81 CANDIDIASIS OF MOUTH AND ESOPHAGUS (MULTI): Status: ACTIVE | Noted: 2023-12-18

## 2024-01-03 PROBLEM — A08.4 VIRAL GASTROENTERITIS: Status: ACTIVE | Noted: 2023-08-02

## 2024-01-03 PROBLEM — M54.50 LOW BACK PAIN: Status: ACTIVE | Noted: 2024-01-03

## 2024-01-03 PROBLEM — G56.21 LESION OF ULNAR NERVE, RIGHT UPPER LIMB: Status: ACTIVE | Noted: 2022-12-22

## 2024-01-03 PROBLEM — R60.0 EDEMA OF BOTH LOWER EXTREMITIES: Status: ACTIVE | Noted: 2024-01-03

## 2024-01-03 PROBLEM — R56.9 UNSPECIFIED CONVULSIONS (MULTI): Status: ACTIVE | Noted: 2023-09-05

## 2024-01-03 PROBLEM — N18.9 CHRONIC KIDNEY DISEASE: Status: ACTIVE | Noted: 2022-12-22

## 2024-01-03 PROBLEM — K56.609 BOWEL OBSTRUCTION (MULTI): Status: RESOLVED | Noted: 2023-11-29 | Resolved: 2024-01-03

## 2024-01-03 PROBLEM — K76.0 FATTY (CHANGE OF) LIVER, NOT ELSEWHERE CLASSIFIED: Status: ACTIVE | Noted: 2023-05-17

## 2024-01-03 PROBLEM — R05.9 COUGH: Status: ACTIVE | Noted: 2024-01-03

## 2024-01-03 PROBLEM — M79.673 PAIN OF FOOT: Status: ACTIVE | Noted: 2024-01-03

## 2024-01-03 PROBLEM — G56.21 ULNAR NEUROPATHY AT ELBOW, RIGHT: Status: RESOLVED | Noted: 2023-03-08 | Resolved: 2024-01-03

## 2024-01-03 PROBLEM — I48.20 CHRONIC ATRIAL FIBRILLATION, UNSPECIFIED (MULTI): Status: ACTIVE | Noted: 2023-06-22

## 2024-01-03 PROBLEM — R07.9 CHEST PAIN: Status: ACTIVE | Noted: 2023-10-04

## 2024-01-03 PROBLEM — H61.23 BILATERAL IMPACTED CERUMEN: Status: ACTIVE | Noted: 2024-01-03

## 2024-01-03 PROBLEM — G56.01 CARPAL TUNNEL SYNDROME OF RIGHT WRIST: Status: RESOLVED | Noted: 2023-03-08 | Resolved: 2024-01-03

## 2024-01-03 PROBLEM — R06.02 SHORTNESS OF BREATH: Status: ACTIVE | Noted: 2023-10-04

## 2024-01-03 PROBLEM — I49.9 IRREGULAR HEART RHYTHM: Status: ACTIVE | Noted: 2023-03-08

## 2024-01-03 PROBLEM — R09.81 CONGESTION OF PARANASAL SINUS: Status: ACTIVE | Noted: 2024-01-03

## 2024-01-03 PROBLEM — M79.89 SWELLING OF LOWER EXTREMITY: Status: ACTIVE | Noted: 2023-09-05

## 2024-01-03 PROBLEM — G56.01 CARPAL TUNNEL SYNDROME, RIGHT UPPER LIMB: Status: ACTIVE | Noted: 2022-12-22

## 2024-01-03 PROBLEM — I12.9 HYPERTENSIVE CHRONIC KIDNEY DISEASE WITH STAGE 1 THROUGH STAGE 4 CHRONIC KIDNEY DISEASE, OR UNSPECIFIED CHRONIC KIDNEY DISEASE: Status: ACTIVE | Noted: 2022-12-22

## 2024-01-03 PROBLEM — C18.1: Status: RESOLVED | Noted: 2023-11-29 | Resolved: 2024-01-03

## 2024-01-03 PROBLEM — R35.1 NOCTURIA: Status: RESOLVED | Noted: 2023-03-08 | Resolved: 2024-01-03

## 2024-01-03 PROBLEM — K57.30 DIVERTICULOSIS OF LARGE INTESTINE WITHOUT PERFORATION OR ABSCESS WITHOUT BLEEDING: Status: ACTIVE | Noted: 2023-05-17

## 2024-01-03 PROBLEM — Z79.01 ANTICOAGULATION MANAGEMENT ENCOUNTER: Status: ACTIVE | Noted: 2024-01-03

## 2024-01-03 PROBLEM — Z85.89 PERSONAL HISTORY OF MALIGNANT NEOPLASM OF OTHER ORGANS AND SYSTEMS: Status: ACTIVE | Noted: 2023-07-26

## 2024-01-03 PROBLEM — B37.0 CANDIDIASIS OF MOUTH AND ESOPHAGUS (MULTI): Status: ACTIVE | Noted: 2023-12-18

## 2024-01-03 PROBLEM — M19.049 CMC ARTHRITIS: Status: RESOLVED | Noted: 2023-03-08 | Resolved: 2024-01-03

## 2024-01-03 PROBLEM — Z51.81 ANTICOAGULATION MANAGEMENT ENCOUNTER: Status: ACTIVE | Noted: 2024-01-03

## 2024-01-03 PROBLEM — G47.33 OBSTRUCTIVE SLEEP APNEA: Status: RESOLVED | Noted: 2023-03-08 | Resolved: 2024-01-03

## 2024-01-03 PROBLEM — I95.9 HYPOTENSION, UNSPECIFIED: Status: ACTIVE | Noted: 2023-09-07

## 2024-01-03 PROBLEM — G47.00 INSOMNIA: Status: ACTIVE | Noted: 2024-01-03

## 2024-01-03 PROBLEM — E11.9 DIABETES MELLITUS (MULTI): Status: ACTIVE | Noted: 2024-01-03

## 2024-01-03 PROBLEM — I82.432 DEEP VEIN THROMBOSIS (DVT) OF POPLITEAL VEIN OF LEFT LOWER EXTREMITY (MULTI): Status: ACTIVE | Noted: 2024-01-03

## 2024-01-03 LAB — CEA SERPL-MCNC: 0.5 UG/L

## 2024-01-03 RX ORDER — BENZONATATE 200 MG/1
CAPSULE ORAL
COMMUNITY
Start: 2021-09-23 | End: 2024-01-18

## 2024-01-03 RX ORDER — ASPIRIN 81 MG/1
TABLET ORAL
COMMUNITY
End: 2024-01-18

## 2024-01-03 RX ORDER — LOSARTAN POTASSIUM 100 MG/1
TABLET ORAL
COMMUNITY
Start: 2020-01-02 | End: 2024-01-18

## 2024-01-03 RX ORDER — OLMESARTAN MEDOXOMIL AND HYDROCHLOROTHIAZIDE 40/25 40; 25 MG/1; MG/1
TABLET ORAL
COMMUNITY
Start: 2018-04-05 | End: 2024-01-18

## 2024-01-04 ENCOUNTER — APPOINTMENT (OUTPATIENT)
Dept: HEMATOLOGY/ONCOLOGY | Facility: HOSPITAL | Age: 69
End: 2024-01-04
Payer: MEDICARE

## 2024-01-04 ENCOUNTER — INFUSION (OUTPATIENT)
Dept: HEMATOLOGY/ONCOLOGY | Facility: CLINIC | Age: 69
End: 2024-01-04
Payer: MEDICARE

## 2024-01-04 VITALS
SYSTOLIC BLOOD PRESSURE: 100 MMHG | DIASTOLIC BLOOD PRESSURE: 58 MMHG | TEMPERATURE: 96.3 F | HEART RATE: 102 BPM | RESPIRATION RATE: 16 BRPM

## 2024-01-04 DIAGNOSIS — C77.2 CANCER OF APPENDIX METASTATIC TO INTRA-ABDOMINAL LYMPH NODE (MULTI): ICD-10-CM

## 2024-01-04 DIAGNOSIS — C18.1 CANCER OF APPENDIX METASTATIC TO INTRA-ABDOMINAL LYMPH NODE (MULTI): ICD-10-CM

## 2024-01-04 PROCEDURE — 2500000004 HC RX 250 GENERAL PHARMACY W/ HCPCS (ALT 636 FOR OP/ED): Performed by: STUDENT IN AN ORGANIZED HEALTH CARE EDUCATION/TRAINING PROGRAM

## 2024-01-04 PROCEDURE — 96523 IRRIG DRUG DELIVERY DEVICE: CPT

## 2024-01-04 RX ORDER — HEPARIN SODIUM,PORCINE/PF 10 UNIT/ML
50 SYRINGE (ML) INTRAVENOUS AS NEEDED
Status: CANCELLED | OUTPATIENT
Start: 2024-01-04

## 2024-01-04 RX ORDER — HEPARIN 100 UNIT/ML
500 SYRINGE INTRAVENOUS AS NEEDED
Status: DISCONTINUED | OUTPATIENT
Start: 2024-01-04 | End: 2024-01-04 | Stop reason: HOSPADM

## 2024-01-04 RX ORDER — HEPARIN 100 UNIT/ML
500 SYRINGE INTRAVENOUS AS NEEDED
Status: CANCELLED | OUTPATIENT
Start: 2024-01-04

## 2024-01-04 RX ADMIN — HEPARIN 500 UNITS: 100 SYRINGE at 16:30

## 2024-01-04 ASSESSMENT — PAIN SCALES - GENERAL: PAINLEVEL: 0-NO PAIN

## 2024-01-08 ENCOUNTER — OFFICE VISIT (OUTPATIENT)
Dept: CARDIOLOGY | Facility: CLINIC | Age: 69
End: 2024-01-08
Payer: MEDICARE

## 2024-01-08 VITALS
RESPIRATION RATE: 18 BRPM | BODY MASS INDEX: 27.16 KG/M2 | HEIGHT: 71 IN | WEIGHT: 194 LBS | HEART RATE: 73 BPM | OXYGEN SATURATION: 98 % | SYSTOLIC BLOOD PRESSURE: 123 MMHG | DIASTOLIC BLOOD PRESSURE: 51 MMHG

## 2024-01-08 DIAGNOSIS — I26.02 ACUTE SADDLE PULMONARY EMBOLISM WITH ACUTE COR PULMONALE (MULTI): Primary | ICD-10-CM

## 2024-01-08 PROCEDURE — 1036F TOBACCO NON-USER: CPT | Performed by: INTERNAL MEDICINE

## 2024-01-08 PROCEDURE — 3074F SYST BP LT 130 MM HG: CPT | Performed by: INTERNAL MEDICINE

## 2024-01-08 PROCEDURE — 1126F AMNT PAIN NOTED NONE PRSNT: CPT | Performed by: INTERNAL MEDICINE

## 2024-01-08 PROCEDURE — 99214 OFFICE O/P EST MOD 30 MIN: CPT | Performed by: INTERNAL MEDICINE

## 2024-01-08 PROCEDURE — 4010F ACE/ARB THERAPY RXD/TAKEN: CPT | Performed by: INTERNAL MEDICINE

## 2024-01-08 PROCEDURE — 3008F BODY MASS INDEX DOCD: CPT | Performed by: INTERNAL MEDICINE

## 2024-01-08 PROCEDURE — 3078F DIAST BP <80 MM HG: CPT | Performed by: INTERNAL MEDICINE

## 2024-01-08 PROCEDURE — 1160F RVW MEDS BY RX/DR IN RCRD: CPT | Performed by: INTERNAL MEDICINE

## 2024-01-08 PROCEDURE — 1159F MED LIST DOCD IN RCRD: CPT | Performed by: INTERNAL MEDICINE

## 2024-01-08 RX ORDER — NYSTATIN 100000 [USP'U]/ML
SUSPENSION ORAL
COMMUNITY
Start: 2024-01-04 | End: 2024-01-18

## 2024-01-08 ASSESSMENT — PATIENT HEALTH QUESTIONNAIRE - PHQ9
2. FEELING DOWN, DEPRESSED OR HOPELESS: NOT AT ALL
SUM OF ALL RESPONSES TO PHQ9 QUESTIONS 1 AND 2: 0
1. LITTLE INTEREST OR PLEASURE IN DOING THINGS: NOT AT ALL

## 2024-01-08 ASSESSMENT — ENCOUNTER SYMPTOMS
LOSS OF SENSATION IN FEET: 0
DEPRESSION: 0
OCCASIONAL FEELINGS OF UNSTEADINESS: 0

## 2024-01-08 ASSESSMENT — PAIN SCALES - GENERAL: PAINLEVEL: 0-NO PAIN

## 2024-01-08 NOTE — PROGRESS NOTES
Chief Complaint:   Cancer-associated PE and DVT     History of Present Illness:    Bunny Abarca is a 68 y.o. man with history of large cancer-associated PE and left common femoral, femoral, popliteal, and calf DVT 9/5/2023. He underwent mechanical thrombectomy with evidence of some chronicity of thrombus. Cancer is appendiceal.     He was treated with heparin and eventual transition to Eliquis 10 mg bid for 7 days followed by Eliquis 5 mg bid.     Repeat echocardiogram done a month later showed decrease in RV size and improvement in RV function, but with persistently elevated RV systolic pressures.     Still undergoing chemo but almost done.    He feels quite fatigued but overall is improving. Shortness of breath and tachycardia are improved.    He denies bleeding including epistaxis, gingival bleeding, hemoptysis, hematemesis, hematochezia, melena, and hematuria.       Past Medical History:   Diagnosis Date    Atrial fibrillation (CMS/[facility]) 03/08/2023    Benign essential hypertension 03/08/2023    Hyperlipidemia 03/08/2023    Obstructive sleep apnea 03/08/2023    Personal history of other diseases of the respiratory system 09/04/2020    History of sore throat    Primary malignant neoplasm of appendix (CMS/[facility]) 10/03/2023    Type 2 diabetes mellitus (CMS/[facility]) 03/08/2023     Past Surgical History:   Procedure Laterality Date    BACK SURGERY  03/28/2016    Back Surgery    CATARACT EXTRACTION  02/15/2018    Cataract Surgery    HERNIA REPAIR  03/28/2016    Hernia Repair    SINUS SURGERY  03/28/2016    Sinus Surgery         Social History:  He reports that he has never smoked. He has never used smokeless tobacco. He reports that he does not currently use alcohol. He reports that he does not currently use drugs.    Family History:  Family History   Problem Relation Name Age of Onset    Congenital heart disease Mother      Hyperlipidemia Father      Hypertension Father      Colon cancer Sister      Other (Multiple System  "Atrophy) Sister      Other (Cystic Fibrosis Gene Carrier) Sister      Other (Malignant Neoplasm Of Ovary) Sibling          Allergies:  Shellfish containing products, Exenatide, Exenatide microspheres, and Perfume    Outpatient Medications:  Current Outpatient Medications   Medication Instructions    Accu-Chek Fastclix Lancet Drum misc test blood sugar TWICE DAILY    apixaban (Eliquis) 5 mg tablet TAKE 1 TABLET BY MOUTH TWO TIMES A DAY    aspirin 81 mg EC tablet oral    atorvastatin (LIPITOR) 40 mg, oral, Nightly    benzonatate (Tessalon) 200 mg capsule oral    DAILY MULTI-VITAMIN ORAL oral    doxazosin (CARDURA) 2 mg, oral, Nightly    fenofibrate (TRICOR) 145 mg, oral, Daily, WITH FOOD    insulin asp prt-insulin aspart (NovoLOG Mix 70-30FlexPen U-100) 100 unit/mL (70-30) injection 38 Units, subcutaneous, 2 times daily    Jardiance 25 mg, oral, Daily    losartan (Cozaar) 100 mg tablet oral    metFORMIN XR (GLUCOPHAGE-XR) 1,000 mg, oral, 2 times daily    olmesartan (BENICAR) 40 mg, oral, Daily    olmesartan-hydrochlorothiazide (BENIcar HCT) 40-25 mg tablet oral    ondansetron (ZOFRAN) 8 mg, oral, Every 8 hours PRN    pen needle, diabetic (BD Ultra-Fine Short Pen Needle) 31 gauge x 5/16\" needle 1 each, subcutaneous, 2 times daily     Blood pressure 123/51, pulse 73, resp. rate 18, height 1.791 m (5' 10.5\"), weight 88 kg (194 lb), SpO2 98 %.    Physical Exam:  No distress  No JVD or carotid bruits  Lungs clear bilaterally  Heart regular and without murmurs  Abdomen soft and non-tender  No leg swelling  Pulses intact       Last Labs:  CBC -  Lab Results   Component Value Date    WBC 5.0 01/02/2024    HGB 11.9 (L) 01/02/2024    HCT 37.6 (L) 01/02/2024    MCV 84 01/02/2024     01/02/2024       RENAL FUNCTION PANEL -   Lab Results   Component Value Date    GLUCOSE 148 (H) 01/02/2024     01/02/2024    K 4.3 01/02/2024     01/02/2024    CO2 26 01/02/2024    ANIONGAP 13 01/02/2024    BUN 20 01/02/2024    " CREATININE 1.03 01/02/2024    GFRMALE 70 09/25/2023    CALCIUM 9.6 01/02/2024    PHOS 2.9 09/07/2023    ALBUMIN 4.4 01/02/2024      CTA chest 9/4/2023  IMPRESSION:  Extensive pulmonary emboli demonstrated throughout the pulmonary  arterial tree bilaterally as described including saddle embolus in the  main pulmonary artery.  No distinct evidence for right heart strain at  this time.    Assessment/Plan   Diagnoses and all orders for this visit:  Acute saddle pulmonary embolism with acute cor pulmonale (CMS/HCC)  Continue anticoagulation--likely indefinite but we'll reassess at 3 months    Beatriz Sanchez MD

## 2024-01-15 ENCOUNTER — OFFICE VISIT (OUTPATIENT)
Dept: HEMATOLOGY/ONCOLOGY | Facility: HOSPITAL | Age: 69
End: 2024-01-15
Payer: MEDICARE

## 2024-01-15 ENCOUNTER — APPOINTMENT (OUTPATIENT)
Dept: HEMATOLOGY/ONCOLOGY | Facility: HOSPITAL | Age: 69
End: 2024-01-15
Payer: MEDICARE

## 2024-01-15 ENCOUNTER — LAB (OUTPATIENT)
Dept: HEMATOLOGY/ONCOLOGY | Facility: HOSPITAL | Age: 69
End: 2024-01-15
Payer: MEDICARE

## 2024-01-15 VITALS
OXYGEN SATURATION: 98 % | RESPIRATION RATE: 18 BRPM | BODY MASS INDEX: 27.76 KG/M2 | HEART RATE: 91 BPM | TEMPERATURE: 97.5 F | WEIGHT: 193.9 LBS | HEIGHT: 70 IN | DIASTOLIC BLOOD PRESSURE: 73 MMHG | SYSTOLIC BLOOD PRESSURE: 134 MMHG

## 2024-01-15 DIAGNOSIS — E11.9 TYPE 2 DIABETES MELLITUS WITHOUT COMPLICATION, WITHOUT LONG-TERM CURRENT USE OF INSULIN (MULTI): ICD-10-CM

## 2024-01-15 DIAGNOSIS — B37.0 THRUSH OF MOUTH AND ESOPHAGUS (MULTI): ICD-10-CM

## 2024-01-15 DIAGNOSIS — C18.1 CANCER OF APPENDIX METASTATIC TO INTRA-ABDOMINAL LYMPH NODE (MULTI): Primary | ICD-10-CM

## 2024-01-15 DIAGNOSIS — B37.81 THRUSH OF MOUTH AND ESOPHAGUS (MULTI): ICD-10-CM

## 2024-01-15 DIAGNOSIS — Z51.11 ENCOUNTER FOR ANTINEOPLASTIC CHEMOTHERAPY: ICD-10-CM

## 2024-01-15 DIAGNOSIS — C77.2 CANCER OF APPENDIX METASTATIC TO INTRA-ABDOMINAL LYMPH NODE (MULTI): Primary | ICD-10-CM

## 2024-01-15 DIAGNOSIS — C77.2 CANCER OF APPENDIX METASTATIC TO INTRA-ABDOMINAL LYMPH NODE (MULTI): ICD-10-CM

## 2024-01-15 DIAGNOSIS — C18.1 CANCER OF APPENDIX METASTATIC TO INTRA-ABDOMINAL LYMPH NODE (MULTI): ICD-10-CM

## 2024-01-15 LAB
ALBUMIN SERPL BCP-MCNC: 4.3 G/DL (ref 3.4–5)
ALP SERPL-CCNC: 45 U/L (ref 33–136)
ALT SERPL W P-5'-P-CCNC: 22 U/L (ref 10–52)
ANION GAP SERPL CALC-SCNC: 14 MMOL/L (ref 10–20)
AST SERPL W P-5'-P-CCNC: 28 U/L (ref 9–39)
BASOPHILS # BLD AUTO: 0.03 X10*3/UL (ref 0–0.1)
BASOPHILS NFR BLD AUTO: 0.6 %
BILIRUB SERPL-MCNC: 0.7 MG/DL (ref 0–1.2)
BUN SERPL-MCNC: 17 MG/DL (ref 6–23)
CALCIUM SERPL-MCNC: 9.2 MG/DL (ref 8.6–10.3)
CHLORIDE SERPL-SCNC: 104 MMOL/L (ref 98–107)
CO2 SERPL-SCNC: 24 MMOL/L (ref 21–32)
CREAT SERPL-MCNC: 1.01 MG/DL (ref 0.5–1.3)
EGFRCR SERPLBLD CKD-EPI 2021: 82 ML/MIN/1.73M*2
EOSINOPHIL # BLD AUTO: 0.03 X10*3/UL (ref 0–0.7)
EOSINOPHIL NFR BLD AUTO: 0.6 %
ERYTHROCYTE [DISTWIDTH] IN BLOOD BY AUTOMATED COUNT: 19.5 % (ref 11.5–14.5)
GLUCOSE SERPL-MCNC: 108 MG/DL (ref 74–99)
HCT VFR BLD AUTO: 37.2 % (ref 41–52)
HGB BLD-MCNC: 11.6 G/DL (ref 13.5–17.5)
IMM GRANULOCYTES # BLD AUTO: 0.01 X10*3/UL (ref 0–0.7)
IMM GRANULOCYTES NFR BLD AUTO: 0.2 % (ref 0–0.9)
LYMPHOCYTES # BLD AUTO: 1.18 X10*3/UL (ref 1.2–4.8)
LYMPHOCYTES NFR BLD AUTO: 25 %
MCH RBC QN AUTO: 26.7 PG (ref 26–34)
MCHC RBC AUTO-ENTMCNC: 31.2 G/DL (ref 32–36)
MCV RBC AUTO: 86 FL (ref 80–100)
MONOCYTES # BLD AUTO: 0.72 X10*3/UL (ref 0.1–1)
MONOCYTES NFR BLD AUTO: 15.3 %
NEUTROPHILS # BLD AUTO: 2.75 X10*3/UL (ref 1.2–7.7)
NEUTROPHILS NFR BLD AUTO: 58.3 %
NRBC BLD-RTO: 0 /100 WBCS (ref 0–0)
PLATELET # BLD AUTO: 160 X10*3/UL (ref 150–450)
POTASSIUM SERPL-SCNC: 4.2 MMOL/L (ref 3.5–5.3)
PROT SERPL-MCNC: 7 G/DL (ref 6.4–8.2)
RBC # BLD AUTO: 4.35 X10*6/UL (ref 4.5–5.9)
SODIUM SERPL-SCNC: 138 MMOL/L (ref 136–145)
WBC # BLD AUTO: 4.7 X10*3/UL (ref 4.4–11.3)

## 2024-01-15 PROCEDURE — 99214 OFFICE O/P EST MOD 30 MIN: CPT | Performed by: STUDENT IN AN ORGANIZED HEALTH CARE EDUCATION/TRAINING PROGRAM

## 2024-01-15 PROCEDURE — 80053 COMPREHEN METABOLIC PANEL: CPT

## 2024-01-15 PROCEDURE — 1157F ADVNC CARE PLAN IN RCRD: CPT | Performed by: STUDENT IN AN ORGANIZED HEALTH CARE EDUCATION/TRAINING PROGRAM

## 2024-01-15 PROCEDURE — 36591 DRAW BLOOD OFF VENOUS DEVICE: CPT

## 2024-01-15 PROCEDURE — 85025 COMPLETE CBC W/AUTO DIFF WBC: CPT

## 2024-01-15 PROCEDURE — 3078F DIAST BP <80 MM HG: CPT | Performed by: STUDENT IN AN ORGANIZED HEALTH CARE EDUCATION/TRAINING PROGRAM

## 2024-01-15 PROCEDURE — 3008F BODY MASS INDEX DOCD: CPT | Performed by: STUDENT IN AN ORGANIZED HEALTH CARE EDUCATION/TRAINING PROGRAM

## 2024-01-15 PROCEDURE — 2500000004 HC RX 250 GENERAL PHARMACY W/ HCPCS (ALT 636 FOR OP/ED): Performed by: STUDENT IN AN ORGANIZED HEALTH CARE EDUCATION/TRAINING PROGRAM

## 2024-01-15 PROCEDURE — 3075F SYST BP GE 130 - 139MM HG: CPT | Performed by: STUDENT IN AN ORGANIZED HEALTH CARE EDUCATION/TRAINING PROGRAM

## 2024-01-15 PROCEDURE — 1036F TOBACCO NON-USER: CPT | Performed by: STUDENT IN AN ORGANIZED HEALTH CARE EDUCATION/TRAINING PROGRAM

## 2024-01-15 PROCEDURE — 4010F ACE/ARB THERAPY RXD/TAKEN: CPT | Performed by: STUDENT IN AN ORGANIZED HEALTH CARE EDUCATION/TRAINING PROGRAM

## 2024-01-15 PROCEDURE — 1126F AMNT PAIN NOTED NONE PRSNT: CPT | Performed by: STUDENT IN AN ORGANIZED HEALTH CARE EDUCATION/TRAINING PROGRAM

## 2024-01-15 PROCEDURE — 1160F RVW MEDS BY RX/DR IN RCRD: CPT | Performed by: STUDENT IN AN ORGANIZED HEALTH CARE EDUCATION/TRAINING PROGRAM

## 2024-01-15 PROCEDURE — 1159F MED LIST DOCD IN RCRD: CPT | Performed by: STUDENT IN AN ORGANIZED HEALTH CARE EDUCATION/TRAINING PROGRAM

## 2024-01-15 RX ORDER — PALONOSETRON 0.05 MG/ML
0.25 INJECTION, SOLUTION INTRAVENOUS ONCE
Status: CANCELLED | OUTPATIENT
Start: 2024-01-16

## 2024-01-15 RX ORDER — HEPARIN SODIUM,PORCINE/PF 10 UNIT/ML
50 SYRINGE (ML) INTRAVENOUS AS NEEDED
Status: CANCELLED | OUTPATIENT
Start: 2024-01-15

## 2024-01-15 RX ORDER — FAMOTIDINE 10 MG/ML
20 INJECTION INTRAVENOUS ONCE AS NEEDED
Status: CANCELLED | OUTPATIENT
Start: 2024-01-16

## 2024-01-15 RX ORDER — INSULIN ASPART 100 [IU]/ML
38 INJECTION, SUSPENSION SUBCUTANEOUS 2 TIMES DAILY
Qty: 15 EACH | Refills: 5 | Status: SHIPPED | OUTPATIENT
Start: 2024-01-15 | End: 2024-07-13

## 2024-01-15 RX ORDER — PROCHLORPERAZINE EDISYLATE 5 MG/ML
10 INJECTION INTRAMUSCULAR; INTRAVENOUS EVERY 6 HOURS PRN
Status: CANCELLED | OUTPATIENT
Start: 2024-01-16

## 2024-01-15 RX ORDER — HEPARIN 100 UNIT/ML
500 SYRINGE INTRAVENOUS AS NEEDED
Status: DISCONTINUED | OUTPATIENT
Start: 2024-01-15 | End: 2024-01-15 | Stop reason: HOSPADM

## 2024-01-15 RX ORDER — PROCHLORPERAZINE MALEATE 10 MG
10 TABLET ORAL EVERY 6 HOURS PRN
Status: CANCELLED | OUTPATIENT
Start: 2024-01-16

## 2024-01-15 RX ORDER — DIPHENHYDRAMINE HYDROCHLORIDE 50 MG/ML
50 INJECTION INTRAMUSCULAR; INTRAVENOUS AS NEEDED
Status: CANCELLED | OUTPATIENT
Start: 2024-01-16

## 2024-01-15 RX ORDER — ALBUTEROL SULFATE 0.83 MG/ML
3 SOLUTION RESPIRATORY (INHALATION) AS NEEDED
Status: CANCELLED | OUTPATIENT
Start: 2024-01-16

## 2024-01-15 RX ORDER — HEPARIN 100 UNIT/ML
500 SYRINGE INTRAVENOUS AS NEEDED
Status: CANCELLED | OUTPATIENT
Start: 2024-01-15

## 2024-01-15 RX ORDER — EPINEPHRINE 0.3 MG/.3ML
0.3 INJECTION SUBCUTANEOUS EVERY 5 MIN PRN
Status: CANCELLED | OUTPATIENT
Start: 2024-01-16

## 2024-01-15 RX ORDER — LORAZEPAM 2 MG/ML
1 INJECTION INTRAMUSCULAR AS NEEDED
Status: CANCELLED | OUTPATIENT
Start: 2024-01-16

## 2024-01-15 RX ADMIN — HEPARIN 500 UNITS: 100 SYRINGE at 12:58

## 2024-01-15 ASSESSMENT — PAIN SCALES - GENERAL: PAINLEVEL: 0-NO PAIN

## 2024-01-15 NOTE — PROGRESS NOTES
Cancer History:  DIAGNOSIS: Appendiceal adenocarcinoma    STAGING: pT4a pN1a Mx    CURRENT SITES OF DISEASE:    MOLECULAR/GENOMICS:  MMR-intact    ctDNA Signatera:  8/14/23: (0) negative  9/25/23: (0) negative  11/6/23: (0) negative  1/2/23: (0) negative    TUMOR MARKER:  5/15/23 CEA: <0.5  8/14/23 CEA: <0.5  11/7/23 CEA: <0.5  12/18/23 CEA: 1.3  1/2/24 CEA: 0.5    CURRENT THERAPY:  Adjuvant FOLFOX every 2 weeks x 12 cycles started on 8/29/23; Dose reduction of Oxaliplatin with C4 due to lasting cold sensitivity. Dose reduced Oxaliplatin with C7 due to increasing neuropathy    PREVIOUS THERAPY:  6/20/23: S/p R hemicolectomy    ONCOLOGIC ISSUES:    PROVIDERS:  CRS: Jazmin Faulkner    HISTORY:   4/2023: presented with R inguinal pain. Thought to have a hernia.  4/17/23: CT abdomen pelvis showed indeterminate masslike lesion at the cecum posteriorly at the expected level of the appendix measuring 3 x 4 cm in dimension.  Also within the left pelvis near the origin of the left inguinal canal there is an indeterminate cavitary mass measuring 2.5 x 2 cm  5/5/23: Underwent colonoscopy.  Report not available.  Pathology of cecal/appendiceal orifice mass biopsy showed poorly differentiated adenocarcinoma with signet ring cell differentiation  5/17/23: CT chest abdomen pelvis showed no evidence of metastatic disease, again noted soft tissue mass near the base of the cecum measuring 4.2 x 2.8 cm, compatible with reported history of appendiceal carcinoma  6/20/23: Underwent right hemicolectomy.  Final pathology showed appendiceal invasive moderately differentiated mucinous adenocarcinoma measuring 5 cm.  Tumor invades the visceral peritoneum.  No lymphovascular or perineural invasion. 1/44 LNs involved.  MMR protein expression intact    PMH: HTN, Gilbert's disease, HLD, DM  SH: , no tobacco, EtOH, illicits  FH: Sister and nephew with Rodriguez syndrome.      Subjective    Taste is better with the nystatin but he still has  thrush  Has more energy, bouncing back faster  Neuropathy is better after reducing the Oxaliplatin     No fevers, chills, chest pain, shortness of breath, abdominal pain, nausea, vomiting, diarrhea, or rashes.    ROS as above. Remainder of 10-point review of systems elicited and negative.     Objective:   BSA: 2.09 meters squared  /79 (BP Location: Left arm, Patient Position: Sitting)   Pulse 68   Temp 36.8 °C (98.2 °F)   Resp 16   Wt 88.8 kg (195 lb 12.3 oz)   SpO2 99%   BMI 28.09 kg/m²      Physical Exam  Gen: A&O, NAD  Head: Normocephalic, atraumatic  Eyes: no scleral icterus  ENT: mucous membranes moist, no oropharyngeal lesions, + thrush  Resp: Lungs CTAB  Cardiac: Tachycardiac, regular rhythm, no murmurs appreciated  Abdomen: Soft, nondistended, nontender, +BS  Neuro: CNII-XII grossly intact  Psych: appropriate mood & affect  Skin: warm, dry, no apparent rashes     ECOG Performance Status: 0     Assessment/Plan   Mr. 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 Faulkner.     He presents today for discussion of adjuvant chemotherapy. I personally reviewed the images from the recent CT, which show no evidence of metastatic disease.  I reviewed the results of his pathology synoptic report, which show pT4a pN1a, Stage III cancer.     I discussed with him and his wife at length that given the stage of his appendiceal cancer, I recommend systemic chemotherapy.  The options are for FOLFOX versus capecitabine plus oxaliplatin.  Given the high risk nature of the diagnosis, my preference is for 6 months of adjuvant chemotherapy, and FOLFOX is better tolerated for this duration.    I discussed the risks and benefits and side effect profile of FOLFOX chemotherapy, and he agrees to proceed.    After 1st cycle of FOLFOX, found to have extensive PE throughout B/L lungs including saddle embolus of main pulmonary artery. On 9/5/23 he had aspiration thrombectomy  and was placed on Apixaban. Discussed admission with Dr. Webb. Ok to proceed with chemotherapy as long as patient is feeling well.     10/9/23: After 2nd cycle, he experienced fatigue, cold sensitivity, nausea and diarrhea.    10/23/23: Having more cold and now light sensitivity. Will decrease dose of Oxaliplatin.     11/20/23: Improved fatigue. Still with taste and cold sensitivity, which is stable.   12/18/23: Improving energy, taste is worse, + thrush  1/2/24: Doing well, improving energy. Still with thrush but taste is improving. Proceed with chemo today.    Plan:  Stage III appendiceal cancer  - Plan for 6mo goal of adjuvant chemotherapy with FOLFOX  - Trend Signatera - draw 1/2/24  - Proceed with C9 FOLFOX today, continue dose reduction oxaliplatin to 68mg/m2 d/t neuropathy  - RTC 2 weeks for C10    Oral thrush  - worsening dysgeusia  - will start nystatin    Pulmonary Embolism including saddle embolism  - continue on Apixaban  - has follow up with vascular who will manage apixaban

## 2024-01-16 ENCOUNTER — APPOINTMENT (OUTPATIENT)
Dept: HEMATOLOGY/ONCOLOGY | Facility: HOSPITAL | Age: 69
End: 2024-01-16
Payer: MEDICARE

## 2024-01-16 ENCOUNTER — INFUSION (OUTPATIENT)
Dept: HEMATOLOGY/ONCOLOGY | Facility: CLINIC | Age: 69
End: 2024-01-16
Payer: MEDICARE

## 2024-01-16 VITALS
WEIGHT: 196.2 LBS | SYSTOLIC BLOOD PRESSURE: 121 MMHG | HEART RATE: 88 BPM | RESPIRATION RATE: 18 BRPM | DIASTOLIC BLOOD PRESSURE: 58 MMHG | BODY MASS INDEX: 27.93 KG/M2 | OXYGEN SATURATION: 96 % | TEMPERATURE: 96.3 F

## 2024-01-16 DIAGNOSIS — C77.2 CANCER OF APPENDIX METASTATIC TO INTRA-ABDOMINAL LYMPH NODE (MULTI): ICD-10-CM

## 2024-01-16 DIAGNOSIS — C18.1 CANCER OF APPENDIX METASTATIC TO INTRA-ABDOMINAL LYMPH NODE (MULTI): ICD-10-CM

## 2024-01-16 PROCEDURE — 96416 CHEMO PROLONG INFUSE W/PUMP: CPT

## 2024-01-16 PROCEDURE — 96415 CHEMO IV INFUSION ADDL HR: CPT

## 2024-01-16 PROCEDURE — 96413 CHEMO IV INFUSION 1 HR: CPT

## 2024-01-16 PROCEDURE — 2500000004 HC RX 250 GENERAL PHARMACY W/ HCPCS (ALT 636 FOR OP/ED): Mod: JZ | Performed by: STUDENT IN AN ORGANIZED HEALTH CARE EDUCATION/TRAINING PROGRAM

## 2024-01-16 PROCEDURE — 2500000004 HC RX 250 GENERAL PHARMACY W/ HCPCS (ALT 636 FOR OP/ED): Performed by: STUDENT IN AN ORGANIZED HEALTH CARE EDUCATION/TRAINING PROGRAM

## 2024-01-16 PROCEDURE — 96375 TX/PRO/DX INJ NEW DRUG ADDON: CPT | Mod: INF

## 2024-01-16 RX ORDER — EPINEPHRINE 0.3 MG/.3ML
0.3 INJECTION SUBCUTANEOUS EVERY 5 MIN PRN
Status: DISCONTINUED | OUTPATIENT
Start: 2024-01-16 | End: 2024-01-16 | Stop reason: HOSPADM

## 2024-01-16 RX ORDER — ALBUTEROL SULFATE 0.83 MG/ML
3 SOLUTION RESPIRATORY (INHALATION) AS NEEDED
Status: DISCONTINUED | OUTPATIENT
Start: 2024-01-16 | End: 2024-01-16 | Stop reason: HOSPADM

## 2024-01-16 RX ORDER — PALONOSETRON 0.05 MG/ML
0.25 INJECTION, SOLUTION INTRAVENOUS ONCE
Status: COMPLETED | OUTPATIENT
Start: 2024-01-16 | End: 2024-01-16

## 2024-01-16 RX ORDER — HEPARIN 100 UNIT/ML
500 SYRINGE INTRAVENOUS AS NEEDED
Status: DISCONTINUED | OUTPATIENT
Start: 2024-01-16 | End: 2024-01-16 | Stop reason: HOSPADM

## 2024-01-16 RX ORDER — HEPARIN SODIUM,PORCINE/PF 10 UNIT/ML
50 SYRINGE (ML) INTRAVENOUS AS NEEDED
Status: CANCELLED | OUTPATIENT
Start: 2024-01-16

## 2024-01-16 RX ORDER — HEPARIN 100 UNIT/ML
500 SYRINGE INTRAVENOUS AS NEEDED
Status: CANCELLED | OUTPATIENT
Start: 2024-01-16

## 2024-01-16 RX ORDER — PROCHLORPERAZINE EDISYLATE 5 MG/ML
10 INJECTION INTRAMUSCULAR; INTRAVENOUS EVERY 6 HOURS PRN
Status: DISCONTINUED | OUTPATIENT
Start: 2024-01-16 | End: 2024-01-16 | Stop reason: HOSPADM

## 2024-01-16 RX ORDER — HEPARIN SODIUM,PORCINE/PF 10 UNIT/ML
50 SYRINGE (ML) INTRAVENOUS AS NEEDED
Status: DISCONTINUED | OUTPATIENT
Start: 2024-01-16 | End: 2024-01-16 | Stop reason: HOSPADM

## 2024-01-16 RX ORDER — DIPHENHYDRAMINE HYDROCHLORIDE 50 MG/ML
50 INJECTION INTRAMUSCULAR; INTRAVENOUS AS NEEDED
Status: DISCONTINUED | OUTPATIENT
Start: 2024-01-16 | End: 2024-01-16 | Stop reason: HOSPADM

## 2024-01-16 RX ORDER — LORAZEPAM 2 MG/ML
1 INJECTION INTRAMUSCULAR AS NEEDED
Status: DISCONTINUED | OUTPATIENT
Start: 2024-01-16 | End: 2024-01-16 | Stop reason: HOSPADM

## 2024-01-16 RX ORDER — PROCHLORPERAZINE MALEATE 10 MG
10 TABLET ORAL EVERY 6 HOURS PRN
Status: DISCONTINUED | OUTPATIENT
Start: 2024-01-16 | End: 2024-01-16 | Stop reason: HOSPADM

## 2024-01-16 RX ORDER — FAMOTIDINE 10 MG/ML
20 INJECTION INTRAVENOUS ONCE AS NEEDED
Status: DISCONTINUED | OUTPATIENT
Start: 2024-01-16 | End: 2024-01-16 | Stop reason: HOSPADM

## 2024-01-16 RX ADMIN — FLUOROURACIL 5100 MG: 50 INJECTION, SOLUTION INTRAVENOUS at 13:19

## 2024-01-16 RX ADMIN — PALONOSETRON HYDROCHLORIDE 250 MCG: 0.25 INJECTION INTRAVENOUS at 10:12

## 2024-01-16 RX ADMIN — OXALIPLATIN 145 MG: 5 INJECTION, SOLUTION INTRAVENOUS at 10:49

## 2024-01-16 RX ADMIN — DEXAMETHASONE SODIUM PHOSPHATE 12 MG: 10 INJECTION, SOLUTION INTRAMUSCULAR; INTRAVENOUS at 10:17

## 2024-01-16 ASSESSMENT — PAIN SCALES - GENERAL: PAINLEVEL: 0-NO PAIN

## 2024-01-18 ENCOUNTER — INFUSION (OUTPATIENT)
Dept: HEMATOLOGY/ONCOLOGY | Facility: CLINIC | Age: 69
End: 2024-01-18
Payer: MEDICARE

## 2024-01-18 VITALS
TEMPERATURE: 95 F | HEART RATE: 89 BPM | WEIGHT: 197.6 LBS | SYSTOLIC BLOOD PRESSURE: 153 MMHG | BODY MASS INDEX: 28.13 KG/M2 | DIASTOLIC BLOOD PRESSURE: 55 MMHG | RESPIRATION RATE: 18 BRPM

## 2024-01-18 DIAGNOSIS — C18.1 CANCER OF APPENDIX METASTATIC TO INTRA-ABDOMINAL LYMPH NODE (MULTI): ICD-10-CM

## 2024-01-18 DIAGNOSIS — C77.2 CANCER OF APPENDIX METASTATIC TO INTRA-ABDOMINAL LYMPH NODE (MULTI): ICD-10-CM

## 2024-01-18 PROCEDURE — 96523 IRRIG DRUG DELIVERY DEVICE: CPT

## 2024-01-18 PROCEDURE — 2500000004 HC RX 250 GENERAL PHARMACY W/ HCPCS (ALT 636 FOR OP/ED): Performed by: STUDENT IN AN ORGANIZED HEALTH CARE EDUCATION/TRAINING PROGRAM

## 2024-01-18 RX ORDER — HEPARIN SODIUM,PORCINE/PF 10 UNIT/ML
50 SYRINGE (ML) INTRAVENOUS AS NEEDED
Status: DISCONTINUED | OUTPATIENT
Start: 2024-01-18 | End: 2024-01-18 | Stop reason: HOSPADM

## 2024-01-18 RX ORDER — HEPARIN 100 UNIT/ML
500 SYRINGE INTRAVENOUS AS NEEDED
Status: DISCONTINUED | OUTPATIENT
Start: 2024-01-18 | End: 2024-01-18 | Stop reason: HOSPADM

## 2024-01-18 RX ORDER — HEPARIN 100 UNIT/ML
500 SYRINGE INTRAVENOUS AS NEEDED
Status: CANCELLED | OUTPATIENT
Start: 2024-01-18

## 2024-01-18 RX ORDER — HEPARIN SODIUM,PORCINE/PF 10 UNIT/ML
50 SYRINGE (ML) INTRAVENOUS AS NEEDED
Status: CANCELLED | OUTPATIENT
Start: 2024-01-18

## 2024-01-18 RX ADMIN — HEPARIN 500 UNITS: 100 SYRINGE at 11:39

## 2024-01-18 ASSESSMENT — PAIN SCALES - GENERAL: PAINLEVEL: 0-NO PAIN

## 2024-01-29 ENCOUNTER — OFFICE VISIT (OUTPATIENT)
Dept: HEMATOLOGY/ONCOLOGY | Facility: HOSPITAL | Age: 69
End: 2024-01-29
Payer: MEDICARE

## 2024-01-29 ENCOUNTER — APPOINTMENT (OUTPATIENT)
Dept: HEMATOLOGY/ONCOLOGY | Facility: HOSPITAL | Age: 69
End: 2024-01-29
Payer: MEDICARE

## 2024-01-29 ENCOUNTER — LAB (OUTPATIENT)
Dept: HEMATOLOGY/ONCOLOGY | Facility: HOSPITAL | Age: 69
End: 2024-01-29
Payer: MEDICARE

## 2024-01-29 VITALS
TEMPERATURE: 97.5 F | SYSTOLIC BLOOD PRESSURE: 148 MMHG | RESPIRATION RATE: 18 BRPM | BODY MASS INDEX: 27.71 KG/M2 | HEART RATE: 100 BPM | OXYGEN SATURATION: 99 % | WEIGHT: 194.67 LBS | DIASTOLIC BLOOD PRESSURE: 72 MMHG

## 2024-01-29 DIAGNOSIS — C77.2 CANCER OF APPENDIX METASTATIC TO INTRA-ABDOMINAL LYMPH NODE (MULTI): ICD-10-CM

## 2024-01-29 DIAGNOSIS — B37.81 THRUSH OF MOUTH AND ESOPHAGUS (MULTI): ICD-10-CM

## 2024-01-29 DIAGNOSIS — B37.0 THRUSH OF MOUTH AND ESOPHAGUS (MULTI): ICD-10-CM

## 2024-01-29 DIAGNOSIS — C77.2 CANCER OF APPENDIX METASTATIC TO INTRA-ABDOMINAL LYMPH NODE (MULTI): Primary | ICD-10-CM

## 2024-01-29 DIAGNOSIS — C18.1 CANCER OF APPENDIX METASTATIC TO INTRA-ABDOMINAL LYMPH NODE (MULTI): ICD-10-CM

## 2024-01-29 DIAGNOSIS — C18.1 CANCER OF APPENDIX METASTATIC TO INTRA-ABDOMINAL LYMPH NODE (MULTI): Primary | ICD-10-CM

## 2024-01-29 DIAGNOSIS — Z51.11 ENCOUNTER FOR ANTINEOPLASTIC CHEMOTHERAPY: ICD-10-CM

## 2024-01-29 LAB
ALBUMIN SERPL BCP-MCNC: 4.2 G/DL (ref 3.4–5)
ALP SERPL-CCNC: 48 U/L (ref 33–136)
ALT SERPL W P-5'-P-CCNC: 25 U/L (ref 10–52)
ANION GAP SERPL CALC-SCNC: 13 MMOL/L (ref 10–20)
AST SERPL W P-5'-P-CCNC: 33 U/L (ref 9–39)
BASOPHILS # BLD AUTO: 0.02 X10*3/UL (ref 0–0.1)
BASOPHILS NFR BLD AUTO: 0.6 %
BILIRUB SERPL-MCNC: 0.6 MG/DL (ref 0–1.2)
BUN SERPL-MCNC: 17 MG/DL (ref 6–23)
CALCIUM SERPL-MCNC: 9.4 MG/DL (ref 8.6–10.3)
CEA SERPL-MCNC: <0.5 UG/L
CHLORIDE SERPL-SCNC: 106 MMOL/L (ref 98–107)
CO2 SERPL-SCNC: 25 MMOL/L (ref 21–32)
CREAT SERPL-MCNC: 0.99 MG/DL (ref 0.5–1.3)
EGFRCR SERPLBLD CKD-EPI 2021: 83 ML/MIN/1.73M*2
EOSINOPHIL # BLD AUTO: 0.04 X10*3/UL (ref 0–0.7)
EOSINOPHIL NFR BLD AUTO: 1.1 %
ERYTHROCYTE [DISTWIDTH] IN BLOOD BY AUTOMATED COUNT: 19.2 % (ref 11.5–14.5)
GLUCOSE SERPL-MCNC: 202 MG/DL (ref 74–99)
HCT VFR BLD AUTO: 35.4 % (ref 41–52)
HGB BLD-MCNC: 11.4 G/DL (ref 13.5–17.5)
IMM GRANULOCYTES # BLD AUTO: 0.01 X10*3/UL (ref 0–0.7)
IMM GRANULOCYTES NFR BLD AUTO: 0.3 % (ref 0–0.9)
LYMPHOCYTES # BLD AUTO: 0.77 X10*3/UL (ref 1.2–4.8)
LYMPHOCYTES NFR BLD AUTO: 21.9 %
MCH RBC QN AUTO: 28 PG (ref 26–34)
MCHC RBC AUTO-ENTMCNC: 32.2 G/DL (ref 32–36)
MCV RBC AUTO: 87 FL (ref 80–100)
MONOCYTES # BLD AUTO: 0.64 X10*3/UL (ref 0.1–1)
MONOCYTES NFR BLD AUTO: 18.2 %
NEUTROPHILS # BLD AUTO: 2.04 X10*3/UL (ref 1.2–7.7)
NEUTROPHILS NFR BLD AUTO: 57.9 %
NRBC BLD-RTO: 0 /100 WBCS (ref 0–0)
PLATELET # BLD AUTO: 131 X10*3/UL (ref 150–450)
POTASSIUM SERPL-SCNC: 3.9 MMOL/L (ref 3.5–5.3)
PROT SERPL-MCNC: 6.7 G/DL (ref 6.4–8.2)
RBC # BLD AUTO: 4.07 X10*6/UL (ref 4.5–5.9)
SODIUM SERPL-SCNC: 140 MMOL/L (ref 136–145)
WBC # BLD AUTO: 3.5 X10*3/UL (ref 4.4–11.3)

## 2024-01-29 PROCEDURE — 3008F BODY MASS INDEX DOCD: CPT | Performed by: STUDENT IN AN ORGANIZED HEALTH CARE EDUCATION/TRAINING PROGRAM

## 2024-01-29 PROCEDURE — 1126F AMNT PAIN NOTED NONE PRSNT: CPT | Performed by: STUDENT IN AN ORGANIZED HEALTH CARE EDUCATION/TRAINING PROGRAM

## 2024-01-29 PROCEDURE — 99214 OFFICE O/P EST MOD 30 MIN: CPT | Performed by: STUDENT IN AN ORGANIZED HEALTH CARE EDUCATION/TRAINING PROGRAM

## 2024-01-29 PROCEDURE — 82378 CARCINOEMBRYONIC ANTIGEN: CPT | Performed by: STUDENT IN AN ORGANIZED HEALTH CARE EDUCATION/TRAINING PROGRAM

## 2024-01-29 PROCEDURE — 84075 ASSAY ALKALINE PHOSPHATASE: CPT

## 2024-01-29 PROCEDURE — 1157F ADVNC CARE PLAN IN RCRD: CPT | Performed by: STUDENT IN AN ORGANIZED HEALTH CARE EDUCATION/TRAINING PROGRAM

## 2024-01-29 PROCEDURE — 1160F RVW MEDS BY RX/DR IN RCRD: CPT | Performed by: STUDENT IN AN ORGANIZED HEALTH CARE EDUCATION/TRAINING PROGRAM

## 2024-01-29 PROCEDURE — 36591 DRAW BLOOD OFF VENOUS DEVICE: CPT

## 2024-01-29 PROCEDURE — 3077F SYST BP >= 140 MM HG: CPT | Performed by: STUDENT IN AN ORGANIZED HEALTH CARE EDUCATION/TRAINING PROGRAM

## 2024-01-29 PROCEDURE — 85025 COMPLETE CBC W/AUTO DIFF WBC: CPT

## 2024-01-29 PROCEDURE — 3078F DIAST BP <80 MM HG: CPT | Performed by: STUDENT IN AN ORGANIZED HEALTH CARE EDUCATION/TRAINING PROGRAM

## 2024-01-29 PROCEDURE — 2500000004 HC RX 250 GENERAL PHARMACY W/ HCPCS (ALT 636 FOR OP/ED): Performed by: STUDENT IN AN ORGANIZED HEALTH CARE EDUCATION/TRAINING PROGRAM

## 2024-01-29 PROCEDURE — 1036F TOBACCO NON-USER: CPT | Performed by: STUDENT IN AN ORGANIZED HEALTH CARE EDUCATION/TRAINING PROGRAM

## 2024-01-29 PROCEDURE — 1159F MED LIST DOCD IN RCRD: CPT | Performed by: STUDENT IN AN ORGANIZED HEALTH CARE EDUCATION/TRAINING PROGRAM

## 2024-01-29 PROCEDURE — 4010F ACE/ARB THERAPY RXD/TAKEN: CPT | Performed by: STUDENT IN AN ORGANIZED HEALTH CARE EDUCATION/TRAINING PROGRAM

## 2024-01-29 RX ORDER — HEPARIN SODIUM,PORCINE/PF 10 UNIT/ML
50 SYRINGE (ML) INTRAVENOUS AS NEEDED
Status: CANCELLED | OUTPATIENT
Start: 2024-01-29

## 2024-01-29 RX ORDER — ALBUTEROL SULFATE 0.83 MG/ML
3 SOLUTION RESPIRATORY (INHALATION) AS NEEDED
Status: CANCELLED | OUTPATIENT
Start: 2024-01-30

## 2024-01-29 RX ORDER — PROCHLORPERAZINE EDISYLATE 5 MG/ML
10 INJECTION INTRAMUSCULAR; INTRAVENOUS EVERY 6 HOURS PRN
Status: CANCELLED | OUTPATIENT
Start: 2024-01-30

## 2024-01-29 RX ORDER — EPINEPHRINE 0.3 MG/.3ML
0.3 INJECTION SUBCUTANEOUS EVERY 5 MIN PRN
Status: CANCELLED | OUTPATIENT
Start: 2024-01-30

## 2024-01-29 RX ORDER — PROCHLORPERAZINE MALEATE 10 MG
10 TABLET ORAL EVERY 6 HOURS PRN
Status: CANCELLED | OUTPATIENT
Start: 2024-01-30

## 2024-01-29 RX ORDER — PALONOSETRON 0.05 MG/ML
0.25 INJECTION, SOLUTION INTRAVENOUS ONCE
Status: CANCELLED | OUTPATIENT
Start: 2024-01-30

## 2024-01-29 RX ORDER — FAMOTIDINE 10 MG/ML
20 INJECTION INTRAVENOUS ONCE AS NEEDED
Status: CANCELLED | OUTPATIENT
Start: 2024-01-30

## 2024-01-29 RX ORDER — HEPARIN 100 UNIT/ML
500 SYRINGE INTRAVENOUS AS NEEDED
Status: CANCELLED | OUTPATIENT
Start: 2024-01-29

## 2024-01-29 RX ORDER — LORAZEPAM 2 MG/ML
1 INJECTION INTRAMUSCULAR AS NEEDED
Status: CANCELLED | OUTPATIENT
Start: 2024-01-30

## 2024-01-29 RX ORDER — DIPHENHYDRAMINE HYDROCHLORIDE 50 MG/ML
50 INJECTION INTRAMUSCULAR; INTRAVENOUS AS NEEDED
Status: CANCELLED | OUTPATIENT
Start: 2024-01-30

## 2024-01-29 RX ORDER — HEPARIN 100 UNIT/ML
500 SYRINGE INTRAVENOUS AS NEEDED
Status: DISCONTINUED | OUTPATIENT
Start: 2024-01-29 | End: 2024-01-29 | Stop reason: HOSPADM

## 2024-01-29 RX ADMIN — HEPARIN 500 UNITS: 100 SYRINGE at 08:26

## 2024-01-29 ASSESSMENT — PAIN SCALES - GENERAL: PAINLEVEL: 0-NO PAIN

## 2024-01-29 ASSESSMENT — PATIENT HEALTH QUESTIONNAIRE - PHQ9
SUM OF ALL RESPONSES TO PHQ9 QUESTIONS 1 AND 2: 0
1. LITTLE INTEREST OR PLEASURE IN DOING THINGS: NOT AT ALL
2. FEELING DOWN, DEPRESSED OR HOPELESS: NOT AT ALL

## 2024-01-29 ASSESSMENT — ENCOUNTER SYMPTOMS
OCCASIONAL FEELINGS OF UNSTEADINESS: 0
DEPRESSION: 0
LOSS OF SENSATION IN FEET: 0

## 2024-01-29 NOTE — PROGRESS NOTES
Patient here today with wife for followup. Patient reported that he does not have much of a sense of taste and smell but is still eating well. Reported that he is feeling fatigue and having diarrhea intermittently for about a week after treatment; does not take medications for diarrhea. Fatigue has lasted a few days longer with the last two treatments. Inquiring about timeline for port removal. Medications and allergies reviewed with patient.

## 2024-01-29 NOTE — PROGRESS NOTES
Cancer History:  DIAGNOSIS: Appendiceal adenocarcinoma    STAGING: pT4a pN1a Mx    CURRENT SITES OF DISEASE:    MOLECULAR/GENOMICS:  MMR-intact    ctDNA Signatera:  8/14/23: (0) negative  9/25/23: (0) negative  11/6/23: (0) negative  1/2/23: (0) negative    TUMOR MARKER:  5/15/23 CEA: <0.5  8/14/23 CEA: <0.5  11/7/23 CEA: <0.5  12/18/23 CEA: 1.3  1/2/24 CEA: 0.5    CURRENT THERAPY:  Adjuvant FOLFOX every 2 weeks x 12 cycles started on 8/29/23; Dose reduction of Oxaliplatin with C4 due to lasting cold sensitivity. Dose reduced Oxaliplatin with C7 due to increasing neuropathy    PREVIOUS THERAPY:  6/20/23: S/p R hemicolectomy    ONCOLOGIC ISSUES:    PROVIDERS:  CRS: Jazmin Faulkner    HISTORY:   4/2023: presented with R inguinal pain. Thought to have a hernia.  4/17/23: CT abdomen pelvis showed indeterminate masslike lesion at the cecum posteriorly at the expected level of the appendix measuring 3 x 4 cm in dimension.  Also within the left pelvis near the origin of the left inguinal canal there is an indeterminate cavitary mass measuring 2.5 x 2 cm  5/5/23: Underwent colonoscopy.  Report not available.  Pathology of cecal/appendiceal orifice mass biopsy showed poorly differentiated adenocarcinoma with signet ring cell differentiation  5/17/23: CT chest abdomen pelvis showed no evidence of metastatic disease, again noted soft tissue mass near the base of the cecum measuring 4.2 x 2.8 cm, compatible with reported history of appendiceal carcinoma  6/20/23: Underwent right hemicolectomy.  Final pathology showed appendiceal invasive moderately differentiated mucinous adenocarcinoma measuring 5 cm.  Tumor invades the visceral peritoneum.  No lymphovascular or perineural invasion. 1/44 LNs involved.  MMR protein expression intact    PMH: HTN, Gilbert's disease, HLD, DM  SH: , no tobacco, EtOH, illicits  FH: Sister and nephew with Rodriguez syndrome.      Subjective    Taste is better with the nystatin but he still has  "thrush  Has more energy, bouncing back faster  Neuropathy is better after reducing the Oxaliplatin     No fevers, chills, chest pain, shortness of breath, abdominal pain, nausea, vomiting, diarrhea, or rashes.    ROS as above. Remainder of 10-point review of systems elicited and negative.     Objective:   BSA: 2.09 meters squared  /73 (BP Location: Left arm, Patient Position: Sitting, BP Cuff Size: Large adult)   Pulse 91   Temp 36.4 °C (97.5 °F)   Resp 18   Ht (S) 1.785 m (5' 10.28\")   Wt 88 kg (193 lb 14.4 oz)   SpO2 98%   BMI 27.60 kg/m²      Physical Exam  Gen: A&O, NAD  Head: Normocephalic, atraumatic  Eyes: no scleral icterus  ENT: mucous membranes moist, no oropharyngeal lesions, + thrush  Resp: Lungs CTAB  Cardiac: Tachycardiac, regular rhythm, no murmurs appreciated  Abdomen: Soft, nondistended, nontender, +BS  Neuro: CNII-XII grossly intact  Psych: appropriate mood & affect  Skin: warm, dry, no apparent rashes     ECOG Performance Status: 0     Assessment/Plan   Mr. 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 Faulkner.     He presents today for discussion of adjuvant chemotherapy. I personally reviewed the images from the recent CT, which show no evidence of metastatic disease.  I reviewed the results of his pathology synoptic report, which show pT4a pN1a, Stage III cancer.     I discussed with him and his wife at length that given the stage of his appendiceal cancer, I recommend systemic chemotherapy.  The options are for FOLFOX versus capecitabine plus oxaliplatin.  Given the high risk nature of the diagnosis, my preference is for 6 months of adjuvant chemotherapy, and FOLFOX is better tolerated for this duration.    I discussed the risks and benefits and side effect profile of FOLFOX chemotherapy, and he agrees to proceed.    After 1st cycle of FOLFOX, found to have extensive PE throughout B/L lungs including saddle embolus of main " pulmonary artery. On 9/5/23 he had aspiration thrombectomy and was placed on Apixaban. Discussed admission with Dr. Webb. Ok to proceed with chemotherapy as long as patient is feeling well.     10/9/23: After 2nd cycle, he experienced fatigue, cold sensitivity, nausea and diarrhea.    10/23/23: Having more cold and now light sensitivity. Will decrease dose of Oxaliplatin.     11/20/23: Improved fatigue. Still with taste and cold sensitivity, which is stable.   12/18/23: Improving energy, taste is worse, + thrush  1/2/24: Doing well, improving energy. Still with thrush but taste is improving. Proceed with chemo today.    Plan:  Stage III appendiceal cancer  - Plan for 6mo goal of adjuvant chemotherapy with FOLFOX  - Trend Signatera - draw 1/2/24  - Proceed with C10 FOLFOX tomorrow, continue dose reduction oxaliplatin to 68mg/m2 d/t neuropathy  - RTC 2 weeks for C11    Oral thrush  - worsening dysgeusia  - continue nystatin    Pulmonary Embolism including saddle embolism  - continue on Apixaban  - has follow up with vascular who will manage apixaban

## 2024-01-30 ENCOUNTER — INFUSION (OUTPATIENT)
Dept: HEMATOLOGY/ONCOLOGY | Facility: CLINIC | Age: 69
End: 2024-01-30
Payer: MEDICARE

## 2024-01-30 ENCOUNTER — NUTRITION (OUTPATIENT)
Dept: HEMATOLOGY/ONCOLOGY | Facility: CLINIC | Age: 69
End: 2024-01-30

## 2024-01-30 VITALS
HEART RATE: 77 BPM | RESPIRATION RATE: 18 BRPM | SYSTOLIC BLOOD PRESSURE: 152 MMHG | BODY MASS INDEX: 28.04 KG/M2 | TEMPERATURE: 97.7 F | WEIGHT: 197 LBS | OXYGEN SATURATION: 94 % | DIASTOLIC BLOOD PRESSURE: 65 MMHG

## 2024-01-30 VITALS — BODY MASS INDEX: 28.2 KG/M2 | HEIGHT: 70 IN | WEIGHT: 197 LBS

## 2024-01-30 DIAGNOSIS — C77.2 CANCER OF APPENDIX METASTATIC TO INTRA-ABDOMINAL LYMPH NODE (MULTI): ICD-10-CM

## 2024-01-30 DIAGNOSIS — C18.1 CANCER OF APPENDIX METASTATIC TO INTRA-ABDOMINAL LYMPH NODE (MULTI): ICD-10-CM

## 2024-01-30 PROCEDURE — 96416 CHEMO PROLONG INFUSE W/PUMP: CPT

## 2024-01-30 PROCEDURE — 2500000004 HC RX 250 GENERAL PHARMACY W/ HCPCS (ALT 636 FOR OP/ED): Performed by: STUDENT IN AN ORGANIZED HEALTH CARE EDUCATION/TRAINING PROGRAM

## 2024-01-30 PROCEDURE — 96415 CHEMO IV INFUSION ADDL HR: CPT

## 2024-01-30 PROCEDURE — 96413 CHEMO IV INFUSION 1 HR: CPT

## 2024-01-30 PROCEDURE — 96375 TX/PRO/DX INJ NEW DRUG ADDON: CPT | Mod: INF

## 2024-01-30 PROCEDURE — 2500000004 HC RX 250 GENERAL PHARMACY W/ HCPCS (ALT 636 FOR OP/ED): Mod: JZ | Performed by: STUDENT IN AN ORGANIZED HEALTH CARE EDUCATION/TRAINING PROGRAM

## 2024-01-30 RX ORDER — FAMOTIDINE 10 MG/ML
20 INJECTION INTRAVENOUS ONCE AS NEEDED
Status: DISCONTINUED | OUTPATIENT
Start: 2024-01-30 | End: 2024-01-30 | Stop reason: HOSPADM

## 2024-01-30 RX ORDER — DIPHENHYDRAMINE HYDROCHLORIDE 50 MG/ML
50 INJECTION INTRAMUSCULAR; INTRAVENOUS AS NEEDED
Status: DISCONTINUED | OUTPATIENT
Start: 2024-01-30 | End: 2024-01-30 | Stop reason: HOSPADM

## 2024-01-30 RX ORDER — HEPARIN SODIUM,PORCINE/PF 10 UNIT/ML
50 SYRINGE (ML) INTRAVENOUS AS NEEDED
Status: CANCELLED | OUTPATIENT
Start: 2024-01-30

## 2024-01-30 RX ORDER — HEPARIN 100 UNIT/ML
500 SYRINGE INTRAVENOUS AS NEEDED
Status: DISCONTINUED | OUTPATIENT
Start: 2024-01-30 | End: 2024-01-30 | Stop reason: HOSPADM

## 2024-01-30 RX ORDER — HEPARIN 100 UNIT/ML
500 SYRINGE INTRAVENOUS AS NEEDED
Status: CANCELLED | OUTPATIENT
Start: 2024-01-30

## 2024-01-30 RX ORDER — ALBUTEROL SULFATE 0.83 MG/ML
3 SOLUTION RESPIRATORY (INHALATION) AS NEEDED
Status: DISCONTINUED | OUTPATIENT
Start: 2024-01-30 | End: 2024-01-30 | Stop reason: HOSPADM

## 2024-01-30 RX ORDER — PROCHLORPERAZINE MALEATE 10 MG
10 TABLET ORAL EVERY 6 HOURS PRN
Status: DISCONTINUED | OUTPATIENT
Start: 2024-01-30 | End: 2024-01-30 | Stop reason: HOSPADM

## 2024-01-30 RX ORDER — EPINEPHRINE 0.3 MG/.3ML
0.3 INJECTION SUBCUTANEOUS EVERY 5 MIN PRN
Status: DISCONTINUED | OUTPATIENT
Start: 2024-01-30 | End: 2024-01-30 | Stop reason: HOSPADM

## 2024-01-30 RX ORDER — LORAZEPAM 2 MG/ML
1 INJECTION INTRAMUSCULAR AS NEEDED
Status: DISCONTINUED | OUTPATIENT
Start: 2024-01-30 | End: 2024-01-30 | Stop reason: HOSPADM

## 2024-01-30 RX ORDER — PROCHLORPERAZINE EDISYLATE 5 MG/ML
10 INJECTION INTRAMUSCULAR; INTRAVENOUS EVERY 6 HOURS PRN
Status: DISCONTINUED | OUTPATIENT
Start: 2024-01-30 | End: 2024-01-30 | Stop reason: HOSPADM

## 2024-01-30 RX ORDER — PALONOSETRON 0.05 MG/ML
0.25 INJECTION, SOLUTION INTRAVENOUS ONCE
Status: COMPLETED | OUTPATIENT
Start: 2024-01-30 | End: 2024-01-30

## 2024-01-30 RX ADMIN — OXALIPLATIN 145 MG: 5 INJECTION, SOLUTION INTRAVENOUS at 12:04

## 2024-01-30 RX ADMIN — PALONOSETRON HYDROCHLORIDE 250 MCG: 0.25 INJECTION INTRAVENOUS at 11:32

## 2024-01-30 RX ADMIN — FLUOROURACIL 5100 MG: 50 INJECTION, SOLUTION INTRAVENOUS at 14:20

## 2024-01-30 RX ADMIN — DEXAMETHASONE SODIUM PHOSPHATE 12 MG: 10 INJECTION, SOLUTION INTRAMUSCULAR; INTRAVENOUS at 11:32

## 2024-01-30 ASSESSMENT — PAIN SCALES - GENERAL: PAINLEVEL: 0-NO PAIN

## 2024-01-30 NOTE — PROGRESS NOTES
"NUTRITION Follow up NOTE  Reason for Visit:  Bunny Abarca is a 68 y.o. male with Appendiceal adenocarcinoma, STAGING: pT4a pN1a Mx    6/20/23: Underwent right hemicolectomy     Current treatment: Adjuvant FOLFOX every 2 weeks x 12 cycles started on 8/29/23; Dose reduction of Oxaliplatin with C4 due to lasting cold sensitivity. Dose reduced Oxaliplatin with C7 due to increasing neuropathy     He presented for nutrition assessment on 11/20/23 with taste changes (c/o metallic taste) and treatment related nausea, diarrhea, and constipation.     Seen today for follow up during infusion in the presence of his significant other.    PMHx: DM II, CKD, Diverticulosis, fatty liver, obesity,  Resection of Appendix and cecum.     Lab Results   Component Value Date/Time    GLUCOSE 202 (H) 01/29/2024 0825     01/29/2024 0825    K 3.9 01/29/2024 0825     01/29/2024 0825    CO2 25 01/29/2024 0825    ANIONGAP 13 01/29/2024 0825    BUN 17 01/29/2024 0825    CREATININE 0.99 01/29/2024 0825    EGFR 83 01/29/2024 0825    CALCIUM 9.4 01/29/2024 0825    ALBUMIN 4.2 01/29/2024 0825    ALKPHOS 48 01/29/2024 0825    PROT 6.7 01/29/2024 0825    AST 33 01/29/2024 0825    BILITOT 0.6 01/29/2024 0825    ALT 25 01/29/2024 0825     Hemoglobin A1C              Component  Ref Range & Units 1 mo ago  (12/4/23) 8 mo ago  (5/12/23) 1 yr ago  (12/14/22) 1 yr ago  (8/11/22) 1 yr ago  (4/13/22) 2 yr ago  (4/8/21) 3 yr ago  (12/7/20)   Hemoglobin A1C  see below % 8.2 High  7.8 Abnormal  R, CM 8.2 Abnormal  R, CM 8.3 Abnormal  R, CM 8.8 Abnormal  R, CM 9.1 R, CM 9.4 R, CM   Estimated Average Glucose  Not Established mg/dL 189 177 R 189 R 192 R 206 R 214 R 223 R          All pertinent labs have been reviewed.     Nutrition Assessment     Anthropometrics:  Anthropometrics  Height: 178.5 cm (5' 10.28\")  Weight: 89.4 kg (197 lb)  BMI (Calculated): 28.05  IBW/kg (Dietitian Calculated): 76.3 kg  Percent of IBW: 117.2 %  Weight Change  Weight History " "/ % Weight Change: Overall stable wt for 12 weeks        Wt Readings from Last 20 Encounters:   01/30/24 89.4 kg (197 lb)   01/30/24 89.4 kg (197 lb)   01/29/24 88.3 kg (194 lb 10.7 oz)   01/18/24 89.6 kg (197 lb 9.6 oz)   01/16/24 89 kg (196 lb 3.2 oz)   01/15/24 88 kg (193 lb 14.4 oz)   01/08/24 88 kg (194 lb)   01/02/24 88.8 kg (195 lb 12.3 oz)   12/19/23 89.4 kg (197 lb 1.5 oz)   12/18/23 88.4 kg (194 lb 12.8 oz)   12/13/23 88.4 kg (194 lb 12.8 oz)   12/07/23 88.2 kg (194 lb 5.4 oz)   12/05/23 88.3 kg (194 lb 12.4 oz)   12/04/23 88.4 kg (194 lb 12.8 oz)   11/29/23 89.4 kg (197 lb)   11/28/23 88.7 kg (195 lb 9.6 oz)   11/22/23 85.4 kg (188 lb 4.4 oz)   11/20/23 88.8 kg (195 lb 12.8 oz)   11/20/23 88.8 kg (195 lb 12.8 oz)   11/09/23 88.5 kg (195 lb 1.7 oz)     11/07/23 88.6 kg (195 lb 5.2 oz)   11/06/23 89.6 kg (197 lb 8 oz)   10/24/23 91.3 kg (201 lb 2.7 oz)   10/23/23 90.3 kg (199 lb 1.2 oz)   10/16/23 89.9 kg (198 lb 4 oz)   10/10/23 91.1 kg (200 lb 13.4 oz)   10/09/23 90.5 kg (199 lb 8.3 oz)   10/09/23 90.5 kg (199 lb 8 oz)   08/02/23 88 kg (194 lb)   07/18/23 93 kg (205 lb)   07/18/23 92.1 kg (203 lb)   05/24/23 93.4 kg (206 lb)   05/23/23 92.1 kg (203 lb)   05/19/23 92.3 kg (203 lb 6.4 oz)   05/16/23 91.6 kg (202 lb)   04/03/23 92.5 kg (204 lb)   03/14/23 92.5 kg (204 lb)     Food And Nutrient Intake:  Food and Nutrient History  Food and Nutrient History: Reports he eats what tastes good or is tolerable each day.  Notes that his tastes perception is \"off\", stating most things taste like Crisco- adia. fats and foods with fats.  He does tolerate peanut M&M's however.  He still eats a large volume of food daily.  For example Ulises Sanchez Mashed potatoes are good to him- he eats the whole \"Family Style\" serving in one sitting. States that BS is running high d/t steroids taken with chemo.  He has put his DM on the back burner for now.  Notes last HgbA1C was 8.2.  He has a sugar free root beer at the bedside, in " "addition to oreos and empty cup of regular pudding.  Energy Intake: Good > 75 %  Fluid Intake: Adequate.  Drinks sugar free pop  Oral Problems: thrush, dysgeusia   Pt reports he continues with thrush, as it kicks in during infusion.  He continues with the nystatin but feels it is not very effective.  Notes the lemon heads were helpful for the first 2-3 cycles of chemo but then were ineffective.                                                             Nutrition Focused Physical Exam:  Subcutaneous Fat Loss  Orbital Fat Pads: Well nourshed (slightly bulging fat pads)  Buccal Fat Pads: Well nourished (full, rounded cheeks)  Triceps: Defer  Ribs: Defer  Muscle Wasting  Temporalis: Well nourished (well-defined muscle)  Pectoralis (Clavicular Region): Defer  Deltoid/Trapezius: Defer  Interosseous: Well nourished (muscle bulges)  Trapezius/Infraspinatus/Supraspinatus (Scapular Region): Defer  Quadriceps: Defer  Gastrocnemius: Defer  Edema  Edema: none        Energy Needs  Calculated Energy Needs Using Equations  Height: 178.5 cm (5' 10.28\")  Estimated Energy Needs  Total Energy Estimated Needs (kCal): 2590 kCal  Total Estimated Energy Need per Day (kCal/kg): 29 kCal/kg  Estimated Fluid Needs  Total Fluid Estimated Needs (mL): 2590 mL  Total Fluid Estimated Needs (mL/kg): 29 mL/kg  Estimated Protein Needs  Total Protein Estimated Needs (g): 116 g  Total Protein Estimated Needs (g/kg): 1.3 g/kg        Nutrition Diagnosis   Malnutrition Diagnosis  Patient has Malnutrition Diagnosis: No  Nutrition Diagnosis  Diagnosis Status (1): Resolved  Nutrition Diagnosis 1: Altered GI function  Related to (1): side effects of chemo  As Evidenced by (1): initial presentation    Nutrition Interventions/Recommendations   Food and Nutrition Delivery  Meals & Snacks: Diets modified for specific foods or ingredients  Goals: Minimize intake of concentrated sugars while on steroid and for a couple days thereafter.  Feeding Assistance: Mouth " care  Goals: Bicarb and salt rinses 4-6 times daily and prior to by mouth (USe of (melted) lemon ice after rinsing)  Other:: Continue adequate fluid intake  Goals: 64 oz minumum with good by mouth intake      Patient Instructions :  Taste and Smell Changes (AND)      Nutrition Monitoring and Evaluation   Food/Nutrient Related History Monitoring  Monitoring and Evaluation Plan: Fluid intake, Amount of food (Refined sugar intake)  Fluid Intake: Estimated fluid intake  Criteria: as above  Amount of Food: Estimated amout of food  Criteria: Continue to meet needs for wt maintenance  Biochemical Data, Medical Tests and Procedures  Monitoring and Evaluation Plan: Electrolyte/renal panel  Criteria: WNL  Nutrition Focused Physical Findings  Monitoring and Evaluation Plan:  (Dysguesia)  Other: Improve taste by 50% or greater        Time Spent  Prep time on day of patient encounter: 5 minutes  Time spent directly with patient, family or caregiver: 20 minutes  Additional Time Spent on Patient Care Activities: 5 minutes  Documentation Time: 15 minutes  Other Time Spent: 0 minutes  Total: 45 minutes        Readiness to Change : Good  Level of Understanding : Good  Anticipated Compliant : Good

## 2024-02-01 ENCOUNTER — INFUSION (OUTPATIENT)
Dept: HEMATOLOGY/ONCOLOGY | Facility: CLINIC | Age: 69
End: 2024-02-01
Payer: MEDICARE

## 2024-02-01 VITALS
HEART RATE: 103 BPM | DIASTOLIC BLOOD PRESSURE: 69 MMHG | RESPIRATION RATE: 16 BRPM | TEMPERATURE: 97 F | SYSTOLIC BLOOD PRESSURE: 110 MMHG

## 2024-02-01 DIAGNOSIS — C77.2 CANCER OF APPENDIX METASTATIC TO INTRA-ABDOMINAL LYMPH NODE (MULTI): ICD-10-CM

## 2024-02-01 DIAGNOSIS — C18.1 CANCER OF APPENDIX METASTATIC TO INTRA-ABDOMINAL LYMPH NODE (MULTI): ICD-10-CM

## 2024-02-01 PROCEDURE — 96523 IRRIG DRUG DELIVERY DEVICE: CPT

## 2024-02-01 PROCEDURE — 2500000004 HC RX 250 GENERAL PHARMACY W/ HCPCS (ALT 636 FOR OP/ED): Performed by: STUDENT IN AN ORGANIZED HEALTH CARE EDUCATION/TRAINING PROGRAM

## 2024-02-01 RX ORDER — HEPARIN 100 UNIT/ML
500 SYRINGE INTRAVENOUS AS NEEDED
Status: CANCELLED | OUTPATIENT
Start: 2024-02-01

## 2024-02-01 RX ORDER — HEPARIN SODIUM,PORCINE/PF 10 UNIT/ML
50 SYRINGE (ML) INTRAVENOUS AS NEEDED
Status: DISCONTINUED | OUTPATIENT
Start: 2024-02-01 | End: 2024-02-01 | Stop reason: HOSPADM

## 2024-02-01 RX ORDER — HEPARIN SODIUM,PORCINE/PF 10 UNIT/ML
50 SYRINGE (ML) INTRAVENOUS AS NEEDED
Status: CANCELLED | OUTPATIENT
Start: 2024-02-01

## 2024-02-01 RX ORDER — HEPARIN 100 UNIT/ML
500 SYRINGE INTRAVENOUS AS NEEDED
Status: DISCONTINUED | OUTPATIENT
Start: 2024-02-01 | End: 2024-02-01 | Stop reason: HOSPADM

## 2024-02-01 RX ADMIN — Medication 500 UNITS: at 12:46

## 2024-02-01 ASSESSMENT — PAIN SCALES - GENERAL: PAINLEVEL: 0-NO PAIN

## 2024-02-05 ENCOUNTER — TELEPHONE (OUTPATIENT)
Dept: ENDOCRINOLOGY | Facility: CLINIC | Age: 69
End: 2024-02-05
Payer: MEDICARE

## 2024-02-05 NOTE — TELEPHONE ENCOUNTER
Patient sent multiple [employer] messages regarding insulin coverage.  He states in his last message that his insurance will cover Novolog. Please review his [employer] messages.  He did include his formulary in the messages.

## 2024-02-08 NOTE — PROGRESS NOTES
Cancer History:  DIAGNOSIS: Appendiceal adenocarcinoma    STAGING: pT4a pN1a Mx    CURRENT SITES OF DISEASE:    MOLECULAR/GENOMICS:  MMR-intact    ctDNA Signatera:  8/14/23: (0) negative  9/25/23: (0) negative  11/6/23: (0) negative  1/2/24: (0) negative    TUMOR MARKER:  5/15/23 CEA: <0.5  8/14/23 CEA: <0.5  11/7/23 CEA: <0.5  12/18/23 CEA: 1.3  1/2/24 CEA: 0.5  1/29/24 CEA: <0.5    CURRENT THERAPY:  Adjuvant FOLFOX every 2 weeks x 12 cycles started on 8/29/23; Dose reduction of Oxaliplatin with C4 due to lasting cold sensitivity. Dose reduced Oxaliplatin with C7 due to increasing neuropathy    PREVIOUS THERAPY:  6/20/23: S/p R hemicolectomy    ONCOLOGIC ISSUES:    PROVIDERS:  CRS: Jazmin Faulkner    HISTORY:   4/2023: presented with R inguinal pain. Thought to have a hernia.  4/17/23: CT abdomen pelvis showed indeterminate masslike lesion at the cecum posteriorly at the expected level of the appendix measuring 3 x 4 cm in dimension.  Also within the left pelvis near the origin of the left inguinal canal there is an indeterminate cavitary mass measuring 2.5 x 2 cm  5/5/23: Underwent colonoscopy.  Report not available.  Pathology of cecal/appendiceal orifice mass biopsy showed poorly differentiated adenocarcinoma with signet ring cell differentiation  5/17/23: CT chest abdomen pelvis showed no evidence of metastatic disease, again noted soft tissue mass near the base of the cecum measuring 4.2 x 2.8 cm, compatible with reported history of appendiceal carcinoma  6/20/23: Underwent right hemicolectomy.  Final pathology showed appendiceal invasive moderately differentiated mucinous adenocarcinoma measuring 5 cm.  Tumor invades the visceral peritoneum.  No lymphovascular or perineural invasion. 1/44 LNs involved.  MMR protein expression intact    PMH: HTN, Gilbert's disease, HLD, DM  SH: , no tobacco, EtOH, illicits  FH: Sister and nephew with Rodriguez syndrome.      Subjective    Patient reported that he does not have  much of a sense of taste and smell but is still eating well. Reported that he is feeling fatigue and having diarrhea intermittently for about a week after treatment; does not take medications for diarrhea. Fatigue has lasted a few days longer with the last two treatments.   No fevers, chills, chest pain, shortness of breath, abdominal pain, nausea, vomiting, or rashes.    ROS as above. Remainder of 10-point review of systems elicited and negative.     Objective:   BSA: 2.09 meters squared  /72 (BP Location: Left arm, Patient Position: Sitting, BP Cuff Size: Adult)   Pulse 100   Temp 36.4 °C (97.5 °F) (Temporal)   Resp 18   Wt 88.3 kg (194 lb 10.7 oz)   SpO2 99%   BMI 27.71 kg/m²      Daily Weight  01/30/24 : 89.4 kg (197 lb)  01/30/24 : 89.4 kg (197 lb)  01/29/24 : 88.3 kg (194 lb 10.7 oz)  01/18/24 : 89.6 kg (197 lb 9.6 oz)  01/16/24 : 89 kg (196 lb 3.2 oz)  01/15/24 : 88 kg (193 lb 14.4 oz)  01/08/24 : 88 kg (194 lb)      Physical Exam  Gen: A&O, NAD  Head: Normocephalic, atraumatic  Eyes: no scleral icterus  ENT: mucous membranes moist, no oropharyngeal lesions, + thrush  Resp: Lungs CTAB  Cardiac: Tachycardiac, regular rhythm, no murmurs appreciated  Abdomen: Soft, nondistended, nontender, +BS  Neuro: CNII-XII grossly intact  Psych: appropriate mood & affect  Skin: warm, dry, no apparent rashes     ECOG Performance Status: 0     Assessment/Plan   Mr. 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 Faulkner.     He presents today for discussion of adjuvant chemotherapy. I personally reviewed the images from the recent CT, which show no evidence of metastatic disease.  I reviewed the results of his pathology synoptic report, which show pT4a pN1a, Stage III cancer.     I discussed with him and his wife at length that given the stage of his appendiceal cancer, I recommend systemic chemotherapy.  The options are for FOLFOX versus capecitabine plus  oxaliplatin.  Given the high risk nature of the diagnosis, my preference is for 6 months of adjuvant chemotherapy, and FOLFOX is better tolerated for this duration.    I discussed the risks and benefits and side effect profile of FOLFOX chemotherapy, and he agrees to proceed.    After 1st cycle of FOLFOX, found to have extensive PE throughout B/L lungs including saddle embolus of main pulmonary artery. On 9/5/23 he had aspiration thrombectomy and was placed on Apixaban. Discussed admission with Dr. Webb. Ok to proceed with chemotherapy as long as patient is feeling well.     10/9/23: After 2nd cycle, he experienced fatigue, cold sensitivity, nausea and diarrhea.    10/23/23: Having more cold and now light sensitivity. Will decrease dose of Oxaliplatin.     11/20/23: Improved fatigue. Still with taste and cold sensitivity, which is stable.   12/18/23: Improving energy, taste is worse, + thrush  1/2/24: Doing well, improving energy. Still with thrush but taste is improving. Proceed with chemo today.    Plan:  Stage III appendiceal cancer  - Plan for 6mo goal of adjuvant chemotherapy with FOLFOX  - Trend Signatera - draw last cycle of chemo  - Proceed with C11 FOLFOX tomorrow, continue dose reduction oxaliplatin to 68mg/m2 d/t neuropathy  - RTC 2 weeks for C12    Oral thrush  - worsening dysgeusia  - continue nystatin    Pulmonary Embolism including saddle embolism  - continue on Apixaban  - has follow up with vascular who will manage apixaban

## 2024-02-12 ENCOUNTER — LAB (OUTPATIENT)
Dept: HEMATOLOGY/ONCOLOGY | Facility: HOSPITAL | Age: 69
End: 2024-02-12
Payer: MEDICARE

## 2024-02-12 ENCOUNTER — OFFICE VISIT (OUTPATIENT)
Dept: HEMATOLOGY/ONCOLOGY | Facility: HOSPITAL | Age: 69
End: 2024-02-12
Payer: MEDICARE

## 2024-02-12 ENCOUNTER — APPOINTMENT (OUTPATIENT)
Dept: HEMATOLOGY/ONCOLOGY | Facility: HOSPITAL | Age: 69
End: 2024-02-12
Payer: MEDICARE

## 2024-02-12 VITALS
WEIGHT: 193.9 LBS | RESPIRATION RATE: 16 BRPM | BODY MASS INDEX: 27.76 KG/M2 | TEMPERATURE: 97.9 F | OXYGEN SATURATION: 100 % | HEART RATE: 98 BPM | HEIGHT: 70 IN | SYSTOLIC BLOOD PRESSURE: 152 MMHG | DIASTOLIC BLOOD PRESSURE: 79 MMHG

## 2024-02-12 DIAGNOSIS — C77.2 CANCER OF APPENDIX METASTATIC TO INTRA-ABDOMINAL LYMPH NODE (MULTI): ICD-10-CM

## 2024-02-12 DIAGNOSIS — C18.1 CANCER OF APPENDIX METASTATIC TO INTRA-ABDOMINAL LYMPH NODE (MULTI): ICD-10-CM

## 2024-02-12 LAB
ALBUMIN SERPL BCP-MCNC: 4.1 G/DL (ref 3.4–5)
ALP SERPL-CCNC: 50 U/L (ref 33–136)
ALT SERPL W P-5'-P-CCNC: 22 U/L (ref 10–52)
ANION GAP SERPL CALC-SCNC: 13 MMOL/L (ref 10–20)
AST SERPL W P-5'-P-CCNC: 33 U/L (ref 9–39)
BASOPHILS # BLD AUTO: 0.01 X10*3/UL (ref 0–0.1)
BASOPHILS NFR BLD AUTO: 0.3 %
BILIRUB SERPL-MCNC: 0.6 MG/DL (ref 0–1.2)
BUN SERPL-MCNC: 18 MG/DL (ref 6–23)
CALCIUM SERPL-MCNC: 9.2 MG/DL (ref 8.6–10.3)
CEA SERPL-MCNC: <0.5 UG/L
CHLORIDE SERPL-SCNC: 106 MMOL/L (ref 98–107)
CO2 SERPL-SCNC: 24 MMOL/L (ref 21–32)
CREAT SERPL-MCNC: 0.98 MG/DL (ref 0.5–1.3)
EGFRCR SERPLBLD CKD-EPI 2021: 85 ML/MIN/1.73M*2
EOSINOPHIL # BLD AUTO: 0.02 X10*3/UL (ref 0–0.7)
EOSINOPHIL NFR BLD AUTO: 0.6 %
ERYTHROCYTE [DISTWIDTH] IN BLOOD BY AUTOMATED COUNT: 18.5 % (ref 11.5–14.5)
GLUCOSE SERPL-MCNC: 200 MG/DL (ref 74–99)
HCT VFR BLD AUTO: 32.6 % (ref 41–52)
HGB BLD-MCNC: 10.6 G/DL (ref 13.5–17.5)
IMM GRANULOCYTES # BLD AUTO: 0.01 X10*3/UL (ref 0–0.7)
IMM GRANULOCYTES NFR BLD AUTO: 0.3 % (ref 0–0.9)
LYMPHOCYTES # BLD AUTO: 0.77 X10*3/UL (ref 1.2–4.8)
LYMPHOCYTES NFR BLD AUTO: 24 %
MCH RBC QN AUTO: 28.6 PG (ref 26–34)
MCHC RBC AUTO-ENTMCNC: 32.5 G/DL (ref 32–36)
MCV RBC AUTO: 88 FL (ref 80–100)
MONOCYTES # BLD AUTO: 0.63 X10*3/UL (ref 0.1–1)
MONOCYTES NFR BLD AUTO: 19.6 %
NEUTROPHILS # BLD AUTO: 1.77 X10*3/UL (ref 1.2–7.7)
NEUTROPHILS NFR BLD AUTO: 55.2 %
NRBC BLD-RTO: 0 /100 WBCS (ref 0–0)
PLATELET # BLD AUTO: 135 X10*3/UL (ref 150–450)
POTASSIUM SERPL-SCNC: 4.1 MMOL/L (ref 3.5–5.3)
PROT SERPL-MCNC: 6.7 G/DL (ref 6.4–8.2)
RBC # BLD AUTO: 3.7 X10*6/UL (ref 4.5–5.9)
SODIUM SERPL-SCNC: 139 MMOL/L (ref 136–145)
WBC # BLD AUTO: 3.2 X10*3/UL (ref 4.4–11.3)

## 2024-02-12 PROCEDURE — 82378 CARCINOEMBRYONIC ANTIGEN: CPT | Performed by: STUDENT IN AN ORGANIZED HEALTH CARE EDUCATION/TRAINING PROGRAM

## 2024-02-12 PROCEDURE — 36591 DRAW BLOOD OFF VENOUS DEVICE: CPT

## 2024-02-12 PROCEDURE — 1126F AMNT PAIN NOTED NONE PRSNT: CPT | Performed by: STUDENT IN AN ORGANIZED HEALTH CARE EDUCATION/TRAINING PROGRAM

## 2024-02-12 PROCEDURE — 2500000004 HC RX 250 GENERAL PHARMACY W/ HCPCS (ALT 636 FOR OP/ED): Performed by: STUDENT IN AN ORGANIZED HEALTH CARE EDUCATION/TRAINING PROGRAM

## 2024-02-12 PROCEDURE — 1157F ADVNC CARE PLAN IN RCRD: CPT | Performed by: STUDENT IN AN ORGANIZED HEALTH CARE EDUCATION/TRAINING PROGRAM

## 2024-02-12 PROCEDURE — 99214 OFFICE O/P EST MOD 30 MIN: CPT | Performed by: STUDENT IN AN ORGANIZED HEALTH CARE EDUCATION/TRAINING PROGRAM

## 2024-02-12 PROCEDURE — 1036F TOBACCO NON-USER: CPT | Performed by: STUDENT IN AN ORGANIZED HEALTH CARE EDUCATION/TRAINING PROGRAM

## 2024-02-12 PROCEDURE — 1160F RVW MEDS BY RX/DR IN RCRD: CPT | Performed by: STUDENT IN AN ORGANIZED HEALTH CARE EDUCATION/TRAINING PROGRAM

## 2024-02-12 PROCEDURE — 3077F SYST BP >= 140 MM HG: CPT | Performed by: STUDENT IN AN ORGANIZED HEALTH CARE EDUCATION/TRAINING PROGRAM

## 2024-02-12 PROCEDURE — 1159F MED LIST DOCD IN RCRD: CPT | Performed by: STUDENT IN AN ORGANIZED HEALTH CARE EDUCATION/TRAINING PROGRAM

## 2024-02-12 PROCEDURE — 4010F ACE/ARB THERAPY RXD/TAKEN: CPT | Performed by: STUDENT IN AN ORGANIZED HEALTH CARE EDUCATION/TRAINING PROGRAM

## 2024-02-12 PROCEDURE — 85025 COMPLETE CBC W/AUTO DIFF WBC: CPT

## 2024-02-12 PROCEDURE — 3078F DIAST BP <80 MM HG: CPT | Performed by: STUDENT IN AN ORGANIZED HEALTH CARE EDUCATION/TRAINING PROGRAM

## 2024-02-12 PROCEDURE — 84075 ASSAY ALKALINE PHOSPHATASE: CPT

## 2024-02-12 PROCEDURE — 3008F BODY MASS INDEX DOCD: CPT | Performed by: STUDENT IN AN ORGANIZED HEALTH CARE EDUCATION/TRAINING PROGRAM

## 2024-02-12 RX ORDER — HEPARIN 100 UNIT/ML
500 SYRINGE INTRAVENOUS AS NEEDED
Status: CANCELLED | OUTPATIENT
Start: 2024-02-12

## 2024-02-12 RX ORDER — HEPARIN SODIUM,PORCINE/PF 10 UNIT/ML
50 SYRINGE (ML) INTRAVENOUS AS NEEDED
Status: CANCELLED | OUTPATIENT
Start: 2024-02-12

## 2024-02-12 RX ORDER — HEPARIN 100 UNIT/ML
500 SYRINGE INTRAVENOUS AS NEEDED
Status: DISCONTINUED | OUTPATIENT
Start: 2024-02-12 | End: 2024-02-12 | Stop reason: HOSPADM

## 2024-02-12 RX ADMIN — HEPARIN 500 UNITS: 100 SYRINGE at 09:15

## 2024-02-12 ASSESSMENT — PAIN SCALES - GENERAL: PAINLEVEL: 0-NO PAIN

## 2024-02-12 NOTE — PROGRESS NOTES
Cancer History:  DIAGNOSIS: Appendiceal adenocarcinoma    STAGING: pT4a pN1a Mx    CURRENT SITES OF DISEASE:    MOLECULAR/GENOMICS:  MMR-intact    ctDNA Signatera:  8/14/23: (0) negative  9/25/23: (0) negative  11/6/23: (0) negative  1/2/24: (0) negative    TUMOR MARKER:  5/15/23 CEA: <0.5  8/14/23 CEA: <0.5  11/7/23 CEA: <0.5  12/18/23 CEA: 1.3  1/2/24 CEA: 0.5  1/29/24 CEA: <0.5    CURRENT THERAPY:  Adjuvant FOLFOX every 2 weeks x 12 cycles started on 8/29/23; Dose reduction of Oxaliplatin with C4 due to lasting cold sensitivity. Dose reduced Oxaliplatin with C7 due to increasing neuropathy    PREVIOUS THERAPY:  6/20/23: S/p R hemicolectomy    ONCOLOGIC ISSUES:    PROVIDERS:  CRS: Jazmin Faulkner    HISTORY:   4/2023: presented with R inguinal pain. Thought to have a hernia.  4/17/23: CT abdomen pelvis showed indeterminate masslike lesion at the cecum posteriorly at the expected level of the appendix measuring 3 x 4 cm in dimension.  Also within the left pelvis near the origin of the left inguinal canal there is an indeterminate cavitary mass measuring 2.5 x 2 cm  5/5/23: Underwent colonoscopy.  Report not available.  Pathology of cecal/appendiceal orifice mass biopsy showed poorly differentiated adenocarcinoma with signet ring cell differentiation  5/17/23: CT chest abdomen pelvis showed no evidence of metastatic disease, again noted soft tissue mass near the base of the cecum measuring 4.2 x 2.8 cm, compatible with reported history of appendiceal carcinoma  6/20/23: Underwent right hemicolectomy.  Final pathology showed appendiceal invasive moderately differentiated mucinous adenocarcinoma measuring 5 cm.  Tumor invades the visceral peritoneum.  No lymphovascular or perineural invasion. 1/44 LNs involved.  MMR protein expression intact    PMH: HTN, Gilbert's disease, HLD, DM  SH: , no tobacco, EtOH, illicits  FH: Sister and nephew with Rodriguez syndrome.      Subjective    Feeling well, 2 cycles ago, had some  "fatigue and thrush with last cycle. Taste changes are significant. Using nystatin, which does seem to help with the thrush. He would like to have his Mediport removed as soon as feasible after finishing chemotherapy.    No fevers, chills, chest pain, shortness of breath, abdominal pain, nausea, vomiting, or rashes.    ROS as above. Remainder of 10-point review of systems elicited and negative.     Objective:  /79 (BP Location: Left arm, Patient Position: Sitting, BP Cuff Size: Large adult)   Pulse 98   Temp 36.6 °C (97.9 °F)   Resp 16   Ht (S) 1.79 m (5' 10.47\")   Wt 88 kg (193 lb 14.4 oz)   SpO2 100%   BMI 27.45 kg/m²      Daily Weight  01/30/24 : 89.4 kg (197 lb)  01/30/24 : 89.4 kg (197 lb)  01/29/24 : 88.3 kg (194 lb 10.7 oz)  01/18/24 : 89.6 kg (197 lb 9.6 oz)  01/16/24 : 89 kg (196 lb 3.2 oz)  01/15/24 : 88 kg (193 lb 14.4 oz)  01/08/24 : 88 kg (194 lb)      Physical Exam  Gen: A&O, NAD  Head: Normocephalic, atraumatic  Eyes: no scleral icterus  ENT: mucous membranes moist, no oropharyngeal lesions, + thrush  Resp: Lungs CTAB  Cardiac: Tachycardiac, regular rhythm, no murmurs appreciated  Abdomen: Soft, nondistended, nontender, +BS  Neuro: CNII-XII grossly intact  Psych: appropriate mood & affect  Skin: warm, dry, no apparent rashes     ECOG Performance Status: 0     Assessment/Plan   Mr. 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 Faulkner.     He presents today for discussion of adjuvant chemotherapy. I personally reviewed the images from the recent CT, which show no evidence of metastatic disease.  I reviewed the results of his pathology synoptic report, which show pT4a pN1a, Stage III cancer.     I discussed with him and his wife at length that given the stage of his appendiceal cancer, I recommend systemic chemotherapy.  The options are for FOLFOX versus capecitabine plus oxaliplatin.  Given the high risk nature of the diagnosis, my " preference is for 6 months of adjuvant chemotherapy, and FOLFOX is better tolerated for this duration.    I discussed the risks and benefits and side effect profile of FOLFOX chemotherapy, and he agrees to proceed.    After 1st cycle of FOLFOX, found to have extensive PE throughout B/L lungs including saddle embolus of main pulmonary artery. On 9/5/23 he had aspiration thrombectomy and was placed on Apixaban. Discussed admission with Dr. Webb. Ok to proceed with chemotherapy as long as patient is feeling well.     10/9/23: After 2nd cycle, he experienced fatigue, cold sensitivity, nausea and diarrhea.    10/23/23: Having more cold and now light sensitivity. Will decrease dose of Oxaliplatin.     11/20/23: Improved fatigue. Still with taste and cold sensitivity, which is stable.   12/18/23: Improving energy, taste is worse, + thrush  1/2/24: Doing well, improving energy. Still with thrush but taste is improving. Proceed with chemo today.  2/12/24: Will proceed with final cycle of FOLFOX, will get post-tx CT in approx 3 weeks with RTC 4 weeks then proceed with surveillance    Plan:  Stage III appendiceal cancer  - Plan for 6mo goal of adjuvant chemotherapy with FOLFOX  - Trend Signatera - draw last cycle of chemo  - Proceed with C12 FOLFOX tomorrow, continue dose reduction oxaliplatin to 68mg/m2 d/t neuropathy  - CT in 3-4 weeks with RTC MD  - Pt requesting Mediport removal after CT    Oral thrush  - continue nystatin  - Offered fluconazole, pt would like to continue nystatin for now    Pulmonary Embolism including saddle embolism  - continue on Apixaban  - has follow up with vascular who will manage apixaban

## 2024-02-13 ENCOUNTER — INFUSION (OUTPATIENT)
Dept: HEMATOLOGY/ONCOLOGY | Facility: CLINIC | Age: 69
End: 2024-02-13
Payer: MEDICARE

## 2024-02-13 VITALS
WEIGHT: 196.4 LBS | OXYGEN SATURATION: 96 % | HEART RATE: 101 BPM | RESPIRATION RATE: 18 BRPM | SYSTOLIC BLOOD PRESSURE: 130 MMHG | BODY MASS INDEX: 27.8 KG/M2 | TEMPERATURE: 97.5 F | DIASTOLIC BLOOD PRESSURE: 64 MMHG

## 2024-02-13 DIAGNOSIS — C18.1 CANCER OF APPENDIX METASTATIC TO INTRA-ABDOMINAL LYMPH NODE (MULTI): ICD-10-CM

## 2024-02-13 DIAGNOSIS — C77.2 CANCER OF APPENDIX METASTATIC TO INTRA-ABDOMINAL LYMPH NODE (MULTI): ICD-10-CM

## 2024-02-13 PROCEDURE — 96415 CHEMO IV INFUSION ADDL HR: CPT

## 2024-02-13 PROCEDURE — 96375 TX/PRO/DX INJ NEW DRUG ADDON: CPT | Mod: INF

## 2024-02-13 PROCEDURE — 96413 CHEMO IV INFUSION 1 HR: CPT

## 2024-02-13 PROCEDURE — 96416 CHEMO PROLONG INFUSE W/PUMP: CPT

## 2024-02-13 PROCEDURE — 2500000004 HC RX 250 GENERAL PHARMACY W/ HCPCS (ALT 636 FOR OP/ED): Mod: JZ | Performed by: STUDENT IN AN ORGANIZED HEALTH CARE EDUCATION/TRAINING PROGRAM

## 2024-02-13 PROCEDURE — 2500000004 HC RX 250 GENERAL PHARMACY W/ HCPCS (ALT 636 FOR OP/ED): Performed by: STUDENT IN AN ORGANIZED HEALTH CARE EDUCATION/TRAINING PROGRAM

## 2024-02-13 RX ORDER — EPINEPHRINE 0.3 MG/.3ML
0.3 INJECTION SUBCUTANEOUS EVERY 5 MIN PRN
Status: CANCELLED | OUTPATIENT
Start: 2024-02-13

## 2024-02-13 RX ORDER — PROCHLORPERAZINE MALEATE 10 MG
10 TABLET ORAL EVERY 6 HOURS PRN
Status: DISCONTINUED | OUTPATIENT
Start: 2024-02-13 | End: 2024-02-13 | Stop reason: HOSPADM

## 2024-02-13 RX ORDER — PALONOSETRON 0.05 MG/ML
0.25 INJECTION, SOLUTION INTRAVENOUS ONCE
Status: COMPLETED | OUTPATIENT
Start: 2024-02-13 | End: 2024-02-13

## 2024-02-13 RX ORDER — LORAZEPAM 2 MG/ML
1 INJECTION INTRAMUSCULAR AS NEEDED
Status: CANCELLED | OUTPATIENT
Start: 2024-02-13

## 2024-02-13 RX ORDER — FAMOTIDINE 10 MG/ML
20 INJECTION INTRAVENOUS ONCE AS NEEDED
Status: CANCELLED | OUTPATIENT
Start: 2024-02-13

## 2024-02-13 RX ORDER — EPINEPHRINE 0.3 MG/.3ML
0.3 INJECTION SUBCUTANEOUS EVERY 5 MIN PRN
Status: DISCONTINUED | OUTPATIENT
Start: 2024-02-13 | End: 2024-02-13 | Stop reason: HOSPADM

## 2024-02-13 RX ORDER — HEPARIN SODIUM,PORCINE/PF 10 UNIT/ML
50 SYRINGE (ML) INTRAVENOUS AS NEEDED
Status: DISCONTINUED | OUTPATIENT
Start: 2024-02-13 | End: 2024-02-13 | Stop reason: HOSPADM

## 2024-02-13 RX ORDER — DIPHENHYDRAMINE HYDROCHLORIDE 50 MG/ML
50 INJECTION INTRAMUSCULAR; INTRAVENOUS AS NEEDED
Status: CANCELLED | OUTPATIENT
Start: 2024-02-13

## 2024-02-13 RX ORDER — PALONOSETRON 0.05 MG/ML
0.25 INJECTION, SOLUTION INTRAVENOUS ONCE
Status: CANCELLED | OUTPATIENT
Start: 2024-02-13

## 2024-02-13 RX ORDER — HEPARIN SODIUM,PORCINE/PF 10 UNIT/ML
50 SYRINGE (ML) INTRAVENOUS AS NEEDED
Status: CANCELLED | OUTPATIENT
Start: 2024-02-13

## 2024-02-13 RX ORDER — PROCHLORPERAZINE MALEATE 10 MG
10 TABLET ORAL EVERY 6 HOURS PRN
Status: CANCELLED | OUTPATIENT
Start: 2024-02-13

## 2024-02-13 RX ORDER — HEPARIN 100 UNIT/ML
500 SYRINGE INTRAVENOUS AS NEEDED
Status: DISCONTINUED | OUTPATIENT
Start: 2024-02-13 | End: 2024-02-13 | Stop reason: HOSPADM

## 2024-02-13 RX ORDER — PROCHLORPERAZINE EDISYLATE 5 MG/ML
10 INJECTION INTRAMUSCULAR; INTRAVENOUS EVERY 6 HOURS PRN
Status: DISCONTINUED | OUTPATIENT
Start: 2024-02-13 | End: 2024-02-13 | Stop reason: HOSPADM

## 2024-02-13 RX ORDER — LORAZEPAM 2 MG/ML
1 INJECTION INTRAMUSCULAR AS NEEDED
Status: DISCONTINUED | OUTPATIENT
Start: 2024-02-13 | End: 2024-02-13 | Stop reason: HOSPADM

## 2024-02-13 RX ORDER — PROCHLORPERAZINE EDISYLATE 5 MG/ML
10 INJECTION INTRAMUSCULAR; INTRAVENOUS EVERY 6 HOURS PRN
Status: CANCELLED | OUTPATIENT
Start: 2024-02-13

## 2024-02-13 RX ORDER — FAMOTIDINE 10 MG/ML
20 INJECTION INTRAVENOUS ONCE AS NEEDED
Status: DISCONTINUED | OUTPATIENT
Start: 2024-02-13 | End: 2024-02-13 | Stop reason: HOSPADM

## 2024-02-13 RX ORDER — ALBUTEROL SULFATE 0.83 MG/ML
3 SOLUTION RESPIRATORY (INHALATION) AS NEEDED
Status: CANCELLED | OUTPATIENT
Start: 2024-02-13

## 2024-02-13 RX ORDER — DIPHENHYDRAMINE HYDROCHLORIDE 50 MG/ML
50 INJECTION INTRAMUSCULAR; INTRAVENOUS AS NEEDED
Status: DISCONTINUED | OUTPATIENT
Start: 2024-02-13 | End: 2024-02-13 | Stop reason: HOSPADM

## 2024-02-13 RX ORDER — ALBUTEROL SULFATE 0.83 MG/ML
3 SOLUTION RESPIRATORY (INHALATION) AS NEEDED
Status: DISCONTINUED | OUTPATIENT
Start: 2024-02-13 | End: 2024-02-13 | Stop reason: HOSPADM

## 2024-02-13 RX ORDER — HEPARIN 100 UNIT/ML
500 SYRINGE INTRAVENOUS AS NEEDED
Status: CANCELLED | OUTPATIENT
Start: 2024-02-13

## 2024-02-13 RX ADMIN — DEXAMETHASONE SODIUM PHOSPHATE 12 MG: 10 INJECTION, SOLUTION INTRAMUSCULAR; INTRAVENOUS at 08:36

## 2024-02-13 RX ADMIN — OXALIPLATIN 145 MG: 5 INJECTION, SOLUTION INTRAVENOUS at 09:04

## 2024-02-13 RX ADMIN — PALONOSETRON HYDROCHLORIDE 250 MCG: 0.25 INJECTION INTRAVENOUS at 08:36

## 2024-02-13 RX ADMIN — FLUOROURACIL 5100 MG: 50 INJECTION, SOLUTION INTRAVENOUS at 11:23

## 2024-02-13 ASSESSMENT — PAIN SCALES - GENERAL: PAINLEVEL: 0-NO PAIN

## 2024-02-15 ENCOUNTER — INFUSION (OUTPATIENT)
Dept: HEMATOLOGY/ONCOLOGY | Facility: CLINIC | Age: 69
End: 2024-02-15
Payer: MEDICARE

## 2024-02-15 VITALS — HEART RATE: 92 BPM | TEMPERATURE: 97.3 F | DIASTOLIC BLOOD PRESSURE: 70 MMHG | SYSTOLIC BLOOD PRESSURE: 141 MMHG

## 2024-02-15 DIAGNOSIS — C77.2 CANCER OF APPENDIX METASTATIC TO INTRA-ABDOMINAL LYMPH NODE (MULTI): ICD-10-CM

## 2024-02-15 DIAGNOSIS — C18.1 CANCER OF APPENDIX METASTATIC TO INTRA-ABDOMINAL LYMPH NODE (MULTI): ICD-10-CM

## 2024-02-15 PROCEDURE — 96523 IRRIG DRUG DELIVERY DEVICE: CPT

## 2024-02-15 PROCEDURE — 2500000004 HC RX 250 GENERAL PHARMACY W/ HCPCS (ALT 636 FOR OP/ED): Performed by: STUDENT IN AN ORGANIZED HEALTH CARE EDUCATION/TRAINING PROGRAM

## 2024-02-15 RX ORDER — HEPARIN 100 UNIT/ML
500 SYRINGE INTRAVENOUS AS NEEDED
Status: CANCELLED | OUTPATIENT
Start: 2024-02-15

## 2024-02-15 RX ORDER — HEPARIN 100 UNIT/ML
500 SYRINGE INTRAVENOUS AS NEEDED
Status: DISCONTINUED | OUTPATIENT
Start: 2024-02-15 | End: 2024-02-15 | Stop reason: HOSPADM

## 2024-02-15 RX ORDER — HEPARIN SODIUM,PORCINE/PF 10 UNIT/ML
50 SYRINGE (ML) INTRAVENOUS AS NEEDED
Status: CANCELLED | OUTPATIENT
Start: 2024-02-15

## 2024-02-15 RX ADMIN — HEPARIN 500 UNITS: 100 SYRINGE at 09:36

## 2024-02-19 ENCOUNTER — TELEPHONE (OUTPATIENT)
Dept: HEMATOLOGY/ONCOLOGY | Facility: HOSPITAL | Age: 69
End: 2024-02-19
Payer: MEDICARE

## 2024-02-19 NOTE — TELEPHONE ENCOUNTER
Left a voicemail to inform Mr. Abarca that his signatera results were negative and that I would mail the results to him.

## 2024-03-04 ENCOUNTER — INFUSION (OUTPATIENT)
Dept: HEMATOLOGY/ONCOLOGY | Facility: CLINIC | Age: 69
End: 2024-03-04
Payer: MEDICARE

## 2024-03-04 ENCOUNTER — HOSPITAL ENCOUNTER (OUTPATIENT)
Dept: RADIOLOGY | Facility: HOSPITAL | Age: 69
Discharge: HOME | End: 2024-03-04
Payer: MEDICARE

## 2024-03-04 DIAGNOSIS — C77.2 CANCER OF APPENDIX METASTATIC TO INTRA-ABDOMINAL LYMPH NODE (MULTI): ICD-10-CM

## 2024-03-04 DIAGNOSIS — C18.1 CANCER OF APPENDIX METASTATIC TO INTRA-ABDOMINAL LYMPH NODE (MULTI): ICD-10-CM

## 2024-03-04 PROCEDURE — 74177 CT ABD & PELVIS W/CONTRAST: CPT

## 2024-03-04 PROCEDURE — 74177 CT ABD & PELVIS W/CONTRAST: CPT | Performed by: STUDENT IN AN ORGANIZED HEALTH CARE EDUCATION/TRAINING PROGRAM

## 2024-03-04 PROCEDURE — 2550000001 HC RX 255 CONTRASTS: Performed by: STUDENT IN AN ORGANIZED HEALTH CARE EDUCATION/TRAINING PROGRAM

## 2024-03-04 PROCEDURE — 2500000004 HC RX 250 GENERAL PHARMACY W/ HCPCS (ALT 636 FOR OP/ED): Performed by: RADIOLOGY

## 2024-03-04 PROCEDURE — 71260 CT THORAX DX C+: CPT | Performed by: STUDENT IN AN ORGANIZED HEALTH CARE EDUCATION/TRAINING PROGRAM

## 2024-03-04 PROCEDURE — 96523 IRRIG DRUG DELIVERY DEVICE: CPT

## 2024-03-04 RX ORDER — HEPARIN SODIUM,PORCINE/PF 10 UNIT/ML
50 SYRINGE (ML) INTRAVENOUS AS NEEDED
Status: CANCELLED | OUTPATIENT
Start: 2024-03-04

## 2024-03-04 RX ORDER — HEPARIN 100 UNIT/ML
500 SYRINGE INTRAVENOUS ONCE
Status: COMPLETED | OUTPATIENT
Start: 2024-03-04 | End: 2024-03-04

## 2024-03-04 RX ORDER — HEPARIN 100 UNIT/ML
100 SYRINGE INTRAVENOUS AS NEEDED
Status: DISCONTINUED | OUTPATIENT
Start: 2024-03-04 | End: 2024-03-04

## 2024-03-04 RX ORDER — HEPARIN 100 UNIT/ML
500 SYRINGE INTRAVENOUS AS NEEDED
Status: DISCONTINUED | OUTPATIENT
Start: 2024-03-04 | End: 2024-03-04 | Stop reason: HOSPADM

## 2024-03-04 RX ORDER — HEPARIN SODIUM,PORCINE/PF 10 UNIT/ML
50 SYRINGE (ML) INTRAVENOUS AS NEEDED
Status: DISCONTINUED | OUTPATIENT
Start: 2024-03-04 | End: 2024-03-04 | Stop reason: HOSPADM

## 2024-03-04 RX ORDER — HEPARIN 100 UNIT/ML
500 SYRINGE INTRAVENOUS AS NEEDED
Status: CANCELLED | OUTPATIENT
Start: 2024-03-04

## 2024-03-04 RX ADMIN — SODIUM CHLORIDE, PRESERVATIVE FREE 500 UNITS: 5 INJECTION INTRAVENOUS at 08:26

## 2024-03-04 RX ADMIN — IOHEXOL 75 ML: 350 INJECTION, SOLUTION INTRAVENOUS at 08:07

## 2024-03-11 ENCOUNTER — APPOINTMENT (OUTPATIENT)
Dept: HEMATOLOGY/ONCOLOGY | Facility: HOSPITAL | Age: 69
End: 2024-03-11
Payer: MEDICARE

## 2024-03-11 ENCOUNTER — LAB (OUTPATIENT)
Dept: HEMATOLOGY/ONCOLOGY | Facility: HOSPITAL | Age: 69
End: 2024-03-11
Payer: MEDICARE

## 2024-03-11 ENCOUNTER — OFFICE VISIT (OUTPATIENT)
Dept: HEMATOLOGY/ONCOLOGY | Facility: HOSPITAL | Age: 69
End: 2024-03-11
Payer: MEDICARE

## 2024-03-11 VITALS
RESPIRATION RATE: 18 BRPM | HEART RATE: 101 BPM | TEMPERATURE: 98.2 F | DIASTOLIC BLOOD PRESSURE: 64 MMHG | OXYGEN SATURATION: 97 % | WEIGHT: 194.89 LBS | SYSTOLIC BLOOD PRESSURE: 128 MMHG | BODY MASS INDEX: 27.59 KG/M2

## 2024-03-11 DIAGNOSIS — C18.1 CANCER OF APPENDIX METASTATIC TO INTRA-ABDOMINAL LYMPH NODE (MULTI): Primary | ICD-10-CM

## 2024-03-11 DIAGNOSIS — B37.81 CANDIDIASIS OF MOUTH AND ESOPHAGUS (MULTI): ICD-10-CM

## 2024-03-11 DIAGNOSIS — B37.0 CANDIDIASIS OF MOUTH AND ESOPHAGUS (MULTI): ICD-10-CM

## 2024-03-11 DIAGNOSIS — C77.2 CANCER OF APPENDIX METASTATIC TO INTRA-ABDOMINAL LYMPH NODE (MULTI): Primary | ICD-10-CM

## 2024-03-11 DIAGNOSIS — C77.2 CANCER OF APPENDIX METASTATIC TO INTRA-ABDOMINAL LYMPH NODE (MULTI): ICD-10-CM

## 2024-03-11 DIAGNOSIS — C18.1 CANCER OF APPENDIX METASTATIC TO INTRA-ABDOMINAL LYMPH NODE (MULTI): ICD-10-CM

## 2024-03-11 LAB
ALBUMIN SERPL BCP-MCNC: 4.2 G/DL (ref 3.4–5)
ALP SERPL-CCNC: 51 U/L (ref 33–136)
ALT SERPL W P-5'-P-CCNC: 21 U/L (ref 10–52)
ANION GAP SERPL CALC-SCNC: 14 MMOL/L (ref 10–20)
AST SERPL W P-5'-P-CCNC: 27 U/L (ref 9–39)
BASOPHILS # BLD AUTO: 0.02 X10*3/UL (ref 0–0.1)
BASOPHILS NFR BLD AUTO: 0.5 %
BILIRUB SERPL-MCNC: 0.7 MG/DL (ref 0–1.2)
BUN SERPL-MCNC: 20 MG/DL (ref 6–23)
CALCIUM SERPL-MCNC: 9.2 MG/DL (ref 8.6–10.3)
CEA SERPL-MCNC: <0.5 UG/L
CHLORIDE SERPL-SCNC: 104 MMOL/L (ref 98–107)
CO2 SERPL-SCNC: 25 MMOL/L (ref 21–32)
CREAT SERPL-MCNC: 1.17 MG/DL (ref 0.5–1.3)
EGFRCR SERPLBLD CKD-EPI 2021: 68 ML/MIN/1.73M*2
EOSINOPHIL # BLD AUTO: 0.02 X10*3/UL (ref 0–0.7)
EOSINOPHIL NFR BLD AUTO: 0.5 %
ERYTHROCYTE [DISTWIDTH] IN BLOOD BY AUTOMATED COUNT: 15.8 % (ref 11.5–14.5)
GLUCOSE SERPL-MCNC: 228 MG/DL (ref 74–99)
HCT VFR BLD AUTO: 34.1 % (ref 41–52)
HGB BLD-MCNC: 11.2 G/DL (ref 13.5–17.5)
IMM GRANULOCYTES # BLD AUTO: 0.01 X10*3/UL (ref 0–0.7)
IMM GRANULOCYTES NFR BLD AUTO: 0.3 % (ref 0–0.9)
LYMPHOCYTES # BLD AUTO: 1.15 X10*3/UL (ref 1.2–4.8)
LYMPHOCYTES NFR BLD AUTO: 29.6 %
MCH RBC QN AUTO: 29.5 PG (ref 26–34)
MCHC RBC AUTO-ENTMCNC: 32.8 G/DL (ref 32–36)
MCV RBC AUTO: 90 FL (ref 80–100)
MONOCYTES # BLD AUTO: 0.48 X10*3/UL (ref 0.1–1)
MONOCYTES NFR BLD AUTO: 12.4 %
NEUTROPHILS # BLD AUTO: 2.2 X10*3/UL (ref 1.2–7.7)
NEUTROPHILS NFR BLD AUTO: 56.7 %
NRBC BLD-RTO: 0 /100 WBCS (ref 0–0)
PLATELET # BLD AUTO: 178 X10*3/UL (ref 150–450)
POTASSIUM SERPL-SCNC: 3.8 MMOL/L (ref 3.5–5.3)
PROT SERPL-MCNC: 7.1 G/DL (ref 6.4–8.2)
RBC # BLD AUTO: 3.8 X10*6/UL (ref 4.5–5.9)
SODIUM SERPL-SCNC: 139 MMOL/L (ref 136–145)
WBC # BLD AUTO: 3.9 X10*3/UL (ref 4.4–11.3)

## 2024-03-11 PROCEDURE — 36591 DRAW BLOOD OFF VENOUS DEVICE: CPT

## 2024-03-11 PROCEDURE — 99214 OFFICE O/P EST MOD 30 MIN: CPT | Performed by: STUDENT IN AN ORGANIZED HEALTH CARE EDUCATION/TRAINING PROGRAM

## 2024-03-11 PROCEDURE — 82378 CARCINOEMBRYONIC ANTIGEN: CPT | Performed by: STUDENT IN AN ORGANIZED HEALTH CARE EDUCATION/TRAINING PROGRAM

## 2024-03-11 PROCEDURE — 3074F SYST BP LT 130 MM HG: CPT | Performed by: STUDENT IN AN ORGANIZED HEALTH CARE EDUCATION/TRAINING PROGRAM

## 2024-03-11 PROCEDURE — 1160F RVW MEDS BY RX/DR IN RCRD: CPT | Performed by: STUDENT IN AN ORGANIZED HEALTH CARE EDUCATION/TRAINING PROGRAM

## 2024-03-11 PROCEDURE — 4010F ACE/ARB THERAPY RXD/TAKEN: CPT | Performed by: STUDENT IN AN ORGANIZED HEALTH CARE EDUCATION/TRAINING PROGRAM

## 2024-03-11 PROCEDURE — 1157F ADVNC CARE PLAN IN RCRD: CPT | Performed by: STUDENT IN AN ORGANIZED HEALTH CARE EDUCATION/TRAINING PROGRAM

## 2024-03-11 PROCEDURE — 85025 COMPLETE CBC W/AUTO DIFF WBC: CPT

## 2024-03-11 PROCEDURE — 1159F MED LIST DOCD IN RCRD: CPT | Performed by: STUDENT IN AN ORGANIZED HEALTH CARE EDUCATION/TRAINING PROGRAM

## 2024-03-11 PROCEDURE — 2500000004 HC RX 250 GENERAL PHARMACY W/ HCPCS (ALT 636 FOR OP/ED): Performed by: STUDENT IN AN ORGANIZED HEALTH CARE EDUCATION/TRAINING PROGRAM

## 2024-03-11 PROCEDURE — 84075 ASSAY ALKALINE PHOSPHATASE: CPT

## 2024-03-11 PROCEDURE — 3078F DIAST BP <80 MM HG: CPT | Performed by: STUDENT IN AN ORGANIZED HEALTH CARE EDUCATION/TRAINING PROGRAM

## 2024-03-11 PROCEDURE — 3008F BODY MASS INDEX DOCD: CPT | Performed by: STUDENT IN AN ORGANIZED HEALTH CARE EDUCATION/TRAINING PROGRAM

## 2024-03-11 PROCEDURE — 1036F TOBACCO NON-USER: CPT | Performed by: STUDENT IN AN ORGANIZED HEALTH CARE EDUCATION/TRAINING PROGRAM

## 2024-03-11 PROCEDURE — 1126F AMNT PAIN NOTED NONE PRSNT: CPT | Performed by: STUDENT IN AN ORGANIZED HEALTH CARE EDUCATION/TRAINING PROGRAM

## 2024-03-11 RX ORDER — FLUCONAZOLE 200 MG/1
200 TABLET ORAL DAILY
Qty: 7 TABLET | Refills: 0 | Status: SHIPPED | OUTPATIENT
Start: 2024-03-11 | End: 2024-03-25

## 2024-03-11 RX ORDER — LANCETS
EACH MISCELLANEOUS
COMMUNITY
Start: 2024-02-17

## 2024-03-11 RX ORDER — HEPARIN 100 UNIT/ML
500 SYRINGE INTRAVENOUS AS NEEDED
Status: CANCELLED | OUTPATIENT
Start: 2024-03-11

## 2024-03-11 RX ORDER — NYSTATIN 100000 [USP'U]/ML
SUSPENSION ORAL
COMMUNITY
Start: 2024-02-17 | End: 2024-04-03 | Stop reason: WASHOUT

## 2024-03-11 RX ORDER — HEPARIN SODIUM,PORCINE/PF 10 UNIT/ML
50 SYRINGE (ML) INTRAVENOUS AS NEEDED
Status: CANCELLED | OUTPATIENT
Start: 2024-03-11

## 2024-03-11 RX ORDER — HEPARIN 100 UNIT/ML
500 SYRINGE INTRAVENOUS AS NEEDED
Status: DISCONTINUED | OUTPATIENT
Start: 2024-03-11 | End: 2024-03-11 | Stop reason: HOSPADM

## 2024-03-11 RX ADMIN — HEPARIN 500 UNITS: 100 SYRINGE at 15:01

## 2024-03-11 ASSESSMENT — PAIN SCALES - GENERAL: PAINLEVEL: 0-NO PAIN

## 2024-03-11 NOTE — PROGRESS NOTES
Cancer History:  DIAGNOSIS: Appendiceal adenocarcinoma    STAGING: pT4a pN1a Mx    CURRENT SITES OF DISEASE:    MOLECULAR/GENOMICS:  MMR-intact    ctDNA Signatera:  8/14/23: (0) negative  9/25/23: (0) negative  11/6/23: (0) negative  1/2/24: (0) negative    TUMOR MARKER:  5/15/23 CEA: <0.5  8/14/23 CEA: <0.5  11/7/23 CEA: <0.5  12/18/23 CEA: 1.3  1/2/24 CEA: 0.5  1/29/24 CEA: <0.5    CURRENT THERAPY:  8/29/23--2/13/24: Adjuvant FOLFOX every 2 weeks x 12 cycles; Dose reduction of Oxaliplatin with C4 due to lasting cold sensitivity. Dose reduced Oxaliplatin with C7 due to increasing neuropathy    PREVIOUS THERAPY:  6/20/23: S/p R hemicolectomy    ONCOLOGIC ISSUES:    PROVIDERS:  CRS: Jazmin Faulkner    HISTORY:   4/2023: presented with R inguinal pain. Thought to have a hernia.  4/17/23: CT abdomen pelvis showed indeterminate masslike lesion at the cecum posteriorly at the expected level of the appendix measuring 3 x 4 cm in dimension.  Also within the left pelvis near the origin of the left inguinal canal there is an indeterminate cavitary mass measuring 2.5 x 2 cm  5/5/23: Underwent colonoscopy.  Report not available.  Pathology of cecal/appendiceal orifice mass biopsy showed poorly differentiated adenocarcinoma with signet ring cell differentiation  5/17/23: CT chest abdomen pelvis showed no evidence of metastatic disease, again noted soft tissue mass near the base of the cecum measuring 4.2 x 2.8 cm, compatible with reported history of appendiceal carcinoma  6/20/23: Underwent right hemicolectomy.  Final pathology showed appendiceal invasive moderately differentiated mucinous adenocarcinoma measuring 5 cm.  Tumor invades the visceral peritoneum.  No lymphovascular or perineural invasion. 1/44 LNs involved.  MMR protein expression intact    PMH: HTN, Gilbert's disease, HLD, DM  SH: , no tobacco, EtOH, illicits  FH: Sister and nephew with Rodriguez syndrome.      Subjective    Continues to have some neuropathy and  cold sensitivity, but improving slowly every day, taking B complex vitamins, and feels like it helps. Continues to have thrush, and asking for stronger thrush medications. He does report pulsing sensation in his legs with bending his neck.    No fevers, chills, chest pain, shortness of breath, abdominal pain, nausea, vomiting, or rashes.    ROS as above. Remainder of 10-point review of systems elicited and negative.     Objective:  /64 (BP Location: Left arm, Patient Position: Sitting)   Pulse 101   Temp 36.8 °C (98.2 °F)   Resp 18   Wt 88.4 kg (194 lb 14.2 oz)   SpO2 97%   BMI 27.59 kg/m²      Daily Weight  02/13/24 : 89.1 kg (196 lb 6.4 oz)  02/12/24 : 88 kg (193 lb 14.4 oz)  01/30/24 : 89.4 kg (197 lb)  01/30/24 : 89.4 kg (197 lb)  01/29/24 : 88.3 kg (194 lb 10.7 oz)  01/18/24 : 89.6 kg (197 lb 9.6 oz)  01/16/24 : 89 kg (196 lb 3.2 oz)      Physical Exam  Gen: A&O, NAD  Head: Normocephalic, atraumatic  Eyes: no scleral icterus  ENT: mucous membranes moist, no oropharyngeal lesions, + thrush  Resp: Lungs CTAB  Cardiac: Tachycardiac, regular rhythm, no murmurs appreciated  Abdomen: Soft, nondistended, nontender, +BS  Neuro: CNII-XII grossly intact  Psych: appropriate mood & affect  Skin: warm, dry, no apparent rashes     ECOG Performance Status: 0     CT C/A/P 3/4/24:  CHEST:  1. No intrathoracic metastatic disease.  2. Previously seen multifocal bilateral pulmonary emboli are not  visualized in today's study, likely resolved. To note, the study is  not tailored to evaluate the pulmonary vasculature.      ABDOMEN-PELVIS:  1. Status post right hemicolectomy with no gross soft tissue masses  at the site of anastomosis. No metastatic disease in the abdomen or  pelvis.  2. Mild hepatic steatosis.  3. Uncomplicated cholelithiasis. Uncomplicated sigmoid diverticulosis    Assessment/Plan   Mr. 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 Faulkner.     He  presents today for discussion of adjuvant chemotherapy. I personally reviewed the images from the recent CT, which show no evidence of metastatic disease.  I reviewed the results of his pathology synoptic report, which show pT4a pN1a, Stage III cancer.     I discussed with him and his wife at length that given the stage of his appendiceal cancer, I recommend systemic chemotherapy.  The options are for FOLFOX versus capecitabine plus oxaliplatin.  Given the high risk nature of the diagnosis, my preference is for 6 months of adjuvant chemotherapy, and FOLFOX is better tolerated for this duration.    I discussed the risks and benefits and side effect profile of FOLFOX chemotherapy, and he agrees to proceed.    After 1st cycle of FOLFOX, found to have extensive PE throughout B/L lungs including saddle embolus of main pulmonary artery. On 9/5/23 he had aspiration thrombectomy and was placed on Apixaban. Discussed admission with Dr. Webb. Ok to proceed with chemotherapy as long as patient is feeling well.     10/9/23: After 2nd cycle, he experienced fatigue, cold sensitivity, nausea and diarrhea.    10/23/23: Having more cold and now light sensitivity. Will decrease dose of Oxaliplatin.     11/20/23: Improved fatigue. Still with taste and cold sensitivity, which is stable.   12/18/23: Improving energy, taste is worse, + thrush  1/2/24: Doing well, improving energy. Still with thrush but taste is improving. Proceed with chemo today.  2/12/24: Will proceed with final cycle of FOLFOX, will get post-tx CT in approx 3 weeks with RTC 4 weeks then proceed with surveillance  3/11/24: I personally reviewed the images from the recent CT, which show no evidence of disease. CEA undetectable. Will proceed with surveillance.     Plan:  Stage III appendiceal cancer  - S/p 6mo adjuvant chemotherapy with FOLFOX 8/2023-2/2024  - Proceed with surveillance with labs including Signatera & CEA q3mo and CT q6mo for 2 years, then will space  out  - Pt requesting Mediport removal after CT in 6mo  - Needs colonoscopy 6/2024 (1 year from surgery); pt to reach out to his gastroenterologist to schedule  - RTC 3mo with CBC, CMP, CEA, Signatera    Oral thrush  - Fluconazole ordered 3/11/24    Pulmonary Embolism including saddle embolism  - continue on Apixaban  - has follow up with vascular medicine who will manage apixaban

## 2024-03-12 ENCOUNTER — APPOINTMENT (OUTPATIENT)
Dept: HEMATOLOGY/ONCOLOGY | Facility: HOSPITAL | Age: 69
End: 2024-03-12
Payer: MEDICARE

## 2024-03-12 DIAGNOSIS — Z86.010 PERSONAL HISTORY OF COLONIC POLYPS: ICD-10-CM

## 2024-03-12 RX ORDER — SOD SULF/POT CHLORIDE/MAG SULF 1.479 G
TABLET ORAL
Qty: 24 TABLET | Refills: 0 | Status: SHIPPED | OUTPATIENT
Start: 2024-03-12 | End: 2024-04-07 | Stop reason: WASHOUT

## 2024-04-03 PROBLEM — R56.9 SEIZURE (MULTI): Status: ACTIVE | Noted: 2023-09-05

## 2024-04-03 PROBLEM — R09.81 CONGESTION OF PARANASAL SINUS: Status: RESOLVED | Noted: 2024-01-03 | Resolved: 2024-04-03

## 2024-04-03 PROBLEM — H61.23 BILATERAL IMPACTED CERUMEN: Status: RESOLVED | Noted: 2024-01-03 | Resolved: 2024-04-03

## 2024-04-03 PROBLEM — F17.200 NICOTINE DEPENDENCE: Status: RESOLVED | Noted: 2023-06-22 | Resolved: 2024-04-03

## 2024-04-03 PROBLEM — R56.9 UNSPECIFIED CONVULSIONS (MULTI): Status: RESOLVED | Noted: 2023-09-05 | Resolved: 2024-04-03

## 2024-04-03 PROBLEM — R20.0 NUMBNESS: Status: ACTIVE | Noted: 2024-04-03

## 2024-04-03 PROBLEM — R05.9 COUGH: Status: RESOLVED | Noted: 2024-01-03 | Resolved: 2024-04-03

## 2024-04-03 PROBLEM — M54.50 LOW BACK PAIN: Status: RESOLVED | Noted: 2024-01-03 | Resolved: 2024-04-03

## 2024-04-03 PROBLEM — S63.509A SPRAIN OF WRIST: Status: RESOLVED | Noted: 2024-01-03 | Resolved: 2024-04-03

## 2024-04-03 ASSESSMENT — ENCOUNTER SYMPTOMS: NUMBNESS: 1

## 2024-04-03 NOTE — PROGRESS NOTES
Subjective   Bunny Abarca is a 68 y.o. male who presents for follow-up of Type 2 diabetes mellitus. The initial diagnosis of diabetes was made in 1995 .     Since his last appointment, he has completed chemotherapy for adenocarcinoma of the appendix.  His lowest weight was 188 lbs.  He has regained some weight.  He has thrush which is improving.  His sense of smell has returned.  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 The patient is on an ACE inhibitor or angiotensin II receptor blocker.  The patient has not been previously hospitalized due to diabetic ketoacidosis.     Current symptoms/problems include none. His clinical course has been stable.     The patient is currently checking the blood glucose 2 times per day.    Patient is using: glucometer                    Hypoglycemia frequency: Denies   Hypoglycemia awareness: N/A     Previous medications tried:  Victoza - ineffective; did lose weight      Current Regimen  NovoLog 70/30 38 units SQ twice daily   Metformin ER 1000 mg BID  Jardiance 25nmg once daily      MEALS: diminshed appetite   Breakfast - fiber one with various berries   Lunch - omits  Dinner - macaroni and cheese, fish  Smacks = pie  Beverages - coffee with non dairy creamer, filtered water, cranberry juice; Glucerna shakes, whole milk         Review of Systems   Constitutional:  Fever: Lost 20 pounds.   Gastrointestinal:  Positive for diarrhea and nausea.   Genitourinary:         Nocturai every 1.5 hours    Musculoskeletal:  Positive for arthralgias, back pain, myalgias and neck pain.   Neurological:  Positive for numbness.        Tingling    All other systems reviewed and are negative.      Objective   /74 (BP Location: Right  "arm, Patient Position: Sitting, BP Cuff Size: Adult)   Pulse 85   Ht 1.778 m (5' 10\")   BMI 27.96 kg/m²   Physical Exam  Vitals and nursing note reviewed.   Constitutional:       General: He is not in acute distress.     Appearance: Normal appearance. He is normal weight.   HENT:      Head: Normocephalic and atraumatic.      Nose: Nose normal.      Mouth/Throat:      Mouth: Mucous membranes are moist.   Eyes:      Extraocular Movements: Extraocular movements intact.   Cardiovascular:      Rate and Rhythm: Normal rate and regular rhythm.      Pulses:           Dorsalis pedis pulses are 2+ on the right side and 2+ on the left side.   Pulmonary:      Effort: Pulmonary effort is normal.      Breath sounds: Normal breath sounds.   Musculoskeletal:         General: Normal range of motion.      Right foot: Normal range of motion. No deformity.      Left foot: Normal range of motion. No deformity.   Feet:      Right foot:      Protective Sensation: 5 sites tested.        Skin integrity: Skin integrity normal. No ulcer or blister.      Toenail Condition: Right toenails are normal.      Left foot:      Protective Sensation: 5 sites tested.        Skin integrity: Skin integrity normal. No ulcer or blister.      Toenail Condition: Left toenails are normal.   Skin:     General: Skin is warm.   Neurological:      Mental Status: He is alert and oriented to person, place, and time.   Psychiatric:         Mood and Affect: Mood normal.       Lab Review  Glucose (mg/dL)   Date Value   03/11/2024 228 (H)   02/12/2024 200 (H)   01/29/2024 202 (H)     POC HEMOGLOBIN A1c (%)   Date Value   04/04/2024 7.4 (A)     Hemoglobin A1C (%)   Date Value   12/04/2023 8.2 (H)   05/12/2023 7.8 (A)   12/14/2022 8.2 (A)   08/11/2022 8.3 (A)     Bicarbonate (mmol/L)   Date Value   03/11/2024 25   02/12/2024 24   01/29/2024 25     Urea Nitrogen (mg/dL)   Date Value   03/11/2024 20   02/12/2024 18   01/29/2024 17     Creatinine (mg/dL)   Date Value "   03/11/2024 1.17   02/12/2024 0.98   01/29/2024 0.99     Lab Results   Component Value Date    CHOL 136 12/04/2023    CHOL 148 12/14/2022    CHOL 151 08/11/2022     Lab Results   Component Value Date    HDL 56.3 12/04/2023    HDL 42.8 12/14/2022    HDL 39.1 (A) 08/11/2022     Lab Results   Component Value Date    LDLCALC 43 12/04/2023     Lab Results   Component Value Date    TRIG 183 (H) 12/04/2023    TRIG 137 12/14/2022    TRIG 213 (H) 08/11/2022     Health Maintenance:   Foot Exam:  Novmber 28, 2023  Eye Exam:  February 2023  Urine Albumin: December 14, 2022    Assessment/Plan   68 year old male presents for follow up for type II DM. He is noted to have a positive anti-STEPHAN 65 antibody in 2017 but has had very detectable C peptide values. His blood pressure is slightly elevated above goal today. He is noted to be on a statin. He is noted to be on an ARB.     Type 2 diabetes mellitus, with long-term current use of insulin (CMS/[facility])  To continue Metformin ER 1000 mg twice daily   To continue NovoLog 70/30 38 units SQ twice daily before breakfast and before dinner  To continue Jardiance 25mg once daily  Please check blood sugars 2 times daily and keep a detailed log  Counseled that the goal A1C should be 7% or less  Counseled glycemic control is warranted to prevent microvascular complications  Please rotate insulin injection sites      Hypertriglyceridemia  Fish Oil dose should be 4g per day for triglycerides   To continue Atorvastatin 40mg once daily   To continue Fenofibrate 145mg once daily     For follow up in 4-5 months with glucose log

## 2024-04-03 NOTE — PATIENT INSTRUCTIONS
Thank you for choosing [facility]  for your health care needs.  If you have any questions, concerns or medical needs, please feel free to contact our office at [phone].    Please ensure you complete your blood work one week before the next scheduled appointment.    To continue Metformin ER 1000 mg twice daily   To continue NovoLog 70/30 38 units SQ twice daily before breakfast and before dinner  To continue Jardiance 25mg once daily  Counseled glycemic control is warranted to prevent microvascular complications  Please check blood sugars 2 times daily and keep a detailed log  Counseled that the goal A1C should be 7% or less  Counseled glycemic control is warranted to prevent microvascular complications  Fish Oil dose should be 4g per day for triglycerides   For follow up in 4-5 months with glucose log

## 2024-04-04 ENCOUNTER — OFFICE VISIT (OUTPATIENT)
Dept: ENDOCRINOLOGY | Facility: CLINIC | Age: 69
End: 2024-04-04
Payer: MEDICARE

## 2024-04-04 VITALS
SYSTOLIC BLOOD PRESSURE: 142 MMHG | DIASTOLIC BLOOD PRESSURE: 74 MMHG | BODY MASS INDEX: 27.96 KG/M2 | HEIGHT: 70 IN | HEART RATE: 85 BPM

## 2024-04-04 DIAGNOSIS — E78.1 HYPERTRIGLYCERIDEMIA: ICD-10-CM

## 2024-04-04 DIAGNOSIS — E11.9 TYPE 2 DIABETES MELLITUS WITHOUT COMPLICATION, WITHOUT LONG-TERM CURRENT USE OF INSULIN (MULTI): Primary | ICD-10-CM

## 2024-04-04 LAB — POC HEMOGLOBIN A1C: 7.4 % (ref 4.2–6.5)

## 2024-04-04 PROCEDURE — 4010F ACE/ARB THERAPY RXD/TAKEN: CPT | Performed by: INTERNAL MEDICINE

## 2024-04-04 PROCEDURE — 3008F BODY MASS INDEX DOCD: CPT | Performed by: INTERNAL MEDICINE

## 2024-04-04 PROCEDURE — 83036 HEMOGLOBIN GLYCOSYLATED A1C: CPT | Performed by: INTERNAL MEDICINE

## 2024-04-04 PROCEDURE — 99214 OFFICE O/P EST MOD 30 MIN: CPT | Performed by: INTERNAL MEDICINE

## 2024-04-04 PROCEDURE — 3077F SYST BP >= 140 MM HG: CPT | Performed by: INTERNAL MEDICINE

## 2024-04-04 PROCEDURE — 1157F ADVNC CARE PLAN IN RCRD: CPT | Performed by: INTERNAL MEDICINE

## 2024-04-04 PROCEDURE — 1160F RVW MEDS BY RX/DR IN RCRD: CPT | Performed by: INTERNAL MEDICINE

## 2024-04-04 PROCEDURE — 3078F DIAST BP <80 MM HG: CPT | Performed by: INTERNAL MEDICINE

## 2024-04-04 PROCEDURE — 1159F MED LIST DOCD IN RCRD: CPT | Performed by: INTERNAL MEDICINE

## 2024-04-04 RX ORDER — METFORMIN HYDROCHLORIDE 500 MG/1
1000 TABLET, EXTENDED RELEASE ORAL 2 TIMES DAILY
Qty: 120 TABLET | Refills: 5 | Status: SHIPPED | OUTPATIENT
Start: 2024-04-04 | End: 2024-10-01

## 2024-04-04 RX ORDER — EMPAGLIFLOZIN 25 MG/1
25 TABLET, FILM COATED ORAL DAILY
Qty: 30 TABLET | Refills: 5 | Status: SHIPPED | OUTPATIENT
Start: 2024-04-04 | End: 2024-10-01

## 2024-04-04 ASSESSMENT — ENCOUNTER SYMPTOMS
MYALGIAS: 1
NECK PAIN: 1
ARTHRALGIAS: 1
BACK PAIN: 1
DIARRHEA: 1
NAUSEA: 1

## 2024-04-05 PROBLEM — I26.99 PULMONARY EMBOLUS (MULTI): Status: RESOLVED | Noted: 2024-01-03 | Resolved: 2024-04-05

## 2024-04-05 PROBLEM — M79.89 SWELLING OF LOWER EXTREMITY: Status: RESOLVED | Noted: 2023-09-05 | Resolved: 2024-04-05

## 2024-04-05 PROBLEM — G56.21 LESION OF ULNAR NERVE, RIGHT UPPER LIMB: Status: RESOLVED | Noted: 2022-12-22 | Resolved: 2024-04-05

## 2024-04-05 PROBLEM — E66.9 OBESITY, UNSPECIFIED: Status: RESOLVED | Noted: 2023-06-06 | Resolved: 2024-04-05

## 2024-04-05 PROBLEM — I82.409 DEEP VEIN THROMBOSIS (DVT) OF LOWER EXTREMITY (MULTI): Status: RESOLVED | Noted: 2023-09-07 | Resolved: 2024-04-05

## 2024-04-05 PROBLEM — R00.2 PALPITATIONS: Status: RESOLVED | Noted: 2023-09-05 | Resolved: 2024-04-05

## 2024-04-05 PROBLEM — K76.0 FATTY (CHANGE OF) LIVER, NOT ELSEWHERE CLASSIFIED: Status: RESOLVED | Noted: 2023-05-17 | Resolved: 2024-04-05

## 2024-04-05 PROBLEM — R07.9 CHEST PAIN: Status: RESOLVED | Noted: 2023-10-04 | Resolved: 2024-04-05

## 2024-04-05 PROBLEM — I95.9 HYPOTENSION, UNSPECIFIED: Status: RESOLVED | Noted: 2023-09-07 | Resolved: 2024-04-05

## 2024-04-05 PROBLEM — I82.462: Status: RESOLVED | Noted: 2023-09-07 | Resolved: 2024-04-05

## 2024-04-05 PROBLEM — R60.0 EDEMA OF BOTH LOWER EXTREMITIES: Status: RESOLVED | Noted: 2024-01-03 | Resolved: 2024-04-05

## 2024-04-05 PROBLEM — Z85.89 PERSONAL HISTORY OF MALIGNANT NEOPLASM OF OTHER ORGANS AND SYSTEMS: Status: RESOLVED | Noted: 2023-07-26 | Resolved: 2024-04-05

## 2024-04-05 PROBLEM — T50.905A ADVERSE EFFECT OF DRUG: Status: RESOLVED | Noted: 2024-01-03 | Resolved: 2024-04-05

## 2024-04-05 PROBLEM — G47.00 INSOMNIA: Status: RESOLVED | Noted: 2024-01-03 | Resolved: 2024-04-05

## 2024-04-05 PROBLEM — R20.0 NUMBNESS: Status: RESOLVED | Noted: 2024-04-03 | Resolved: 2024-04-05

## 2024-04-05 PROBLEM — M79.673 PAIN OF FOOT: Status: RESOLVED | Noted: 2024-01-03 | Resolved: 2024-04-05

## 2024-04-05 PROBLEM — I82.452: Status: RESOLVED | Noted: 2024-01-03 | Resolved: 2024-04-05

## 2024-04-05 PROBLEM — R06.02 SHORTNESS OF BREATH: Status: RESOLVED | Noted: 2023-10-04 | Resolved: 2024-04-05

## 2024-04-05 PROBLEM — D64.9 ANEMIA: Status: RESOLVED | Noted: 2024-01-03 | Resolved: 2024-04-05

## 2024-04-05 PROBLEM — A08.4 VIRAL GASTROENTERITIS: Status: RESOLVED | Noted: 2023-08-02 | Resolved: 2024-04-05

## 2024-04-05 PROBLEM — I49.9 IRREGULAR HEART RHYTHM: Status: RESOLVED | Noted: 2023-03-08 | Resolved: 2024-04-05

## 2024-04-05 PROBLEM — R20.0 NUMBNESS OF HAND: Status: RESOLVED | Noted: 2024-01-03 | Resolved: 2024-04-05

## 2024-04-05 PROBLEM — N18.9 CHRONIC KIDNEY DISEASE: Status: RESOLVED | Noted: 2022-12-22 | Resolved: 2024-04-05

## 2024-04-05 PROBLEM — G56.01 CARPAL TUNNEL SYNDROME, RIGHT UPPER LIMB: Status: RESOLVED | Noted: 2022-12-22 | Resolved: 2024-04-05

## 2024-04-07 NOTE — ASSESSMENT & PLAN NOTE
To continue Metformin ER 1000 mg twice daily   To continue NovoLog 70/30 38 units SQ twice daily before breakfast and before dinner  To continue Jardiance 25mg once daily  Please check blood sugars 2 times daily and keep a detailed log  Counseled that the goal A1C should be 7% or less  Counseled glycemic control is warranted to prevent microvascular complications  Please rotate insulin injection sites

## 2024-04-08 ENCOUNTER — OFFICE VISIT (OUTPATIENT)
Dept: CARDIOLOGY | Facility: CLINIC | Age: 69
End: 2024-04-08
Payer: MEDICARE

## 2024-04-08 VITALS
BODY MASS INDEX: 28.14 KG/M2 | OXYGEN SATURATION: 97 % | RESPIRATION RATE: 18 BRPM | HEART RATE: 75 BPM | HEIGHT: 71 IN | WEIGHT: 201 LBS | DIASTOLIC BLOOD PRESSURE: 82 MMHG | SYSTOLIC BLOOD PRESSURE: 144 MMHG

## 2024-04-08 DIAGNOSIS — I26.02 ACUTE SADDLE PULMONARY EMBOLISM WITH ACUTE COR PULMONALE (MULTI): Primary | ICD-10-CM

## 2024-04-08 DIAGNOSIS — I48.91 ATRIAL FIBRILLATION, UNSPECIFIED TYPE (MULTI): ICD-10-CM

## 2024-04-08 PROCEDURE — 3008F BODY MASS INDEX DOCD: CPT | Performed by: INTERNAL MEDICINE

## 2024-04-08 PROCEDURE — 3077F SYST BP >= 140 MM HG: CPT | Performed by: INTERNAL MEDICINE

## 2024-04-08 PROCEDURE — 1125F AMNT PAIN NOTED PAIN PRSNT: CPT | Performed by: INTERNAL MEDICINE

## 2024-04-08 PROCEDURE — 99213 OFFICE O/P EST LOW 20 MIN: CPT | Performed by: INTERNAL MEDICINE

## 2024-04-08 PROCEDURE — 1159F MED LIST DOCD IN RCRD: CPT | Performed by: INTERNAL MEDICINE

## 2024-04-08 PROCEDURE — 1157F ADVNC CARE PLAN IN RCRD: CPT | Performed by: INTERNAL MEDICINE

## 2024-04-08 PROCEDURE — 1036F TOBACCO NON-USER: CPT | Performed by: INTERNAL MEDICINE

## 2024-04-08 PROCEDURE — 4010F ACE/ARB THERAPY RXD/TAKEN: CPT | Performed by: INTERNAL MEDICINE

## 2024-04-08 PROCEDURE — 3079F DIAST BP 80-89 MM HG: CPT | Performed by: INTERNAL MEDICINE

## 2024-04-08 PROCEDURE — 1160F RVW MEDS BY RX/DR IN RCRD: CPT | Performed by: INTERNAL MEDICINE

## 2024-04-08 ASSESSMENT — PAIN SCALES - GENERAL: PAINLEVEL: 8

## 2024-04-08 ASSESSMENT — ENCOUNTER SYMPTOMS
LOSS OF SENSATION IN FEET: 1
DEPRESSION: 0
OCCASIONAL FEELINGS OF UNSTEADINESS: 0

## 2024-04-08 NOTE — PROGRESS NOTES
No chief complaint on file.      History of Present Illness:  Bunny Abarca is a/an 68 y.o. who follows up for cancer-associated PE and left common femoral, femoral, popliteal, and calf DVT 9/5/2023. He underwent mechanical thrombectomy with evidence of some chronicity of thrombus. He is on anticoagulation with apixaban. Cancer is appendiceal. He is finished with adjuvant chemo and doing well, with no evidence of disease. Port remains in place.    He denies bleeding including epistaxis, gingival bleeding, hemoptysis, hematemesis, hematochezia, melena, and hematuria.         Past Medical History:   Diagnosis Date    Atrial fibrillation (CMS/[facility]) 03/08/2023    Benign essential hypertension 03/08/2023    Hyperlipidemia 03/08/2023    Obstructive sleep apnea 03/08/2023    Personal history of other diseases of the respiratory system 09/04/2020    History of sore throat    Primary malignant neoplasm of appendix (CMS/[facility]) 10/03/2023    Type 2 diabetes mellitus (CMS/[facility]) 03/08/2023     Past Surgical History:   Procedure Laterality Date    BACK SURGERY  03/28/2016    Back Surgery    CATARACT EXTRACTION  02/15/2018    Cataract Surgery    HERNIA REPAIR  03/28/2016    Hernia Repair    SINUS SURGERY  03/28/2016    Sinus Surgery     Social History     Tobacco Use    Smoking status: Never    Smokeless tobacco: Never   Vaping Use    Vaping Use: Never used   Substance Use Topics    Alcohol use: Not Currently    Drug use: Not Currently     Family History   Problem Relation Name Age of Onset    Congenital heart disease Mother      Hyperlipidemia Father      Hypertension Father      Colon cancer Sister      Other (Multiple System Atrophy) Sister      Other (Cystic Fibrosis Gene Carrier) Sister      Other (Malignant Neoplasm Of Ovary) Sibling       Current Outpatient Medications   Medication Sig Dispense Refill    Accu-Chek Fastclix Lancet Drum misc test blood sugar TWICE DAILY      apixaban (Eliquis) 5 mg tablet TAKE 1 TABLET BY MOUTH  "TWO TIMES A  tablet 1    atorvastatin (Lipitor) 40 mg tablet Take 1 tablet (40 mg) by mouth once daily at bedtime. 90 tablet 1    fenofibrate (Tricor) 145 mg tablet Take 1 tablet (145 mg) by mouth once daily. WITH FOOD 90 tablet 1    insulin asp prt-insulin aspart (NovoLOG Mix 70-30FlexPen U-100) 100 unit/mL (70-30) injection Inject 38 Units under the skin 2 times a day. 15 each 5    Jardiance 25 mg Take 1 tablet (25 mg) by mouth once daily. 30 tablet 5    metFORMIN  mg 24 hr tablet Take 2 tablets (1,000 mg) by mouth 2 times a day. 120 tablet 5    olmesartan (BENIcar) 40 mg tablet Take 1 tablet (40 mg) by mouth once daily. 90 tablet 1    pen needle, diabetic (BD Ultra-Fine Short Pen Needle) 31 gauge x 5/16\" needle Inject 1 each under the skin 2 times a day. 100 each 11     No current facility-administered medications for this visit.       Physical Examination:  Blood pressure 144/82, pulse 75, resp. rate 18, height 1.791 m (5' 10.5\"), weight 91.2 kg (201 lb), SpO2 97 %.  No distress  No JVD or carotid bruits  Lungs clear bilaterally  Heart regular and without murmurs  Abdomen soft and non-tender  No leg swelling  Pulses intact    Pertinent Labs:    Pertinent Imaging:  CTA chest 9/4/2023  IMPRESSION:  Extensive pulmonary emboli demonstrated throughout the pulmonary  arterial tree bilaterally as described including saddle embolus in the  main pulmonary artery.  No distinct evidence for right heart strain at  this time.    Venous duplex ultrasound 9/5/2023   CONCLUSIONS:  Right Lower Venous: No evidence of acute deep vein thrombus visualized in the right lower extremity.  Left Lower Venous: There is acute occlusive deep vein thrombosis visualized in the proximal femoral, mid femoral, distal femoral, popliteal, posterior tibial, peroneal, soleal and gastrocnemius veins in the calf veins. There is acute non-occlusive deep vein thrombosis visualized in the common femoral vein. Positive for superficial venous " thrombosis of the small saphenous vein.    Encounter Diagnosis   Name Primary?    Acute saddle pulmonary embolism with acute cor pulmonale (CMS/HCC) Yes   History of PAF    Continue apixaban 5 mg    Follow up in September.    Beatriz Sanchez MD, MS

## 2024-04-18 DIAGNOSIS — I10 HYPERTENSION, UNSPECIFIED TYPE: ICD-10-CM

## 2024-04-18 RX ORDER — DOXAZOSIN 2 MG/1
2 TABLET ORAL NIGHTLY
Qty: 90 TABLET | Refills: 0 | Status: SHIPPED | OUTPATIENT
Start: 2024-04-18 | End: 2024-06-05 | Stop reason: SDUPTHER

## 2024-04-22 ENCOUNTER — APPOINTMENT (OUTPATIENT)
Dept: HEMATOLOGY/ONCOLOGY | Facility: HOSPITAL | Age: 69
End: 2024-04-22
Payer: MEDICARE

## 2024-04-23 ENCOUNTER — INFUSION (OUTPATIENT)
Dept: HEMATOLOGY/ONCOLOGY | Facility: CLINIC | Age: 69
End: 2024-04-23
Payer: MEDICARE

## 2024-04-23 DIAGNOSIS — C77.2 CANCER OF APPENDIX METASTATIC TO INTRA-ABDOMINAL LYMPH NODE (MULTI): ICD-10-CM

## 2024-04-23 DIAGNOSIS — C18.1 CANCER OF APPENDIX METASTATIC TO INTRA-ABDOMINAL LYMPH NODE (MULTI): ICD-10-CM

## 2024-04-23 PROCEDURE — 2500000004 HC RX 250 GENERAL PHARMACY W/ HCPCS (ALT 636 FOR OP/ED): Performed by: STUDENT IN AN ORGANIZED HEALTH CARE EDUCATION/TRAINING PROGRAM

## 2024-04-23 PROCEDURE — 96523 IRRIG DRUG DELIVERY DEVICE: CPT

## 2024-04-23 PROCEDURE — 36591 DRAW BLOOD OFF VENOUS DEVICE: CPT

## 2024-04-23 RX ORDER — HEPARIN SODIUM,PORCINE/PF 10 UNIT/ML
50 SYRINGE (ML) INTRAVENOUS AS NEEDED
OUTPATIENT
Start: 2024-04-23

## 2024-04-23 RX ORDER — HEPARIN 100 UNIT/ML
500 SYRINGE INTRAVENOUS AS NEEDED
Status: DISCONTINUED | OUTPATIENT
Start: 2024-04-23 | End: 2024-04-23 | Stop reason: HOSPADM

## 2024-04-23 RX ORDER — HEPARIN 100 UNIT/ML
500 SYRINGE INTRAVENOUS AS NEEDED
Status: CANCELLED | OUTPATIENT
Start: 2024-04-23

## 2024-04-23 RX ADMIN — HEPARIN 500 UNITS: 100 SYRINGE at 14:39

## 2024-04-26 ENCOUNTER — TELEPHONE (OUTPATIENT)
Dept: ENDOCRINOLOGY | Facility: CLINIC | Age: 69
End: 2024-04-26
Payer: MEDICARE

## 2024-04-26 NOTE — TELEPHONE ENCOUNTER
Patient called to inform Dr. Calvillo that he is having a procedure on 5/6/24 and will be NPO the night prior. He wants to know what adjustments need to be made. Please advise.

## 2024-05-06 ENCOUNTER — OFFICE VISIT (OUTPATIENT)
Dept: GASTROENTEROLOGY | Facility: EXTERNAL LOCATION | Age: 69
End: 2024-05-06
Payer: MEDICARE

## 2024-05-06 DIAGNOSIS — E11.9 TYPE 2 DIABETES MELLITUS WITHOUT COMPLICATION, WITHOUT LONG-TERM CURRENT USE OF INSULIN (MULTI): ICD-10-CM

## 2024-05-06 DIAGNOSIS — I10 BENIGN HYPERTENSION: ICD-10-CM

## 2024-05-06 DIAGNOSIS — C18.1 MALIGNANT NEOPLASM OF APPENDIX VERMIFORMIS (MULTI): Primary | ICD-10-CM

## 2024-05-06 DIAGNOSIS — Z86.010 PERSONAL HISTORY OF COLONIC POLYPS: ICD-10-CM

## 2024-05-06 DIAGNOSIS — K57.30 DIVERTICULOSIS OF LARGE INTESTINE WITHOUT DIVERTICULITIS: ICD-10-CM

## 2024-05-06 PROCEDURE — 1157F ADVNC CARE PLAN IN RCRD: CPT | Performed by: INTERNAL MEDICINE

## 2024-05-06 PROCEDURE — 4010F ACE/ARB THERAPY RXD/TAKEN: CPT | Performed by: INTERNAL MEDICINE

## 2024-05-06 PROCEDURE — 3008F BODY MASS INDEX DOCD: CPT | Performed by: INTERNAL MEDICINE

## 2024-05-06 PROCEDURE — 45378 DIAGNOSTIC COLONOSCOPY: CPT | Performed by: INTERNAL MEDICINE

## 2024-05-06 PROCEDURE — 1160F RVW MEDS BY RX/DR IN RCRD: CPT | Performed by: INTERNAL MEDICINE

## 2024-05-06 PROCEDURE — 1159F MED LIST DOCD IN RCRD: CPT | Performed by: INTERNAL MEDICINE

## 2024-05-08 ENCOUNTER — TELEPHONE (OUTPATIENT)
Dept: ENDOCRINOLOGY | Facility: CLINIC | Age: 69
End: 2024-05-08
Payer: MEDICARE

## 2024-05-08 NOTE — TELEPHONE ENCOUNTER
----- Message from Pedro Calvillo MD sent at 5/8/2024  5:22 AM EDT -----  Regarding: FW: Colonoscopy next Monday - prescription to hold?  Contact: [phone]  Insulin is resumed once awake and eating.    ----- Message -----  From: Angelita Morris MA  Sent: 4/29/2024   1:20 PM EDT  To: Pedro Calvillo MD  Subject: FW: Colonoscopy next Monday - prescription t#      ----- Message -----  From: Bunny Abarca  Sent: 4/29/2024  11:48 AM EDT  To: #  Subject: Colonoscopy next Monday - prescription to [facility] Dr. Calvillo,    When should I hold and resume my insulin for colonoscopy scheduled for Monday May 6'th.    Thank You & Best Regards,  Michael

## 2024-05-10 ENCOUNTER — APPOINTMENT (OUTPATIENT)
Dept: RADIOLOGY | Facility: HOSPITAL | Age: 69
DRG: 390 | End: 2024-05-10
Payer: MEDICARE

## 2024-05-10 ENCOUNTER — HOSPITAL ENCOUNTER (INPATIENT)
Facility: HOSPITAL | Age: 69
LOS: 3 days | Discharge: HOME | DRG: 390 | End: 2024-05-13
Attending: EMERGENCY MEDICINE | Admitting: INTERNAL MEDICINE
Payer: MEDICARE

## 2024-05-10 DIAGNOSIS — R10.9 ABDOMINAL PAIN, UNSPECIFIED ABDOMINAL LOCATION: Primary | ICD-10-CM

## 2024-05-10 DIAGNOSIS — K56.609 SMALL BOWEL OBSTRUCTION (MULTI): ICD-10-CM

## 2024-05-10 DIAGNOSIS — C78.6 SECONDARY MALIGNANT NEOPLASM OF RETROPERITONEUM AND PERITONEUM (MULTI): ICD-10-CM

## 2024-05-10 LAB
ALBUMIN SERPL BCP-MCNC: 4.8 G/DL (ref 3.4–5)
ALP SERPL-CCNC: 41 U/L (ref 33–136)
ALT SERPL W P-5'-P-CCNC: 32 U/L (ref 10–52)
ANION GAP SERPL CALC-SCNC: 18 MMOL/L (ref 10–20)
APPEARANCE UR: CLEAR
AST SERPL W P-5'-P-CCNC: 30 U/L (ref 9–39)
BASOPHILS # BLD AUTO: 0.01 X10*3/UL (ref 0–0.1)
BASOPHILS NFR BLD AUTO: 0.2 %
BILIRUB SERPL-MCNC: 1.1 MG/DL (ref 0–1.2)
BILIRUB UR STRIP.AUTO-MCNC: NEGATIVE MG/DL
BUN SERPL-MCNC: 27 MG/DL (ref 6–23)
CALCIUM SERPL-MCNC: 10.4 MG/DL (ref 8.6–10.3)
CHLORIDE SERPL-SCNC: 99 MMOL/L (ref 98–107)
CO2 SERPL-SCNC: 23 MMOL/L (ref 21–32)
COLOR UR: YELLOW
CREAT SERPL-MCNC: 1.1 MG/DL (ref 0.5–1.3)
EGFRCR SERPLBLD CKD-EPI 2021: 74 ML/MIN/1.73M*2
EOSINOPHIL # BLD AUTO: 0 X10*3/UL (ref 0–0.7)
EOSINOPHIL NFR BLD AUTO: 0 %
ERYTHROCYTE [DISTWIDTH] IN BLOOD BY AUTOMATED COUNT: 13.7 % (ref 11.5–14.5)
GLUCOSE BLD MANUAL STRIP-MCNC: 147 MG/DL (ref 74–99)
GLUCOSE BLD MANUAL STRIP-MCNC: 159 MG/DL (ref 74–99)
GLUCOSE SERPL-MCNC: 198 MG/DL (ref 74–99)
GLUCOSE UR STRIP.AUTO-MCNC: ABNORMAL MG/DL
HCT VFR BLD AUTO: 41.8 % (ref 41–52)
HGB BLD-MCNC: 14 G/DL (ref 13.5–17.5)
HOLD SPECIMEN: NORMAL
HOLD SPECIMEN: NORMAL
IMM GRANULOCYTES # BLD AUTO: 0.01 X10*3/UL (ref 0–0.7)
IMM GRANULOCYTES NFR BLD AUTO: 0.2 % (ref 0–0.9)
KETONES UR STRIP.AUTO-MCNC: ABNORMAL MG/DL
LEUKOCYTE ESTERASE UR QL STRIP.AUTO: NEGATIVE
LIPASE SERPL-CCNC: 65 U/L (ref 9–82)
LYMPHOCYTES # BLD AUTO: 0.2 X10*3/UL (ref 1.2–4.8)
LYMPHOCYTES NFR BLD AUTO: 3.8 %
MCH RBC QN AUTO: 28.9 PG (ref 26–34)
MCHC RBC AUTO-ENTMCNC: 33.5 G/DL (ref 32–36)
MCV RBC AUTO: 86 FL (ref 80–100)
MONOCYTES # BLD AUTO: 0.36 X10*3/UL (ref 0.1–1)
MONOCYTES NFR BLD AUTO: 6.8 %
NEUTROPHILS # BLD AUTO: 4.73 X10*3/UL (ref 1.2–7.7)
NEUTROPHILS NFR BLD AUTO: 89 %
NITRITE UR QL STRIP.AUTO: NEGATIVE
NRBC BLD-RTO: 0 /100 WBCS (ref 0–0)
PH UR STRIP.AUTO: 5 [PH]
PLATELET # BLD AUTO: 308 X10*3/UL (ref 150–450)
POTASSIUM SERPL-SCNC: 4.3 MMOL/L (ref 3.5–5.3)
PROT SERPL-MCNC: 7.6 G/DL (ref 6.4–8.2)
PROT UR STRIP.AUTO-MCNC: NEGATIVE MG/DL
RBC # BLD AUTO: 4.84 X10*6/UL (ref 4.5–5.9)
RBC # UR STRIP.AUTO: NEGATIVE /UL
SODIUM SERPL-SCNC: 136 MMOL/L (ref 136–145)
SP GR UR STRIP.AUTO: 1.04
UROBILINOGEN UR STRIP.AUTO-MCNC: NORMAL MG/DL
WBC # BLD AUTO: 5.3 X10*3/UL (ref 4.4–11.3)

## 2024-05-10 PROCEDURE — 2550000001 HC RX 255 CONTRASTS: Performed by: EMERGENCY MEDICINE

## 2024-05-10 PROCEDURE — 83690 ASSAY OF LIPASE: CPT | Performed by: EMERGENCY MEDICINE

## 2024-05-10 PROCEDURE — 82947 ASSAY GLUCOSE BLOOD QUANT: CPT

## 2024-05-10 PROCEDURE — 74177 CT ABD & PELVIS W/CONTRAST: CPT | Performed by: RADIOLOGY

## 2024-05-10 PROCEDURE — 81003 URINALYSIS AUTO W/O SCOPE: CPT | Performed by: EMERGENCY MEDICINE

## 2024-05-10 PROCEDURE — 96374 THER/PROPH/DIAG INJ IV PUSH: CPT

## 2024-05-10 PROCEDURE — 74177 CT ABD & PELVIS W/CONTRAST: CPT

## 2024-05-10 PROCEDURE — 71045 X-RAY EXAM CHEST 1 VIEW: CPT | Performed by: RADIOLOGY

## 2024-05-10 PROCEDURE — 2500000004 HC RX 250 GENERAL PHARMACY W/ HCPCS (ALT 636 FOR OP/ED): Performed by: INTERNAL MEDICINE

## 2024-05-10 PROCEDURE — 83036 HEMOGLOBIN GLYCOSYLATED A1C: CPT | Mod: AHULAB | Performed by: INTERNAL MEDICINE

## 2024-05-10 PROCEDURE — 96361 HYDRATE IV INFUSION ADD-ON: CPT

## 2024-05-10 PROCEDURE — 85025 COMPLETE CBC W/AUTO DIFF WBC: CPT | Performed by: EMERGENCY MEDICINE

## 2024-05-10 PROCEDURE — 37799 UNLISTED PX VASCULAR SURGERY: CPT | Performed by: INTERNAL MEDICINE

## 2024-05-10 PROCEDURE — 1200000002 HC GENERAL ROOM WITH TELEMETRY DAILY

## 2024-05-10 PROCEDURE — 99222 1ST HOSP IP/OBS MODERATE 55: CPT | Performed by: INTERNAL MEDICINE

## 2024-05-10 PROCEDURE — 96375 TX/PRO/DX INJ NEW DRUG ADDON: CPT

## 2024-05-10 PROCEDURE — 99222 1ST HOSP IP/OBS MODERATE 55: CPT | Performed by: COLON & RECTAL SURGERY

## 2024-05-10 PROCEDURE — 84075 ASSAY ALKALINE PHOSPHATASE: CPT | Performed by: EMERGENCY MEDICINE

## 2024-05-10 PROCEDURE — 71045 X-RAY EXAM CHEST 1 VIEW: CPT

## 2024-05-10 PROCEDURE — 99285 EMERGENCY DEPT VISIT HI MDM: CPT | Mod: 25

## 2024-05-10 PROCEDURE — 2500000005 HC RX 250 GENERAL PHARMACY W/O HCPCS: Performed by: EMERGENCY MEDICINE

## 2024-05-10 PROCEDURE — 2500000004 HC RX 250 GENERAL PHARMACY W/ HCPCS (ALT 636 FOR OP/ED): Performed by: EMERGENCY MEDICINE

## 2024-05-10 RX ORDER — ACETAMINOPHEN 650 MG/1
650 SUPPOSITORY RECTAL EVERY 4 HOURS PRN
Status: DISCONTINUED | OUTPATIENT
Start: 2024-05-10 | End: 2024-05-13

## 2024-05-10 RX ORDER — PANTOPRAZOLE SODIUM 40 MG/10ML
40 INJECTION, POWDER, LYOPHILIZED, FOR SOLUTION INTRAVENOUS
Status: DISCONTINUED | OUTPATIENT
Start: 2024-05-11 | End: 2024-05-13

## 2024-05-10 RX ORDER — ONDANSETRON 4 MG/1
4 TABLET, FILM COATED ORAL EVERY 8 HOURS PRN
Status: DISCONTINUED | OUTPATIENT
Start: 2024-05-10 | End: 2024-05-13

## 2024-05-10 RX ORDER — ONDANSETRON HYDROCHLORIDE 2 MG/ML
4 INJECTION, SOLUTION INTRAVENOUS EVERY 8 HOURS PRN
Status: DISCONTINUED | OUTPATIENT
Start: 2024-05-10 | End: 2024-05-13 | Stop reason: HOSPADM

## 2024-05-10 RX ORDER — LIDOCAINE HYDROCHLORIDE 40 MG/ML
200 INJECTION, SOLUTION RETROBULBAR; TOPICAL ONCE
Status: COMPLETED | OUTPATIENT
Start: 2024-05-10 | End: 2024-05-10

## 2024-05-10 RX ORDER — SODIUM CHLORIDE 9 MG/ML
75 INJECTION, SOLUTION INTRAVENOUS CONTINUOUS
Status: DISCONTINUED | OUTPATIENT
Start: 2024-05-10 | End: 2024-05-13 | Stop reason: HOSPADM

## 2024-05-10 RX ORDER — SODIUM CHLORIDE 0.9 % (FLUSH) 0.9 %
10 SYRINGE (ML) INJECTION EVERY 12 HOURS SCHEDULED
Status: DISCONTINUED | OUTPATIENT
Start: 2024-05-10 | End: 2024-05-13 | Stop reason: HOSPADM

## 2024-05-10 RX ORDER — LIDOCAINE HYDROCHLORIDE 20 MG/ML
1 JELLY TOPICAL ONCE
Status: COMPLETED | OUTPATIENT
Start: 2024-05-10 | End: 2024-05-10

## 2024-05-10 RX ORDER — MORPHINE SULFATE 4 MG/ML
4 INJECTION, SOLUTION INTRAMUSCULAR; INTRAVENOUS ONCE
Status: COMPLETED | OUTPATIENT
Start: 2024-05-10 | End: 2024-05-10

## 2024-05-10 RX ORDER — PANTOPRAZOLE SODIUM 40 MG/1
40 TABLET, DELAYED RELEASE ORAL
Status: DISCONTINUED | OUTPATIENT
Start: 2024-05-11 | End: 2024-05-13 | Stop reason: HOSPADM

## 2024-05-10 RX ORDER — HYDROMORPHONE HYDROCHLORIDE 1 MG/ML
1 INJECTION, SOLUTION INTRAMUSCULAR; INTRAVENOUS; SUBCUTANEOUS
Status: DISCONTINUED | OUTPATIENT
Start: 2024-05-10 | End: 2024-05-13 | Stop reason: HOSPADM

## 2024-05-10 RX ORDER — POLYETHYLENE GLYCOL 3350 17 G/17G
17 POWDER, FOR SOLUTION ORAL DAILY PRN
Status: DISCONTINUED | OUTPATIENT
Start: 2024-05-10 | End: 2024-05-13 | Stop reason: HOSPADM

## 2024-05-10 RX ORDER — ONDANSETRON HYDROCHLORIDE 2 MG/ML
4 INJECTION, SOLUTION INTRAVENOUS ONCE
Status: COMPLETED | OUTPATIENT
Start: 2024-05-10 | End: 2024-05-10

## 2024-05-10 RX ORDER — DEXTROSE 50 % IN WATER (D50W) INTRAVENOUS SYRINGE
25
Status: DISCONTINUED | OUTPATIENT
Start: 2024-05-10 | End: 2024-05-13 | Stop reason: HOSPADM

## 2024-05-10 RX ORDER — ACETAMINOPHEN 160 MG/5ML
650 SOLUTION ORAL EVERY 4 HOURS PRN
Status: DISCONTINUED | OUTPATIENT
Start: 2024-05-10 | End: 2024-05-13

## 2024-05-10 RX ORDER — INSULIN LISPRO 100 [IU]/ML
0-5 INJECTION, SOLUTION INTRAVENOUS; SUBCUTANEOUS
Status: DISCONTINUED | OUTPATIENT
Start: 2024-05-10 | End: 2024-05-13 | Stop reason: HOSPADM

## 2024-05-10 RX ORDER — GLUCOSAM/CHONDRO/HERB 149/HYAL 750-100 MG
2 TABLET ORAL 2 TIMES DAILY
COMMUNITY
End: 2024-06-05 | Stop reason: WASHOUT

## 2024-05-10 RX ORDER — ACETAMINOPHEN 325 MG/1
650 TABLET ORAL EVERY 4 HOURS PRN
Status: DISCONTINUED | OUTPATIENT
Start: 2024-05-10 | End: 2024-05-13 | Stop reason: HOSPADM

## 2024-05-10 RX ORDER — DEXTROSE 50 % IN WATER (D50W) INTRAVENOUS SYRINGE
12.5
Status: DISCONTINUED | OUTPATIENT
Start: 2024-05-10 | End: 2024-05-13 | Stop reason: HOSPADM

## 2024-05-10 RX ORDER — SODIUM CHLORIDE 0.9 % (FLUSH) 0.9 %
10 SYRINGE (ML) INJECTION AS NEEDED
Status: DISCONTINUED | OUTPATIENT
Start: 2024-05-10 | End: 2024-05-13 | Stop reason: HOSPADM

## 2024-05-10 RX ORDER — GUAIFENESIN 600 MG/1
600 TABLET, EXTENDED RELEASE ORAL EVERY 12 HOURS PRN
Status: DISCONTINUED | OUTPATIENT
Start: 2024-05-10 | End: 2024-05-13 | Stop reason: HOSPADM

## 2024-05-10 RX ORDER — ENOXAPARIN SODIUM 100 MG/ML
40 INJECTION SUBCUTANEOUS EVERY 24 HOURS
Status: DISCONTINUED | OUTPATIENT
Start: 2024-05-10 | End: 2024-05-13 | Stop reason: HOSPADM

## 2024-05-10 RX ADMIN — ONDANSETRON 4 MG: 2 INJECTION INTRAMUSCULAR; INTRAVENOUS at 05:23

## 2024-05-10 RX ADMIN — ENOXAPARIN SODIUM 40 MG: 40 INJECTION SUBCUTANEOUS at 22:07

## 2024-05-10 RX ADMIN — Medication 10 ML: at 21:00

## 2024-05-10 RX ADMIN — HYDROMORPHONE HYDROCHLORIDE 1 MG: 1 INJECTION, SOLUTION INTRAMUSCULAR; INTRAVENOUS; SUBCUTANEOUS at 13:59

## 2024-05-10 RX ADMIN — LIDOCAINE HYDROCHLORIDE 200 MG: 40 INJECTION, SOLUTION RETROBULBAR; TOPICAL at 08:39

## 2024-05-10 RX ADMIN — SODIUM CHLORIDE 1000 ML: 9 INJECTION, SOLUTION INTRAVENOUS at 05:22

## 2024-05-10 RX ADMIN — HYDROMORPHONE HYDROCHLORIDE 1 MG: 1 INJECTION, SOLUTION INTRAMUSCULAR; INTRAVENOUS; SUBCUTANEOUS at 07:57

## 2024-05-10 RX ADMIN — HYDROMORPHONE HYDROCHLORIDE 1 MG: 1 INJECTION, SOLUTION INTRAMUSCULAR; INTRAVENOUS; SUBCUTANEOUS at 21:05

## 2024-05-10 RX ADMIN — MORPHINE SULFATE 4 MG: 4 INJECTION, SOLUTION INTRAMUSCULAR; INTRAVENOUS at 05:23

## 2024-05-10 RX ADMIN — IOHEXOL 75 ML: 350 INJECTION, SOLUTION INTRAVENOUS at 06:08

## 2024-05-10 RX ADMIN — SODIUM CHLORIDE 75 ML/HR: 9 INJECTION, SOLUTION INTRAVENOUS at 15:45

## 2024-05-10 RX ADMIN — Medication 10 ML: at 07:57

## 2024-05-10 RX ADMIN — LIDOCAINE HYDROCHLORIDE 1 APPLICATION: 20 JELLY TOPICAL at 08:40

## 2024-05-10 SDOH — SOCIAL STABILITY: SOCIAL INSECURITY: HAVE YOU HAD ANY THOUGHTS OF HARMING ANYONE ELSE?: NO

## 2024-05-10 SDOH — SOCIAL STABILITY: SOCIAL INSECURITY: HAS ANYONE EVER THREATENED TO HURT YOUR FAMILY OR YOUR PETS?: NO

## 2024-05-10 SDOH — SOCIAL STABILITY: SOCIAL INSECURITY: ARE THERE ANY APPARENT SIGNS OF INJURIES/BEHAVIORS THAT COULD BE RELATED TO ABUSE/NEGLECT?: NO

## 2024-05-10 SDOH — SOCIAL STABILITY: SOCIAL INSECURITY: DO YOU FEEL ANYONE HAS EXPLOITED OR TAKEN ADVANTAGE OF YOU FINANCIALLY OR OF YOUR PERSONAL PROPERTY?: NO

## 2024-05-10 SDOH — SOCIAL STABILITY: SOCIAL INSECURITY: DO YOU FEEL UNSAFE GOING BACK TO THE PLACE WHERE YOU ARE LIVING?: NO

## 2024-05-10 SDOH — SOCIAL STABILITY: SOCIAL INSECURITY: WERE YOU ABLE TO COMPLETE ALL THE BEHAVIORAL HEALTH SCREENINGS?: YES

## 2024-05-10 SDOH — SOCIAL STABILITY: SOCIAL INSECURITY: HAVE YOU HAD THOUGHTS OF HARMING ANYONE ELSE?: NO

## 2024-05-10 SDOH — SOCIAL STABILITY: SOCIAL INSECURITY: ABUSE: ADULT

## 2024-05-10 SDOH — SOCIAL STABILITY: SOCIAL INSECURITY: ARE YOU OR HAVE YOU BEEN THREATENED OR ABUSED PHYSICALLY, EMOTIONALLY, OR SEXUALLY BY ANYONE?: NO

## 2024-05-10 SDOH — SOCIAL STABILITY: SOCIAL INSECURITY: DOES ANYONE TRY TO KEEP YOU FROM HAVING/CONTACTING OTHER FRIENDS OR DOING THINGS OUTSIDE YOUR HOME?: NO

## 2024-05-10 ASSESSMENT — PAIN DESCRIPTION - DESCRIPTORS
DESCRIPTORS: CRAMPING;PRESSURE
DESCRIPTORS: CRAMPING;SPASM
DESCRIPTORS: SPASM;CRAMPING

## 2024-05-10 ASSESSMENT — PATIENT HEALTH QUESTIONNAIRE - PHQ9
2. FEELING DOWN, DEPRESSED OR HOPELESS: NOT AT ALL
1. LITTLE INTEREST OR PLEASURE IN DOING THINGS: NOT AT ALL
SUM OF ALL RESPONSES TO PHQ9 QUESTIONS 1 & 2: 0

## 2024-05-10 ASSESSMENT — COGNITIVE AND FUNCTIONAL STATUS - GENERAL
DAILY ACTIVITIY SCORE: 24
MOBILITY SCORE: 24
DAILY ACTIVITIY SCORE: 24
MOBILITY SCORE: 24
PATIENT BASELINE BEDBOUND: NO

## 2024-05-10 ASSESSMENT — PAIN DESCRIPTION - ONSET
ONSET: ONGOING
ONSET: ONGOING

## 2024-05-10 ASSESSMENT — ACTIVITIES OF DAILY LIVING (ADL)
ADEQUATE_TO_COMPLETE_ADL: YES
BATHING: INDEPENDENT
JUDGMENT_ADEQUATE_SAFELY_COMPLETE_DAILY_ACTIVITIES: YES
HEARING - LEFT EAR: FUNCTIONAL
TOILETING: INDEPENDENT
LACK_OF_TRANSPORTATION: NO
PATIENT'S MEMORY ADEQUATE TO SAFELY COMPLETE DAILY ACTIVITIES?: YES
WALKS IN HOME: INDEPENDENT
GROOMING: INDEPENDENT
DRESSING YOURSELF: INDEPENDENT
HEARING - RIGHT EAR: FUNCTIONAL
FEEDING YOURSELF: INDEPENDENT

## 2024-05-10 ASSESSMENT — PAIN DESCRIPTION - PAIN TYPE
TYPE: ACUTE PAIN
TYPE: ACUTE PAIN

## 2024-05-10 ASSESSMENT — LIFESTYLE VARIABLES
SUBSTANCE_ABUSE_PAST_12_MONTHS: NO
AUDIT-C TOTAL SCORE: 1
HOW MANY STANDARD DRINKS CONTAINING ALCOHOL DO YOU HAVE ON A TYPICAL DAY: PATIENT DOES NOT DRINK
AUDIT-C TOTAL SCORE: 1
HOW OFTEN DO YOU HAVE 6 OR MORE DRINKS ON ONE OCCASION: NEVER
PRESCIPTION_ABUSE_PAST_12_MONTHS: NO
HOW OFTEN DO YOU HAVE A DRINK CONTAINING ALCOHOL: MONTHLY OR LESS
SKIP TO QUESTIONS 9-10: 1

## 2024-05-10 ASSESSMENT — PAIN DESCRIPTION - LOCATION
LOCATION: ABDOMEN

## 2024-05-10 ASSESSMENT — PAIN - FUNCTIONAL ASSESSMENT
PAIN_FUNCTIONAL_ASSESSMENT: 0-10

## 2024-05-10 ASSESSMENT — ENCOUNTER SYMPTOMS
DIARRHEA: 1
ABDOMINAL PAIN: 1
VOMITING: 1

## 2024-05-10 ASSESSMENT — PAIN SCALES - GENERAL
PAINLEVEL_OUTOF10: 6
PAINLEVEL_OUTOF10: 10 - WORST POSSIBLE PAIN
PAINLEVEL_OUTOF10: 7
PAINLEVEL_OUTOF10: 10 - WORST POSSIBLE PAIN
PAINLEVEL_OUTOF10: 8
PAINLEVEL_OUTOF10: 10 - WORST POSSIBLE PAIN

## 2024-05-10 ASSESSMENT — PAIN DESCRIPTION - ORIENTATION
ORIENTATION: MID;UPPER
ORIENTATION: UPPER;LOWER

## 2024-05-10 ASSESSMENT — PAIN DESCRIPTION - FREQUENCY
FREQUENCY: CONSTANT/CONTINUOUS
FREQUENCY: CONSTANT/CONTINUOUS

## 2024-05-10 ASSESSMENT — PAIN DESCRIPTION - PROGRESSION
CLINICAL_PROGRESSION: GRADUALLY IMPROVING
CLINICAL_PROGRESSION: NOT CHANGED

## 2024-05-10 NOTE — PROGRESS NOTES
"Pharmacy Medication History Review     Spoke to the patient, with a list. Patient takes Eliquis daily  Took all medication yesterday    Bunny Abarca is a 68 y.o. male admitted for Abdominal pain, unspecified abdominal location. Pharmacy reviewed the patient's [id number] medications and allergies for accuracy.    The list below reflectives the updated PTA list. Please review each medication in order reconciliation for additional clarification and justification.     Prior to Admission Medications   Prescriptions Last Dose Informant   Accu-Chek Fastclix Lancet Drum misc     Sig: test blood sugar TWICE DAILY   Jardiance 25 mg 5/9/2024    Sig: Take 1 tablet (25 mg) by mouth once daily.   apixaban (Eliquis) 5 mg tablet 5/9/2024    Sig: TAKE 1 TABLET BY MOUTH TWO TIMES A DAY   atorvastatin (Lipitor) 40 mg tablet 5/9/2024    Sig: Take 1 tablet (40 mg) by mouth once daily at bedtime.   doxazosin (Cardura) 2 mg tablet 5/9/2024    Sig: Take 1 tablet (2 mg) by mouth once daily at bedtime.   fenofibrate (Tricor) 145 mg tablet 5/9/2024    Sig: Take 1 tablet (145 mg) by mouth once daily. WITH FOOD   insulin asp prt-insulin aspart (NovoLOG Mix 70-30FlexPen U-100) 100 unit/mL (70-30) injection 5/9/2024    Sig: Inject 38 Units under the skin 2 times a day.   metFORMIN  mg 24 hr tablet 5/9/2024    Sig: Take 2 tablets (1,000 mg) by mouth 2 times a day.   olmesartan (BENIcar) 40 mg tablet 5/9/2024    Sig: Take 1 tablet (40 mg) by mouth once daily.   omega 3-dha-epa-fish oil (Fish OiL) 1,000 mg (120 mg-180 mg) capsule 5/9/2024    Sig: Take 2 capsules (2,000 mg) by mouth 2 times a day.   pen needle, diabetic (BD Ultra-Fine Short Pen Needle) 31 gauge x 5/16\" needle     Sig: Inject 1 each under the skin 2 times a day.      Facility-Administered Medications: None         The list below reflectives the updated allergy list. Please review each documented allergy for additional clarification and justification.  Allergies  " Reviewed by Joseph Chapley, EMT on 5/10/2024        Severity Reactions Comments    Cat Dander Not Specified Cough, Runny nose     Exenatide Low Hives, Itching     Perfume Low Itching, Rash             Below are additional concerns with the patient's PTA list.      Lesli Sandoval

## 2024-05-10 NOTE — H&P
Bunny Abarca is a 68 y.o. male   Abdominal Pain  Associated symptoms include diarrhea and vomiting.   Vomiting   Associated symptoms include abdominal pain and diarrhea.   Diarrhea   Associated symptoms include abdominal pain and vomiting.      Patient with a past medical history of hypertension dyslipidemia atrial fibrillation history of carcinoma of the appendix s/p surgery diabetes mellitus was doing well post colonoscopy that was routine when he developed abdominal pain and cramping  Had multiple bouts of diarrhea  Had emesis x 1  Symptoms were not improving and he got progressively bloated so came to the hospital further evaluation where workup shows small bowel obstruction    No fevers chills chest pain shortness of breath or palpitations    Past Medical History  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 He has never used smokeless tobacco. He reports that he does not currently use alcohol. He reports that he does not currently use drugs.    Family History  Family History   Problem Relation Name Age of Onset    Congenital heart disease Mother      Hyperlipidemia Father      Hypertension Father      Colon cancer Sister      Other (Multiple System Atrophy) Sister      Other (Cystic Fibrosis Gene Carrier) Sister      Other (Malignant  Neoplasm Of Ovary) Sibling          Allergies  Cat dander, Exenatide, and Perfume    Review of Systems   Gastrointestinal:  Positive for abdominal pain, diarrhea and vomiting.        Constitutional: not feeling poorly, no fever, no recent weight gain and no recent weight loss.   Eyes: no blurred vision and no diplopia.   ENT: no hearing loss, no tinnitus, no earache, no sore throat, no hoarseness and no swollen glands in the neck.   Cardiovascular: no chest pain, no tightness or heavy pressure, no shortness of breath, no palpitations and no lower extremity edema.   Respiratory: no cough, wheezing or shortness of breath at rest or exertion  Gastrointestinal: Abdominal pain  Genitourinary: no urinary frequency, no dysuria, no hematuria, no burning sensation during urination, urinary stream is not smaller and urinary stream does not start and stop.   Musculoskeletal: no arthralgias, no joint stiffness, no muscle weakness, no back pain and no difficulty walking.   Skin: no rashes, no change in skin color and pigmentation, no skin lesions and no skin lumps.   Neurological: no headaches, no dizziness, no seizures, no tingling, no numbness, no signs and symptoms of stroke and no limb weakness.   Psychiatric: no confusion, no memory lapses or loss, no depression and no sleep disturbances.   Endocrine: no goiter, no thyroid disorder, no diabetes mellitus, no excessive thirst, no dry skin, no cold intolerance, no heat intolerance and no increased urinary frequency.   Hematologic/Lymphatic: is not slow to heal, does not bleed easily, does not bruise easily, no thrombophlebitis, no anemia and no history of blood transfusion.   All other systems have been reviewed and are negative for complaint.     Vitals:    05/10/24 1500   BP: 116/75   Pulse: 91   Resp: 18   Temp: 37 °C (98.6 °F)   SpO2: 92%        Scheduled medications  insulin lispro, 0-5 Units, subcutaneous, Before meals & nightly  [START ON 5/11/2024] pantoprazole, 40 mg,  oral, Daily before breakfast   Or  [START ON 5/11/2024] pantoprazole, 40 mg, intravenous, Daily before breakfast  sodium chloride 0.9%, 10 mL, intravenous, q12h REYMUNDO      Continuous medications  sodium chloride 0.9%, 75 mL/hr, Last Rate: 75 mL/hr (05/10/24 1545)      PRN medications  PRN medications: acetaminophen **OR** acetaminophen **OR** acetaminophen, dextrose, dextrose, glucagon, glucagon, guaiFENesin, HYDROmorphone, ondansetron **OR** ondansetron, polyethylene glycol, sodium chloride 0.9%    Results from last 7 days   Lab Units 05/10/24  0528   WBC AUTO x10*3/uL 5.3   HEMOGLOBIN g/dL 14.0   HEMATOCRIT % 41.8   PLATELETS AUTO x10*3/uL 308     Results from last 7 days   Lab Units 05/10/24  0528   SODIUM mmol/L 136   POTASSIUM mmol/L 4.3   CHLORIDE mmol/L 99   CO2 mmol/L 23   BUN mg/dL 27*   CREATININE mg/dL 1.10   CALCIUM mg/dL 10.4*   PROTEIN TOTAL g/dL 7.6   BILIRUBIN TOTAL mg/dL 1.1   ALK PHOS U/L 41   ALT U/L 32   AST U/L 30   GLUCOSE mg/dL 198*            XR chest 1 view   Final Result   1.  No evidence of acute cardiopulmonary process.                  MACRO:   None        Signed by: Joseph Schoenberger 5/10/2024 9:42 AM   Dictation workstation:   [id number]      CT abdomen pelvis w IV contrast   Final Result   1. Small-bowel obstruction with transition point in the right mid   abdomen. Surgical evaluation is recommended.   2. Similar postsurgical change related to right hemicolectomy and   ileocolic anastomosis.   3. New fluid attenuation material within the right inguinal canal,   possibly trace ascitic fluid, less likely surgical repair material.   4. Colonic diverticulosis without evidence of diverticulitis.   5. Prostatomegaly.   6. Cholelithiasis.        MACRO:   Kyle Hauser discussed the significance and urgency of this   critical finding by telephone with  Dr. Ramirez On 5/10/2024 at   6:40 am.  (**-RCF-**) Findings:  See findings.             Signed by: Kyle Hauser 5/10/2024  6:41 AM   Dictation workstation:   [id number]          Physical Exam     Constitutional   General appearance: Alert and in no acute distress.   Eyes   Inspection of eyes: Sclera and conjunctiva were normal.    Pupil exam: Pupils were equal in size. Extraocular movements were intact.   Pulmonary   Respiratory assessment: No respiratory distress, normal respiratory rhythm and effort.    Auscultation of Lungs: Clear bilateral breath sounds.   Cardiovascular   Auscultation of heart: Arrhythmia on auscultation  Exam for edema: No peripheral edema.   Abdomen   Abdominal Exam: Distended nontender  Liver and Spleen exam: No hepato-splenomegaly.   Musculoskeletal   Examination of gait: Normal.    Inspection of digits and nails: No clubbing or cyanosis of the fingernails.    Inspection/palpation of joints, bones and muscles: No joint swelling. Normal movement of all extremities.   Skin   Skin inspection: Normal skin color and pigmentation, normal skin turgor and no visible rash.   Neurologic   Cranial nerves: Nerves 2-12 were intact, no focal neuro defects.   Psychiatric   Orientation: Oriented to person, place, and time.    Mood and affect: Normal.       Assessment/Plan      #Small bowel obstruction  NG tube to suction  IV fluids/antiemetics/pain control if needed  Ambulate if able    #Atrial fibrillation  Rate controlled  Holding Eliquis  Subcu Lovenox    #Hypertension  #Dyslipidemia  #Diabetes mellitus  Will hold oral medications while n.p.o.  Sliding scale insulin  Will use IV hydralazine if needed for systolic blood pressure greater than 170

## 2024-05-10 NOTE — PROGRESS NOTES
05/10/24 0814   [facility] Disability Status   Are you deaf or do you have serious difficulty hearing? N   Are you blind or do you have serious difficulty seeing, even when wearing glasses? N   Because of a physical, mental, or emotional condition, do you have serious difficulty concentrating, remembering, or making decisions? (5 years old or older) N   Do you have serious difficulty walking or climbing stairs? N   Do you have serious difficulty dressing or bathing? N   Because of a physical, mental, or emotional condition, do you have serious difficulty doing errands alone such as visiting the doctor? N

## 2024-05-10 NOTE — CARE PLAN
The patient's goals for the shift include  feel better     The clinical goals for the shift include  safety

## 2024-05-10 NOTE — PROGRESS NOTES
Transitional Care Coordination Progress Note:  Plan per Medical/Surgical team: treatment of abd pain,N/V/D, SBO with NPO, NGT to LIS, IV Dilaudid, morphine, IV fluids, surgery consult  Status: Inpatient  Payor source: medicare A/B, [employer]  Discharge disposition: Home with wife   Potential Barriers: constipation  ADOD: 5/13/2024  GEN Boyer RN, BSN Transitional Care Coordinator ED# [phone]      05/10/24 0814   Discharge Planning   Living Arrangements Spouse/significant other   Support Systems Spouse/significant other   Assistance Needed ? surgery   Type of Residence Private residence   Number of Stairs to Enter Residence 2   Number of Stairs Within Residence 18   Home or Post Acute Services None   Patient expects to be discharged to: Home with wife   Does the patient need discharge transport arranged? No   Financial Resource Strain   How hard is it for you to pay for the very basics like food, housing, medical care, and heating? Not hard   Housing Stability   In the last 12 months, was there a time when you were not able to pay the mortgage or rent on time? N   In the last 12 months, how many places have you lived? 1   In the last 12 months, was there a time when you did not have a steady place to sleep or slept in a shelter (including now)? N   Transportation Needs   In the past 12 months, has lack of transportation kept you from medical appointments or from getting medications? no   In the past 12 months, has lack of transportation kept you from meetings, work, or from getting things needed for daily living? No

## 2024-05-10 NOTE — CONSULTS
Reason For Consult  SBO    History Of Present Illness  Bunny Abarca is a 68 y.o. male presenting with 24 hours of crampy abdominal pain, bloating.  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  He completed adj chemo and has had undetectable CEA, and negative CT DNA and CT scans.  He underwent a colonoscopy on Monday which was normal. He felt fine after that.  The day PTA he developed multiple bouts of diarrhea, and then abdominal cramping that worsened.  He tried tums, zantac and MOM and felt worse and worse and was unable to sleep so presented to the ED.  He has not had a BM or gas since the day PTA.  He felt chilled but temp was measured normal.  He vomited only once a small amount.  He is VERY bloated.  He has not had anything like this in the past.  Normal bowel habit is twice/AM.  His weight has been normal and he has no abdominal symptoms prior to yesterday.      Past Medical History  He has a past medical history of Atrial fibrillation (Multi) (03/08/2023), Benign essential hypertension (03/08/2023), Hyperlipidemia (03/08/2023), Obstructive sleep apnea (03/08/2023), Personal history of other diseases of the respiratory system (09/04/2020), Primary malignant neoplasm of appendix (Multi) (10/03/2023), and Type 2 diabetes mellitus (Multi) (03/08/2023).    Surgical History  He has a past surgical history that includes Back surgery (03/28/2016); Hernia repair (03/28/2016); Sinus surgery (03/28/2016); and Cataract extraction (02/15/2018).  06/23 - Lx right hemo     Social History  He reports that he has never smoked. He has never used smokeless tobacco. He reports that he does not currently use alcohol. He reports that he does not currently use drugs.    Family History  Family History   Problem Relation Name Age of Onset    Congenital heart disease Mother      Hyperlipidemia Father      Hypertension Father      Colon cancer Sister      Other (Multiple System Atrophy) Sister       "Other (Cystic Fibrosis Gene Carrier) Sister      Other (Malignant Neoplasm Of Ovary) Sibling          Allergies  Cat dander, Exenatide, and Perfume    Review of Systems  + chills, bloating, nausea.      Physical Exam  Constitutional: Well developed, awake/alert/oriented x3, no distress, alert and cooperative   Eyes: Sclera anicteric, no conjunctival inflammation, conjugate gaze   ENMT: mucous membranes moist, no apparent injury,   Head/Neck: Neck supple, no apparent injury, No JVD, trachea midline, no bruits   Respiratory/Thorax: Patent airways, CTAB, normal breath sounds with good chest expansion, thorax symmetric   Cardiovascular: Regular, rate and rhythm, no murmurs, normal S1 and S2   Gastrointestinal: Quite distended, soft, non-tender, no rebound tenderness or guarding, no masses palpable, no organomegaly, +BS, no bruits, well healed midline scar without hernia.    Extremities: normal extremities, no cyanosis edema, contusions or wounds, Neurological: alert and oriented x3, normal strength, Normal gait   Psychological: Appropriate mood and behavior   Skin: Warm and dry, no lesions, no rashes      Last Recorded Vitals  Blood pressure 148/73, pulse 57, temperature 36.3 °C (97.3 °F), temperature source Temporal, resp. rate 18, height 1.791 m (5' 10.5\"), weight 88.9 kg (196 lb), SpO2 95%.    Relevant Results  === 05/10/24 ===    CT ABDOMEN PELVIS W IV CONTRAST    - Impression -  1. Small-bowel obstruction with transition point in the right mid  abdomen. Surgical evaluation is recommended.  2. Similar postsurgical change related to right hemicolectomy and  ileocolic anastomosis.  3. New fluid attenuation material within the right inguinal canal,  possibly trace ascitic fluid, less likely surgical repair material.  4. Colonic diverticulosis without evidence of diverticulitis.  5. Prostatomegaly.  6. Cholelithiasis.    MACRO:  Kyle Hauser discussed the significance and urgency of this  critical finding by " telephone with  Dr. Ramirez On 5/10/2024 at  6:40 am.  (**-RCF-**) Findings:  See findings.      Signed by: Kyle Hauser 5/10/2024 6:41 AM  Dictation workstation:   [id number]  Lab Results   Component Value Date    WBC 5.3 05/10/2024    HGB 14.0 05/10/2024    HCT 41.8 05/10/2024    MCV 86 05/10/2024     05/10/2024     Lab Results   Component Value Date    CEA <0.5 03/11/2024      Lab Results   Component Value Date    GLUCOSE 198 (H) 05/10/2024    CALCIUM 10.4 (H) 05/10/2024     05/10/2024    K 4.3 05/10/2024    CO2 23 05/10/2024    CL 99 05/10/2024    BUN 27 (H) 05/10/2024    CREATININE 1.10 05/10/2024         Assessment/Plan     Impression - Pt 1 year s/p right he for T4aN1 appendiceal mucinous neoplasm.  RIGOBERTO currently, here with distal SBO  Hx of PE    Plan   NGT  IVF  Can have ice chips/popsicles  Out of bed  Avoid narcotics if pt is able  Will monitor  - if no progress could need exploratory laparoscopy Monday  Hold eliquis and bridge with subcutaneous lovenox over the weekend.  If not better will hold Sunday night for possible surgery Monday.        I spent 60 minutes in the professional and overall care of this patient.      Evette Faulkner MD

## 2024-05-10 NOTE — PROGRESS NOTES
Home with wife      05/10/24 9896   Current Planned Discharge Disposition   Current Planned Discharge Disposition Home

## 2024-05-10 NOTE — ED TRIAGE NOTES
Patient arrived to ED from home via POV. He states that he had severe, generalized Abdominal pain that started yesterday morning (5/9). The discomfort includes bloating and cramping. He is also complaining of intermittent diarrhea and constipation. He additional states that he is nauseous and had vomiting. PMHx is significant of Appendix Cancer, Pulmonary Embolism, Diabetes, and Colorectal surgery.

## 2024-05-10 NOTE — ED PROVIDER NOTES
HPI   Chief Complaint   Patient presents with    Abdominal Pain    Nausea    Vomiting    Fecal Impaction    Diarrhea       The patient had a colonoscopy this week.  He is on Xarelto for pulm embolism.  The patient has some nausea vomiting and some abdominal distention.  He says generalized pain and tenderness after the colonoscopy.  States he has not had a bowel movement since yesterday colonoscopy.                          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    Type 2 diabetes mellitus (Multi) 03/08/2023     Past Surgical History:   Procedure Laterality Date    BACK SURGERY  03/28/2016    Back Surgery    CATARACT EXTRACTION  02/15/2018    Cataract Surgery    HERNIA REPAIR  03/28/2016    Hernia Repair    SINUS SURGERY  03/28/2016    Sinus Surgery     Family History   Problem Relation Name Age of Onset    Congenital heart disease Mother      Hyperlipidemia Father      Hypertension Father      Colon cancer Sister      Other (Multiple System Atrophy) Sister      Other (Cystic Fibrosis Gene Carrier) Sister      Other (Malignant Neoplasm Of Ovary) Sibling       Social History     Tobacco Use    Smoking status: Never    Smokeless tobacco: Never   Vaping Use    Vaping status: Never Used   Substance Use Topics    Alcohol use: Not Currently    Drug use: Not Currently       Physical Exam   ED Triage Vitals [05/10/24 0421]   Temperature Heart Rate Respirations BP   36.3 °C (97.3 °F) (!) 112 20 121/87      Pulse Ox Temp Source Heart Rate Source Patient Position   96 % Temporal Monitor Sitting      BP Location FiO2 (%)     Right arm --       Physical  Exam  Vitals and nursing note reviewed.   Constitutional:       General: He is not in acute distress.     Appearance: He is well-developed.   HENT:      Head: Normocephalic and atraumatic.   Eyes:      Conjunctiva/sclera: Conjunctivae normal.   Cardiovascular:      Rate and Rhythm: Regular rhythm. Tachycardia present.      Heart sounds: No murmur heard.  Pulmonary:      Effort: Pulmonary effort is normal. No respiratory distress.      Breath sounds: Normal breath sounds.   Abdominal:      General: Abdomen is protuberant. There is distension.      Palpations: Abdomen is soft.      Tenderness: There is generalized abdominal tenderness.   Musculoskeletal:         General: No swelling.      Cervical back: Neck supple.   Skin:     General: Skin is warm and dry.      Capillary Refill: Capillary refill takes less than 2 seconds.   Neurological:      Mental Status: He is alert.   Psychiatric:         Mood and Affect: Mood normal.         ED Course & MDM   Diagnoses as of 05/16/24 2258   Abdominal pain, unspecified abdominal location   Small bowel obstruction (Multi)       Medical Decision Making  Patient has a distended abdomen.  No high-pitched noises.  Slight tachycardic.  Will CT the abdomen today to rule out obstruction.  Considered perforation possible but less likely.  Patient was found to have a small obstruction.  Surgery made aware.  Admitted to the hospitalist    Procedure  Procedures     Tim Vargas MD  05/16/24 8828

## 2024-05-10 NOTE — PROGRESS NOTES
Emergency Medicine Transition of Care Note.    I received Bunny Abarca in signout from Dr. Vargas.  Please see the previous ED provider note for all HPI, PE and MDM up to the time of signout at  6:00. This is in addition to the primary record.    In brief Bunny Abarca is an 68 y.o. male presenting for   Chief Complaint   Patient presents with    Abdominal Pain    Nausea    Vomiting    Fecal Impaction    Diarrhea     At the time of signout we were awaiting:  CT abdomen pelvis.   patient has a small bowel obstruction with transition point in the right mid abdomen.  Patient is experiencing significant amount of pain and continued nausea.  I have ordered a nasogastric tube.  I have notified the surgical team including Dr. Faulkner as she operated him in the past for appendiceal cancer.  Dr. Middleton will admit the patient to medicine with surgery consulting.    Diagnoses as of 05/10/24 0703   Abdominal pain, unspecified abdominal location   Small bowel obstruction (Multi)       Medical Decision Making      Final diagnoses:   [R10.9] Abdominal pain, unspecified abdominal location   [K56.609] Small bowel obstruction (Multi)           Procedure  Procedures    Joyce Ramirez MD

## 2024-05-11 ENCOUNTER — APPOINTMENT (OUTPATIENT)
Dept: RADIOLOGY | Facility: HOSPITAL | Age: 69
DRG: 390 | End: 2024-05-11
Payer: MEDICARE

## 2024-05-11 ENCOUNTER — PATIENT MESSAGE (OUTPATIENT)
Dept: GASTROENTEROLOGY | Facility: HOSPITAL | Age: 69
End: 2024-05-11
Payer: MEDICARE

## 2024-05-11 ENCOUNTER — APPOINTMENT (OUTPATIENT)
Dept: CARDIOLOGY | Facility: HOSPITAL | Age: 69
DRG: 390 | End: 2024-05-11
Payer: MEDICARE

## 2024-05-11 LAB
ANION GAP SERPL CALC-SCNC: 15 MMOL/L (ref 10–20)
APPEARANCE UR: CLEAR
BILIRUB UR STRIP.AUTO-MCNC: NEGATIVE MG/DL
BUN SERPL-MCNC: 37 MG/DL (ref 6–23)
CALCIUM SERPL-MCNC: 7.6 MG/DL (ref 8.6–10.3)
CHLORIDE SERPL-SCNC: 106 MMOL/L (ref 98–107)
CO2 SERPL-SCNC: 22 MMOL/L (ref 21–32)
COLOR UR: ABNORMAL
CREAT SERPL-MCNC: 1.31 MG/DL (ref 0.5–1.3)
EGFRCR SERPLBLD CKD-EPI 2021: 60 ML/MIN/1.73M*2
ERYTHROCYTE [DISTWIDTH] IN BLOOD BY AUTOMATED COUNT: 13.9 % (ref 11.5–14.5)
EST. AVERAGE GLUCOSE BLD GHB EST-MCNC: 163 MG/DL
GLUCOSE BLD MANUAL STRIP-MCNC: 140 MG/DL (ref 74–99)
GLUCOSE BLD MANUAL STRIP-MCNC: 148 MG/DL (ref 74–99)
GLUCOSE BLD MANUAL STRIP-MCNC: 178 MG/DL (ref 74–99)
GLUCOSE BLD MANUAL STRIP-MCNC: 179 MG/DL (ref 74–99)
GLUCOSE SERPL-MCNC: 151 MG/DL (ref 74–99)
GLUCOSE UR STRIP.AUTO-MCNC: ABNORMAL MG/DL
HBA1C MFR BLD: 7.3 %
HCT VFR BLD AUTO: 36.8 % (ref 41–52)
HGB BLD-MCNC: 11.8 G/DL (ref 13.5–17.5)
KETONES UR STRIP.AUTO-MCNC: ABNORMAL MG/DL
LEUKOCYTE ESTERASE UR QL STRIP.AUTO: NEGATIVE
MCH RBC QN AUTO: 28.9 PG (ref 26–34)
MCHC RBC AUTO-ENTMCNC: 32.1 G/DL (ref 32–36)
MCV RBC AUTO: 90 FL (ref 80–100)
NITRITE UR QL STRIP.AUTO: NEGATIVE
NRBC BLD-RTO: 0 /100 WBCS (ref 0–0)
PH UR STRIP.AUTO: 5.5 [PH]
PLATELET # BLD AUTO: 247 X10*3/UL (ref 150–450)
POTASSIUM SERPL-SCNC: 4.5 MMOL/L (ref 3.5–5.3)
PROT UR STRIP.AUTO-MCNC: NEGATIVE MG/DL
RBC # BLD AUTO: 4.08 X10*6/UL (ref 4.5–5.9)
RBC # UR STRIP.AUTO: NEGATIVE /UL
SODIUM SERPL-SCNC: 138 MMOL/L (ref 136–145)
SP GR UR STRIP.AUTO: 1.04
UROBILINOGEN UR STRIP.AUTO-MCNC: NORMAL MG/DL
WBC # BLD AUTO: 2 X10*3/UL (ref 4.4–11.3)

## 2024-05-11 PROCEDURE — 74018 RADEX ABDOMEN 1 VIEW: CPT | Performed by: RADIOLOGY

## 2024-05-11 PROCEDURE — 85027 COMPLETE CBC AUTOMATED: CPT | Performed by: INTERNAL MEDICINE

## 2024-05-11 PROCEDURE — 2500000001 HC RX 250 WO HCPCS SELF ADMINISTERED DRUGS (ALT 637 FOR MEDICARE OP): Performed by: INTERNAL MEDICINE

## 2024-05-11 PROCEDURE — 93010 ELECTROCARDIOGRAM REPORT: CPT | Performed by: INTERNAL MEDICINE

## 2024-05-11 PROCEDURE — 1200000002 HC GENERAL ROOM WITH TELEMETRY DAILY

## 2024-05-11 PROCEDURE — 2500000002 HC RX 250 W HCPCS SELF ADMINISTERED DRUGS (ALT 637 FOR MEDICARE OP, ALT 636 FOR OP/ED): Performed by: INTERNAL MEDICINE

## 2024-05-11 PROCEDURE — 93005 ELECTROCARDIOGRAM TRACING: CPT

## 2024-05-11 PROCEDURE — 82947 ASSAY GLUCOSE BLOOD QUANT: CPT

## 2024-05-11 PROCEDURE — 74018 RADEX ABDOMEN 1 VIEW: CPT

## 2024-05-11 PROCEDURE — 99232 SBSQ HOSP IP/OBS MODERATE 35: CPT | Performed by: INTERNAL MEDICINE

## 2024-05-11 PROCEDURE — 2500000004 HC RX 250 GENERAL PHARMACY W/ HCPCS (ALT 636 FOR OP/ED): Performed by: INTERNAL MEDICINE

## 2024-05-11 PROCEDURE — 81003 URINALYSIS AUTO W/O SCOPE: CPT | Performed by: INTERNAL MEDICINE

## 2024-05-11 PROCEDURE — 2500000004 HC RX 250 GENERAL PHARMACY W/ HCPCS (ALT 636 FOR OP/ED): Performed by: EMERGENCY MEDICINE

## 2024-05-11 PROCEDURE — 80048 BASIC METABOLIC PNL TOTAL CA: CPT | Performed by: INTERNAL MEDICINE

## 2024-05-11 RX ADMIN — INSULIN LISPRO 1 UNITS: 100 INJECTION, SOLUTION INTRAVENOUS; SUBCUTANEOUS at 12:53

## 2024-05-11 RX ADMIN — HYDROMORPHONE HYDROCHLORIDE 1 MG: 1 INJECTION, SOLUTION INTRAMUSCULAR; INTRAVENOUS; SUBCUTANEOUS at 16:40

## 2024-05-11 RX ADMIN — HYDROMORPHONE HYDROCHLORIDE 1 MG: 1 INJECTION, SOLUTION INTRAMUSCULAR; INTRAVENOUS; SUBCUTANEOUS at 04:46

## 2024-05-11 RX ADMIN — ENOXAPARIN SODIUM 40 MG: 40 INJECTION SUBCUTANEOUS at 21:35

## 2024-05-11 RX ADMIN — HYDROMORPHONE HYDROCHLORIDE 1 MG: 1 INJECTION, SOLUTION INTRAMUSCULAR; INTRAVENOUS; SUBCUTANEOUS at 10:33

## 2024-05-11 RX ADMIN — Medication 10 ML: at 09:00

## 2024-05-11 RX ADMIN — SODIUM CHLORIDE 75 ML/HR: 9 INJECTION, SOLUTION INTRAVENOUS at 16:40

## 2024-05-11 RX ADMIN — INSULIN LISPRO 1 UNITS: 100 INJECTION, SOLUTION INTRAVENOUS; SUBCUTANEOUS at 16:40

## 2024-05-11 RX ADMIN — PANTOPRAZOLE SODIUM 40 MG: 40 TABLET, DELAYED RELEASE ORAL at 10:34

## 2024-05-11 RX ADMIN — ACETAMINOPHEN 650 MG: 325 TABLET ORAL at 04:46

## 2024-05-11 ASSESSMENT — PAIN SCALES - GENERAL
PAINLEVEL_OUTOF10: 1
PAINLEVEL_OUTOF10: 5 - MODERATE PAIN
PAINLEVEL_OUTOF10: 1
PAINLEVEL_OUTOF10: 7
PAINLEVEL_OUTOF10: 4

## 2024-05-11 ASSESSMENT — COGNITIVE AND FUNCTIONAL STATUS - GENERAL
MOBILITY SCORE: 24
DAILY ACTIVITIY SCORE: 24
DAILY ACTIVITIY SCORE: 24
MOBILITY SCORE: 24

## 2024-05-11 ASSESSMENT — PAIN - FUNCTIONAL ASSESSMENT
PAIN_FUNCTIONAL_ASSESSMENT: 0-10

## 2024-05-11 ASSESSMENT — PAIN DESCRIPTION - LOCATION
LOCATION: THROAT
LOCATION: THROAT

## 2024-05-11 ASSESSMENT — PAIN DESCRIPTION - DESCRIPTORS: DESCRIPTORS: CRAMPING

## 2024-05-11 NOTE — PROGRESS NOTES
"Bunny Abarca is a 68 y.o. male on day 1 of admission presenting with Abdominal pain, unspecified abdominal location.    Subjective   Patient is awake in bed this morning, he had just come back from Xray. He reports having 4 Bms overnight. Denies nausea/vomiting, or abd pain.        Objective     Physical Exam  Constitutional:       Appearance: Normal appearance.   HENT:      Nose:      Comments: NGT in place  Cardiovascular:      Rate and Rhythm: Normal rate and regular rhythm.   Pulmonary:      Effort: Pulmonary effort is normal.      Comments: Non labored breathing on RA  Abdominal:      General: There is distension (improved).      Palpations: Abdomen is soft.      Tenderness: There is no abdominal tenderness.   Skin:     General: Skin is warm and dry.   Neurological:      General: No focal deficit present.      Mental Status: He is alert and oriented to person, place, and time. Mental status is at baseline.   Psychiatric:         Attention and Perception: Attention normal.         Mood and Affect: Mood normal.         Speech: Speech normal.         Behavior: Behavior normal.         Cognition and Memory: Cognition normal.         Last Recorded Vitals  Blood pressure 143/62, pulse 53, temperature 36.6 °C (97.9 °F), temperature source Temporal, resp. rate 18, height 1.791 m (5' 10.5\"), weight 88.9 kg (196 lb), SpO2 93%.  Intake/Output last 3 Shifts:  I/O last 3 completed shifts:  In: 10 (0.1 mL/kg) [I.V.:10 (0.1 mL/kg)]  Out: 200 (2.2 mL/kg) [Urine:200 (0.1 mL/kg/hr)]  Weight: 88.9 kg     Relevant Results  Scheduled medications  enoxaparin, 40 mg, subcutaneous, q24h  insulin lispro, 0-5 Units, subcutaneous, Before meals & nightly  pantoprazole, 40 mg, oral, Daily before breakfast   Or  pantoprazole, 40 mg, intravenous, Daily before breakfast  sodium chloride 0.9%, 10 mL, intravenous, q12h REYMUNDO      Continuous medications  sodium chloride 0.9%, 75 mL/hr, Last Rate: 75 mL/hr (05/10/24 4849)      PRN " medications  PRN medications: acetaminophen **OR** acetaminophen **OR** acetaminophen, dextrose, dextrose, glucagon, glucagon, guaiFENesin, HYDROmorphone, ondansetron **OR** ondansetron, polyethylene glycol, sodium chloride 0.9%  Results for orders placed or performed during the hospital encounter of 05/10/24 (from the past 24 hour(s))   POCT GLUCOSE   Result Value Ref Range    POCT Glucose 159 (H) 74 - 99 mg/dL   SST TOP   Result Value Ref Range    Extra Tube Hold for add-ons.    PST Top   Result Value Ref Range    Extra Tube Hold for add-ons.    Hemoglobin A1C   Result Value Ref Range    Hemoglobin A1C 7.3 (H) see below %    Estimated Average Glucose 163 Not Established mg/dL   POCT GLUCOSE   Result Value Ref Range    POCT Glucose 147 (H) 74 - 99 mg/dL   CBC   Result Value Ref Range    WBC 2.0 (L) 4.4 - 11.3 x10*3/uL    nRBC 0.0 0.0 - 0.0 /100 WBCs    RBC 4.08 (L) 4.50 - 5.90 x10*6/uL    Hemoglobin 11.8 (L) 13.5 - 17.5 g/dL    Hematocrit 36.8 (L) 41.0 - 52.0 %    MCV 90 80 - 100 fL    MCH 28.9 26.0 - 34.0 pg    MCHC 32.1 32.0 - 36.0 g/dL    RDW 13.9 11.5 - 14.5 %    Platelets 247 150 - 450 x10*3/uL   Basic metabolic panel   Result Value Ref Range    Glucose 151 (H) 74 - 99 mg/dL    Sodium 138 136 - 145 mmol/L    Potassium 4.5 3.5 - 5.3 mmol/L    Chloride 106 98 - 107 mmol/L    Bicarbonate 22 21 - 32 mmol/L    Anion Gap 15 10 - 20 mmol/L    Urea Nitrogen 37 (H) 6 - 23 mg/dL    Creatinine 1.31 (H) 0.50 - 1.30 mg/dL    eGFR 60 (L) >60 mL/min/1.73m*2    Calcium 7.6 (L) 8.6 - 10.3 mg/dL   Urinalysis with Reflex Microscopic   Result Value Ref Range    Color, Urine Light-Yellow Light-Yellow, Yellow, Dark-Yellow    Appearance, Urine Clear Clear    Specific Gravity, Urine 1.037 (N) 1.005 - 1.035    pH, Urine 5.5 5.0, 5.5, 6.0, 6.5, 7.0, 7.5, 8.0    Protein, Urine NEGATIVE NEGATIVE, 10 (TRACE), 20 (TRACE) mg/dL    Glucose, Urine OVER (4+) (A) Normal mg/dL    Blood, Urine NEGATIVE NEGATIVE    Ketones, Urine 10 (1+) (A)  NEGATIVE mg/dL    Bilirubin, Urine NEGATIVE NEGATIVE    Urobilinogen, Urine Normal Normal mg/dL    Nitrite, Urine NEGATIVE NEGATIVE    Leukocyte Esterase, Urine NEGATIVE NEGATIVE   POCT GLUCOSE   Result Value Ref Range    POCT Glucose 148 (H) 74 - 99 mg/dL     XR abdomen 1 view   Final Result   Persistent dilatation of small bowel loops.        MACRO:   none        Signed by: Tara Godoy 5/11/2024 10:11 AM   Dictation workstation:   [id number]      XR chest 1 view   Final Result   1.  No evidence of acute cardiopulmonary process.                  MACRO:   None        Signed by: Joseph Schoenberger 5/10/2024 9:42 AM   Dictation workstation:   [id number]      CT abdomen pelvis w IV contrast   Final Result   1. Small-bowel obstruction with transition point in the right mid   abdomen. Surgical evaluation is recommended.   2. Similar postsurgical change related to right hemicolectomy and   ileocolic anastomosis.   3. New fluid attenuation material within the right inguinal canal,   possibly trace ascitic fluid, less likely surgical repair material.   4. Colonic diverticulosis without evidence of diverticulitis.   5. Prostatomegaly.   6. Cholelithiasis.        MACRO:   Kyle Hauser discussed the significance and urgency of this   critical finding by telephone with  Dr. Ramirez On 5/10/2024 at   6:40 am.  (**-RCF-**) Findings:  See findings.             Signed by: Kyle Hauser 5/10/2024 6:41 AM   Dictation workstation:   [id number]                    Assessment/Plan   Principal Problem:    Abdominal pain, unspecified abdominal location    Plan: SBO, appears resolving  - CLD  - NGT clamped  - PRN antiemetic  - Daily PPI  - Encourage lots of walking.  - DVT prophylaxis: SCDs/ Lovenox    Dispo: Discussed with Dr. Faulkner, appears to be improving clinically. Will try clamp and clears.         I spent 35 minutes in the professional and overall care of this patient.      Scotty Colvin PA-C    Pt overall  feeliing much better - no more abdominal pain.  He has had multiple BM's with lots of gas, X rays better as well     Gen - well appearing sleeping comfortably  Abd - softer, still quite distended, incision CDI, nontender  Extr - no edema    NGT was in at 20 cm on exam.  I discussed whether he would want to reposition vs remove.  Where he has no nausea and is having BM's opted for removal.     Impression - Pt here with SBO, with decreased WBC count and diarrhea with persistent distention concerned that this could be C diff     Plan   Check C diff today   Can try clears and see how he does   Lots of walking  Continue IVF as he is just turning around fluidwise

## 2024-05-11 NOTE — CARE PLAN
The patient's goals for the shift include  Pain management    The clinical goals for the shift include Patient to remain free of falls    Over the shift, the patient did not make progress toward the following goals. Barriers to progression include pain management. Recommendations to address these barriers include utilization of pain meds.

## 2024-05-11 NOTE — PROGRESS NOTES
Bunny Abarca is a 68 y.o. male     KUB shows persistent bowel obstruction  Patient has been ambulating and does feel a little better though    Review of Systems     Constitutional: no fever, no chills, not feeling poorly, not feeling tired   Cardiovascular: no chest pain   Respiratory: no cough, wheezing or shortness of breath a  Gastrointestinal: , no nausea, no diarrhea, no vomiting and no blood in stools.   Neurological: no headache,   All other systems have been reviewed and are negative for complaint.       Vitals:    05/11/24 0751   BP: 143/62   Pulse: 53   Resp: 18   Temp: 36.6 °C (97.9 °F)   SpO2: 93%        Scheduled medications  enoxaparin, 40 mg, subcutaneous, q24h  insulin lispro, 0-5 Units, subcutaneous, Before meals & nightly  pantoprazole, 40 mg, oral, Daily before breakfast   Or  pantoprazole, 40 mg, intravenous, Daily before breakfast  sodium chloride 0.9%, 10 mL, intravenous, q12h REYMUNDO      Continuous medications  sodium chloride 0.9%, 75 mL/hr, Last Rate: 75 mL/hr (05/10/24 1545)      PRN medications  PRN medications: acetaminophen **OR** acetaminophen **OR** acetaminophen, dextrose, dextrose, glucagon, glucagon, guaiFENesin, HYDROmorphone, ondansetron **OR** ondansetron, polyethylene glycol, sodium chloride 0.9%    Lab Review   Results from last 7 days   Lab Units 05/11/24  0610 05/10/24  0528   WBC AUTO x10*3/uL 2.0* 5.3   HEMOGLOBIN g/dL 11.8* 14.0   HEMATOCRIT % 36.8* 41.8   PLATELETS AUTO x10*3/uL 247 308     Results from last 7 days   Lab Units 05/11/24  0610 05/10/24  0528   SODIUM mmol/L 138 136   POTASSIUM mmol/L 4.5 4.3   CHLORIDE mmol/L 106 99   CO2 mmol/L 22 23   BUN mg/dL 37* 27*   CREATININE mg/dL 1.31* 1.10   CALCIUM mg/dL 7.6* 10.4*   PROTEIN TOTAL g/dL  --  7.6   BILIRUBIN TOTAL mg/dL  --  1.1   ALK PHOS U/L  --  41   ALT U/L  --  32   AST U/L  --  30   GLUCOSE mg/dL 151* 198*            XR abdomen 1 view   Final Result   Persistent dilatation of small bowel loops.        MACRO:    none        Signed by: Tara Godoy 5/11/2024 10:11 AM   Dictation workstation:   [id number]      XR chest 1 view   Final Result   1.  No evidence of acute cardiopulmonary process.                  MACRO:   None        Signed by: Joseph Schoenberger 5/10/2024 9:42 AM   Dictation workstation:   [id number]      CT abdomen pelvis w IV contrast   Final Result   1. Small-bowel obstruction with transition point in the right mid   abdomen. Surgical evaluation is recommended.   2. Similar postsurgical change related to right hemicolectomy and   ileocolic anastomosis.   3. New fluid attenuation material within the right inguinal canal,   possibly trace ascitic fluid, less likely surgical repair material.   4. Colonic diverticulosis without evidence of diverticulitis.   5. Prostatomegaly.   6. Cholelithiasis.        MACRO:   Kyle Hauser discussed the significance and urgency of this   critical finding by telephone with  Dr. Ramirez On 5/10/2024 at   6:40 am.  (**-RCF-**) Findings:  See findings.             Signed by: Kyle Hauser 5/10/2024 6:41 AM   Dictation workstation:   [id number]            Physical Exam     Constitutional   General appearance: Alert and in no acute distress.     Pulmonary   Respiratory assessment: No respiratory distress, normal respiratory rhythm and effort.    Auscultation of Lungs: Clear bilateral breath sounds.   Cardiovascular   Auscultation of heart: Irregular rhythm  Exam for edema: No peripheral edema.   Abdomen   Abdominal Exam: Distended  Liver and Spleen exam: No hepato-splenomegaly.   Musculoskeletal   Examination of gait: ROM intact  Skin inspection: Normal skin color and pigmentation, normal skin turgor and no visible rash.   Neurologic   Cranial nerves: Nerves 2-12 were intact, no focal neuro defects.     Assessment/Plan      #Small bowel obstruction  NG tube to suction  KUB this morning shows persistent obstruction  Patient has been ambulating in the  halls  Management as per surgery     #Atrial fibrillation  Rate controlled  Holding Eliquis  Subcu Lovenox     #Hypertension  #Dyslipidemia  #Diabetes mellitus  Will hold oral medications while n.p.o.  Sliding scale insulin  Will use IV hydralazine if needed for systolic blood pressure greater than 170

## 2024-05-12 ENCOUNTER — APPOINTMENT (OUTPATIENT)
Dept: RADIOLOGY | Facility: HOSPITAL | Age: 69
DRG: 390 | End: 2024-05-12
Payer: MEDICARE

## 2024-05-12 LAB
ANION GAP SERPL CALC-SCNC: 11 MMOL/L (ref 10–20)
BUN SERPL-MCNC: 20 MG/DL (ref 6–23)
CALCIUM SERPL-MCNC: 7.4 MG/DL (ref 8.6–10.3)
CHLORIDE SERPL-SCNC: 105 MMOL/L (ref 98–107)
CO2 SERPL-SCNC: 24 MMOL/L (ref 21–32)
CREAT SERPL-MCNC: 1.01 MG/DL (ref 0.5–1.3)
EGFRCR SERPLBLD CKD-EPI 2021: 82 ML/MIN/1.73M*2
ERYTHROCYTE [DISTWIDTH] IN BLOOD BY AUTOMATED COUNT: 13.5 % (ref 11.5–14.5)
GLUCOSE BLD MANUAL STRIP-MCNC: 139 MG/DL (ref 74–99)
GLUCOSE BLD MANUAL STRIP-MCNC: 161 MG/DL (ref 74–99)
GLUCOSE BLD MANUAL STRIP-MCNC: 176 MG/DL (ref 74–99)
GLUCOSE BLD MANUAL STRIP-MCNC: 184 MG/DL (ref 74–99)
GLUCOSE SERPL-MCNC: 136 MG/DL (ref 74–99)
HCT VFR BLD AUTO: 36.6 % (ref 41–52)
HGB BLD-MCNC: 11.4 G/DL (ref 13.5–17.5)
MCH RBC QN AUTO: 28.9 PG (ref 26–34)
MCHC RBC AUTO-ENTMCNC: 31.1 G/DL (ref 32–36)
MCV RBC AUTO: 93 FL (ref 80–100)
NRBC BLD-RTO: 0 /100 WBCS (ref 0–0)
PLATELET # BLD AUTO: 236 X10*3/UL (ref 150–450)
POTASSIUM SERPL-SCNC: 4.3 MMOL/L (ref 3.5–5.3)
RBC # BLD AUTO: 3.95 X10*6/UL (ref 4.5–5.9)
SODIUM SERPL-SCNC: 136 MMOL/L (ref 136–145)
WBC # BLD AUTO: 2.1 X10*3/UL (ref 4.4–11.3)

## 2024-05-12 PROCEDURE — C9113 INJ PANTOPRAZOLE SODIUM, VIA: HCPCS | Performed by: INTERNAL MEDICINE

## 2024-05-12 PROCEDURE — 82947 ASSAY GLUCOSE BLOOD QUANT: CPT

## 2024-05-12 PROCEDURE — 99232 SBSQ HOSP IP/OBS MODERATE 35: CPT | Performed by: INTERNAL MEDICINE

## 2024-05-12 PROCEDURE — 82374 ASSAY BLOOD CARBON DIOXIDE: CPT | Performed by: INTERNAL MEDICINE

## 2024-05-12 PROCEDURE — 87493 C DIFF AMPLIFIED PROBE: CPT | Mod: AHULAB | Performed by: COLON & RECTAL SURGERY

## 2024-05-12 PROCEDURE — 74018 RADEX ABDOMEN 1 VIEW: CPT

## 2024-05-12 PROCEDURE — 85027 COMPLETE CBC AUTOMATED: CPT | Performed by: INTERNAL MEDICINE

## 2024-05-12 PROCEDURE — 2500000002 HC RX 250 W HCPCS SELF ADMINISTERED DRUGS (ALT 637 FOR MEDICARE OP, ALT 636 FOR OP/ED): Performed by: INTERNAL MEDICINE

## 2024-05-12 PROCEDURE — 37799 UNLISTED PX VASCULAR SURGERY: CPT | Performed by: INTERNAL MEDICINE

## 2024-05-12 PROCEDURE — 74018 RADEX ABDOMEN 1 VIEW: CPT | Performed by: RADIOLOGY

## 2024-05-12 PROCEDURE — 1200000002 HC GENERAL ROOM WITH TELEMETRY DAILY

## 2024-05-12 PROCEDURE — 2500000004 HC RX 250 GENERAL PHARMACY W/ HCPCS (ALT 636 FOR OP/ED): Performed by: INTERNAL MEDICINE

## 2024-05-12 RX ADMIN — SODIUM CHLORIDE 75 ML/HR: 9 INJECTION, SOLUTION INTRAVENOUS at 17:52

## 2024-05-12 RX ADMIN — INSULIN LISPRO 1 UNITS: 100 INJECTION, SOLUTION INTRAVENOUS; SUBCUTANEOUS at 08:58

## 2024-05-12 RX ADMIN — ACETAMINOPHEN 650 MG: 325 TABLET ORAL at 08:58

## 2024-05-12 RX ADMIN — Medication 10 ML: at 08:58

## 2024-05-12 RX ADMIN — ENOXAPARIN SODIUM 40 MG: 40 INJECTION SUBCUTANEOUS at 21:18

## 2024-05-12 RX ADMIN — Medication 10 ML: at 21:18

## 2024-05-12 RX ADMIN — INSULIN LISPRO 1 UNITS: 100 INJECTION, SOLUTION INTRAVENOUS; SUBCUTANEOUS at 21:18

## 2024-05-12 RX ADMIN — SODIUM CHLORIDE 75 ML/HR: 9 INJECTION, SOLUTION INTRAVENOUS at 04:58

## 2024-05-12 RX ADMIN — PANTOPRAZOLE SODIUM 40 MG: 40 INJECTION, POWDER, FOR SOLUTION INTRAVENOUS at 05:02

## 2024-05-12 RX ADMIN — INSULIN LISPRO 1 UNITS: 100 INJECTION, SOLUTION INTRAVENOUS; SUBCUTANEOUS at 12:52

## 2024-05-12 ASSESSMENT — PAIN SCALES - GENERAL
PAINLEVEL_OUTOF10: 2
PAINLEVEL_OUTOF10: 0 - NO PAIN

## 2024-05-12 ASSESSMENT — COGNITIVE AND FUNCTIONAL STATUS - GENERAL
MOBILITY SCORE: 24
DAILY ACTIVITIY SCORE: 24

## 2024-05-12 ASSESSMENT — PAIN - FUNCTIONAL ASSESSMENT
PAIN_FUNCTIONAL_ASSESSMENT: 0-10

## 2024-05-12 NOTE — PROGRESS NOTES
"Bunny Abarca is a 68 y.o. male on day 2 of admission presenting with Abdominal pain, unspecified abdominal location.    Subjective   Patient is awake in bed this morning. He reports feeling back to normal. He had multiple bowel movements through out the night.       Objective     Physical Exam  Constitutional:       Appearance: Normal appearance.   Cardiovascular:      Rate and Rhythm: Normal rate and regular rhythm.   Pulmonary:      Effort: Pulmonary effort is normal.      Comments: Non labored breathing on RA  Abdominal:      General: There is distension (improved).      Palpations: Abdomen is soft.      Tenderness: There is no abdominal tenderness.   Skin:     General: Skin is warm and dry.   Neurological:      General: No focal deficit present.      Mental Status: He is alert and oriented to person, place, and time. Mental status is at baseline.   Psychiatric:         Attention and Perception: Attention normal.         Mood and Affect: Mood normal.         Speech: Speech normal.         Behavior: Behavior normal.         Cognition and Memory: Cognition normal.         Last Recorded Vitals  Blood pressure 114/74, pulse 79, temperature 37.1 °C (98.8 °F), temperature source Temporal, resp. rate 17, height 1.791 m (5' 10.5\"), weight 88.9 kg (196 lb), SpO2 94%.  Intake/Output last 3 Shifts:  I/O last 3 completed shifts:  In: 3751.3 (42.2 mL/kg) [P.O.:960; I.V.:2791.3 (31.4 mL/kg)]  Out: 1054 (11.9 mL/kg) [Urine:404 (0.1 mL/kg/hr); Emesis/NG output:650]  Weight: 88.9 kg     Relevant Results  Scheduled medications  enoxaparin, 40 mg, subcutaneous, q24h  insulin lispro, 0-5 Units, subcutaneous, Before meals & nightly  pantoprazole, 40 mg, oral, Daily before breakfast   Or  pantoprazole, 40 mg, intravenous, Daily before breakfast  sodium chloride 0.9%, 10 mL, intravenous, q12h REYMUNDO      Continuous medications  sodium chloride 0.9%, 75 mL/hr, Last Rate: 75 mL/hr (05/12/24 9476)      PRN medications  PRN medications: " acetaminophen **OR** acetaminophen **OR** acetaminophen, dextrose, dextrose, glucagon, glucagon, guaiFENesin, HYDROmorphone, ondansetron **OR** ondansetron, polyethylene glycol, sodium chloride 0.9%  Results for orders placed or performed during the hospital encounter of 05/10/24 (from the past 24 hour(s))   POCT GLUCOSE   Result Value Ref Range    POCT Glucose 179 (H) 74 - 99 mg/dL   POCT GLUCOSE   Result Value Ref Range    POCT Glucose 178 (H) 74 - 99 mg/dL   POCT GLUCOSE   Result Value Ref Range    POCT Glucose 140 (H) 74 - 99 mg/dL   CBC   Result Value Ref Range    WBC 2.1 (L) 4.4 - 11.3 x10*3/uL    nRBC 0.0 0.0 - 0.0 /100 WBCs    RBC 3.95 (L) 4.50 - 5.90 x10*6/uL    Hemoglobin 11.4 (L) 13.5 - 17.5 g/dL    Hematocrit 36.6 (L) 41.0 - 52.0 %    MCV 93 80 - 100 fL    MCH 28.9 26.0 - 34.0 pg    MCHC 31.1 (L) 32.0 - 36.0 g/dL    RDW 13.5 11.5 - 14.5 %    Platelets 236 150 - 450 x10*3/uL   Basic metabolic panel   Result Value Ref Range    Glucose 136 (H) 74 - 99 mg/dL    Sodium 136 136 - 145 mmol/L    Potassium 4.3 3.5 - 5.3 mmol/L    Chloride 105 98 - 107 mmol/L    Bicarbonate 24 21 - 32 mmol/L    Anion Gap 11 10 - 20 mmol/L    Urea Nitrogen 20 6 - 23 mg/dL    Creatinine 1.01 0.50 - 1.30 mg/dL    eGFR 82 >60 mL/min/1.73m*2    Calcium 7.4 (L) 8.6 - 10.3 mg/dL     XR abdomen 1 view   Final Result   Persistent dilatation of small bowel loops.        MACRO:   none        Signed by: Tara Godoy 5/11/2024 10:11 AM   Dictation workstation:   [id number]      XR chest 1 view   Final Result   1.  No evidence of acute cardiopulmonary process.                  MACRO:   None        Signed by: Joseph Schoenberger 5/10/2024 9:42 AM   Dictation workstation:   [id number]      CT abdomen pelvis w IV contrast   Final Result   1. Small-bowel obstruction with transition point in the right mid   abdomen. Surgical evaluation is recommended.   2. Similar postsurgical change related to right hemicolectomy and   ileocolic anastomosis.    3. New fluid attenuation material within the right inguinal canal,   possibly trace ascitic fluid, less likely surgical repair material.   4. Colonic diverticulosis without evidence of diverticulitis.   5. Prostatomegaly.   6. Cholelithiasis.        MACRO:   Kyle Hauser discussed the significance and urgency of this   critical finding by telephone with  Dr. Ramirez On 5/10/2024 at   6:40 am.  (**-RCF-**) Findings:  See findings.             Signed by: Kyle Hauser 5/10/2024 6:41 AM   Dictation workstation:   [id number]      XR abdomen 1 view    (Results Pending)                 Assessment/Plan   Principal Problem:    Abdominal pain, unspecified abdominal location    Plan: SBO, appears resolving  - Advanced to soft diet  - NGT removed  - PRN antiemetic  - Daily PPI  - Encourage lots of walking.  - DVT prophylaxis: SCDs/ Lovenox    Dispo: Improving clinically. Will Advance diet as tolerated anticipate discharge within the next 24 hours.         I spent 35 minutes in the professional and overall care of this patient.      Scotty Colvin PA-C    Feeling good, no bloating or nausea  Advancing diet, possible discharge today if doing well

## 2024-05-12 NOTE — CARE PLAN
Problem: Pain  Goal: My pain/discomfort is manageable  Outcome: Progressing     Problem: Safety  Goal: Patient will be injury free during hospitalization  Outcome: Progressing  Goal: I will remain free of falls  Outcome: Progressing     Problem: Daily Care  Goal: Daily care needs are met  Outcome: Progressing     Problem: Psychosocial Needs  Goal: Demonstrates ability to cope with hospitalization/illness  Outcome: Progressing  Goal: Collaborate with me, my family, and caregiver to identify my specific goals  Outcome: Progressing     Problem: Discharge Barriers  Goal: My discharge needs are met  Outcome: Progressing   The patient's goals for the shift include      The clinical goals for the shift include Patient to be able to rest

## 2024-05-12 NOTE — CARE PLAN
The patient's goals for the shift include  be able to have more BM    The clinical goals for the shift include pain control    Over the shift, the patient did not make progress toward the following goals. Barriers to progression include none. Recommendations to address these barriers include encourage mobility.

## 2024-05-12 NOTE — PROGRESS NOTES
Bunny Abarca is a 68 y.o. male     KUB shows persistent bowel obstruction  But the patient looks better clinically    Review of Systems     Constitutional: no fever, no chills, not feeling poorly, not feeling tired   Cardiovascular: no chest pain   Respiratory: no cough, wheezing or shortness of breath a  Gastrointestinal: , no nausea, no diarrhea, no vomiting and no blood in stools.   Neurological: no headache,   All other systems have been reviewed and are negative for complaint.       Vitals:    05/12/24 0740   BP: 114/74   Pulse: 79   Resp: 17   Temp: 37.1 °C (98.8 °F)   SpO2: 94%        Scheduled medications  enoxaparin, 40 mg, subcutaneous, q24h  insulin lispro, 0-5 Units, subcutaneous, Before meals & nightly  pantoprazole, 40 mg, oral, Daily before breakfast   Or  pantoprazole, 40 mg, intravenous, Daily before breakfast  sodium chloride 0.9%, 10 mL, intravenous, q12h REYMUNDO      Continuous medications  sodium chloride 0.9%, 75 mL/hr, Last Rate: 75 mL/hr (05/12/24 1149)      PRN medications  PRN medications: acetaminophen **OR** acetaminophen **OR** acetaminophen, dextrose, dextrose, glucagon, glucagon, guaiFENesin, HYDROmorphone, ondansetron **OR** ondansetron, polyethylene glycol, sodium chloride 0.9%    Lab Review   Results from last 7 days   Lab Units 05/12/24  0559 05/11/24  0610 05/10/24  0528   WBC AUTO x10*3/uL 2.1* 2.0* 5.3   HEMOGLOBIN g/dL 11.4* 11.8* 14.0   HEMATOCRIT % 36.6* 36.8* 41.8   PLATELETS AUTO x10*3/uL 236 247 308     Results from last 7 days   Lab Units 05/12/24  0559 05/11/24  0610 05/10/24  0528   SODIUM mmol/L 136 138 136   POTASSIUM mmol/L 4.3 4.5 4.3   CHLORIDE mmol/L 105 106 99   CO2 mmol/L 24 22 23   BUN mg/dL 20 37* 27*   CREATININE mg/dL 1.01 1.31* 1.10   CALCIUM mg/dL 7.4* 7.6* 10.4*   PROTEIN TOTAL g/dL  --   --  7.6   BILIRUBIN TOTAL mg/dL  --   --  1.1   ALK PHOS U/L  --   --  41   ALT U/L  --   --  32   AST U/L  --   --  30   GLUCOSE mg/dL 136* 151* 198*            XR  abdomen 1 view   Final Result   Persistent dilatation of small bowel loops.             MACRO:   none        Signed by: Tara Godoy 5/12/2024 9:21 AM   Dictation workstation:   [id number]      XR abdomen 1 view   Final Result   Persistent dilatation of small bowel loops.        MACRO:   none        Signed by: Tara Godoy 5/11/2024 10:11 AM   Dictation workstation:   [id number]      XR chest 1 view   Final Result   1.  No evidence of acute cardiopulmonary process.                  MACRO:   None        Signed by: Joseph Schoenberger 5/10/2024 9:42 AM   Dictation workstation:   [id number]      CT abdomen pelvis w IV contrast   Final Result   1. Small-bowel obstruction with transition point in the right mid   abdomen. Surgical evaluation is recommended.   2. Similar postsurgical change related to right hemicolectomy and   ileocolic anastomosis.   3. New fluid attenuation material within the right inguinal canal,   possibly trace ascitic fluid, less likely surgical repair material.   4. Colonic diverticulosis without evidence of diverticulitis.   5. Prostatomegaly.   6. Cholelithiasis.        MACRO:   Kyle Hauser discussed the significance and urgency of this   critical finding by telephone with  Dr. Ramirez On 5/10/2024 at   6:40 am.  (**-RCF-**) Findings:  See findings.             Signed by: Kyle Hauser 5/10/2024 6:41 AM   Dictation workstation:   [id number]            Physical Exam     Constitutional   General appearance: Alert and in no acute distress.     Pulmonary   Respiratory assessment: No respiratory distress, normal respiratory rhythm and effort.    Auscultation of Lungs: Clear bilateral breath sounds.   Cardiovascular   Auscultation of heart: Irregular rhythm  Exam for edema: No peripheral edema.   Abdomen   Abdominal Exam: Distended  Liver and Spleen exam: No hepato-splenomegaly.   Musculoskeletal   Examination of gait: ROM intact  Skin inspection: Normal skin color and  pigmentation, normal skin turgor and no visible rash.   Neurologic   Cranial nerves: Nerves 2-12 were intact, no focal neuro defects.     Assessment/Plan      #Small bowel obstruction  NG tube out  Surgery advancing diet  KUB this morning shows persistent obstruction  Patient has been ambulating in the halls  Management as per surgery     #Atrial fibrillation  Rate controlled  Holding Eliquis  Subcu Lovenox     #Hypertension  #Dyslipidemia  #Diabetes mellitus    Sliding scale insulin  Will use IV hydralazine if needed for systolic blood pressure greater than 170

## 2024-05-13 VITALS
SYSTOLIC BLOOD PRESSURE: 142 MMHG | TEMPERATURE: 97.5 F | WEIGHT: 196 LBS | HEIGHT: 71 IN | HEART RATE: 79 BPM | DIASTOLIC BLOOD PRESSURE: 80 MMHG | OXYGEN SATURATION: 96 % | RESPIRATION RATE: 17 BRPM | BODY MASS INDEX: 27.44 KG/M2

## 2024-05-13 PROBLEM — K56.609 SBO (SMALL BOWEL OBSTRUCTION) (MULTI): Status: RESOLVED | Noted: 2023-11-29 | Resolved: 2024-05-13

## 2024-05-13 LAB
ANION GAP SERPL CALC-SCNC: 11 MMOL/L (ref 10–20)
BUN SERPL-MCNC: 16 MG/DL (ref 6–23)
C DIF TOX TCDA+TCDB STL QL NAA+PROBE: NOT DETECTED
CALCIUM SERPL-MCNC: 8.3 MG/DL (ref 8.6–10.3)
CHLORIDE SERPL-SCNC: 106 MMOL/L (ref 98–107)
CO2 SERPL-SCNC: 22 MMOL/L (ref 21–32)
CREAT SERPL-MCNC: 0.91 MG/DL (ref 0.5–1.3)
EGFRCR SERPLBLD CKD-EPI 2021: >90 ML/MIN/1.73M*2
ERYTHROCYTE [DISTWIDTH] IN BLOOD BY AUTOMATED COUNT: 13.2 % (ref 11.5–14.5)
GLUCOSE BLD MANUAL STRIP-MCNC: 198 MG/DL (ref 74–99)
GLUCOSE SERPL-MCNC: 170 MG/DL (ref 74–99)
HCT VFR BLD AUTO: 35.5 % (ref 41–52)
HGB BLD-MCNC: 11.4 G/DL (ref 13.5–17.5)
MCH RBC QN AUTO: 28.6 PG (ref 26–34)
MCHC RBC AUTO-ENTMCNC: 32.1 G/DL (ref 32–36)
MCV RBC AUTO: 89 FL (ref 80–100)
NRBC BLD-RTO: 0 /100 WBCS (ref 0–0)
PLATELET # BLD AUTO: 226 X10*3/UL (ref 150–450)
POTASSIUM SERPL-SCNC: 4.1 MMOL/L (ref 3.5–5.3)
RBC # BLD AUTO: 3.99 X10*6/UL (ref 4.5–5.9)
SODIUM SERPL-SCNC: 135 MMOL/L (ref 136–145)
WBC # BLD AUTO: 3.1 X10*3/UL (ref 4.4–11.3)

## 2024-05-13 PROCEDURE — 85027 COMPLETE CBC AUTOMATED: CPT | Performed by: INTERNAL MEDICINE

## 2024-05-13 PROCEDURE — 2500000001 HC RX 250 WO HCPCS SELF ADMINISTERED DRUGS (ALT 637 FOR MEDICARE OP): Performed by: INTERNAL MEDICINE

## 2024-05-13 PROCEDURE — 80048 BASIC METABOLIC PNL TOTAL CA: CPT | Performed by: INTERNAL MEDICINE

## 2024-05-13 PROCEDURE — 2500000002 HC RX 250 W HCPCS SELF ADMINISTERED DRUGS (ALT 637 FOR MEDICARE OP, ALT 636 FOR OP/ED): Performed by: INTERNAL MEDICINE

## 2024-05-13 PROCEDURE — 99239 HOSP IP/OBS DSCHRG MGMT >30: CPT | Performed by: INTERNAL MEDICINE

## 2024-05-13 PROCEDURE — 82947 ASSAY GLUCOSE BLOOD QUANT: CPT

## 2024-05-13 PROCEDURE — 37799 UNLISTED PX VASCULAR SURGERY: CPT | Performed by: INTERNAL MEDICINE

## 2024-05-13 RX ORDER — ACETAMINOPHEN 325 MG/1
650 TABLET ORAL EVERY 4 HOURS PRN
Start: 2024-05-13 | End: 2024-06-05 | Stop reason: WASHOUT

## 2024-05-13 RX ORDER — ACETAMINOPHEN 325 MG/1
650 TABLET ORAL EVERY 4 HOURS PRN
Status: CANCELLED
Start: 2024-05-13

## 2024-05-13 RX ADMIN — PANTOPRAZOLE SODIUM 40 MG: 40 TABLET, DELAYED RELEASE ORAL at 06:42

## 2024-05-13 RX ADMIN — INSULIN LISPRO 1 UNITS: 100 INJECTION, SOLUTION INTRAVENOUS; SUBCUTANEOUS at 10:39

## 2024-05-13 SDOH — ECONOMIC STABILITY: TRANSPORTATION INSECURITY
IN THE PAST 12 MONTHS, HAS LACK OF TRANSPORTATION KEPT YOU FROM MEETINGS, WORK, OR FROM GETTING THINGS NEEDED FOR DAILY LIVING?: NO

## 2024-05-13 SDOH — ECONOMIC STABILITY: HOUSING INSECURITY: IN THE LAST 12 MONTHS, HOW MANY PLACES HAVE YOU LIVED?: 2

## 2024-05-13 SDOH — ECONOMIC STABILITY: HOUSING INSECURITY

## 2024-05-13 SDOH — ECONOMIC STABILITY: FOOD INSECURITY: WITHIN THE PAST 12 MONTHS, THE FOOD YOU BOUGHT JUST DIDN'T LAST AND YOU DIDN'T HAVE MONEY TO GET MORE.: NEVER TRUE

## 2024-05-13 SDOH — ECONOMIC STABILITY: TRANSPORTATION INSECURITY
IN THE PAST 12 MONTHS, HAS THE LACK OF TRANSPORTATION KEPT YOU FROM MEDICAL APPOINTMENTS OR FROM GETTING MEDICATIONS?: NO

## 2024-05-13 SDOH — ECONOMIC STABILITY: HOUSING INSECURITY: IN THE PAST 12 MONTHS HAS THE ELECTRIC, GAS, OIL, OR WATER COMPANY THREATENED TO SHUT OFF SERVICES IN YOUR HOME?: NO

## 2024-05-13 SDOH — ECONOMIC STABILITY: HOUSING INSECURITY
IN THE LAST 12 MONTHS, WAS THERE A TIME WHEN YOU DID NOT HAVE A STEADY PLACE TO SLEEP OR SLEPT IN A SHELTER (INCLUDING NOW)?: NO

## 2024-05-13 SDOH — ECONOMIC STABILITY: FOOD INSECURITY: WITHIN THE PAST 12 MONTHS, YOU WORRIED THAT YOUR FOOD WOULD RUN OUT BEFORE YOU GOT MONEY TO BUY MORE.: NEVER TRUE

## 2024-05-13 SDOH — ECONOMIC STABILITY: INCOME INSECURITY: IN THE LAST 12 MONTHS, WAS THERE A TIME WHEN YOU WERE NOT ABLE TO PAY THE MORTGAGE OR RENT ON TIME?: NO

## 2024-05-13 SDOH — ECONOMIC STABILITY: FOOD INSECURITY

## 2024-05-13 SDOH — ECONOMIC STABILITY: FOOD INSECURITY: WITHIN THE PAST 12 MONTHS, YOU WORRIED THAT YOUR FOOD WOULD RUN OUT BEFORE YOU GOT THE MONEY TO BUY MORE.: NEVER TRUE

## 2024-05-13 SDOH — ECONOMIC STABILITY: TRANSPORTATION INSECURITY

## 2024-05-13 SDOH — ECONOMIC STABILITY: GENERAL

## 2024-05-13 SDOH — ECONOMIC STABILITY: HOUSING INSECURITY: IN THE LAST 12 MONTHS, WAS THERE A TIME WHEN YOU WERE NOT ABLE TO PAY THE MORTGAGE OR RENT ON TIME?: NO

## 2024-05-13 SDOH — ECONOMIC STABILITY: FOOD INSECURITY: WITHIN THE PAST 12 MONTHS, THE FOOD YOU BOUGHT JUST DIDN’T LAST AND YOU DIDN’T HAVE MONEY TO GET MORE.: NEVER TRUE

## 2024-05-13 SDOH — ECONOMIC STABILITY: TRANSPORTATION INSECURITY: IN THE PAST 12 MONTHS, HAS LACK OF TRANSPORTATION KEPT YOU FROM MEDICAL APPOINTMENTS OR FROM GETTING MEDICATIONS?: NO

## 2024-05-13 ASSESSMENT — COGNITIVE AND FUNCTIONAL STATUS - GENERAL
MOBILITY SCORE: 24
DAILY ACTIVITIY SCORE: 24

## 2024-05-13 ASSESSMENT — PAIN SCALES - GENERAL: PAINLEVEL_OUTOF10: 0 - NO PAIN

## 2024-05-13 ASSESSMENT — ACTIVITIES OF DAILY LIVING (ADL): LACK_OF_TRANSPORTATION: NO

## 2024-05-13 ASSESSMENT — SOCIAL DETERMINANTS OF HEALTH (SDOH): IN THE PAST 12 MONTHS, HAS THE ELECTRIC, GAS, OIL, OR WATER COMPANY THREATENED TO SHUT OFF SERVICE IN YOUR HOME?: NO

## 2024-05-13 NOTE — PROGRESS NOTES
05/13/24 1007   Discharge Planning   Patient expects to be discharged to: home with wife- no needs     Spoke to Marilin NP- patient is med ready  Tolerating diet  NG removed  Surgery ok'd for dc  Will dc today home with wife    ADOD today  BARRIERS none  DISPO HNN

## 2024-05-13 NOTE — NURSING NOTE
Discharge instructions provided using teach back method. Pt's health related  risk factors discussed with pt. pt educated to look for any worsening sign and symptoms. Pt educated to seek medical attention if experience any medical emergency. Pt aware to follow up with outpatient clinics as scheduled. Home going meds reviewed with pt. Pt verbalized understanding of disposition and discharge instructions. All questions answered to patient's satisfaction and within nursing scope of practice. Vitals stable, mediport to be removed by bedside nurse.

## 2024-05-13 NOTE — CARE PLAN
Problem: Pain  Goal: My pain/discomfort is manageable  Outcome: Adequate for Discharge     Problem: Safety  Goal: Patient will be injury free during hospitalization  Outcome: Adequate for Discharge  Goal: I will remain free of falls  Outcome: Adequate for Discharge     Problem: Daily Care  Goal: Daily care needs are met  Outcome: Adequate for Discharge     Problem: Psychosocial Needs  Goal: Demonstrates ability to cope with hospitalization/illness  Outcome: Adequate for Discharge  Goal: Collaborate with me, my family, and caregiver to identify my specific goals  Outcome: Adequate for Discharge     Problem: Discharge Barriers  Goal: My discharge needs are met  Outcome: Adequate for Discharge   The patient's goals for the shift include      The clinical goals for the shift include discharge

## 2024-05-13 NOTE — DISCHARGE SUMMARY
Discharge Diagnosis  Abdominal pain, unspecified abdominal location    Issues Requiring Follow-Up  Follow with PCP    Test Results Pending At Discharge  Pending Labs       Order Current Status    Extra Urine Gray Tube Collected (05/10/24 0502)    Urinalysis with Reflex Culture and Microscopic In process            Hospital Course   Patient with a past medical history of hypertension dyslipidemia atrial fibrillation history of carcinoma of the appendix s/p surgery diabetes mellitus was doing well post colonoscopy that was routine when he developed abdominal pain and cramping  Had multiple bouts of diarrhea  Had emesis x 1  Symptoms were not improving and he got progressively bloated so came to the hospital further evaluation where workup shows small bowel obstruction  Surgery recommended holding Eliquis for potential surgery  So we put him on Lovenox for the atrial fibrillation and DVT prophylaxis  Patient was put on NG tube to low wall suction  Antiemetics and fluids were provided  Supportive care has been successful and a small bowel obstruction has resolved  He has had bowel movements  Has been able to tolerate diet without any difficulty  NG tube removed  We will discharge the patient home in stable condition  Eliquis resumed    Pertinent Physical Exam At Time of Discharge  Physical Exam    Constitutional   General appearance: Alert and in no acute distress.     Pulmonary   Respiratory assessment: No respiratory distress, normal respiratory rhythm and effort.    Auscultation of Lungs: Clear bilateral breath sounds.   Cardiovascular   Auscultation of heart: Apical pulse normal, heart rate and rhythm normal, normal S1 and S2, no murmurs and no pericardial rub.    Exam for edema: No peripheral edema.   Abdomen   Abdominal Exam: No bruits, normal bowel sounds, soft, non-tender, no abdominal mass palpated.    Liver and Spleen exam: No hepato-splenomegaly.   Musculoskeletal   Examination of gait: Normal.    Inspection  "of digits and nails: No clubbing or cyanosis of the fingernails.    Inspection/palpation of joints, bones and muscles: No joint swelling. Normal movement of all extremities.   Skin   Skin inspection: Normal skin color and pigmentation, normal skin turgor and no visible rash.   Neurologic   Cranial nerves: Nerves 2-12 were intact, no focal neuro defects.       Home Medications     Medication List      START taking these medications     acetaminophen 325 mg tablet; Commonly known as: Tylenol; Take 2 tablets   (650 mg) by mouth every 4 hours if needed for mild pain (1 - 3) or   moderate pain (4 - 6).     CONTINUE taking these medications     Accu-Chek Fastclix Lancet Drum misc; Generic drug: lancets   apixaban 5 mg tablet; Commonly known as: Eliquis; TAKE 1 TABLET BY MOUTH   TWO TIMES A DAY   atorvastatin 40 mg tablet; Commonly known as: Lipitor; Take 1 tablet (40   mg) by mouth once daily at bedtime.   doxazosin 2 mg tablet; Commonly known as: Cardura; Take 1 tablet (2 mg)   by mouth once daily at bedtime.   fenofibrate 145 mg tablet; Commonly known as: Tricor; Take 1 tablet (145   mg) by mouth once daily. WITH FOOD   Fish OiL 1,000 mg (120 mg-180 mg) capsule; Generic drug: omega   3-dha-epa-fish oil   insulin asp prt-insulin aspart 100 unit/mL (70-30) injection; Commonly   known as: NovoLOG Mix 70-30FlexPen U-100; Inject 38 Units under the skin 2   times a day.   Jardiance 25 mg; Generic drug: empagliflozin; Take 1 tablet (25 mg) by   mouth once daily.   metFORMIN  mg 24 hr tablet; Commonly known as: Glucophage-XR; Take   2 tablets (1,000 mg) by mouth 2 times a day.   olmesartan 40 mg tablet; Commonly known as: BENIcar; Take 1 tablet (40   mg) by mouth once daily.   pen needle, diabetic 31 gauge x 5/16\" needle; Commonly known as: BD   Ultra-Fine Short Pen Needle; Inject 1 each under the skin 2 times a day.       Outpatient Follow-Up  Future Appointments   Date Time Provider Department Center   5/23/2024 10:30 " AM Ante T Pletikosic, DO [id number] [facility]   6/11/2024  9:20 AM [facility]TR[facility] 01 [id number] [facility]   6/11/2024 10:00 AM MARILY Garcia-CNP [id number] [facility]   9/10/2024 10:15 AM Pedro Calvillo MD [id number] [facility]   9/30/2024  9:30 AM Beatriz Sanchez MD, MS [id number] [facility]     Patient seen at bedside. Events from the last visit reviewed. Discussed with staff. Results of tests and investigations from last visit reviewed and discussed with patient/Family. Electronic chart on [facility] reviewed. Input / Recommendations  from consultants  appreciated and reviewed and agreed with.     discharge summary and profile completed. medications reviewed and discussed with patient and family.  scripts completed and signed.     total discharge time in excess of 30 minutes.    Surekha Middleton MD

## 2024-05-13 NOTE — PROGRESS NOTES
"Bunny Abarca is a 68 y.o. male on day 3 of admission presenting with Abdominal pain, unspecified abdominal location.    Subjective   Patient is awake in bed this morning. He reports feeling great, tolerating diet, continues to have Bms       Objective     Physical Exam  Constitutional:       Appearance: Normal appearance.   Cardiovascular:      Rate and Rhythm: Normal rate and regular rhythm.   Pulmonary:      Effort: Pulmonary effort is normal.      Comments: Non labored breathing on RA  Abdominal:      General: There is distension (improved).      Palpations: Abdomen is soft.      Tenderness: There is no abdominal tenderness.   Skin:     General: Skin is warm and dry.   Neurological:      General: No focal deficit present.      Mental Status: He is alert and oriented to person, place, and time. Mental status is at baseline.   Psychiatric:         Attention and Perception: Attention normal.         Mood and Affect: Mood normal.         Speech: Speech normal.         Behavior: Behavior normal.         Cognition and Memory: Cognition normal.         Last Recorded Vitals  Blood pressure 142/80, pulse 79, temperature 36.4 °C (97.5 °F), temperature source Temporal, resp. rate 17, height 1.791 m (5' 10.5\"), weight 88.9 kg (196 lb), SpO2 96%.  Intake/Output last 3 Shifts:  I/O last 3 completed shifts:  In: 3756.3 (42.3 mL/kg) [I.V.:3756.3 (42.3 mL/kg)]  Out: 654 (7.4 mL/kg) [Urine:4 (0 mL/kg/hr); Emesis/NG output:650]  Weight: 88.9 kg     Relevant Results  Scheduled medications  enoxaparin, 40 mg, subcutaneous, q24h  insulin lispro, 0-5 Units, subcutaneous, Before meals & nightly  pantoprazole, 40 mg, oral, Daily before breakfast  sodium chloride 0.9%, 10 mL, intravenous, q12h REYMUNDO      Continuous medications  [Held by provider] sodium chloride 0.9%, 75 mL/hr, Last Rate: 75 mL/hr (05/12/24 2154)      PRN medications  PRN medications: acetaminophen **OR** [DISCONTINUED] acetaminophen **OR** [DISCONTINUED] acetaminophen, " dextrose, dextrose, guaiFENesin, HYDROmorphone, [DISCONTINUED] ondansetron **OR** ondansetron, polyethylene glycol, sodium chloride 0.9%  Results for orders placed or performed during the hospital encounter of 05/10/24 (from the past 24 hour(s))   POCT GLUCOSE   Result Value Ref Range    POCT Glucose 161 (H) 74 - 99 mg/dL   C. difficile, PCR    Specimen: Stool   Result Value Ref Range    C. difficile, PCR Not Detected Not Detected   POCT GLUCOSE   Result Value Ref Range    POCT Glucose 139 (H) 74 - 99 mg/dL   POCT GLUCOSE   Result Value Ref Range    POCT Glucose 184 (H) 74 - 99 mg/dL   CBC   Result Value Ref Range    WBC 3.1 (L) 4.4 - 11.3 x10*3/uL    nRBC 0.0 0.0 - 0.0 /100 WBCs    RBC 3.99 (L) 4.50 - 5.90 x10*6/uL    Hemoglobin 11.4 (L) 13.5 - 17.5 g/dL    Hematocrit 35.5 (L) 41.0 - 52.0 %    MCV 89 80 - 100 fL    MCH 28.6 26.0 - 34.0 pg    MCHC 32.1 32.0 - 36.0 g/dL    RDW 13.2 11.5 - 14.5 %    Platelets 226 150 - 450 x10*3/uL   Basic metabolic panel   Result Value Ref Range    Glucose 170 (H) 74 - 99 mg/dL    Sodium 135 (L) 136 - 145 mmol/L    Potassium 4.1 3.5 - 5.3 mmol/L    Chloride 106 98 - 107 mmol/L    Bicarbonate 22 21 - 32 mmol/L    Anion Gap 11 10 - 20 mmol/L    Urea Nitrogen 16 6 - 23 mg/dL    Creatinine 0.91 0.50 - 1.30 mg/dL    eGFR >90 >60 mL/min/1.73m*2    Calcium 8.3 (L) 8.6 - 10.3 mg/dL     XR abdomen 1 view   Final Result   Persistent dilatation of small bowel loops.             MACRO:   none        Signed by: Tara Godoy 5/12/2024 9:21 AM   Dictation workstation:   [id number]      XR abdomen 1 view   Final Result   Persistent dilatation of small bowel loops.        MACRO:   none        Signed by: Tara Godoy 5/11/2024 10:11 AM   Dictation workstation:   [id number]      XR chest 1 view   Final Result   1.  No evidence of acute cardiopulmonary process.                  MACRO:   None        Signed by: Joseph Schoenberger 5/10/2024 9:42 AM   Dictation workstation:   [id number]      CT abdomen  pelvis w IV contrast   Final Result   1. Small-bowel obstruction with transition point in the right mid   abdomen. Surgical evaluation is recommended.   2. Similar postsurgical change related to right hemicolectomy and   ileocolic anastomosis.   3. New fluid attenuation material within the right inguinal canal,   possibly trace ascitic fluid, less likely surgical repair material.   4. Colonic diverticulosis without evidence of diverticulitis.   5. Prostatomegaly.   6. Cholelithiasis.        MACRO:   Kyle Hauser discussed the significance and urgency of this   critical finding by telephone with  Dr. Ramirez On 5/10/2024 at   6:40 am.  (**-RCF-**) Findings:  See findings.             Signed by: Kyle Hauser 5/10/2024 6:41 AM   Dictation workstation:   [id number]                    Assessment/Plan   Principal Problem:    Abdominal pain, unspecified abdominal location    Plan: SBO, resolved  - Tolerating diet  - PRN antiemetic  - Daily PPI  - Encourage lots of walking.  - DVT prophylaxis: SCDs/ Lovenox    Dispo: Doing well. Surgery to sign off, discharge when medically ready.         I spent 35 minutes in the professional and overall care of this patient.    Scotty Colvin PA-C

## 2024-05-13 NOTE — TELEPHONE ENCOUNTER
From: Bunny Abarca  To: Evette Faulkner  Sent: 5/11/2024 4:40 AM EDT  Subject: Gift arrived AM      Had my first bowel movements this morning at 4 AM  What is the next step in your plan for me ?

## 2024-05-13 NOTE — CARE PLAN
The patient's goals for the shift include      The clinical goals for the shift include Pt will remain HDS      Problem: Pain  Goal: My pain/discomfort is manageable  Outcome: Progressing     Problem: Safety  Goal: Patient will be injury free during hospitalization  Outcome: Progressing  Goal: I will remain free of falls  Outcome: Progressing     Problem: Daily Care  Goal: Daily care needs are met  Outcome: Progressing     Problem: Psychosocial Needs  Goal: Demonstrates ability to cope with hospitalization/illness  Outcome: Progressing  Goal: Collaborate with me, my family, and caregiver to identify my specific goals  Outcome: Progressing     Problem: Discharge Barriers  Goal: My discharge needs are met  Outcome: Progressing

## 2024-05-14 ENCOUNTER — NURSE TRIAGE (OUTPATIENT)
Dept: ADMISSION | Facility: HOSPITAL | Age: 69
End: 2024-05-14
Payer: MEDICARE

## 2024-05-14 ASSESSMENT — ENCOUNTER SYMPTOMS
PAIN: 0
FEVER: 0

## 2024-05-14 NOTE — TELEPHONE ENCOUNTER
Patient states he is still experiencing ongoing neuropathy in his fingers and toes. The pain has resolved, but still has constant numbness and tingling, sometimes hard to button shirt or  small objects.    Also complains of ongoing thrush in mouth. Tongue is coated yellow, no pain. Sense of taste is altered significantly.    He is asking for both medications to be sent to [facility] on [address].  He states these were discussed at last follow up, to which he refused at that time

## 2024-05-15 LAB
ATRIAL RATE: 94 BPM
P AXIS: 46 DEGREES
P OFFSET: 188 MS
P ONSET: 136 MS
PR INTERVAL: 160 MS
Q ONSET: 216 MS
QRS COUNT: 20 BEATS
QRS DURATION: 90 MS
QT INTERVAL: 332 MS
QTC CALCULATION(BAZETT): 471 MS
QTC FREDERICIA: 419 MS
R AXIS: 39 DEGREES
T AXIS: 68 DEGREES
T OFFSET: 382 MS
VENTRICULAR RATE: 121 BPM

## 2024-05-15 NOTE — TELEPHONE ENCOUNTER
Patient returned phone call.   He denies any pain associated with neuropathy. He states that he was recommended to take B complex by Dr. Webb and it no longer hurts, but is still numb/tingling from the middle of his hand and foot to the tips of his fingers and toes.  He also states that he still has thrush - the fluconazole didn't help - it is still persisting on his tongue.  Secure chat sent to Dr. Webb. RN will follow up with pt.

## 2024-05-16 ENCOUNTER — PATIENT OUTREACH (OUTPATIENT)
Dept: CARE COORDINATION | Facility: CLINIC | Age: 69
End: 2024-05-16
Payer: MEDICARE

## 2024-05-16 DIAGNOSIS — B37.0 CANDIDIASIS OF MOUTH AND ESOPHAGUS (MULTI): Primary | ICD-10-CM

## 2024-05-16 DIAGNOSIS — B37.81 CANDIDIASIS OF MOUTH AND ESOPHAGUS (MULTI): Primary | ICD-10-CM

## 2024-05-16 RX ORDER — CLOTRIMAZOLE 10 MG/1
10 LOZENGE ORAL; TOPICAL
Qty: 50 TROCHE | Refills: 0 | Status: SHIPPED | OUTPATIENT
Start: 2024-05-16 | End: 2024-05-26

## 2024-05-16 NOTE — TELEPHONE ENCOUNTER
Informed pt that clotrimazole has been sent to his pharmacy.   Pt is taking B complex for neuropathy and is okay with just that.   Will update Dr. Webb about thrush at visit next month.

## 2024-05-23 ENCOUNTER — APPOINTMENT (OUTPATIENT)
Dept: PRIMARY CARE | Facility: CLINIC | Age: 69
End: 2024-05-23
Payer: MEDICARE

## 2024-05-23 DIAGNOSIS — I10 HYPERTENSION, UNSPECIFIED TYPE: ICD-10-CM

## 2024-05-23 DIAGNOSIS — Z79.4 TYPE 2 DIABETES MELLITUS WITHOUT COMPLICATION, WITH LONG-TERM CURRENT USE OF INSULIN (MULTI): ICD-10-CM

## 2024-05-23 DIAGNOSIS — E78.2 MIXED HYPERLIPIDEMIA: ICD-10-CM

## 2024-05-23 DIAGNOSIS — E11.9 TYPE 2 DIABETES MELLITUS WITHOUT COMPLICATION, WITH LONG-TERM CURRENT USE OF INSULIN (MULTI): ICD-10-CM

## 2024-05-24 ENCOUNTER — LAB (OUTPATIENT)
Dept: LAB | Facility: LAB | Age: 69
End: 2024-05-24
Payer: MEDICARE

## 2024-05-24 DIAGNOSIS — I10 HYPERTENSION, UNSPECIFIED TYPE: ICD-10-CM

## 2024-05-24 DIAGNOSIS — E11.9 TYPE 2 DIABETES MELLITUS WITHOUT COMPLICATION, WITH LONG-TERM CURRENT USE OF INSULIN (MULTI): ICD-10-CM

## 2024-05-24 DIAGNOSIS — E78.2 MIXED HYPERLIPIDEMIA: ICD-10-CM

## 2024-05-24 DIAGNOSIS — Z79.4 TYPE 2 DIABETES MELLITUS WITHOUT COMPLICATION, WITH LONG-TERM CURRENT USE OF INSULIN (MULTI): ICD-10-CM

## 2024-05-24 LAB
ALBUMIN SERPL BCP-MCNC: 4.5 G/DL (ref 3.4–5)
ALP SERPL-CCNC: 36 U/L (ref 33–136)
ALT SERPL W P-5'-P-CCNC: 22 U/L (ref 10–52)
ANION GAP SERPL CALC-SCNC: 10 MMOL/L (ref 10–20)
AST SERPL W P-5'-P-CCNC: 21 U/L (ref 9–39)
BILIRUB SERPL-MCNC: 0.6 MG/DL (ref 0–1.2)
BUN SERPL-MCNC: 19 MG/DL (ref 6–23)
CALCIUM SERPL-MCNC: 9.2 MG/DL (ref 8.6–10.3)
CHLORIDE SERPL-SCNC: 106 MMOL/L (ref 98–107)
CHOLEST SERPL-MCNC: 89 MG/DL (ref 0–199)
CHOLESTEROL/HDL RATIO: 2.1
CO2 SERPL-SCNC: 27 MMOL/L (ref 21–32)
CREAT SERPL-MCNC: 1.06 MG/DL (ref 0.5–1.3)
EGFRCR SERPLBLD CKD-EPI 2021: 77 ML/MIN/1.73M*2
EST. AVERAGE GLUCOSE BLD GHB EST-MCNC: 174 MG/DL
GLUCOSE SERPL-MCNC: 88 MG/DL (ref 74–99)
HBA1C MFR BLD: 7.7 %
HDLC SERPL-MCNC: 42.7 MG/DL
LDLC SERPL CALC-MCNC: 25 MG/DL
NON HDL CHOLESTEROL: 46 MG/DL (ref 0–149)
POTASSIUM SERPL-SCNC: 4.1 MMOL/L (ref 3.5–5.3)
PROT SERPL-MCNC: 6.7 G/DL (ref 6.4–8.2)
SODIUM SERPL-SCNC: 139 MMOL/L (ref 136–145)
TRIGL SERPL-MCNC: 109 MG/DL (ref 0–149)
VLDL: 22 MG/DL (ref 0–40)

## 2024-05-24 PROCEDURE — 80061 LIPID PANEL: CPT

## 2024-05-24 PROCEDURE — 83036 HEMOGLOBIN GLYCOSYLATED A1C: CPT

## 2024-05-24 PROCEDURE — 80053 COMPREHEN METABOLIC PANEL: CPT

## 2024-05-24 PROCEDURE — 36415 COLL VENOUS BLD VENIPUNCTURE: CPT

## 2024-05-26 NOTE — RESULT ENCOUNTER NOTE
Hemoglobin A1c noted to increase slightly up to 7.7%    Cholesterol is the best on file 89, HDL 42, LDL 25, triglycerides 109    Sugar, kidneys, liver, electrolytes are all within normal limits    This overall is the best on file in regards to the cholesterol panel and CMP

## 2024-06-05 ENCOUNTER — OFFICE VISIT (OUTPATIENT)
Dept: PRIMARY CARE | Facility: CLINIC | Age: 69
End: 2024-06-05
Payer: MEDICARE

## 2024-06-05 VITALS
WEIGHT: 197 LBS | OXYGEN SATURATION: 96 % | BODY MASS INDEX: 27.87 KG/M2 | SYSTOLIC BLOOD PRESSURE: 130 MMHG | DIASTOLIC BLOOD PRESSURE: 70 MMHG | HEART RATE: 92 BPM

## 2024-06-05 DIAGNOSIS — C18.1 CANCER OF APPENDIX METASTATIC TO INTRA-ABDOMINAL LYMPH NODE (MULTI): ICD-10-CM

## 2024-06-05 DIAGNOSIS — E78.2 MIXED HYPERLIPIDEMIA: ICD-10-CM

## 2024-06-05 DIAGNOSIS — I48.20 CHRONIC ATRIAL FIBRILLATION, UNSPECIFIED (MULTI): ICD-10-CM

## 2024-06-05 DIAGNOSIS — I10 ESSENTIAL (PRIMARY) HYPERTENSION: Primary | ICD-10-CM

## 2024-06-05 DIAGNOSIS — I10 HYPERTENSION, UNSPECIFIED TYPE: ICD-10-CM

## 2024-06-05 DIAGNOSIS — C77.2 CANCER OF APPENDIX METASTATIC TO INTRA-ABDOMINAL LYMPH NODE (MULTI): ICD-10-CM

## 2024-06-05 DIAGNOSIS — K13.21 LEUKOPLAKIA OF TONGUE: ICD-10-CM

## 2024-06-05 DIAGNOSIS — I25.10 ATHEROSCLEROSIS OF NATIVE CORONARY ARTERY OF NATIVE HEART WITHOUT ANGINA PECTORIS: ICD-10-CM

## 2024-06-05 DIAGNOSIS — Z79.4 TYPE 2 DIABETES MELLITUS WITHOUT COMPLICATION, WITH LONG-TERM CURRENT USE OF INSULIN (MULTI): ICD-10-CM

## 2024-06-05 DIAGNOSIS — E11.9 TYPE 2 DIABETES MELLITUS WITHOUT COMPLICATION, WITH LONG-TERM CURRENT USE OF INSULIN (MULTI): ICD-10-CM

## 2024-06-05 PROBLEM — R56.9 SEIZURE (MULTI): Status: RESOLVED | Noted: 2023-09-05 | Resolved: 2024-06-05

## 2024-06-05 PROCEDURE — 3048F LDL-C <100 MG/DL: CPT | Performed by: STUDENT IN AN ORGANIZED HEALTH CARE EDUCATION/TRAINING PROGRAM

## 2024-06-05 PROCEDURE — 1159F MED LIST DOCD IN RCRD: CPT | Performed by: STUDENT IN AN ORGANIZED HEALTH CARE EDUCATION/TRAINING PROGRAM

## 2024-06-05 PROCEDURE — 1157F ADVNC CARE PLAN IN RCRD: CPT | Performed by: STUDENT IN AN ORGANIZED HEALTH CARE EDUCATION/TRAINING PROGRAM

## 2024-06-05 PROCEDURE — 3078F DIAST BP <80 MM HG: CPT | Performed by: STUDENT IN AN ORGANIZED HEALTH CARE EDUCATION/TRAINING PROGRAM

## 2024-06-05 PROCEDURE — 4010F ACE/ARB THERAPY RXD/TAKEN: CPT | Performed by: STUDENT IN AN ORGANIZED HEALTH CARE EDUCATION/TRAINING PROGRAM

## 2024-06-05 PROCEDURE — 3051F HG A1C>EQUAL 7.0%<8.0%: CPT | Performed by: STUDENT IN AN ORGANIZED HEALTH CARE EDUCATION/TRAINING PROGRAM

## 2024-06-05 PROCEDURE — 3008F BODY MASS INDEX DOCD: CPT | Performed by: STUDENT IN AN ORGANIZED HEALTH CARE EDUCATION/TRAINING PROGRAM

## 2024-06-05 PROCEDURE — 1036F TOBACCO NON-USER: CPT | Performed by: STUDENT IN AN ORGANIZED HEALTH CARE EDUCATION/TRAINING PROGRAM

## 2024-06-05 PROCEDURE — 1111F DSCHRG MED/CURRENT MED MERGE: CPT | Performed by: STUDENT IN AN ORGANIZED HEALTH CARE EDUCATION/TRAINING PROGRAM

## 2024-06-05 PROCEDURE — 3075F SYST BP GE 130 - 139MM HG: CPT | Performed by: STUDENT IN AN ORGANIZED HEALTH CARE EDUCATION/TRAINING PROGRAM

## 2024-06-05 PROCEDURE — 99215 OFFICE O/P EST HI 40 MIN: CPT | Performed by: STUDENT IN AN ORGANIZED HEALTH CARE EDUCATION/TRAINING PROGRAM

## 2024-06-05 RX ORDER — DOXAZOSIN 2 MG/1
2 TABLET ORAL NIGHTLY
Qty: 90 TABLET | Refills: 1 | Status: SHIPPED | OUTPATIENT
Start: 2024-06-05

## 2024-06-05 RX ORDER — OLMESARTAN MEDOXOMIL 40 MG/1
40 TABLET ORAL DAILY
Qty: 90 TABLET | Refills: 1 | Status: SHIPPED | OUTPATIENT
Start: 2024-06-05

## 2024-06-05 RX ORDER — ATORVASTATIN CALCIUM 40 MG/1
40 TABLET, FILM COATED ORAL NIGHTLY
Qty: 90 TABLET | Refills: 1 | Status: SHIPPED | OUTPATIENT
Start: 2024-06-05

## 2024-06-05 NOTE — PROGRESS NOTES
Subjective   Reason for Visit: Bunny Abarca is an 69 y.o. male here for a 6 month healthcare maintenance follow-up.    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  09/07/23- Admitted for lower extremity swelling and a syncopal episode and was found to have a saddle pulmonary embolus. Was on flecainide for his atrial fibrillation but was discontinued while inpatient. Cardiologist decided not to start back up outpatient.  2/12/24- Started final cycle of systemic chemotherapy.  3/11/24- Oncology-ordered CT showed no evidence of malignancy. CEA undetectable. Proceeding with surveillance through oncology.   5/10/24- Admitted to [facility] from the ED for SBO about one week after undergoing colonoscopy (results normal). CT showed SBO, no evidence of malignant recurrence, with cholelithiases, diverticulosis, and prostatomegaly. Symptoms resolved with non-operative management, discharged on 5/13/24.      2. Hypertension  Established h/o paroxysmal atrial fibrillation, followed by Cardiology   Currently on olmesartan 40 mg daily, doxazosin 2 mg nightly  Tolerating well.   BP reading today 136/70  Denies any headaches, dizziness, vision changes, SOB, GARDUNO, LE edema, or any other complaints.     3. Hyperlipidemia and hypertriglyceridemia  Currently on Atorvastatin 40 mg daily and fenofibrate 145 mg daily was 88 by Dr Calvillo.  Last lipid panel in 5/24 is normal   Tolerating well and denies side effects.     4. Type II Diabetes Mellitus  Takes Metformin 500 mg 2 tablets twice a day and on NovoLog.  He also takes Jardiance 25 mg daily.  Follows with Endocrinology, Dr. Calvillo  A1c on 5/24/24 improved to 7.7% from 8.2% on  12/04/23.      5. Atrial fibrillation  Follows Cardiology, Dr. Ibanez  Previously on metoprolol and then Flecainide, as of now, cardiology does not recommend any antiarrhythmic medication.   Currently on apixaban 5 mg BID for anticoagulation, followed by vascular medicine  He is tolerating the medication well.     6. Nocturia  He is experiencing insomnia due to having to get up to urinate several times throughout the night.  He believes this is due to Jardiance and HCTZ.  Started doxazosin 2 mg nightly in 12/23  Since then symptoms have remained the same    7.  Leukoplakia of the tongue  He has a history of whitening of the surface of the tongue.  In the past has tried nystatin and other antifungals without improvement.  Admits that presentation has improvement since he finished last dose of chemotherapy.      Allergies   Allergen Reactions    Shellfish Derived Other    Cat Dander Cough and Runny nose    Perfume Itching and Rash       Immunization History   Administered Date(s) Administered    Flu vaccine (IIV4), preservative free *Check age/dose* 11/10/2017, 10/04/2018, 11/01/2019, 11/01/2020    Flu vaccine, quadrivalent, high-dose, preservative free, age 65y+ (FLUZONE) 09/23/2023    Influenza, High Dose Seasonal, Preservative Free 10/05/2021, 10/04/2022    Influenza, Unspecified 10/04/2022    Influenza, seasonal, injectable 12/20/2004, 10/04/2022    Moderna COVID-19 vaccine, Fall 2023, 12 yeasrs and older (50mcg/0.5mL) 11/04/2023    Moderna COVID-19 vaccine, bivalent, blue cap/gray label *Check age/dose* 10/11/2022    Moderna SARS-CoV-2 Vaccination 04/16/2022    Pfizer Purple Cap SARS-CoV-2 03/05/2021, 03/25/2021, 10/21/2021    Pneumococcal conjugate vaccine, 13-valent (PREVNAR 13) 10/05/2021    Pneumococcal polysaccharide vaccine, 23-valent, age 2 years and older (PNEUMOVAX 23) 10/05/2022    RESPIRATORY SYNCYTIAL VIRUS (RSV), ELIGIBLE PREGNANT PTS, 0.5 ML (ABRYSVO) 10/02/2023    Tdap vaccine, age 7 year and  older (BOOSTRIX, ADACEL) 11/10/2017    Zoster vaccine, recombinant, adult (SHINGRIX) 08/24/2021, 10/25/2021       Review of Systems  All pertinent positive symptoms are included in the history of present illness.    All other systems have been reviewed and are negative and noncontributory to this patient's current ailments.    Objective   Vitals:  /70 (BP Location: Left arm, Patient Position: Sitting, BP Cuff Size: Adult)   Pulse 92   Wt 89.4 kg (197 lb)   SpO2 96%   BMI 27.87 kg/m²     Lab on 05/24/2024   Component Date Value Ref Range Status    Cholesterol 05/24/2024 89  0 - 199 mg/dL Final          Age      Desirable   Borderline High   High     0-19 Y     0 - 169       170 - 199     >/= 200    20-24 Y     0 - 189       190 - 224     >/= 225         >24 Y     0 - 199       200 - 239     >/= 240   **All ranges are based on fasting samples. Specific   therapeutic targets will vary based on patient-specific   cardiac risk.    Pediatric guidelines reference:Pediatrics 2011, 128(S5).Adult guidelines reference: NCEP ATPIII Guidelines,RYAN 2001, 258:2486-97    Venipuncture immediately after or during the administration of Metamizole may lead to falsely low results. Testing should be performed immediately prior to Metamizole dosing.    HDL-Cholesterol 05/24/2024 42.7  mg/dL Final      Age       Very Low   Low     Normal    High    0-19 Y    < 35      < 40     40-45     ----  20-24 Y    ----     < 40      >45      ----        >24 Y      ----     < 40     40-60      >60      Cholesterol/HDL Ratio 05/24/2024 2.1   Final      Ref Values  Desirable  < 3.4  High Risk  > 5.0    LDL Calculated 05/24/2024 25  <=99 mg/dL Final                                Near   Borderline      AGE      Desirable  Optimal    High     High     Very High     0-19 Y     0 - 109     ---    110-129   >/= 130     ----    20-24 Y     0 - 119     ---    120-159   >/= 160     ----      >24 Y     0 -  99   100-129  130-159   160-189     >/=190       VLDL 05/24/2024 22  0 - 40 mg/dL Final    Triglycerides 05/24/2024 109  0 - 149 mg/dL Final       Age         Desirable   Borderline High   High     Very High   0 D-90 D    19 - 174         ----         ----        ----  91 D- 9 Y     0 -  74        75 -  99     >/= 100      ----    10-19 Y     0 -  89        90 - 129     >/= 130      ----    20-24 Y     0 - 114       115 - 149     >/= 150      ----         >24 Y     0 - 149       150 - 199    200- 499    >/= 500    Venipuncture immediately after or during the administration of Metamizole may lead to falsely low results. Testing should be performed immediately prior to Metamizole dosing.    Non HDL Cholesterol 05/24/2024 46  0 - 149 mg/dL Final          Age       Desirable   Borderline High   High     Very High     0-19 Y     0 - 119       120 - 144     >/= 145    >/= 160    20-24 Y     0 - 149       150 - 189     >/= 190      ----         >24 Y    30 mg/dL above LDL Cholesterol goal      Glucose 05/24/2024 88  74 - 99 mg/dL Final    Sodium 05/24/2024 139  136 - 145 mmol/L Final    Potassium 05/24/2024 4.1  3.5 - 5.3 mmol/L Final    Chloride 05/24/2024 106  98 - 107 mmol/L Final    Bicarbonate 05/24/2024 27  21 - 32 mmol/L Final    Anion Gap 05/24/2024 10  10 - 20 mmol/L Final    Urea Nitrogen 05/24/2024 19  6 - 23 mg/dL Final    Creatinine 05/24/2024 1.06  0.50 - 1.30 mg/dL Final    eGFR 05/24/2024 77  >60 mL/min/1.73m*2 Final    Calculations of estimated GFR are performed using the 2021 CKD-EPI Study Refit equation without the race variable for the IDMS-Traceable creatinine methods.  https://jasn.asnjournals.org/content/early/2021/09/22/ASN.2857666712    Calcium 05/24/2024 9.2  8.6 - 10.3 mg/dL Final    Albumin 05/24/2024 4.5  3.4 - 5.0 g/dL Final    Alkaline Phosphatase 05/24/2024 36  33 - 136 U/L Final    Total Protein 05/24/2024 6.7  6.4 - 8.2 g/dL Final    AST 05/24/2024 21  9 - 39 U/L Final    Bilirubin, Total 05/24/2024 0.6  0.0 - 1.2 mg/dL Final    ALT  05/24/2024 22  10 - 52 U/L Final    Patients treated with Sulfasalazine may generate falsely decreased results for ALT.    Hemoglobin A1C 05/24/2024 7.7 (H)  see below % Final    Estimated Average Glucose 05/24/2024 174  Not Established mg/dL Final   Admission on 05/10/2024, Discharged on 05/13/2024   Component Date Value Ref Range Status    WBC 05/10/2024 5.3  4.4 - 11.3 x10*3/uL Final    nRBC 05/10/2024 0.0  0.0 - 0.0 /100 WBCs Final    RBC 05/10/2024 4.84  4.50 - 5.90 x10*6/uL Final    Hemoglobin 05/10/2024 14.0  13.5 - 17.5 g/dL Final    Hematocrit 05/10/2024 41.8  41.0 - 52.0 % Final    MCV 05/10/2024 86  80 - 100 fL Final    MCH 05/10/2024 28.9  26.0 - 34.0 pg Final    MCHC 05/10/2024 33.5  32.0 - 36.0 g/dL Final    RDW 05/10/2024 13.7  11.5 - 14.5 % Final    Platelets 05/10/2024 308  150 - 450 x10*3/uL Final    Neutrophils % 05/10/2024 89.0  40.0 - 80.0 % Final    Immature Granulocytes %, Automated 05/10/2024 0.2  0.0 - 0.9 % Final    Immature Granulocyte Count (IG) includes promyelocytes, myelocytes and metamyelocytes but does not include bands. Percent differential counts (%) should be interpreted in the context of the absolute cell counts (cells/UL).    Lymphocytes % 05/10/2024 3.8  13.0 - 44.0 % Final    Monocytes % 05/10/2024 6.8  2.0 - 10.0 % Final    Eosinophils % 05/10/2024 0.0  0.0 - 6.0 % Final    Basophils % 05/10/2024 0.2  0.0 - 2.0 % Final    Neutrophils Absolute 05/10/2024 4.73  1.20 - 7.70 x10*3/uL Final    Percent differential counts (%) should be interpreted in the context of the absolute cell counts (cells/uL).    Immature Granulocytes Absolute, Au* 05/10/2024 0.01  0.00 - 0.70 x10*3/uL Final    Lymphocytes Absolute 05/10/2024 0.20 (L)  1.20 - 4.80 x10*3/uL Final    Monocytes Absolute 05/10/2024 0.36  0.10 - 1.00 x10*3/uL Final    Eosinophils Absolute 05/10/2024 0.00  0.00 - 0.70 x10*3/uL Final    Basophils Absolute 05/10/2024 0.01  0.00 - 0.10 x10*3/uL Final    Glucose 05/10/2024 198 (H)   74 - 99 mg/dL Final    Sodium 05/10/2024 136  136 - 145 mmol/L Final    Potassium 05/10/2024 4.3  3.5 - 5.3 mmol/L Final    Chloride 05/10/2024 99  98 - 107 mmol/L Final    Bicarbonate 05/10/2024 23  21 - 32 mmol/L Final    Anion Gap 05/10/2024 18  10 - 20 mmol/L Final    Urea Nitrogen 05/10/2024 27 (H)  6 - 23 mg/dL Final    Creatinine 05/10/2024 1.10  0.50 - 1.30 mg/dL Final    eGFR 05/10/2024 74  >60 mL/min/1.73m*2 Final    Calculations of estimated GFR are performed using the 2021 CKD-EPI Study Refit equation without the race variable for the IDMS-Traceable creatinine methods.  https://jasn.asnjournals.org/content/early/2021/09/22/ASN.8613475835    Calcium 05/10/2024 10.4 (H)  8.6 - 10.3 mg/dL Final    Albumin 05/10/2024 4.8  3.4 - 5.0 g/dL Final    Alkaline Phosphatase 05/10/2024 41  33 - 136 U/L Final    Total Protein 05/10/2024 7.6  6.4 - 8.2 g/dL Final    AST 05/10/2024 30  9 - 39 U/L Final    Bilirubin, Total 05/10/2024 1.1  0.0 - 1.2 mg/dL Final    ALT 05/10/2024 32  10 - 52 U/L Final    Patients treated with Sulfasalazine may generate falsely decreased results for ALT.    Lipase 05/10/2024 65  9 - 82 U/L Final    Color, Urine 05/10/2024 Yellow  Light-Yellow, Yellow, Dark-Yellow Final    Appearance, Urine 05/10/2024 Clear  Clear Final    Specific Gravity, Urine 05/10/2024 1.042 (N)  1.005 - 1.035 Final    pH, Urine 05/10/2024 5.0  5.0, 5.5, 6.0, 6.5, 7.0, 7.5, 8.0 Final    Protein, Urine 05/10/2024 NEGATIVE  NEGATIVE, 10 (TRACE), 20 (TRACE) mg/dL Final    Glucose, Urine 05/10/2024 OVER (4+) (A)  Normal mg/dL Final    Blood, Urine 05/10/2024 NEGATIVE  NEGATIVE Final    Ketones, Urine 05/10/2024 20 (1+) (A)  NEGATIVE mg/dL Final    Bilirubin, Urine 05/10/2024 NEGATIVE  NEGATIVE Final    Urobilinogen, Urine 05/10/2024 Normal  Normal mg/dL Final    Nitrite, Urine 05/10/2024 NEGATIVE  NEGATIVE Final    Leukocyte Esterase, Urine 05/10/2024 NEGATIVE  NEGATIVE Final    Color, Urine 05/11/2024 Light-Yellow   Light-Yellow, Yellow, Dark-Yellow Final    Appearance, Urine 05/11/2024 Clear  Clear Final    Specific Gravity, Urine 05/11/2024 1.037 (N)  1.005 - 1.035 Final    pH, Urine 05/11/2024 5.5  5.0, 5.5, 6.0, 6.5, 7.0, 7.5, 8.0 Final    Protein, Urine 05/11/2024 NEGATIVE  NEGATIVE, 10 (TRACE), 20 (TRACE) mg/dL Final    Glucose, Urine 05/11/2024 OVER (4+) (A)  Normal mg/dL Final    Blood, Urine 05/11/2024 NEGATIVE  NEGATIVE Final    Ketones, Urine 05/11/2024 10 (1+) (A)  NEGATIVE mg/dL Final    Bilirubin, Urine 05/11/2024 NEGATIVE  NEGATIVE Final    Urobilinogen, Urine 05/11/2024 Normal  Normal mg/dL Final    Nitrite, Urine 05/11/2024 NEGATIVE  NEGATIVE Final    Leukocyte Esterase, Urine 05/11/2024 NEGATIVE  NEGATIVE Final    Hemoglobin A1C 05/10/2024 7.3 (H)  see below % Final    Estimated Average Glucose 05/10/2024 163  Not Established mg/dL Final    POCT Glucose 05/10/2024 159 (H)  74 - 99 mg/dL Final    Extra Tube 05/10/2024 Hold for add-ons.   Final    Auto resulted.    Extra Tube 05/10/2024 Hold for add-ons.   Final    Auto resulted.    WBC 05/11/2024 2.0 (L)  4.4 - 11.3 x10*3/uL Final    nRBC 05/11/2024 0.0  0.0 - 0.0 /100 WBCs Final    RBC 05/11/2024 4.08 (L)  4.50 - 5.90 x10*6/uL Final    Hemoglobin 05/11/2024 11.8 (L)  13.5 - 17.5 g/dL Final    Hematocrit 05/11/2024 36.8 (L)  41.0 - 52.0 % Final    MCV 05/11/2024 90  80 - 100 fL Final    MCH 05/11/2024 28.9  26.0 - 34.0 pg Final    MCHC 05/11/2024 32.1  32.0 - 36.0 g/dL Final    RDW 05/11/2024 13.9  11.5 - 14.5 % Final    Platelets 05/11/2024 247  150 - 450 x10*3/uL Final    Glucose 05/11/2024 151 (H)  74 - 99 mg/dL Final    Sodium 05/11/2024 138  136 - 145 mmol/L Final    Potassium 05/11/2024 4.5  3.5 - 5.3 mmol/L Final    MILD HEMOLYSIS DETECTED. The result may be falsely elevated due to hemolysis or other interferents. Clinical correlation is recommended. Repeat testing may be considered.    Chloride 05/11/2024 106  98 - 107 mmol/L Final    Bicarbonate  05/11/2024 22  21 - 32 mmol/L Final    Anion Gap 05/11/2024 15  10 - 20 mmol/L Final    Urea Nitrogen 05/11/2024 37 (H)  6 - 23 mg/dL Final    Creatinine 05/11/2024 1.31 (H)  0.50 - 1.30 mg/dL Final    eGFR 05/11/2024 60 (L)  >60 mL/min/1.73m*2 Final    Calculations of estimated GFR are performed using the 2021 CKD-EPI Study Refit equation without the race variable for the IDMS-Traceable creatinine methods.  https://jasn.asnjournals.org/content/early/2021/09/22/ASN.2461452681    Calcium 05/11/2024 7.6 (L)  8.6 - 10.3 mg/dL Final    POCT Glucose 05/10/2024 147 (H)  74 - 99 mg/dL Final    POCT Glucose 05/11/2024 148 (H)  74 - 99 mg/dL Final    POCT Glucose 05/11/2024 179 (H)  74 - 99 mg/dL Final    POCT Glucose 05/11/2024 178 (H)  74 - 99 mg/dL Final    WBC 05/12/2024 2.1 (L)  4.4 - 11.3 x10*3/uL Final    nRBC 05/12/2024 0.0  0.0 - 0.0 /100 WBCs Final    RBC 05/12/2024 3.95 (L)  4.50 - 5.90 x10*6/uL Final    Hemoglobin 05/12/2024 11.4 (L)  13.5 - 17.5 g/dL Final    Hematocrit 05/12/2024 36.6 (L)  41.0 - 52.0 % Final    MCV 05/12/2024 93  80 - 100 fL Final    MCH 05/12/2024 28.9  26.0 - 34.0 pg Final    MCHC 05/12/2024 31.1 (L)  32.0 - 36.0 g/dL Final    RDW 05/12/2024 13.5  11.5 - 14.5 % Final    Platelets 05/12/2024 236  150 - 450 x10*3/uL Final    Glucose 05/12/2024 136 (H)  74 - 99 mg/dL Final    Sodium 05/12/2024 136  136 - 145 mmol/L Final    Potassium 05/12/2024 4.3  3.5 - 5.3 mmol/L Final    Chloride 05/12/2024 105  98 - 107 mmol/L Final    Bicarbonate 05/12/2024 24  21 - 32 mmol/L Final    Anion Gap 05/12/2024 11  10 - 20 mmol/L Final    Urea Nitrogen 05/12/2024 20  6 - 23 mg/dL Final    Creatinine 05/12/2024 1.01  0.50 - 1.30 mg/dL Final    eGFR 05/12/2024 82  >60 mL/min/1.73m*2 Final    Calculations of estimated GFR are performed using the 2021 CKD-EPI Study Refit equation without the race variable for the IDMS-Traceable creatinine  methods.  https://jasn.asnjournals.org/content/early/2021/09/22/ASN.9337751800    Calcium 05/12/2024 7.4 (L)  8.6 - 10.3 mg/dL Final    POCT Glucose 05/11/2024 140 (H)  74 - 99 mg/dL Final    C. difficile, PCR 05/12/2024 Not Detected  Not Detected Final    POCT Glucose 05/12/2024 176 (H)  74 - 99 mg/dL Final    POCT Glucose 05/12/2024 161 (H)  74 - 99 mg/dL Final    POCT Glucose 05/12/2024 139 (H)  74 - 99 mg/dL Final    WBC 05/13/2024 3.1 (L)  4.4 - 11.3 x10*3/uL Final    nRBC 05/13/2024 0.0  0.0 - 0.0 /100 WBCs Final    RBC 05/13/2024 3.99 (L)  4.50 - 5.90 x10*6/uL Final    Hemoglobin 05/13/2024 11.4 (L)  13.5 - 17.5 g/dL Final    Hematocrit 05/13/2024 35.5 (L)  41.0 - 52.0 % Final    MCV 05/13/2024 89  80 - 100 fL Final    MCH 05/13/2024 28.6  26.0 - 34.0 pg Final    MCHC 05/13/2024 32.1  32.0 - 36.0 g/dL Final    RDW 05/13/2024 13.2  11.5 - 14.5 % Final    Platelets 05/13/2024 226  150 - 450 x10*3/uL Final    Glucose 05/13/2024 170 (H)  74 - 99 mg/dL Final    Sodium 05/13/2024 135 (L)  136 - 145 mmol/L Final    Potassium 05/13/2024 4.1  3.5 - 5.3 mmol/L Final    Chloride 05/13/2024 106  98 - 107 mmol/L Final    Bicarbonate 05/13/2024 22  21 - 32 mmol/L Final    Anion Gap 05/13/2024 11  10 - 20 mmol/L Final    Urea Nitrogen 05/13/2024 16  6 - 23 mg/dL Final    Creatinine 05/13/2024 0.91  0.50 - 1.30 mg/dL Final    eGFR 05/13/2024 >90  >60 mL/min/1.73m*2 Final    Calculations of estimated GFR are performed using the 2021 CKD-EPI Study Refit equation without the race variable for the IDMS-Traceable creatinine methods.  https://jasn.asnjournals.org/content/early/2021/09/22/ASN.5934361998    Calcium 05/13/2024 8.3 (L)  8.6 - 10.3 mg/dL Final    POCT Glucose 05/12/2024 184 (H)  74 - 99 mg/dL Final    Ventricular Rate 05/11/2024 121  BPM Final    Atrial Rate 05/11/2024 94  BPM Final    CO Interval 05/11/2024 160  ms Final    QRS Duration 05/11/2024 90  ms Final    QT Interval 05/11/2024 332  ms Final    QTC  Calculation(Bazett) 05/11/2024 471  ms Final    P Axis 05/11/2024 46  degrees Final    R Axis 05/11/2024 39  degrees Final    T Axis 05/11/2024 68  degrees Final    QRS Count 05/11/2024 20  beats Final    Q Onset 05/11/2024 216  ms Final    P Onset 05/11/2024 136  ms Final    P Offset 05/11/2024 188  ms Final    T Offset 05/11/2024 382  ms Final    QTC Fredericia 05/11/2024 419  ms Final    POCT Glucose 05/13/2024 198 (H)  74 - 99 mg/dL Final   Office Visit on 04/04/2024   Component Date Value Ref Range Status    POC HEMOGLOBIN A1c 04/04/2024 7.4 (A)  4.2 - 6.5 % Final   Lab on 03/11/2024   Component Date Value Ref Range Status    WBC 03/11/2024 3.9 (L)  4.4 - 11.3 x10*3/uL Final    nRBC 03/11/2024 0.0  0.0 - 0.0 /100 WBCs Final    RBC 03/11/2024 3.80 (L)  4.50 - 5.90 x10*6/uL Final    Hemoglobin 03/11/2024 11.2 (L)  13.5 - 17.5 g/dL Final    Hematocrit 03/11/2024 34.1 (L)  41.0 - 52.0 % Final    MCV 03/11/2024 90  80 - 100 fL Final    MCH 03/11/2024 29.5  26.0 - 34.0 pg Final    MCHC 03/11/2024 32.8  32.0 - 36.0 g/dL Final    RDW 03/11/2024 15.8 (H)  11.5 - 14.5 % Final    Platelets 03/11/2024 178  150 - 450 x10*3/uL Final    Neutrophils % 03/11/2024 56.7  40.0 - 80.0 % Final    Immature Granulocytes %, Automated 03/11/2024 0.3  0.0 - 0.9 % Final    Immature Granulocyte Count (IG) includes promyelocytes, myelocytes and metamyelocytes but does not include bands. Percent differential counts (%) should be interpreted in the context of the absolute cell counts (cells/UL).    Lymphocytes % 03/11/2024 29.6  13.0 - 44.0 % Final    Monocytes % 03/11/2024 12.4  2.0 - 10.0 % Final    Eosinophils % 03/11/2024 0.5  0.0 - 6.0 % Final    Basophils % 03/11/2024 0.5  0.0 - 2.0 % Final    Neutrophils Absolute 03/11/2024 2.20  1.20 - 7.70 x10*3/uL Final    Percent differential counts (%) should be interpreted in the context of the absolute cell counts (cells/uL).    Immature Granulocytes Absolute, Au* 03/11/2024 0.01  0.00 - 0.70  x10*3/uL Final    Lymphocytes Absolute 03/11/2024 1.15 (L)  1.20 - 4.80 x10*3/uL Final    Monocytes Absolute 03/11/2024 0.48  0.10 - 1.00 x10*3/uL Final    Eosinophils Absolute 03/11/2024 0.02  0.00 - 0.70 x10*3/uL Final    Basophils Absolute 03/11/2024 0.02  0.00 - 0.10 x10*3/uL Final    Glucose 03/11/2024 228 (H)  74 - 99 mg/dL Final    Sodium 03/11/2024 139  136 - 145 mmol/L Final    Potassium 03/11/2024 3.8  3.5 - 5.3 mmol/L Final    Chloride 03/11/2024 104  98 - 107 mmol/L Final    Bicarbonate 03/11/2024 25  21 - 32 mmol/L Final    Anion Gap 03/11/2024 14  10 - 20 mmol/L Final    Urea Nitrogen 03/11/2024 20  6 - 23 mg/dL Final    Creatinine 03/11/2024 1.17  0.50 - 1.30 mg/dL Final    eGFR 03/11/2024 68  >60 mL/min/1.73m*2 Final    Calculations of estimated GFR are performed using the 2021 CKD-EPI Study Refit equation without the race variable for the IDMS-Traceable creatinine methods.  https://jasn.asnjournals.org/content/early/2021/09/22/ASN.6842920317    Calcium 03/11/2024 9.2  8.6 - 10.3 mg/dL Final    Albumin 03/11/2024 4.2  3.4 - 5.0 g/dL Final    Alkaline Phosphatase 03/11/2024 51  33 - 136 U/L Final    Total Protein 03/11/2024 7.1  6.4 - 8.2 g/dL Final    AST 03/11/2024 27  9 - 39 U/L Final    Bilirubin, Total 03/11/2024 0.7  0.0 - 1.2 mg/dL Final    ALT 03/11/2024 21  10 - 52 U/L Final    Patients treated with Sulfasalazine may generate falsely decreased results for ALT.    Carcinoembryonic AG 03/11/2024 <0.5  ug/L Final   Lab on 02/12/2024   Component Date Value Ref Range Status    WBC 02/12/2024 3.2 (L)  4.4 - 11.3 x10*3/uL Final    nRBC 02/12/2024 0.0  0.0 - 0.0 /100 WBCs Final    RBC 02/12/2024 3.70 (L)  4.50 - 5.90 x10*6/uL Final    Hemoglobin 02/12/2024 10.6 (L)  13.5 - 17.5 g/dL Final    Hematocrit 02/12/2024 32.6 (L)  41.0 - 52.0 % Final    MCV 02/12/2024 88  80 - 100 fL Final    MCH 02/12/2024 28.6  26.0 - 34.0 pg Final    MCHC 02/12/2024 32.5  32.0 - 36.0 g/dL Final    RDW 02/12/2024 18.5 (H)   11.5 - 14.5 % Final    Platelets 02/12/2024 135 (L)  150 - 450 x10*3/uL Final    Neutrophils % 02/12/2024 55.2  40.0 - 80.0 % Final    Immature Granulocytes %, Automated 02/12/2024 0.3  0.0 - 0.9 % Final    Immature Granulocyte Count (IG) includes promyelocytes, myelocytes and metamyelocytes but does not include bands. Percent differential counts (%) should be interpreted in the context of the absolute cell counts (cells/UL).    Lymphocytes % 02/12/2024 24.0  13.0 - 44.0 % Final    Monocytes % 02/12/2024 19.6  2.0 - 10.0 % Final    Eosinophils % 02/12/2024 0.6  0.0 - 6.0 % Final    Basophils % 02/12/2024 0.3  0.0 - 2.0 % Final    Neutrophils Absolute 02/12/2024 1.77  1.20 - 7.70 x10*3/uL Final    Percent differential counts (%) should be interpreted in the context of the absolute cell counts (cells/uL).    Immature Granulocytes Absolute, Au* 02/12/2024 0.01  0.00 - 0.70 x10*3/uL Final    Lymphocytes Absolute 02/12/2024 0.77 (L)  1.20 - 4.80 x10*3/uL Final    Monocytes Absolute 02/12/2024 0.63  0.10 - 1.00 x10*3/uL Final    Eosinophils Absolute 02/12/2024 0.02  0.00 - 0.70 x10*3/uL Final    Basophils Absolute 02/12/2024 0.01  0.00 - 0.10 x10*3/uL Final    Glucose 02/12/2024 200 (H)  74 - 99 mg/dL Final    Sodium 02/12/2024 139  136 - 145 mmol/L Final    Potassium 02/12/2024 4.1  3.5 - 5.3 mmol/L Final    Chloride 02/12/2024 106  98 - 107 mmol/L Final    Bicarbonate 02/12/2024 24  21 - 32 mmol/L Final    Anion Gap 02/12/2024 13  10 - 20 mmol/L Final    Urea Nitrogen 02/12/2024 18  6 - 23 mg/dL Final    Creatinine 02/12/2024 0.98  0.50 - 1.30 mg/dL Final    eGFR 02/12/2024 85  >60 mL/min/1.73m*2 Final    Calculations of estimated GFR are performed using the 2021 CKD-EPI Study Refit equation without the race variable for the IDMS-Traceable creatinine methods.  https://jasn.asnjournals.org/content/early/2021/09/22/ASN.[email or web address]    Calcium 02/12/2024 9.2  8.6 - 10.3 mg/dL Final    Albumin 02/12/2024 4.1  3.4 - 5.0 g/dL  Final    Alkaline Phosphatase 02/12/2024 50  33 - 136 U/L Final    Total Protein 02/12/2024 6.7  6.4 - 8.2 g/dL Final    AST 02/12/2024 33  9 - 39 U/L Final    Bilirubin, Total 02/12/2024 0.6  0.0 - 1.2 mg/dL Final    ALT 02/12/2024 22  10 - 52 U/L Final    Patients treated with Sulfasalazine may generate falsely decreased results for ALT.    Carcinoembryonic AG 02/12/2024 <0.5  ug/L Final   Lab on 01/29/2024   Component Date Value Ref Range Status    WBC 01/29/2024 3.5 (L)  4.4 - 11.3 x10*3/uL Final    nRBC 01/29/2024 0.0  0.0 - 0.0 /100 WBCs Final    RBC 01/29/2024 4.07 (L)  4.50 - 5.90 x10*6/uL Final    Hemoglobin 01/29/2024 11.4 (L)  13.5 - 17.5 g/dL Final    Hematocrit 01/29/2024 35.4 (L)  41.0 - 52.0 % Final    MCV 01/29/2024 87  80 - 100 fL Final    MCH 01/29/2024 28.0  26.0 - 34.0 pg Final    MCHC 01/29/2024 32.2  32.0 - 36.0 g/dL Final    RDW 01/29/2024 19.2 (H)  11.5 - 14.5 % Final    Platelets 01/29/2024 131 (L)  150 - 450 x10*3/uL Final    Neutrophils % 01/29/2024 57.9  40.0 - 80.0 % Final    Immature Granulocytes %, Automated 01/29/2024 0.3  0.0 - 0.9 % Final    Immature Granulocyte Count (IG) includes promyelocytes, myelocytes and metamyelocytes but does not include bands. Percent differential counts (%) should be interpreted in the context of the absolute cell counts (cells/UL).    Lymphocytes % 01/29/2024 21.9  13.0 - 44.0 % Final    Monocytes % 01/29/2024 18.2  2.0 - 10.0 % Final    Eosinophils % 01/29/2024 1.1  0.0 - 6.0 % Final    Basophils % 01/29/2024 0.6  0.0 - 2.0 % Final    Neutrophils Absolute 01/29/2024 2.04  1.20 - 7.70 x10*3/uL Final    Percent differential counts (%) should be interpreted in the context of the absolute cell counts (cells/uL).    Immature Granulocytes Absolute, Au* 01/29/2024 0.01  0.00 - 0.70 x10*3/uL Final    Lymphocytes Absolute 01/29/2024 0.77 (L)  1.20 - 4.80 x10*3/uL Final    Monocytes Absolute 01/29/2024 0.64  0.10 - 1.00 x10*3/uL Final    Eosinophils Absolute  01/29/2024 0.04  0.00 - 0.70 x10*3/uL Final    Basophils Absolute 01/29/2024 0.02  0.00 - 0.10 x10*3/uL Final    Glucose 01/29/2024 202 (H)  74 - 99 mg/dL Final    Sodium 01/29/2024 140  136 - 145 mmol/L Final    Potassium 01/29/2024 3.9  3.5 - 5.3 mmol/L Final    Chloride 01/29/2024 106  98 - 107 mmol/L Final    Bicarbonate 01/29/2024 25  21 - 32 mmol/L Final    Anion Gap 01/29/2024 13  10 - 20 mmol/L Final    Urea Nitrogen 01/29/2024 17  6 - 23 mg/dL Final    Creatinine 01/29/2024 0.99  0.50 - 1.30 mg/dL Final    eGFR 01/29/2024 83  >60 mL/min/1.73m*2 Final    Calculations of estimated GFR are performed using the 2021 CKD-EPI Study Refit equation without the race variable for the IDMS-Traceable creatinine methods.  https://jasn.asnjournals.org/content/early/2021/09/22/ASN.3931457635    Calcium 01/29/2024 9.4  8.6 - 10.3 mg/dL Final    Albumin 01/29/2024 4.2  3.4 - 5.0 g/dL Final    Alkaline Phosphatase 01/29/2024 48  33 - 136 U/L Final    Total Protein 01/29/2024 6.7  6.4 - 8.2 g/dL Final    AST 01/29/2024 33  9 - 39 U/L Final    Bilirubin, Total 01/29/2024 0.6  0.0 - 1.2 mg/dL Final    ALT 01/29/2024 25  10 - 52 U/L Final    Patients treated with Sulfasalazine may generate falsely decreased results for ALT.    Carcinoembryonic AG 01/29/2024 <0.5  ug/L Final   Lab on 01/15/2024   Component Date Value Ref Range Status    WBC 01/15/2024 4.7  4.4 - 11.3 x10*3/uL Final    nRBC 01/15/2024 0.0  0.0 - 0.0 /100 WBCs Final    RBC 01/15/2024 4.35 (L)  4.50 - 5.90 x10*6/uL Final    Hemoglobin 01/15/2024 11.6 (L)  13.5 - 17.5 g/dL Final    Hematocrit 01/15/2024 37.2 (L)  41.0 - 52.0 % Final    MCV 01/15/2024 86  80 - 100 fL Final    MCH 01/15/2024 26.7  26.0 - 34.0 pg Final    MCHC 01/15/2024 31.2 (L)  32.0 - 36.0 g/dL Final    RDW 01/15/2024 19.5 (H)  11.5 - 14.5 % Final    Platelets 01/15/2024 160  150 - 450 x10*3/uL Final    Neutrophils % 01/15/2024 58.3  40.0 - 80.0 % Final    Immature Granulocytes %, Automated 01/15/2024  0.2  0.0 - 0.9 % Final    Immature Granulocyte Count (IG) includes promyelocytes, myelocytes and metamyelocytes but does not include bands. Percent differential counts (%) should be interpreted in the context of the absolute cell counts (cells/UL).    Lymphocytes % 01/15/2024 25.0  13.0 - 44.0 % Final    Monocytes % 01/15/2024 15.3  2.0 - 10.0 % Final    Eosinophils % 01/15/2024 0.6  0.0 - 6.0 % Final    Basophils % 01/15/2024 0.6  0.0 - 2.0 % Final    Neutrophils Absolute 01/15/2024 2.75  1.20 - 7.70 x10*3/uL Final    Percent differential counts (%) should be interpreted in the context of the absolute cell counts (cells/uL).    Immature Granulocytes Absolute, Au* 01/15/2024 0.01  0.00 - 0.70 x10*3/uL Final    Lymphocytes Absolute 01/15/2024 1.18 (L)  1.20 - 4.80 x10*3/uL Final    Monocytes Absolute 01/15/2024 0.72  0.10 - 1.00 x10*3/uL Final    Eosinophils Absolute 01/15/2024 0.03  0.00 - 0.70 x10*3/uL Final    Basophils Absolute 01/15/2024 0.03  0.00 - 0.10 x10*3/uL Final    Glucose 01/15/2024 108 (H)  74 - 99 mg/dL Final    Sodium 01/15/2024 138  136 - 145 mmol/L Final    Potassium 01/15/2024 4.2  3.5 - 5.3 mmol/L Final    Chloride 01/15/2024 104  98 - 107 mmol/L Final    Bicarbonate 01/15/2024 24  21 - 32 mmol/L Final    Anion Gap 01/15/2024 14  10 - 20 mmol/L Final    Urea Nitrogen 01/15/2024 17  6 - 23 mg/dL Final    Creatinine 01/15/2024 1.01  0.50 - 1.30 mg/dL Final    eGFR 01/15/2024 82  >60 mL/min/1.73m*2 Final    Calculations of estimated GFR are performed using the 2021 CKD-EPI Study Refit equation without the race variable for the IDMS-Traceable creatinine methods.  https://jasn.asnjournals.org/content/early/2021/09/22/ASN.7937233838    Calcium 01/15/2024 9.2  8.6 - 10.3 mg/dL Final    Albumin 01/15/2024 4.3  3.4 - 5.0 g/dL Final    Alkaline Phosphatase 01/15/2024 45  33 - 136 U/L Final    Total Protein 01/15/2024 7.0  6.4 - 8.2 g/dL Final    AST 01/15/2024 28  9 - 39 U/L Final    Bilirubin, Total  01/15/2024 0.7  0.0 - 1.2 mg/dL Final    ALT 01/15/2024 22  10 - 52 U/L Final    Patients treated with Sulfasalazine may generate falsely decreased results for ALT.   Lab on 01/02/2024   Component Date Value Ref Range Status    WBC 01/02/2024 5.0  4.4 - 11.3 x10*3/uL Final    nRBC 01/02/2024 0.0  0.0 - 0.0 /100 WBCs Final    RBC 01/02/2024 4.49 (L)  4.50 - 5.90 x10*6/uL Final    Hemoglobin 01/02/2024 11.9 (L)  13.5 - 17.5 g/dL Final    Hematocrit 01/02/2024 37.6 (L)  41.0 - 52.0 % Final    MCV 01/02/2024 84  80 - 100 fL Final    MCH 01/02/2024 26.5  26.0 - 34.0 pg Final    MCHC 01/02/2024 31.6 (L)  32.0 - 36.0 g/dL Final    RDW 01/02/2024 18.9 (H)  11.5 - 14.5 % Final    Platelets 01/02/2024 193  150 - 450 x10*3/uL Final    Neutrophils % 01/02/2024 60.6  40.0 - 80.0 % Final    Immature Granulocytes %, Automated 01/02/2024 0.4  0.0 - 0.9 % Final    Immature Granulocyte Count (IG) includes promyelocytes, myelocytes and metamyelocytes but does not include bands. Percent differential counts (%) should be interpreted in the context of the absolute cell counts (cells/UL).    Lymphocytes % 01/02/2024 23.4  13.0 - 44.0 % Final    Monocytes % 01/02/2024 14.2  2.0 - 10.0 % Final    Eosinophils % 01/02/2024 0.8  0.0 - 6.0 % Final    Basophils % 01/02/2024 0.6  0.0 - 2.0 % Final    Neutrophils Absolute 01/02/2024 3.03  1.20 - 7.70 x10*3/uL Final    Percent differential counts (%) should be interpreted in the context of the absolute cell counts (cells/uL).    Immature Granulocytes Absolute, Au* 01/02/2024 0.02  0.00 - 0.70 x10*3/uL Final    Lymphocytes Absolute 01/02/2024 1.17 (L)  1.20 - 4.80 x10*3/uL Final    Monocytes Absolute 01/02/2024 0.71  0.10 - 1.00 x10*3/uL Final    Eosinophils Absolute 01/02/2024 0.04  0.00 - 0.70 x10*3/uL Final    Basophils Absolute 01/02/2024 0.03  0.00 - 0.10 x10*3/uL Final    Glucose 01/02/2024 148 (H)  74 - 99 mg/dL Final    Sodium 01/02/2024 140  136 - 145 mmol/L Final    Potassium 01/02/2024  4.3  3.5 - 5.3 mmol/L Final    Chloride 01/02/2024 105  98 - 107 mmol/L Final    Bicarbonate 01/02/2024 26  21 - 32 mmol/L Final    Anion Gap 01/02/2024 13  10 - 20 mmol/L Final    Urea Nitrogen 01/02/2024 20  6 - 23 mg/dL Final    Creatinine 01/02/2024 1.03  0.50 - 1.30 mg/dL Final    eGFR 01/02/2024 80  >60 mL/min/1.73m*2 Final    Calculations of estimated GFR are performed using the 2021 CKD-EPI Study Refit equation without the race variable for the IDMS-Traceable creatinine methods.  https://jasn.asnjournals.org/content/early/2021/09/22/ASN.3628266529    Calcium 01/02/2024 9.6  8.6 - 10.3 mg/dL Final    Albumin 01/02/2024 4.4  3.4 - 5.0 g/dL Final    Alkaline Phosphatase 01/02/2024 47  33 - 136 U/L Final    Total Protein 01/02/2024 7.2  6.4 - 8.2 g/dL Final    AST 01/02/2024 27  9 - 39 U/L Final    Bilirubin, Total 01/02/2024 0.6  0.0 - 1.2 mg/dL Final    ALT 01/02/2024 21  10 - 52 U/L Final    Patients treated with Sulfasalazine may generate falsely decreased results for ALT.    Carcinoembryonic AG 01/02/2024 0.5  ug/L Final   Lab on 12/18/2023   Component Date Value Ref Range Status    WBC 12/18/2023 4.2 (L)  4.4 - 11.3 x10*3/uL Final    nRBC 12/18/2023 0.0  0.0 - 0.0 /100 WBCs Final    RBC 12/18/2023 4.41 (L)  4.50 - 5.90 x10*6/uL Final    Hemoglobin 12/18/2023 11.5 (L)  13.5 - 17.5 g/dL Final    Hematocrit 12/18/2023 36.7 (L)  41.0 - 52.0 % Final    MCV 12/18/2023 83  80 - 100 fL Final    MCH 12/18/2023 26.1  26.0 - 34.0 pg Final    MCHC 12/18/2023 31.3 (L)  32.0 - 36.0 g/dL Final    RDW 12/18/2023 18.2 (H)  11.5 - 14.5 % Final    Platelets 12/18/2023 171  150 - 450 x10*3/uL Final    Neutrophils % 12/18/2023 59.0  40.0 - 80.0 % Final    Immature Granulocytes %, Automated 12/18/2023 0.9  0.0 - 0.9 % Final    Immature Granulocyte Count (IG) includes promyelocytes, myelocytes and metamyelocytes but does not include bands. Percent differential counts (%) should be interpreted in the context of the absolute cell  counts (cells/UL).    Lymphocytes % 12/18/2023 24.2  13.0 - 44.0 % Final    Monocytes % 12/18/2023 14.5  2.0 - 10.0 % Final    Eosinophils % 12/18/2023 0.7  0.0 - 6.0 % Final    Basophils % 12/18/2023 0.7  0.0 - 2.0 % Final    Neutrophils Absolute 12/18/2023 2.49  1.20 - 7.70 x10*3/uL Final    Percent differential counts (%) should be interpreted in the context of the absolute cell counts (cells/uL).    Immature Granulocytes Absolute, Au* 12/18/2023 0.04  0.00 - 0.70 x10*3/uL Final    Lymphocytes Absolute 12/18/2023 1.02 (L)  1.20 - 4.80 x10*3/uL Final    Monocytes Absolute 12/18/2023 0.61  0.10 - 1.00 x10*3/uL Final    Eosinophils Absolute 12/18/2023 0.03  0.00 - 0.70 x10*3/uL Final    Basophils Absolute 12/18/2023 0.03  0.00 - 0.10 x10*3/uL Final    Glucose 12/18/2023 154 (H)  74 - 99 mg/dL Final    Sodium 12/18/2023 138  136 - 145 mmol/L Final    Potassium 12/18/2023 4.1  3.5 - 5.3 mmol/L Final    Chloride 12/18/2023 105  98 - 107 mmol/L Final    Bicarbonate 12/18/2023 24  21 - 32 mmol/L Final    Anion Gap 12/18/2023 13  10 - 20 mmol/L Final    Urea Nitrogen 12/18/2023 19  6 - 23 mg/dL Final    Creatinine 12/18/2023 0.94  0.50 - 1.30 mg/dL Final    eGFR 12/18/2023 89  >60 mL/min/1.73m*2 Final    Calculations of estimated GFR are performed using the 2021 CKD-EPI Study Refit equation without the race variable for the IDMS-Traceable creatinine methods.  https://jasn.asnjournals.org/content/early/2021/09/22/ASN.5811254858    Calcium 12/18/2023 9.6  8.6 - 10.3 mg/dL Final    Albumin 12/18/2023 4.2  3.4 - 5.0 g/dL Final    Alkaline Phosphatase 12/18/2023 44  33 - 136 U/L Final    Total Protein 12/18/2023 7.0  6.4 - 8.2 g/dL Final    AST 12/18/2023 24  9 - 39 U/L Final    Bilirubin, Total 12/18/2023 0.6  0.0 - 1.2 mg/dL Final    ALT 12/18/2023 21  10 - 52 U/L Final    Patients treated with Sulfasalazine may generate falsely decreased results for ALT.    Prostate Specific Antigen,Screen 12/18/2023 0.62  <=4.00 ng/mL  Final    Carcinoembryonic AG 12/18/2023 1.3  ug/L Final       Physical Exam  CONSTITUTIONAL - well nourished, well developed, looks like stated age, in no acute distress, not ill-appearing, and not tired appearing  SKIN - normal skin color and pigmentation, normal skin turgor without rash, lesions, or nodules visualized  HEAD - no trauma, normocephalic  EYES - pupils are equal and reactive to light, extraocular muscles are intact, and normal external exam  ENT - no injection, no signs of infection, uvula midline, normal tongue movement and throat normal, white discoloration of the tongue which is not scrappable, no exudate, nasal passage without discharge and patent  NECK - supple without rigidity, no neck mass was observed,   LUNG - clear to auscultation, no wheezing, no crackles and no rales, good effort  CARDIAC -irregular rhythm but regular rate, no murmur, normal S1 and S2  ABDOMEN - no organomegaly, soft, nontender, nondistended, normal bowel sounds, surgical incision scar present  EXTREMITIES - no edema, no deformities  NEUROLOGICAL - normal gait, normal balance, normal motor, alert, oriented and no focal signs, tingling in upper extremities which is improving  PSYCHIATRIC - alert, pleasant and cordial, age-appropriate      Assessment/Plan     1. Adenocarcinoma of appendix   Reviewed ED/hospital course of action and discharge summaries  Continue close management of this with GI/Oncology at upcoming follow-up with them.   If Abdominal pain, nausea, vomiting, or severe constipation returns, return to ED immediately.     2. Hypertension  Blood pressure well-controlled  Continue olmesartan 40 mg daily, doxazosin 2 mg nightly  I would like to continue to monitor and record blood pressures at home   Blood pressure goal should be below 130/80, ideally 120/80     3. Hyperlipidemia and hypertriglyceridemia  Continue taking atorvastatin and fenofibrate daily.  We will send refills to your pharmacy as needed.     4.  Type II Diabetes Mellitus  Continue taking metformin, insulin and fenofibrate daily and monitoring your blood sugars regularly.  Please continue to follow with your endocrinologist regularly.     5. Atrial fibrillation  Continue taking apixaban as prescribed.  Please continue to follow with your cardiologist regularly.     6. Nocturia  Continue doxazosin 2 mg as prescribed.   Stable, will continue to monitor.     7.  Leukoplakia of the tongue  Improving condition  Opted to monitor for now.  If not resolving or getting worse planning to do a biopsy in the future     Please contact the office if you have any questions/concerns, otherwise follow-up in the office in 6 months for annual wellness visit.    Keven Mathur MD   Resident, PGY1

## 2024-06-11 ENCOUNTER — OFFICE VISIT (OUTPATIENT)
Dept: HEMATOLOGY/ONCOLOGY | Facility: HOSPITAL | Age: 69
End: 2024-06-11
Payer: MEDICARE

## 2024-06-11 ENCOUNTER — LAB REQUISITION (OUTPATIENT)
Dept: LAB | Facility: HOSPITAL | Age: 69
End: 2024-06-11
Payer: MEDICARE

## 2024-06-11 ENCOUNTER — LAB (OUTPATIENT)
Dept: HEMATOLOGY/ONCOLOGY | Facility: HOSPITAL | Age: 69
End: 2024-06-11
Payer: MEDICARE

## 2024-06-11 VITALS
HEART RATE: 90 BPM | OXYGEN SATURATION: 98 % | SYSTOLIC BLOOD PRESSURE: 129 MMHG | RESPIRATION RATE: 16 BRPM | DIASTOLIC BLOOD PRESSURE: 70 MMHG | WEIGHT: 197.7 LBS | TEMPERATURE: 97.5 F | BODY MASS INDEX: 27.97 KG/M2

## 2024-06-11 DIAGNOSIS — C77.2 CANCER OF APPENDIX METASTATIC TO INTRA-ABDOMINAL LYMPH NODE (MULTI): ICD-10-CM

## 2024-06-11 DIAGNOSIS — C18.1 CANCER OF APPENDIX METASTATIC TO INTRA-ABDOMINAL LYMPH NODE (MULTI): ICD-10-CM

## 2024-06-11 DIAGNOSIS — B37.81 CANDIDIASIS OF MOUTH AND ESOPHAGUS (MULTI): ICD-10-CM

## 2024-06-11 DIAGNOSIS — B37.0 CANDIDIASIS OF MOUTH AND ESOPHAGUS (MULTI): ICD-10-CM

## 2024-06-11 DIAGNOSIS — C18.1 PRIMARY MALIGNANT NEOPLASM OF APPENDIX (MULTI): Primary | ICD-10-CM

## 2024-06-11 DIAGNOSIS — C77.2 SECONDARY AND UNSPECIFIED MALIGNANT NEOPLASM OF INTRA-ABDOMINAL LYMPH NODES (MULTI): ICD-10-CM

## 2024-06-11 DIAGNOSIS — B37.81 CANDIDAL ESOPHAGITIS (MULTI): ICD-10-CM

## 2024-06-11 DIAGNOSIS — Z85.038 ENCOUNTER FOR FOLLOW-UP SURVEILLANCE OF APPENDICEAL CANCER: ICD-10-CM

## 2024-06-11 DIAGNOSIS — C18.1 MALIGNANT NEOPLASM OF APPENDIX (MULTI): ICD-10-CM

## 2024-06-11 DIAGNOSIS — B37.0 CANDIDAL STOMATITIS: ICD-10-CM

## 2024-06-11 DIAGNOSIS — Z08 ENCOUNTER FOR FOLLOW-UP SURVEILLANCE OF APPENDICEAL CANCER: ICD-10-CM

## 2024-06-11 LAB
ALBUMIN SERPL BCP-MCNC: 4.6 G/DL (ref 3.4–5)
ALP SERPL-CCNC: 34 U/L (ref 33–136)
ALT SERPL W P-5'-P-CCNC: 26 U/L (ref 10–52)
ANION GAP SERPL CALC-SCNC: 14 MMOL/L (ref 10–20)
AST SERPL W P-5'-P-CCNC: 23 U/L (ref 9–39)
BASOPHILS # BLD AUTO: 0.04 X10*3/UL (ref 0–0.1)
BASOPHILS NFR BLD AUTO: 0.8 %
BILIRUB SERPL-MCNC: 0.7 MG/DL (ref 0–1.2)
BUN SERPL-MCNC: 23 MG/DL (ref 6–23)
CALCIUM SERPL-MCNC: 9.5 MG/DL (ref 8.6–10.3)
CEA SERPL-MCNC: <0.5 UG/L
CHLORIDE SERPL-SCNC: 104 MMOL/L (ref 98–107)
CO2 SERPL-SCNC: 25 MMOL/L (ref 21–32)
CREAT SERPL-MCNC: 1.12 MG/DL (ref 0.5–1.3)
EGFRCR SERPLBLD CKD-EPI 2021: 72 ML/MIN/1.73M*2
EOSINOPHIL # BLD AUTO: 0.06 X10*3/UL (ref 0–0.7)
EOSINOPHIL NFR BLD AUTO: 1.2 %
ERYTHROCYTE [DISTWIDTH] IN BLOOD BY AUTOMATED COUNT: 14 % (ref 11.5–14.5)
GLUCOSE SERPL-MCNC: 140 MG/DL (ref 74–99)
HCT VFR BLD AUTO: 39.8 % (ref 41–52)
HGB BLD-MCNC: 12.9 G/DL (ref 13.5–17.5)
IMM GRANULOCYTES # BLD AUTO: 0.01 X10*3/UL (ref 0–0.7)
IMM GRANULOCYTES NFR BLD AUTO: 0.2 % (ref 0–0.9)
LYMPHOCYTES # BLD AUTO: 1.33 X10*3/UL (ref 1.2–4.8)
LYMPHOCYTES NFR BLD AUTO: 26.6 %
MCH RBC QN AUTO: 28 PG (ref 26–34)
MCHC RBC AUTO-ENTMCNC: 32.4 G/DL (ref 32–36)
MCV RBC AUTO: 87 FL (ref 80–100)
MONOCYTES # BLD AUTO: 0.37 X10*3/UL (ref 0.1–1)
MONOCYTES NFR BLD AUTO: 7.4 %
NEUTROPHILS # BLD AUTO: 3.19 X10*3/UL (ref 1.2–7.7)
NEUTROPHILS NFR BLD AUTO: 63.8 %
NRBC BLD-RTO: 0 /100 WBCS (ref 0–0)
PLATELET # BLD AUTO: 264 X10*3/UL (ref 150–450)
POTASSIUM SERPL-SCNC: 4.1 MMOL/L (ref 3.5–5.3)
PROT SERPL-MCNC: 7.4 G/DL (ref 6.4–8.2)
RBC # BLD AUTO: 4.6 X10*6/UL (ref 4.5–5.9)
SODIUM SERPL-SCNC: 139 MMOL/L (ref 136–145)
WBC # BLD AUTO: 5 X10*3/UL (ref 4.4–11.3)

## 2024-06-11 PROCEDURE — 87185 SC STD ENZYME DETCJ PER NZM: CPT

## 2024-06-11 PROCEDURE — 87070 CULTURE OTHR SPECIMN AEROBIC: CPT

## 2024-06-11 PROCEDURE — 82565 ASSAY OF CREATININE: CPT

## 2024-06-11 PROCEDURE — 2500000004 HC RX 250 GENERAL PHARMACY W/ HCPCS (ALT 636 FOR OP/ED): Performed by: STUDENT IN AN ORGANIZED HEALTH CARE EDUCATION/TRAINING PROGRAM

## 2024-06-11 PROCEDURE — 87075 CULTR BACTERIA EXCEPT BLOOD: CPT

## 2024-06-11 PROCEDURE — 36591 DRAW BLOOD OFF VENOUS DEVICE: CPT

## 2024-06-11 PROCEDURE — 99215 OFFICE O/P EST HI 40 MIN: CPT | Performed by: STUDENT IN AN ORGANIZED HEALTH CARE EDUCATION/TRAINING PROGRAM

## 2024-06-11 PROCEDURE — 87205 SMEAR GRAM STAIN: CPT

## 2024-06-11 PROCEDURE — 82378 CARCINOEMBRYONIC ANTIGEN: CPT | Performed by: STUDENT IN AN ORGANIZED HEALTH CARE EDUCATION/TRAINING PROGRAM

## 2024-06-11 PROCEDURE — 82374 ASSAY BLOOD CARBON DIOXIDE: CPT

## 2024-06-11 PROCEDURE — 87070 CULTURE OTHR SPECIMN AEROBIC: CPT | Mod: OUT | Performed by: STUDENT IN AN ORGANIZED HEALTH CARE EDUCATION/TRAINING PROGRAM

## 2024-06-11 PROCEDURE — 85025 COMPLETE CBC W/AUTO DIFF WBC: CPT

## 2024-06-11 RX ORDER — HEPARIN SODIUM,PORCINE/PF 10 UNIT/ML
50 SYRINGE (ML) INTRAVENOUS AS NEEDED
OUTPATIENT
Start: 2024-06-11

## 2024-06-11 RX ORDER — HEPARIN 100 UNIT/ML
500 SYRINGE INTRAVENOUS AS NEEDED
Status: DISCONTINUED | OUTPATIENT
Start: 2024-06-11 | End: 2024-06-11 | Stop reason: HOSPADM

## 2024-06-11 RX ORDER — HEPARIN 100 UNIT/ML
500 SYRINGE INTRAVENOUS AS NEEDED
OUTPATIENT
Start: 2024-06-11

## 2024-06-11 RX ADMIN — HEPARIN 500 UNITS: 100 SYRINGE at 09:51

## 2024-06-11 ASSESSMENT — PAIN SCALES - GENERAL: PAINLEVEL: 0-NO PAIN

## 2024-06-13 LAB
B-LACTAMASE ORGANISM ISLT: POSITIVE
BACTERIA SPEC CULT: ABNORMAL
BACTERIA SPEC CULT: ABNORMAL
GRAM STN SPEC: ABNORMAL
GRAM STN SPEC: ABNORMAL

## 2024-06-17 ENCOUNTER — NURSE TRIAGE (OUTPATIENT)
Dept: HEMATOLOGY/ONCOLOGY | Facility: HOSPITAL | Age: 69
End: 2024-06-17
Payer: MEDICARE

## 2024-06-17 ENCOUNTER — TELEPHONE (OUTPATIENT)
Dept: HEMATOLOGY/ONCOLOGY | Facility: HOSPITAL | Age: 69
End: 2024-06-17
Payer: MEDICARE

## 2024-06-17 ENCOUNTER — PATIENT OUTREACH (OUTPATIENT)
Dept: CARE COORDINATION | Facility: CLINIC | Age: 69
End: 2024-06-17
Payer: MEDICARE

## 2024-06-17 NOTE — TELEPHONE ENCOUNTER
Spouse updated that results will probably take another week.  She also asked about wound culture results and asks if he needs an antibiotic?  Preferred pharmacy is [facility] in [place]

## 2024-06-19 DIAGNOSIS — K12.1 BACTERIAL ORAL INFECTION: Primary | ICD-10-CM

## 2024-06-19 DIAGNOSIS — B96.89 BACTERIAL ORAL INFECTION: Primary | ICD-10-CM

## 2024-06-19 RX ORDER — AMOXICILLIN AND CLAVULANATE POTASSIUM 875; 125 MG/1; MG/1
875 TABLET, FILM COATED ORAL 2 TIMES DAILY
Qty: 28 TABLET | Refills: 0 | Status: SHIPPED | OUTPATIENT
Start: 2024-06-19 | End: 2024-07-03

## 2024-06-19 NOTE — TELEPHONE ENCOUNTER
Spouse informed that an antibiotic for pt's mouth was sent to his pharmacy.  Socorro Kate CNP spoke to pt's PCP, pt has an overgrowth of bacteria in his mouth.  If pt's mouth remains white after the antibiotic, he should contact his PCP for next steps.  Spouse verbalized understanding of this plan.

## 2024-06-29 PROBLEM — Z85.038 ENCOUNTER FOR FOLLOW-UP SURVEILLANCE OF APPENDICEAL CANCER: Status: ACTIVE | Noted: 2024-06-29

## 2024-06-29 PROBLEM — Z08 ENCOUNTER FOR FOLLOW-UP SURVEILLANCE OF APPENDICEAL CANCER: Status: ACTIVE | Noted: 2024-06-29

## 2024-06-29 NOTE — PROGRESS NOTES
Cancer History:  DIAGNOSIS: Appendiceal adenocarcinoma    STAGING: pT4a pN1a Mx    CURRENT SITES OF DISEASE:    MOLECULAR/GENOMICS:  MMR-intact    ctDNA Signatera:  8/14/23: (0) negative  9/25/23: (0) negative  11/6/23: (0) negative  1/2/24: (0) negative  2/2/24: (0) negative  6/11/24: (0) negative    TUMOR MARKER:  5/15/23 CEA: <0.5  8/14/23 CEA: <0.5  11/7/23 CEA: <0.5  12/18/23 CEA: 1.3  1/2/24 CEA: 0.5  1/29/24 CEA: <0.5  2/12/24 CEA: <0.5  3/11/24 CEA: <0.5  6/11/23 CEA: <0.5    CURRENT THERAPY:  Surveillance     PREVIOUS THERAPY:  6/20/23: S/p R hemicolectomy  8/29/23--2/13/24: Adjuvant FOLFOX every 2 weeks x 12 cycles; Dose reduction of Oxaliplatin with C4 due to lasting cold sensitivity. Dose reduced Oxaliplatin with C7 due to increasing neuropathy. Cycle 12 given 2/13/24    ONCOLOGIC ISSUES:    PROVIDERS:  CRS: Jazmin Faulkner    HISTORY:   4/2023: presented with R inguinal pain. Thought to have a hernia.  4/17/23: CT abdomen pelvis showed indeterminate masslike lesion at the cecum posteriorly at the expected level of the appendix measuring 3 x 4 cm in dimension.  Also within the left pelvis near the origin of the left inguinal canal there is an indeterminate cavitary mass measuring 2.5 x 2 cm  5/5/23: Underwent colonoscopy.  Report not available.  Pathology of cecal/appendiceal orifice mass biopsy showed poorly differentiated adenocarcinoma with signet ring cell differentiation  5/17/23: CT chest abdomen pelvis showed no evidence of metastatic disease, again noted soft tissue mass near the base of the cecum measuring 4.2 x 2.8 cm, compatible with reported history of appendiceal carcinoma  6/20/23: Underwent right hemicolectomy.  Final pathology showed appendiceal invasive moderately differentiated mucinous adenocarcinoma measuring 5 cm.  Tumor invades the visceral peritoneum.  No lymphovascular or perineural invasion. 1/44 LNs involved.  MMR protein expression intact    PMH: HTN, Gilbert's disease, HLD,  DM  SH: , no tobacco, EtOH, illicits  FH: Sister and nephew with Rodriguez syndrome.      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 White coating on tongue is improved but still present. Asking about swabbing it to see if he has a bacterial infection. Denies bowel any issues  Colonoscopy done 5/6/24 was negative. Repeat in 3 years  Then on 5/10/24 he was admitted after becoming bloated, having abdominal cramping. He had SBO. He had non-surgical interventions which resolved the SBO.    No fevers, chills, chest pain, shortness of breath, abdominal pain, nausea, vomiting, or rashes.    ROS as above. Remainder of 10-point review of systems elicited and negative.     Objective:  /70 (BP Location: Left arm, Patient Position: Sitting)   Pulse 90   Temp 36.4 °C (97.5 °F) (Temporal)   Resp 16   Wt 89.7 kg (197 lb 11.2 oz)   SpO2 98%   BMI 27.97 kg/m²      Daily Weight  06/11/24 : 89.7 kg (197 lb 11.2 oz)  06/05/24 : 89.4 kg (197 lb)  05/10/24 : 88.9 kg (196 lb)  04/08/24 : 91.2 kg (201 lb)  03/11/24 : 88.4 kg (194 lb 14.2 oz)  02/13/24 : 89.1 kg (196 lb 6.4 oz)  02/12/24 : 88 kg (193 lb 14.4 oz)      Physical Exam  Gen: A&O, NAD  Head: Normocephalic, atraumatic  Eyes: no scleral icterus  ENT: mucous membranes moist, no oropharyngeal lesions, white tongue   Resp: Lungs CTAB  Cardiac: Tachycardiac, regular rhythm, no murmurs appreciated  Abdomen: Soft, nondistended, nontender, +BS  Neuro: CNII-XII grossly intact  Psych: appropriate mood & affect  Skin: warm, dry, no apparent rashes     ECOG Performance Status: 0     CT C/A/P 3/4/24:  CHEST:  1. No intrathoracic metastatic disease.  2. Previously seen multifocal bilateral pulmonary emboli are not  visualized in today's study, likely resolved. To note, the study is  not tailored to evaluate the pulmonary vasculature.      ABDOMEN-PELVIS:  1. Status post right hemicolectomy with no gross soft tissue masses  at the site of anastomosis. No  metastatic disease in the abdomen or  pelvis.  2. Mild hepatic steatosis.  3. Uncomplicated cholelithiasis. Uncomplicated sigmoid diverticulosis    Assessment/Plan   Mr. 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 Faulkner.     He presents today for discussion of adjuvant chemotherapy. I personally reviewed the images from the recent CT, which show no evidence of metastatic disease.  I reviewed the results of his pathology synoptic report, which show pT4a pN1a, Stage III cancer.     I discussed with him and his wife at length that given the stage of his appendiceal cancer, I recommend systemic chemotherapy.  The options are for FOLFOX versus capecitabine plus oxaliplatin.  Given the high risk nature of the diagnosis, my preference is for 6 months of adjuvant chemotherapy, and FOLFOX is better tolerated for this duration.    I discussed the risks and benefits and side effect profile of FOLFOX chemotherapy, and he agrees to proceed.    After 1st cycle of FOLFOX, found to have extensive PE throughout B/L lungs including saddle embolus of main pulmonary artery. On 9/5/23 he had aspiration thrombectomy and was placed on Apixaban. Discussed admission with Dr. Webb. Ok to proceed with chemotherapy as long as patient is feeling well.     10/9/23: After 2nd cycle, he experienced fatigue, cold sensitivity, nausea and diarrhea.    10/23/23: Having more cold and now light sensitivity. Will decrease dose of Oxaliplatin.     11/20/23: Improved fatigue. Still with taste and cold sensitivity, which is stable.   12/18/23: Improving energy, taste is worse, + thrush  1/2/24: Doing well, improving energy. Still with thrush but taste is improving. Proceed with chemo today.  2/12/24: Will proceed with final cycle of FOLFOX, will get post-tx CT in approx 3 weeks with RTC 4 weeks then proceed with surveillance  3/11/24: I personally reviewed the images from the recent CT, which  show no evidence of disease. CEA undetectable. Will proceed with surveillance.   6/11/24. Doing well. Neuropathy is slowly improving. Continues with white 'coating' on tongue. Continue with surveillance every 3 months with lab work and CT scan every 6 months. Colonoscopy done in May 2024 was unremarkable. Will need it repeated in 3 years.    Plan:  Stage III appendiceal cancer  - S/p 6mo adjuvant chemotherapy with FOLFOX 8/2023-2/2024  - Proceed with surveillance with labs including Signatera & CEA q3mo and CT q6mo for 2 years, then will space out  - Pt requesting Mediport removal after CT in 3mo  - Repeat colonoscopy 5/2027  - flush mediport every 6 weeks for now  - CT c/a/p prior to next follow up in 3 months  - swabbed tongue today for bacteria infection per pt request  - RTC 3mo with CBC, CMP, CEA, Signatera and to review scan    Oral thrush  - Fluconazole ordered 3/11/24  - swabbed tongue for bacterial infection    Pulmonary Embolism including saddle embolism  - continue on Apixaban  - has follow up with vascular medicine who will manage apixaban

## 2024-07-22 ENCOUNTER — TELEPHONE (OUTPATIENT)
Dept: ENDOCRINOLOGY | Facility: CLINIC | Age: 69
End: 2024-07-22

## 2024-07-22 DIAGNOSIS — E78.2 MIXED HYPERLIPIDEMIA: Primary | ICD-10-CM

## 2024-07-22 DIAGNOSIS — E11.9 TYPE 2 DIABETES MELLITUS WITHOUT COMPLICATION, WITHOUT LONG-TERM CURRENT USE OF INSULIN (MULTI): Primary | ICD-10-CM

## 2024-07-22 RX ORDER — FENOFIBRATE 145 MG/1
145 TABLET, FILM COATED ORAL DAILY
Qty: 90 TABLET | Refills: 1 | Status: SHIPPED | OUTPATIENT
Start: 2024-07-22

## 2024-07-22 RX ORDER — FENOFIBRATE 145 MG/1
145 TABLET, FILM COATED ORAL DAILY
Qty: 30 TABLET | Refills: 5 | Status: CANCELLED | OUTPATIENT
Start: 2024-07-22 | End: 2025-01-18

## 2024-07-22 NOTE — TELEPHONE ENCOUNTER
Pended lancets for approval.  Patient requesting Fenofibrate but last prescribing was Ante Pletikosic.  Messaged patient on [employer] to advise.

## 2024-07-24 ENCOUNTER — LAB (OUTPATIENT)
Dept: HEMATOLOGY/ONCOLOGY | Facility: CLINIC | Age: 69
End: 2024-07-24
Payer: MEDICARE

## 2024-07-24 VITALS
OXYGEN SATURATION: 96 % | RESPIRATION RATE: 18 BRPM | SYSTOLIC BLOOD PRESSURE: 137 MMHG | DIASTOLIC BLOOD PRESSURE: 72 MMHG | HEART RATE: 69 BPM | TEMPERATURE: 97 F

## 2024-07-24 DIAGNOSIS — C77.2 CANCER OF APPENDIX METASTATIC TO INTRA-ABDOMINAL LYMPH NODE (MULTI): ICD-10-CM

## 2024-07-24 DIAGNOSIS — C18.1 CANCER OF APPENDIX METASTATIC TO INTRA-ABDOMINAL LYMPH NODE (MULTI): ICD-10-CM

## 2024-07-24 PROCEDURE — 36591 DRAW BLOOD OFF VENOUS DEVICE: CPT

## 2024-07-24 PROCEDURE — 2500000004 HC RX 250 GENERAL PHARMACY W/ HCPCS (ALT 636 FOR OP/ED): Performed by: STUDENT IN AN ORGANIZED HEALTH CARE EDUCATION/TRAINING PROGRAM

## 2024-07-24 RX ORDER — HEPARIN 100 UNIT/ML
500 SYRINGE INTRAVENOUS AS NEEDED
OUTPATIENT
Start: 2024-07-24

## 2024-07-24 RX ORDER — HEPARIN SODIUM,PORCINE/PF 10 UNIT/ML
50 SYRINGE (ML) INTRAVENOUS AS NEEDED
OUTPATIENT
Start: 2024-07-24

## 2024-07-24 RX ORDER — HEPARIN SODIUM,PORCINE/PF 10 UNIT/ML
50 SYRINGE (ML) INTRAVENOUS AS NEEDED
Status: DISCONTINUED | OUTPATIENT
Start: 2024-07-24 | End: 2024-07-24 | Stop reason: HOSPADM

## 2024-07-24 RX ORDER — HEPARIN 100 UNIT/ML
500 SYRINGE INTRAVENOUS AS NEEDED
Status: DISCONTINUED | OUTPATIENT
Start: 2024-07-24 | End: 2024-07-24 | Stop reason: HOSPADM

## 2024-07-24 ASSESSMENT — PAIN SCALES - GENERAL: PAINLEVEL: 0-NO PAIN

## 2024-08-01 ENCOUNTER — LAB (OUTPATIENT)
Dept: LAB | Facility: LAB | Age: 69
End: 2024-08-01
Payer: MEDICARE

## 2024-08-01 DIAGNOSIS — E11.9 TYPE 2 DIABETES MELLITUS WITHOUT COMPLICATION, WITHOUT LONG-TERM CURRENT USE OF INSULIN (MULTI): ICD-10-CM

## 2024-08-01 DIAGNOSIS — E78.1 HYPERTRIGLYCERIDEMIA: ICD-10-CM

## 2024-08-01 LAB
ALBUMIN SERPL BCP-MCNC: 4.5 G/DL (ref 3.4–5)
ALP SERPL-CCNC: 34 U/L (ref 33–136)
ALT SERPL W P-5'-P-CCNC: 38 U/L (ref 10–52)
ANION GAP SERPL CALC-SCNC: 12 MMOL/L (ref 10–20)
AST SERPL W P-5'-P-CCNC: 32 U/L (ref 9–39)
BILIRUB SERPL-MCNC: 0.7 MG/DL (ref 0–1.2)
BUN SERPL-MCNC: 20 MG/DL (ref 6–23)
CALCIUM SERPL-MCNC: 9.4 MG/DL (ref 8.6–10.3)
CHLORIDE SERPL-SCNC: 104 MMOL/L (ref 98–107)
CHOLEST SERPL-MCNC: 100 MG/DL (ref 0–199)
CHOLESTEROL/HDL RATIO: 2.4
CO2 SERPL-SCNC: 26 MMOL/L (ref 21–32)
CREAT SERPL-MCNC: 1.24 MG/DL (ref 0.5–1.3)
CREAT UR-MCNC: 56.2 MG/DL (ref 20–370)
EGFRCR SERPLBLD CKD-EPI 2021: 63 ML/MIN/1.73M*2
EST. AVERAGE GLUCOSE BLD GHB EST-MCNC: 171 MG/DL
GLUCOSE SERPL-MCNC: 103 MG/DL (ref 74–99)
HBA1C MFR BLD: 7.6 %
HDLC SERPL-MCNC: 41.8 MG/DL
LDLC SERPL CALC-MCNC: 42 MG/DL
MICROALBUMIN UR-MCNC: <7 MG/L
MICROALBUMIN/CREAT UR: NORMAL MG/G{CREAT}
NON HDL CHOLESTEROL: 58 MG/DL (ref 0–149)
POTASSIUM SERPL-SCNC: 4.1 MMOL/L (ref 3.5–5.3)
PROT SERPL-MCNC: 6.8 G/DL (ref 6.4–8.2)
SODIUM SERPL-SCNC: 138 MMOL/L (ref 136–145)
TRIGL SERPL-MCNC: 82 MG/DL (ref 0–149)
VLDL: 16 MG/DL (ref 0–40)

## 2024-08-01 PROCEDURE — 80053 COMPREHEN METABOLIC PANEL: CPT

## 2024-08-01 PROCEDURE — 82043 UR ALBUMIN QUANTITATIVE: CPT

## 2024-08-01 PROCEDURE — 83036 HEMOGLOBIN GLYCOSYLATED A1C: CPT

## 2024-08-01 PROCEDURE — 82570 ASSAY OF URINE CREATININE: CPT

## 2024-08-01 PROCEDURE — 80061 LIPID PANEL: CPT

## 2024-08-07 ENCOUNTER — APPOINTMENT (OUTPATIENT)
Dept: RADIOLOGY | Facility: HOSPITAL | Age: 69
End: 2024-08-07
Payer: MEDICARE

## 2024-08-07 ENCOUNTER — HOSPITAL ENCOUNTER (EMERGENCY)
Facility: HOSPITAL | Age: 69
Discharge: HOME | End: 2024-08-07
Attending: EMERGENCY MEDICINE
Payer: MEDICARE

## 2024-08-07 VITALS
RESPIRATION RATE: 18 BRPM | OXYGEN SATURATION: 97 % | HEART RATE: 77 BPM | SYSTOLIC BLOOD PRESSURE: 130 MMHG | BODY MASS INDEX: 28.06 KG/M2 | HEIGHT: 70 IN | WEIGHT: 196 LBS | DIASTOLIC BLOOD PRESSURE: 67 MMHG

## 2024-08-07 DIAGNOSIS — M25.572 ACUTE LEFT ANKLE PAIN: Primary | ICD-10-CM

## 2024-08-07 PROCEDURE — 73610 X-RAY EXAM OF ANKLE: CPT | Mod: LT

## 2024-08-07 PROCEDURE — 93971 EXTREMITY STUDY: CPT

## 2024-08-07 PROCEDURE — 99284 EMERGENCY DEPT VISIT MOD MDM: CPT | Mod: 25

## 2024-08-07 PROCEDURE — 73610 X-RAY EXAM OF ANKLE: CPT | Mod: LEFT SIDE | Performed by: RADIOLOGY

## 2024-08-07 PROCEDURE — 93971 EXTREMITY STUDY: CPT | Performed by: RADIOLOGY

## 2024-08-07 ASSESSMENT — PAIN - FUNCTIONAL ASSESSMENT: PAIN_FUNCTIONAL_ASSESSMENT: 0-10

## 2024-08-07 ASSESSMENT — COLUMBIA-SUICIDE SEVERITY RATING SCALE - C-SSRS
1. IN THE PAST MONTH, HAVE YOU WISHED YOU WERE DEAD OR WISHED YOU COULD GO TO SLEEP AND NOT WAKE UP?: NO
6. HAVE YOU EVER DONE ANYTHING, STARTED TO DO ANYTHING, OR PREPARED TO DO ANYTHING TO END YOUR LIFE?: NO
2. HAVE YOU ACTUALLY HAD ANY THOUGHTS OF KILLING YOURSELF?: NO

## 2024-08-07 ASSESSMENT — PAIN SCALES - GENERAL: PAINLEVEL_OUTOF10: 3

## 2024-08-07 ASSESSMENT — PAIN DESCRIPTION - LOCATION: LOCATION: ANKLE

## 2024-08-07 NOTE — ED TRIAGE NOTES
Pt arrived via private vehicle from home with chief c/o of L ankle swelling, pain, since yesterday. Pt has hx of saddle PE, currently on eliquis. Pt denies missed doses, denies long car rides. He and his wife moved into new home and have been moving up and down stairs the last few days. Pt states he has been on a half dose of eliquis per pulmonologist order since July. Pt denies CP, SOB. Pt has hx of appendix ca, on chemo.

## 2024-08-07 NOTE — ED TRIAGE NOTES
" TRIAGE NOTE   I saw the patient as the Clinician in Triage and performed a brief history and physical exam, established acuity, and ordered appropriate tests to develop basic plan of care. Patient will be seen by an NILAY, resident and/or physician who will independently evaluate the patient. Please see subsequent provider notes for further details and disposition.     Brief HPI: In brief, Bunny Abarca \"Orin" is a 69 y.o. male that presents for left ankle pain and swelling.  Patient thinks he may have rolled his ankle.  There is swelling laterally.  No fall or impact injury.  Also has a history of DVT/PE on Eliquis.  Initial presentation of DVT with left ankle swelling.  Reports compliance with anticoagulant medication.  No chest pain Furtick pain shortness of breath cough hemoptysis..     Focused Physical exam:   Vital signs are stable.  No tachycardia tachypnea or hypoxia.  No respiratory difficulty..  No increased work of breathing.  No pleuritic pain elicited.  Left lower extremity TTP with mild swelling lateral malleolus.  Nontender medial malleolus.  Ankle joint stable.  Patient is ambulating with minimal discomfort.  Lower leg otherwise nontender without swelling.    Plan/MDM:   Workup initiated.  Please see subsequent provider note for further details and disposition   "

## 2024-08-07 NOTE — ED PROVIDER NOTES
HPI   Chief Complaint   Patient presents with    Leg Swelling       Chief complaint: Left ankle pain and swelling    History of present illness: Patient is a 69-year-old male with history of cancer currently in remission as well as DVT/PE presenting to the emergency department with complaints of pain and swelling of his left ankle.  According to the patient, he has been doing heavy lifting recently as he has been moving from 1 house to another.  Patient states that he takes Eliquis and has not been compliant with this.  Today, he notes that he had swelling of his left ankle.  The patient states that this is painful.  He denies any pain anywhere else he denies any chest pain he denies any worsening shortness of breath.  He denies any fever.  Although the patient has no concerns for this, family encouraged the patient to come to the emergency department given his medical history.  The patient's pulmonary embolus in the past was severe which required thrombectomy and the patient almost  from it.      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    Type 2 diabetes mellitus (Multi) 2023     Past Surgical History:   Procedure Laterality Date    BACK SURGERY  2016    Back Surgery    CATARACT EXTRACTION  02/15/2018    Cataract Surgery    HERNIA REPAIR  2016    Hernia Repair    SINUS SURGERY  2016    Sinus Surgery     Family History   Problem Relation Name Age of Onset    Congenital heart disease Mother      Hyperlipidemia Father      Hypertension Father      Colon cancer Sister      Other (Multiple System  Atrophy) Sister      Other (Cystic Fibrosis Gene Carrier) Sister      Other (Malignant Neoplasm Of Ovary) Sibling       Social History     Tobacco Use    Smoking status: Never    Smokeless tobacco: Never   Vaping Use    Vaping status: Never Used   Substance Use Topics    Alcohol use: Not Currently    Drug use: Not Currently       Physical Exam   ED Triage Vitals [08/07/24 1435]   Temp Heart Rate Respirations BP   -- 75 18 155/76      Pulse Ox Temp src Heart Rate Source Patient Position   98 % -- -- --      BP Location FiO2 (%)     Left arm --       Physical Exam  Vitals and nursing note reviewed.   Constitutional:       General: He is not in acute distress.     Appearance: He is well-developed.   HENT:      Head: Normocephalic and atraumatic.   Eyes:      Conjunctiva/sclera: Conjunctivae normal.   Cardiovascular:      Rate and Rhythm: Normal rate and regular rhythm.      Heart sounds: No murmur heard.  Pulmonary:      Effort: Pulmonary effort is normal. No respiratory distress.      Breath sounds: Normal breath sounds.   Abdominal:      Palpations: Abdomen is soft.      Tenderness: There is no abdominal tenderness.   Musculoskeletal:         General: No swelling.      Cervical back: Neck supple.      Left ankle: Swelling present. Tenderness present.   Skin:     General: Skin is warm and dry.      Capillary Refill: Capillary refill takes less than 2 seconds.   Neurological:      Mental Status: He is alert.   Psychiatric:         Mood and Affect: Mood normal.           ED Course & MDM   Diagnoses as of 08/09/24 2352   Acute left ankle pain                 No data recorded     Yuki Coma Scale Score: 15 (08/07/24 1436 : Dahiana Astorga RN)                           Medical Decision Making  Medical decision making: Patient remained stable throughout his time in the emergency department.  X-ray the patient's left ankle demonstrated no significant acute abnormalities finally venous duplex of the patient's left  lower extremity demonstrated an echogenic thrombus of the femoral vein into the popliteal vein but no other significant acute abnormalities.    Patient presents to the emergency department with pain and swelling of his left ankle.  Workup was performed as above.  Currently, the patient is not experiencing any pain of his knee or thigh and as result I feel the DVT seen on the ultrasound is likely chronic in nature.  The patient was reassured I explained to the patient that if he has any worsening symptoms he should return immediately to the emergency department however to continue being compliant with his Eliquis.  Patient was instructed to take over-the-counter medications as needed for pain control as he likely sprained his ankle he will use a splint at home.  The patient was then discharged home in improved condition.    Amount and/or Complexity of Data Reviewed  Radiology: ordered. Decision-making details documented in ED Course.        Procedure  Procedures     Dave Rosa MD  08/09/24 3502

## 2024-08-08 ENCOUNTER — APPOINTMENT (OUTPATIENT)
Dept: PRIMARY CARE | Facility: CLINIC | Age: 69
End: 2024-08-08
Payer: MEDICARE

## 2024-08-30 RX ORDER — LANCETS
EACH MISCELLANEOUS
Qty: 200 EACH | Refills: 3 | Status: SHIPPED | OUTPATIENT
Start: 2024-08-30

## 2024-09-09 ENCOUNTER — APPOINTMENT (OUTPATIENT)
Dept: PRIMARY CARE | Facility: CLINIC | Age: 69
End: 2024-09-09
Payer: MEDICARE

## 2024-09-10 ENCOUNTER — APPOINTMENT (OUTPATIENT)
Dept: ENDOCRINOLOGY | Facility: CLINIC | Age: 69
End: 2024-09-10
Payer: MEDICARE

## 2024-09-10 VITALS
WEIGHT: 201 LBS | DIASTOLIC BLOOD PRESSURE: 62 MMHG | HEIGHT: 71 IN | SYSTOLIC BLOOD PRESSURE: 132 MMHG | HEART RATE: 66 BPM | BODY MASS INDEX: 28.14 KG/M2

## 2024-09-10 DIAGNOSIS — Z79.4 TYPE 2 DIABETES MELLITUS WITH OTHER SPECIFIED COMPLICATION, WITH LONG-TERM CURRENT USE OF INSULIN: Primary | ICD-10-CM

## 2024-09-10 DIAGNOSIS — E11.69 TYPE 2 DIABETES MELLITUS WITH OTHER SPECIFIED COMPLICATION, WITH LONG-TERM CURRENT USE OF INSULIN: Primary | ICD-10-CM

## 2024-09-10 PROCEDURE — 1157F ADVNC CARE PLAN IN RCRD: CPT | Performed by: INTERNAL MEDICINE

## 2024-09-10 PROCEDURE — 3048F LDL-C <100 MG/DL: CPT | Performed by: INTERNAL MEDICINE

## 2024-09-10 PROCEDURE — 99214 OFFICE O/P EST MOD 30 MIN: CPT | Performed by: INTERNAL MEDICINE

## 2024-09-10 PROCEDURE — 4010F ACE/ARB THERAPY RXD/TAKEN: CPT | Performed by: INTERNAL MEDICINE

## 2024-09-10 PROCEDURE — 1160F RVW MEDS BY RX/DR IN RCRD: CPT | Performed by: INTERNAL MEDICINE

## 2024-09-10 PROCEDURE — 3062F POS MACROALBUMINURIA REV: CPT | Performed by: INTERNAL MEDICINE

## 2024-09-10 PROCEDURE — G2211 COMPLEX E/M VISIT ADD ON: HCPCS | Performed by: INTERNAL MEDICINE

## 2024-09-10 PROCEDURE — 3051F HG A1C>EQUAL 7.0%<8.0%: CPT | Performed by: INTERNAL MEDICINE

## 2024-09-10 PROCEDURE — 3008F BODY MASS INDEX DOCD: CPT | Performed by: INTERNAL MEDICINE

## 2024-09-10 PROCEDURE — 3078F DIAST BP <80 MM HG: CPT | Performed by: INTERNAL MEDICINE

## 2024-09-10 PROCEDURE — 1159F MED LIST DOCD IN RCRD: CPT | Performed by: INTERNAL MEDICINE

## 2024-09-10 PROCEDURE — 3075F SYST BP GE 130 - 139MM HG: CPT | Performed by: INTERNAL MEDICINE

## 2024-09-10 RX ORDER — OMEGA-3-ACID ETHYL ESTERS 1 G/1
1 CAPSULE, LIQUID FILLED ORAL 2 TIMES DAILY
COMMUNITY
End: 2024-09-15 | Stop reason: WASHOUT

## 2024-09-10 RX ORDER — CYANOCOBALAMIN (VITAMIN B-12) 250 MCG
250 TABLET ORAL DAILY
COMMUNITY

## 2024-09-10 RX ORDER — INSULIN ASPART 100 [IU]/ML
38 INJECTION, SUSPENSION SUBCUTANEOUS 2 TIMES DAILY
Qty: 15 EACH | Refills: 5 | Status: SHIPPED | OUTPATIENT
Start: 2024-09-10 | End: 2025-03-09

## 2024-09-10 ASSESSMENT — ENCOUNTER SYMPTOMS
CONSTIPATION: 0
SHORTNESS OF BREATH: 1
DIARRHEA: 0

## 2024-09-10 NOTE — PROGRESS NOTES
"Subjective   Michael Abarca is a 69 y.o. male who presents for follow-up of Type 2 diabetes mellitus. The initial diagnosis of diabetes was made in 1995 .     He has had no major changes to his health since his last appointment.  He had an infected ingrown toenail for which he sought medical attention at the ED.  He finished antibiotic therapy.    He desires to see a new endocrinologist which is closer to home as he recently moved to [place].      Known complications due to diabetes included peripheral neuropathy    Cardiovascular risk factors include advanced age (older than 55 for men, 65 for women), diabetes mellitus, and male gender. The patient is on an ACE inhibitor or angiotensin II receptor blocker.  The patient has not been previously hospitalized due to diabetic ketoacidosis.     Current symptoms/problems include none. His clinical course has been stable.     The patient is currently checking the blood glucose 2 times per day.    Patient is using: glucometer                            Hypoglycemia frequency: Intermittent episodes as documented above    Hypoglycemia awareness:    Previous medications tried:  Victoza - ineffective; did lose weight      Current Regimen  NovoLog 70/30 38 units SQ twice daily   Metformin ER 1000 mg BID  Jardiance 25nmg once daily       Exericse routine - arm exercises, bicycle 10 mins     Review of Systems   Respiratory:  Positive for shortness of breath.    Cardiovascular:  Negative for chest pain.   Gastrointestinal:  Negative for constipation and diarrhea.   Genitourinary:         Nocturia x 1.5 hours        Objective   /62 (BP Location: Left arm, Patient Position: Sitting, BP Cuff Size: Adult)   Pulse 66   Ht 1.791 m (5' 10.5\")   Wt 91.2 kg (201 lb)   BMI 28.43 kg/m²   Physical Exam  Vitals and nursing note reviewed.   Constitutional:       General: He is not in acute distress.     Appearance: Normal appearance. He is normal weight.   HENT:      Head: Normocephalic and " atraumatic.      Nose: Nose normal.      Mouth/Throat:      Mouth: Mucous membranes are moist.   Eyes:      Extraocular Movements: Extraocular movements intact.   Cardiovascular:      Rate and Rhythm: Normal rate and regular rhythm.   Pulmonary:      Effort: Pulmonary effort is normal.      Breath sounds: Normal breath sounds.   Musculoskeletal:         General: Normal range of motion.      Right foot: Normal range of motion. No deformity, bunion or Charcot foot.      Left foot: Normal range of motion. No deformity, bunion or Charcot foot.   Feet:      Right foot:      Skin integrity: Skin integrity normal. No ulcer, blister, skin breakdown or erythema.      Toenail Condition: Right toenails are normal.      Left foot:      Skin integrity: Skin integrity normal. No ulcer, blister, skin breakdown or erythema.      Toenail Condition: Left toenails are normal.   Skin:     General: Skin is warm.   Neurological:      Mental Status: He is alert and oriented to person, place, and time.   Psychiatric:         Mood and Affect: Mood normal.         Lab Review  Glucose (mg/dL)   Date Value   08/01/2024 103 (H)   06/11/2024 140 (H)   05/24/2024 88     POC HEMOGLOBIN A1c (%)   Date Value   04/04/2024 7.4 (A)     Hemoglobin A1C (%)   Date Value   08/01/2024 7.6 (H)   05/24/2024 7.7 (H)   05/10/2024 7.3 (H)     Bicarbonate (mmol/L)   Date Value   08/01/2024 26   06/11/2024 25   05/24/2024 27     Urea Nitrogen (mg/dL)   Date Value   08/01/2024 20   06/11/2024 23   05/24/2024 19     Creatinine (mg/dL)   Date Value   08/01/2024 1.24   06/11/2024 1.12   05/24/2024 1.06     Lab Results   Component Value Date    CHOL 100 08/01/2024    CHOL 89 05/24/2024    CHOL 136 12/04/2023     Lab Results   Component Value Date    HDL 41.8 08/01/2024    HDL 42.7 05/24/2024    HDL 56.3 12/04/2023     Lab Results   Component Value Date    LDLCALC 42 08/01/2024    LDLCALC 25 05/24/2024    LDLCALC 43 12/04/2023     Lab Results   Component Value Date     TRIG 82 08/01/2024    TRIG 109 05/24/2024    TRIG 183 (H) 12/04/2023     Health Maintenance:   Foot Exam: September 10. 2024  Eye Exam:  February 2023  Urine Albumin: August 1, 2024    Assessment/Plan   69 year old male presents for follow up for type II DM. He is noted to have a positive anti-STEPHAN 65 antibody in 2017 but has had very detectable C peptide values. His blood pressure is at goal today. He is noted to be on a statin. He is noted to be on an ARB.     Type 2 diabetes mellitus, with long-term current use of insulin (Multi)  To continue Metformin ER 1000 mg twice daily   To continue NovoLog 70/30 38 units SQ twice daily before breakfast and before dinner  To continue Jardiance 25mg once daily  Counseled glycemic control is warranted to prevent microvascular complications  Please check blood sugars 2 times daily and keep a detailed log  To find a new endocrinologist closer to home

## 2024-09-10 NOTE — PATIENT INSTRUCTIONS
Thank you for [place] [facility]  for your health care needs.  If you have any questions, concerns or medical needs, please feel free to contact our office at [phone].    Please ensure you complete your blood work one week before the next scheduled appointment.    To continue Metformin ER 1000 mg twice daily   To continue NovoLog 70/30 38 units SQ twice daily before breakfast and before dinner  To continue Jardiance 25mg once daily  Counseled glycemic control is warranted to prevent microvascular complications  Please check blood sugars 2 times daily and keep a detailed log  To find a new endocrinologist closer to home

## 2024-09-11 ENCOUNTER — TELEPHONE (OUTPATIENT)
Dept: HEMATOLOGY/ONCOLOGY | Facility: HOSPITAL | Age: 69
End: 2024-09-11
Payer: MEDICARE

## 2024-09-11 NOTE — TELEPHONE ENCOUNTER
Spoke with Mr. Abarca regarding signatera - I informed him that a new order was put in the portal and he should receive a kit soon.  He also informed me that he moved - address is now up to date in the signatera portal.  Patient was grateful and verbalized understanding.

## 2024-09-12 ENCOUNTER — HOSPITAL ENCOUNTER (OUTPATIENT)
Dept: RADIOLOGY | Facility: CLINIC | Age: 69
Discharge: HOME | End: 2024-09-12
Payer: MEDICARE

## 2024-09-12 ENCOUNTER — LAB (OUTPATIENT)
Dept: HEMATOLOGY/ONCOLOGY | Facility: CLINIC | Age: 69
End: 2024-09-12
Payer: MEDICARE

## 2024-09-12 VITALS
OXYGEN SATURATION: 95 % | DIASTOLIC BLOOD PRESSURE: 70 MMHG | SYSTOLIC BLOOD PRESSURE: 154 MMHG | HEART RATE: 73 BPM | TEMPERATURE: 96.8 F

## 2024-09-12 DIAGNOSIS — C18.1 CANCER OF APPENDIX METASTATIC TO INTRA-ABDOMINAL LYMPH NODE (MULTI): ICD-10-CM

## 2024-09-12 DIAGNOSIS — C77.2 CANCER OF APPENDIX METASTATIC TO INTRA-ABDOMINAL LYMPH NODE (MULTI): ICD-10-CM

## 2024-09-12 LAB
ALBUMIN SERPL BCP-MCNC: 4.8 G/DL (ref 3.4–5)
ALP SERPL-CCNC: 35 U/L (ref 33–136)
ALT SERPL W P-5'-P-CCNC: 36 U/L (ref 10–52)
ANION GAP SERPL CALC-SCNC: 13 MMOL/L (ref 10–20)
AST SERPL W P-5'-P-CCNC: 31 U/L (ref 9–39)
BASOPHILS # BLD AUTO: 0.03 X10*3/UL (ref 0–0.1)
BASOPHILS NFR BLD AUTO: 0.5 %
BILIRUB SERPL-MCNC: 0.9 MG/DL (ref 0–1.2)
BUN SERPL-MCNC: 19 MG/DL (ref 6–23)
CALCIUM SERPL-MCNC: 9.6 MG/DL (ref 8.6–10.6)
CEA SERPL-MCNC: <0.5 UG/L
CHLORIDE SERPL-SCNC: 104 MMOL/L (ref 98–107)
CO2 SERPL-SCNC: 26 MMOL/L (ref 21–32)
CREAT SERPL-MCNC: 1.18 MG/DL (ref 0.5–1.3)
EGFRCR SERPLBLD CKD-EPI 2021: 67 ML/MIN/1.73M*2
EOSINOPHIL # BLD AUTO: 0.16 X10*3/UL (ref 0–0.7)
EOSINOPHIL NFR BLD AUTO: 2.9 %
ERYTHROCYTE [DISTWIDTH] IN BLOOD BY AUTOMATED COUNT: 12.9 % (ref 11.5–14.5)
GLUCOSE SERPL-MCNC: 71 MG/DL (ref 74–99)
HCT VFR BLD AUTO: 42.7 % (ref 41–52)
HGB BLD-MCNC: 13.9 G/DL (ref 13.5–17.5)
IMM GRANULOCYTES # BLD AUTO: 0.01 X10*3/UL (ref 0–0.7)
IMM GRANULOCYTES NFR BLD AUTO: 0.2 % (ref 0–0.9)
LYMPHOCYTES # BLD AUTO: 1.38 X10*3/UL (ref 1.2–4.8)
LYMPHOCYTES NFR BLD AUTO: 25.1 %
MCH RBC QN AUTO: 28.4 PG (ref 26–34)
MCHC RBC AUTO-ENTMCNC: 32.6 G/DL (ref 32–36)
MCV RBC AUTO: 87 FL (ref 80–100)
MONOCYTES # BLD AUTO: 0.41 X10*3/UL (ref 0.1–1)
MONOCYTES NFR BLD AUTO: 7.5 %
NEUTROPHILS # BLD AUTO: 3.51 X10*3/UL (ref 1.2–7.7)
NEUTROPHILS NFR BLD AUTO: 63.8 %
NRBC BLD-RTO: NORMAL /100{WBCS}
PLATELET # BLD AUTO: 286 X10*3/UL (ref 150–450)
POTASSIUM SERPL-SCNC: 4 MMOL/L (ref 3.5–5.3)
PROT SERPL-MCNC: 7.2 G/DL (ref 6.4–8.2)
RBC # BLD AUTO: 4.89 X10*6/UL (ref 4.5–5.9)
SODIUM SERPL-SCNC: 139 MMOL/L (ref 136–145)
WBC # BLD AUTO: 5.5 X10*3/UL (ref 4.4–11.3)

## 2024-09-12 PROCEDURE — 85025 COMPLETE CBC W/AUTO DIFF WBC: CPT | Performed by: STUDENT IN AN ORGANIZED HEALTH CARE EDUCATION/TRAINING PROGRAM

## 2024-09-12 PROCEDURE — 82378 CARCINOEMBRYONIC ANTIGEN: CPT | Performed by: STUDENT IN AN ORGANIZED HEALTH CARE EDUCATION/TRAINING PROGRAM

## 2024-09-12 PROCEDURE — 74177 CT ABD & PELVIS W/CONTRAST: CPT

## 2024-09-12 PROCEDURE — 36591 DRAW BLOOD OFF VENOUS DEVICE: CPT

## 2024-09-12 PROCEDURE — 2500000004 HC RX 250 GENERAL PHARMACY W/ HCPCS (ALT 636 FOR OP/ED): Performed by: STUDENT IN AN ORGANIZED HEALTH CARE EDUCATION/TRAINING PROGRAM

## 2024-09-12 PROCEDURE — 2550000001 HC RX 255 CONTRASTS: Performed by: STUDENT IN AN ORGANIZED HEALTH CARE EDUCATION/TRAINING PROGRAM

## 2024-09-12 PROCEDURE — 74177 CT ABD & PELVIS W/CONTRAST: CPT | Performed by: STUDENT IN AN ORGANIZED HEALTH CARE EDUCATION/TRAINING PROGRAM

## 2024-09-12 PROCEDURE — 71260 CT THORAX DX C+: CPT | Performed by: STUDENT IN AN ORGANIZED HEALTH CARE EDUCATION/TRAINING PROGRAM

## 2024-09-12 PROCEDURE — 80053 COMPREHEN METABOLIC PANEL: CPT | Performed by: STUDENT IN AN ORGANIZED HEALTH CARE EDUCATION/TRAINING PROGRAM

## 2024-09-12 RX ORDER — HEPARIN 100 UNIT/ML
500 SYRINGE INTRAVENOUS AS NEEDED
OUTPATIENT
Start: 2024-09-12

## 2024-09-12 RX ORDER — HEPARIN SODIUM,PORCINE/PF 10 UNIT/ML
50 SYRINGE (ML) INTRAVENOUS AS NEEDED
OUTPATIENT
Start: 2024-09-12

## 2024-09-12 RX ORDER — HEPARIN 100 UNIT/ML
500 SYRINGE INTRAVENOUS AS NEEDED
Status: DISCONTINUED | OUTPATIENT
Start: 2024-09-12 | End: 2024-09-12 | Stop reason: HOSPADM

## 2024-09-12 RX ORDER — HEPARIN SODIUM,PORCINE/PF 10 UNIT/ML
50 SYRINGE (ML) INTRAVENOUS AS NEEDED
Status: DISCONTINUED | OUTPATIENT
Start: 2024-09-12 | End: 2024-09-12 | Stop reason: HOSPADM

## 2024-09-12 ASSESSMENT — PAIN SCALES - GENERAL: PAINLEVEL: 0-NO PAIN

## 2024-09-15 NOTE — ASSESSMENT & PLAN NOTE
To continue Metformin ER 1000 mg twice daily   To continue NovoLog 70/30 38 units SQ twice daily before breakfast and before dinner  To continue Jardiance 25mg once daily  Counseled glycemic control is warranted to prevent microvascular complications  Please check blood sugars 2 times daily and keep a detailed log  To find a new endocrinologist closer to home

## 2024-09-16 ENCOUNTER — OFFICE VISIT (OUTPATIENT)
Dept: HEMATOLOGY/ONCOLOGY | Facility: HOSPITAL | Age: 69
End: 2024-09-16
Payer: MEDICARE

## 2024-09-16 VITALS
RESPIRATION RATE: 18 BRPM | DIASTOLIC BLOOD PRESSURE: 69 MMHG | WEIGHT: 197.75 LBS | SYSTOLIC BLOOD PRESSURE: 132 MMHG | TEMPERATURE: 97.3 F | HEART RATE: 72 BPM | BODY MASS INDEX: 27.97 KG/M2 | OXYGEN SATURATION: 100 %

## 2024-09-16 DIAGNOSIS — E11.9 TYPE 2 DIABETES MELLITUS WITHOUT COMPLICATION, WITHOUT LONG-TERM CURRENT USE OF INSULIN (MULTI): ICD-10-CM

## 2024-09-16 DIAGNOSIS — C77.2 CANCER OF APPENDIX METASTATIC TO INTRA-ABDOMINAL LYMPH NODE (MULTI): ICD-10-CM

## 2024-09-16 DIAGNOSIS — C18.1 CANCER OF APPENDIX METASTATIC TO INTRA-ABDOMINAL LYMPH NODE (MULTI): ICD-10-CM

## 2024-09-16 PROCEDURE — 4010F ACE/ARB THERAPY RXD/TAKEN: CPT | Performed by: STUDENT IN AN ORGANIZED HEALTH CARE EDUCATION/TRAINING PROGRAM

## 2024-09-16 PROCEDURE — 1126F AMNT PAIN NOTED NONE PRSNT: CPT | Performed by: STUDENT IN AN ORGANIZED HEALTH CARE EDUCATION/TRAINING PROGRAM

## 2024-09-16 PROCEDURE — G2211 COMPLEX E/M VISIT ADD ON: HCPCS | Performed by: STUDENT IN AN ORGANIZED HEALTH CARE EDUCATION/TRAINING PROGRAM

## 2024-09-16 PROCEDURE — 3062F POS MACROALBUMINURIA REV: CPT | Performed by: STUDENT IN AN ORGANIZED HEALTH CARE EDUCATION/TRAINING PROGRAM

## 2024-09-16 PROCEDURE — 99215 OFFICE O/P EST HI 40 MIN: CPT | Performed by: STUDENT IN AN ORGANIZED HEALTH CARE EDUCATION/TRAINING PROGRAM

## 2024-09-16 PROCEDURE — 3075F SYST BP GE 130 - 139MM HG: CPT | Performed by: STUDENT IN AN ORGANIZED HEALTH CARE EDUCATION/TRAINING PROGRAM

## 2024-09-16 PROCEDURE — 3051F HG A1C>EQUAL 7.0%<8.0%: CPT | Performed by: STUDENT IN AN ORGANIZED HEALTH CARE EDUCATION/TRAINING PROGRAM

## 2024-09-16 PROCEDURE — 1159F MED LIST DOCD IN RCRD: CPT | Performed by: STUDENT IN AN ORGANIZED HEALTH CARE EDUCATION/TRAINING PROGRAM

## 2024-09-16 PROCEDURE — 3078F DIAST BP <80 MM HG: CPT | Performed by: STUDENT IN AN ORGANIZED HEALTH CARE EDUCATION/TRAINING PROGRAM

## 2024-09-16 PROCEDURE — 3048F LDL-C <100 MG/DL: CPT | Performed by: STUDENT IN AN ORGANIZED HEALTH CARE EDUCATION/TRAINING PROGRAM

## 2024-09-16 PROCEDURE — 1157F ADVNC CARE PLAN IN RCRD: CPT | Performed by: STUDENT IN AN ORGANIZED HEALTH CARE EDUCATION/TRAINING PROGRAM

## 2024-09-16 RX ORDER — EMPAGLIFLOZIN 25 MG/1
25 TABLET, FILM COATED ORAL DAILY
Qty: 30 TABLET | Refills: 5 | Status: SHIPPED | OUTPATIENT
Start: 2024-09-16 | End: 2025-03-15

## 2024-09-16 RX ORDER — METFORMIN HYDROCHLORIDE 500 MG/1
1000 TABLET, EXTENDED RELEASE ORAL 2 TIMES DAILY
Qty: 120 TABLET | Refills: 5 | Status: SHIPPED | OUTPATIENT
Start: 2024-09-16 | End: 2025-03-15

## 2024-09-16 ASSESSMENT — PAIN SCALES - GENERAL: PAINLEVEL: 0-NO PAIN

## 2024-09-16 NOTE — PROGRESS NOTES
Cancer History:  DIAGNOSIS: Appendiceal adenocarcinoma    STAGING: pT4a pN1a Mx    CURRENT SITES OF DISEASE:    MOLECULAR/GENOMICS:  MMR-intact    ctDNA Signatera:  8/14/23: (0) negative  9/25/23: (0) negative  11/6/23: (0) negative  1/2/24: (0) negative  2/2/24: (0) negative  6/11/24: (0) negative  9/12/24: pending    TUMOR MARKER:  5/15/23 CEA: <0.5  8/14/23 CEA: <0.5  11/7/23 CEA: <0.5  12/18/23 CEA: 1.3  1/2/24 CEA: 0.5  1/29/24 CEA: <0.5  2/12/24 CEA: <0.5  3/11/24 CEA: <0.5  6/11/23 CEA: <0.5  9/12/24 CEA: <0.5    CURRENT THERAPY:  Surveillance     PREVIOUS THERAPY:  6/20/23: S/p R hemicolectomy  8/29/23--2/13/24: Adjuvant FOLFOX every 2 weeks x 12 cycles; Dose reduction of Oxaliplatin with C4 due to lasting cold sensitivity. Dose reduced Oxaliplatin with C7 due to increasing neuropathy. Cycle 12 given 2/13/24    ONCOLOGIC ISSUES:  Neuropathy  Thrush    PROVIDERS:  CRS: Jazmin Faulkner    HISTORY:   4/2023: presented with R inguinal pain. Thought to have a hernia.  4/17/23: CT abdomen pelvis showed indeterminate masslike lesion at the cecum posteriorly at the expected level of the appendix measuring 3 x 4 cm in dimension.  Also within the left pelvis near the origin of the left inguinal canal there is an indeterminate cavitary mass measuring 2.5 x 2 cm  5/5/23: Underwent colonoscopy.  Report not available.  Pathology of cecal/appendiceal orifice mass biopsy showed poorly differentiated adenocarcinoma with signet ring cell differentiation  5/17/23: CT chest abdomen pelvis showed no evidence of metastatic disease, again noted soft tissue mass near the base of the cecum measuring 4.2 x 2.8 cm, compatible with reported history of appendiceal carcinoma  6/20/23: Underwent right hemicolectomy.  Final pathology showed appendiceal invasive moderately differentiated mucinous adenocarcinoma measuring 5 cm.  Tumor invades the visceral peritoneum.  No lymphovascular or perineural invasion. 1/44 LNs involved.  MMR protein  expression intact    PMH: HTN, Gilbert's disease, HLD, DM  SH: , no tobacco, EtOH, illicits  FH: Sister and nephew with Rodriguez syndrome.      Subjective    Mr. Abarca doing quite well. Eager to discuss port removal. Notes that neuropathy is a about 90% improved. Still endorses white plaque over the tongue, which he says has not improved despite treatment with fluconazole. States that the plaque does not easily scrap off with a tooth brush. Otherwise denies any fever/chills, N/V, chest pain, abdominal pain, rash, or leg swelling. Recently moved to [place].     ROS as above. Remainder of 10-point review of systems elicited and negative.     Objective:  /69 (BP Location: Left arm, Patient Position: Sitting, BP Cuff Size: Adult)   Pulse 72   Temp 36.3 °C (97.3 °F) (Temporal)   Resp 18   Wt 89.7 kg (197 lb 12 oz)   SpO2 100%   BMI 27.97 kg/m²      Daily Weight  09/16/24 : 89.7 kg (197 lb 12 oz)  09/10/24 : 91.2 kg (201 lb)  08/07/24 : 88.9 kg (196 lb)  06/11/24 : 89.7 kg (197 lb 11.2 oz)  06/05/24 : 89.4 kg (197 lb)  05/10/24 : 88.9 kg (196 lb)  04/08/24 : 91.2 kg (201 lb)      Physical Exam  Gen: A&O, NAD  Head: Normocephalic, atraumatic  Eyes: no scleral icterus  ENT: mucous membranes moist, no oropharyngeal lesions. White plaque noted over surface of tongue  Resp: Lungs CTAB  Cardiac: Tachycardiac, regular rhythm, no murmurs appreciated  Abdomen: Soft, nondistended, nontender, +BS  Neuro: CNII-XII grossly intact  Psych: appropriate mood & affect  Skin: warm, dry, no apparent rashes     ECOG Performance Status: 0     Recent labs (9/12/24):  CBC: WBC 5.5, Hgb 13.9, Plt 286  CMP: Na 139, K 4.0, Cl 104, bicarb 26, BUN 19, Cr 1.18, Alb 4.8, Alk phos 35, AST 31, ALT 36  CEA: <0.5    CT C/A/P 9/12/24:  CHEST:  1. No intrathoracic metastatic disease.      ABDOMEN-PELVIS:  1. Status post right hemicolectomy with ileocolonic anastomosis. No  definite locoregional recurrence or metastatic disease in the  abdomen  or pelvis  2. Other chronic and ancillary findings are unchanged.    Assessment/Plan   Mr. 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 Faulkner.     He presents today for discussion of adjuvant chemotherapy. I personally reviewed the images from the recent CT, which show no evidence of metastatic disease.  I reviewed the results of his pathology synoptic report, which show pT4a pN1a, Stage III cancer.     I discussed with him and his wife at length that given the stage of his appendiceal cancer, I recommend systemic chemotherapy.  The options are for FOLFOX versus capecitabine plus oxaliplatin.  Given the high risk nature of the diagnosis, my preference is for 6 months of adjuvant chemotherapy, and FOLFOX is better tolerated for this duration.    I discussed the risks and benefits and side effect profile of FOLFOX chemotherapy, and he agrees to proceed.    After 1st cycle of FOLFOX, found to have extensive PE throughout B/L lungs including saddle embolus of main pulmonary artery. On 9/5/23 he had aspiration thrombectomy and was placed on Apixaban. Discussed admission with Dr. Webb. Ok to proceed with chemotherapy as long as patient is feeling well.     10/9/23: After 2nd cycle, he experienced fatigue, cold sensitivity, nausea and diarrhea.    10/23/23: Having more cold and now light sensitivity. Will decrease dose of Oxaliplatin.     11/20/23: Improved fatigue. Still with taste and cold sensitivity, which is stable.   12/18/23: Improving energy, taste is worse, + thrush  1/2/24: Doing well, improving energy. Still with thrush but taste is improving. Proceed with chemo today.  2/12/24: Will proceed with final cycle of FOLFOX, will get post-tx CT in approx 3 weeks with RTC 4 weeks then proceed with surveillance  3/11/24: I personally reviewed the images from the recent CT, which show no evidence of disease. CEA undetectable. Will proceed with  surveillance.   6/11/24. Doing well. Neuropathy is slowly improving. Continues with white 'coating' on tongue. Continue with surveillance every 3 months with lab work and CT scan every 6 months. Colonoscopy done in May 2024 was unremarkable. Will need it repeated in 3 years.  9/16/24: Doing exceedingly well. CEA remains undetectable, Signatera pending. Repeat CT with no evidence of disease recurrence or metastasis    PLAN:  Stage III appendiceal cancer  - S/p 6mo adjuvant chemotherapy with FOLFOX 8/2023-2/2024  -CEA undetectable, most recent scans (9/12/24) without evidence of disease recurrence or mets  -Order placed for port removal per patient request  - Proceed with surveillance with labs including Signatera & CEA q3mo and CT q6mo for 2 years, then will space out  - Repeat colonoscopy 5/2027  - RTC 3mo with repeat  CBC, CMP, CEA, and Signatera     Oral leukoplakia  -S/p treatment with nystatin and fluconazole for suspected thrush, patient reports some improvement but still has persistent white plaque on surface of tongue  -Will refer to dermatology for possible biopsy    Pulmonary Embolism including saddle embolism  - continue on Apixaban  - has follow up with vascular medicine who will manage apixaban , should be able to discontinue once Mediport is removed    Patient was seen and discussed with Dr. Webb.    Veronica Stout  Internal Medicine, PGY-2

## 2024-09-16 NOTE — TELEPHONE ENCOUNTER
Last seen 9/10/2024 (no follow up scheduled, patient is moving)      Patient will need metformin and jardiance to be sent [facility]

## 2024-09-17 DIAGNOSIS — E11.9 TYPE 2 DIABETES MELLITUS WITHOUT COMPLICATION, WITHOUT LONG-TERM CURRENT USE OF INSULIN (MULTI): ICD-10-CM

## 2024-09-17 RX ORDER — PEN NEEDLE, DIABETIC 30 GX3/16"
1 NEEDLE, DISPOSABLE MISCELLANEOUS 2 TIMES DAILY
Qty: 200 EACH | Refills: 3 | Status: SHIPPED | OUTPATIENT
Start: 2024-09-17 | End: 2025-09-17

## 2024-09-17 NOTE — TELEPHONE ENCOUNTER
9/17/24  8:43 AM  Donnie Calvillo,     My current prescription rx # [id number]  For BD Pen Needle Short [id number]/16 BD     Is not recognized by the Pharmacy.  Do I need a new prescription ?     Best Regards,  Michael

## 2024-09-19 ENCOUNTER — TELEPHONE (OUTPATIENT)
Dept: DERMATOLOGY | Facility: CLINIC | Age: 69
End: 2024-09-19
Payer: MEDICARE

## 2024-09-27 ENCOUNTER — HOSPITAL ENCOUNTER (OUTPATIENT)
Dept: CARDIOLOGY | Facility: HOSPITAL | Age: 69
Setting detail: OUTPATIENT SURGERY
Discharge: HOME | End: 2024-09-27
Payer: MEDICARE

## 2024-09-27 VITALS
SYSTOLIC BLOOD PRESSURE: 141 MMHG | HEART RATE: 77 BPM | TEMPERATURE: 96.6 F | WEIGHT: 197.75 LBS | HEIGHT: 70 IN | DIASTOLIC BLOOD PRESSURE: 69 MMHG | BODY MASS INDEX: 28.31 KG/M2 | RESPIRATION RATE: 16 BRPM | OXYGEN SATURATION: 95 %

## 2024-09-27 DIAGNOSIS — C77.2 CANCER OF APPENDIX METASTATIC TO INTRA-ABDOMINAL LYMPH NODE (MULTI): ICD-10-CM

## 2024-09-27 DIAGNOSIS — C18.1 CANCER OF APPENDIX METASTATIC TO INTRA-ABDOMINAL LYMPH NODE (MULTI): ICD-10-CM

## 2024-09-27 LAB — GLUCOSE BLD MANUAL STRIP-MCNC: 105 MG/DL (ref 74–99)

## 2024-09-27 PROCEDURE — 2500000004 HC RX 250 GENERAL PHARMACY W/ HCPCS (ALT 636 FOR OP/ED): Performed by: RADIOLOGY

## 2024-09-27 PROCEDURE — 2500000004 HC RX 250 GENERAL PHARMACY W/ HCPCS (ALT 636 FOR OP/ED): Performed by: NURSE PRACTITIONER

## 2024-09-27 PROCEDURE — 7100000010 HC PHASE TWO TIME - EACH INCREMENTAL 1 MINUTE

## 2024-09-27 PROCEDURE — 2500000005 HC RX 250 GENERAL PHARMACY W/O HCPCS: Performed by: RADIOLOGY

## 2024-09-27 PROCEDURE — 82947 ASSAY GLUCOSE BLOOD QUANT: CPT

## 2024-09-27 PROCEDURE — 99152 MOD SED SAME PHYS/QHP 5/>YRS: CPT | Performed by: RADIOLOGY

## 2024-09-27 PROCEDURE — 99222 1ST HOSP IP/OBS MODERATE 55: CPT | Performed by: NURSE PRACTITIONER

## 2024-09-27 PROCEDURE — 99152 MOD SED SAME PHYS/QHP 5/>YRS: CPT

## 2024-09-27 PROCEDURE — 7100000009 HC PHASE TWO TIME - INITIAL BASE CHARGE

## 2024-09-27 RX ORDER — FENTANYL CITRATE 50 UG/ML
INJECTION, SOLUTION INTRAMUSCULAR; INTRAVENOUS AS NEEDED
Status: DISCONTINUED | OUTPATIENT
Start: 2024-09-27 | End: 2024-09-27 | Stop reason: HOSPADM

## 2024-09-27 RX ORDER — DEXTROSE 50 % IN WATER (D50W) INTRAVENOUS SYRINGE
12.5
Status: DISCONTINUED | OUTPATIENT
Start: 2024-09-27 | End: 2024-09-28 | Stop reason: HOSPADM

## 2024-09-27 RX ORDER — DEXTROSE 50 % IN WATER (D50W) INTRAVENOUS SYRINGE
25
Status: DISCONTINUED | OUTPATIENT
Start: 2024-09-27 | End: 2024-09-28 | Stop reason: HOSPADM

## 2024-09-27 RX ORDER — SODIUM CHLORIDE 9 MG/ML
100 INJECTION, SOLUTION INTRAVENOUS CONTINUOUS
Status: DISCONTINUED | OUTPATIENT
Start: 2024-09-27 | End: 2024-09-27

## 2024-09-27 RX ORDER — MIDAZOLAM HYDROCHLORIDE 1 MG/ML
INJECTION, SOLUTION INTRAMUSCULAR; INTRAVENOUS AS NEEDED
Status: DISCONTINUED | OUTPATIENT
Start: 2024-09-27 | End: 2024-09-27 | Stop reason: HOSPADM

## 2024-09-27 RX ORDER — LIDOCAINE HYDROCHLORIDE 20 MG/ML
INJECTION, SOLUTION INFILTRATION; PERINEURAL AS NEEDED
Status: DISCONTINUED | OUTPATIENT
Start: 2024-09-27 | End: 2024-09-27 | Stop reason: HOSPADM

## 2024-09-27 ASSESSMENT — ENCOUNTER SYMPTOMS
NEUROLOGICAL NEGATIVE: 1
CONSTITUTIONAL NEGATIVE: 1
EYES NEGATIVE: 1
RESPIRATORY NEGATIVE: 1
PSYCHIATRIC NEGATIVE: 1
CARDIOVASCULAR NEGATIVE: 1
HEMATOLOGIC/LYMPHATIC NEGATIVE: 1
GASTROINTESTINAL NEGATIVE: 1
ENDOCRINE NEGATIVE: 1
ALLERGIC/IMMUNOLOGIC NEGATIVE: 1
MUSCULOSKELETAL NEGATIVE: 1

## 2024-09-27 ASSESSMENT — PAIN - FUNCTIONAL ASSESSMENT: PAIN_FUNCTIONAL_ASSESSMENT: 0-10

## 2024-09-27 ASSESSMENT — PAIN SCALES - GENERAL
PAINLEVEL_OUTOF10: 3
PAINLEVEL_OUTOF10: 0 - NO PAIN
PAINLEVEL_OUTOF10: 0 - NO PAIN

## 2024-09-27 NOTE — H&P
"History Of Present Illness  Bunny Abarca \"Michael\" is a 69 y.o. male presenting with appendiceal cancer, here for Mediport removal with Dr. Mackey. 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    Past Medical History:  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    Type 2 diabetes mellitus (Multi) 03/08/2023        Past Surgical History:  Past Surgical History:   Procedure Laterality Date    BACK SURGERY  03/28/2016    Back Surgery    CATARACT EXTRACTION  02/15/2018    Cataract Surgery    HERNIA REPAIR  03/28/2016    Hernia Repair    SINUS SURGERY  03/28/2016    Sinus Surgery          Social History:   reports that he has never smoked. He has never used smokeless tobacco. He reports that he does not currently use alcohol. He reports that he does not currently use drugs.     Family History:  Family History   Problem Relation Name Age of Onset    Congenital heart disease Mother      Hyperlipidemia Father      Hypertension Father      Colon cancer Sister      Other (Multiple System Atrophy) Sister      Other (Cystic Fibrosis Gene Carrier) Sister      Other (Malignant Neoplasm Of Ovary) Sibling          Allergies:  Allergies   Allergen Reactions    Shellfish Derived Other    Cat Dander Cough and Runny nose    Perfume Itching and Rash        Home Medications:  Current Outpatient Medications   Medication Instructions    Accu-Chek Fastclix Lancet Drum misc test blood sugar TWICE DAILY    apixaban (Eliquis) 5 mg tablet TAKE 1 TABLET BY MOUTH TWO TIMES A DAY    atorvastatin (LIPITOR) 40 mg, oral, " "Nightly    cyanocobalamin (VITAMIN B-12) 250 mcg, oral, Daily    doxazosin (CARDURA) 2 mg, oral, Nightly    fenofibrate (TRICOR) 145 mg, oral, Daily, WITH FOOD    insulin asp prt-insulin aspart (NovoLOG Mix 70-30FlexPen U-100) 100 unit/mL (70-30) injection 38 Units, subcutaneous, 2 times daily    Jardiance 25 mg, oral, Daily    metFORMIN XR (GLUCOPHAGE-XR) 1,000 mg, oral, 2 times daily    olmesartan (BENICAR) 40 mg, oral, Daily    pen needle, diabetic (BD Ultra-Fine Short Pen Needle) 31 gauge x 5/16\" needle 1 each, subcutaneous, 2 times daily       Inpatient Medications:  Scheduled medications   Medication Dose Route Frequency     PRN medications   Medication    dextrose    dextrose    glucagon    glucagon     Continuous Medications   Medication Dose Last Rate    sodium chloride 0.9%  100 mL/hr           Review of Systems   Constitutional: Negative.    HENT: Negative.     Eyes: Negative.    Respiratory: Negative.     Cardiovascular: Negative.    Gastrointestinal: Negative.    Endocrine: Negative.    Genitourinary: Negative.    Musculoskeletal: Negative.    Skin: Negative.    Allergic/Immunologic: Negative.    Neurological: Negative.    Hematological: Negative.    Psychiatric/Behavioral: Negative.            Physical Exam    General:  Patient is awake, alert, and oriented.  Patient is in no acute distress.  HEENT:  Pupils equal and reactive.  Normocephalic.  Moist mucosa.    Neck:  No JVD.    Cardiovascular:  Regular rate and rhythm.  Normal S1 and S2. No murmurs/rubs/gallops. Radial pulses 2+.   Pulmonary:  Clear to auscultation bilaterally.  Abdomen:  Soft. Non-tender.   Non-distended.  Positive bowel sounds.  Lower Extremities:  Pedal pulses 2+ No LE edema.  Neurologic:  Cranial nerves II-XII grossly intact.   No focal deficit.   Skin: Skin warm and dry, no lesions. Normal skin turgor.   Psychiatric: Normal affect.     Sedation Plan    ASA 3     Mallampati class: II.    Risks, benefits, and alternatives discussed " "with patient.         NPO since 000    Last Recorded Vitals  Pulse 79, temperature 35.9 °C (96.6 °F), temperature source Tympanic, resp. rate 15, height 1.778 m (5' 10\"), weight 89.7 kg (197 lb 12 oz), SpO2 95%.         Vitals from the Past 24 Hours  Heart Rate:  [79]   Temp:  [35.9 °C (96.6 °F)]   Resp:  [15]   Height:  [177.8 cm (5' 10\")]   Weight:  [89.7 kg (197 lb 12 oz)]   SpO2:  [95 %]          Relevant Results    Labs    CBC:   Recent Labs     09/12/24  0813 06/11/24  0948 05/13/24  0620 05/12/24  0559 05/11/24  0610 05/10/24  0528   WBC 5.5 5.0 3.1* 2.1* 2.0* 5.3   HGB 13.9 12.9* 11.4* 11.4* 11.8* 14.0   HCT 42.7 39.8* 35.5* 36.6* 36.8* 41.8    264 226 236 247 308   MCV 87 87 89 93 90 86     BMP/CMP:   Recent Labs     09/12/24  0813 08/01/24  0739 06/11/24  0948 05/24/24  0810 05/13/24  0620 05/12/24  0559 05/11/24  0610 05/10/24  0528 03/11/24  1501    138 139 139 135* 136   < > 136 139   K 4.0 4.1 4.1 4.1 4.1 4.3   < > 4.3 3.8    104 104 106 106 105   < > 99 104   BUN 19 20 23 19 16 20   < > 27* 20   CREATININE 1.18 1.24 1.12 1.06 0.91 1.01   < > 1.10 1.17   CO2 26 26 25 27 22 24   < > 23 25   CALCIUM 9.6 9.4 9.5 9.2 8.3* 7.4*   < > 10.4* 9.2   PROT 7.2 6.8 7.4 6.7  --   --   --  7.6 7.1   BILITOT 0.9 0.7 0.7 0.6  --   --   --  1.1 0.7   ALKPHOS 35 34 34 36  --   --   --  41 51   ALT 36 38 26 22  --   --   --  32 21   AST 31 32 23 21  --   --   --  30 27   GLUCOSE 71* 103* 140* 88 170* 136*   < > 198* 228*    < > = values in this interval not displayed.      Lipid Panel:   Recent Labs     08/01/24  0739 05/24/24  0810 12/04/23  0900 12/14/22  1036 08/11/22  1056 12/08/21  1212 04/08/21  0803 12/07/20  1024 01/27/20  1042 04/15/19  0911 03/21/18  1011   CHOL 100 89 136 148 151 148 152 150 142 169 169   HDL 41.8 42.7 56.3 42.8 39.1* 40.3 39.8* 40.0 36.4* 43.7 46.4   CHHDL 2.4 2.1 2.4 3.5 3.9 3.7 3.8 3.8 3.9 3.9 3.6   VLDL 16 22 37 27 43* 30 27 47* 72* 29 38   TRIG 82 109 183* 137 213* 152* " "133 234* 360* 146 190*   NHDL 58 46 80  --  112  --   --  110 106  --   --      Cardiac       No lab exists for component: \"CK\", \"CKMBP\"   Hemoglobin A1C:   Recent Labs     08/01/24  0739 05/24/24  0810 05/10/24  1722 04/04/24  1504 12/04/23  0900 05/12/23  0912 12/14/22  1036 08/11/22  1055 04/13/22  0957 04/08/21  0803 12/07/20  1024 05/26/20  0819   HGBA1C 7.6* 7.7* 7.3* 7.4* 8.2* 7.8* 8.2* 8.3* 8.8* 9.1 9.4 9.8     TSH/ Free T4:   Recent Labs     12/07/20  1023 01/27/20  1042 04/15/19  0911   TSH 2.17 3.07 1.59     Iron:   Recent Labs     09/04/23  2150   BNP 65     Coag:     ABO: No results found for: \"ABO\"    Past Cardiology Tests (Last 3 Years):    EKG:  Recent Labs     05/11/24  0445 11/29/23  0932 09/06/23  1250   ATRRATE 94 95 117   VENTRATE 121 95 117   PRINT 160 160 156   QRSDUR 90 84 94   QTCFRED 419 407 505   QTCCALCB 471 439 563     Encounter Date: 05/10/24   Electrocardiogram, 12-lead PRN ACS symptoms   Result Value    Ventricular Rate 121    Atrial Rate 94    DE Interval 160    QRS Duration 90    QT Interval 332    QTC Calculation(Bazett) 471    P Axis 46    R Axis 39    T Axis 68    QRS Count 20    Q Onset 216    P Onset 136    P Offset 188    T Offset 382    QTC Fredericia 419    Narrative    Sinus rhythm with Premature supraventricular complexes with frequent and consecutive Premature ventricular complexes  Abnormal ECG  When compared with ECG of 29-NOV-2023 09:48,  Premature supraventricular complexes are now Present  Nonspecific T wave abnormality has replaced inverted T waves in Inferior leads  Confirmed by Bebeto Khan ([id number]) on 5/15/2024 2:43:24 PM     Echo:  Echocardiogram:   Transthoracic Echo (TTE) Complete 10/04/2023    [facility], [address] [place] [address]  Tel [phone] and Fax [phone]    TRANSTHORACIC ECHOCARDIOGRAM REPORT      Patient Name:      TORREY HITCHCOCK      [place] Physician:    [id number] Sesar Roman MD  Study Date:        " 10/4/2023           Ordering Provider:    [id number] ANETTE BAKER  MRN/PID:           [id number]            Fellow:  Accession#:        [id number]        Nurse:  Date of Birth/Age: 1955 / 68      Sonographer:          Mary Simmons RDCS  years  Gender:            M                   Additional Staff:  Height:            177.80 cm           Admit Date:  Weight:            89.81 kg            Admission Status:     Outpatient  BSA:               2.08 m2             Encounter#:           [id number]  Department Location:  [facility]  Blood Pressure: 116 /79 mmHg    Study Type:    TRANSTHORACIC ECHO (TTE) COMPLETE  Diagnosis/ICD: Other pulmonary embolism without acute cor pulmonale-I26.99  Indication:    Pulmonary embolism    Patient History:  Diabetes:          Yes  Pertinent History: A-Fib, PE, HTN, Hyperlipidemia, Chest Pain and Dyspnea.  NAJMA,Adenocarcinoma,Chemotherapy.    Study Detail: The following Echo studies were performed: 2D, M-Mode, Doppler and  color flow.      PHYSICIAN INTERPRETATION:  Left Ventricle: The left ventricular systolic function is normal, with an estimated ejection fraction of 60-65%. There are no regional wall motion abnormalities. The left ventricular cavity size is normal. Spectral Doppler shows an impaired relaxation pattern of left ventricular diastolic filling. There are normal left ventricular filling pressures.  Left Atrium: The left atrium is normal in size.  Right Ventricle: The right ventricle is normal in size. There is normal right ventricular global systolic function.  Right Atrium: The right atrium is normal in size.  Aortic Valve: The aortic valve is trileaflet. There is trace to mild aortic valve regurgitation. The peak instantaneous gradient of the aortic valve is 7.0 mmHg.  Mitral Valve: The mitral valve is normal in structure. There is no evidence of mitral valve regurgitation.  Tricuspid Valve: The tricuspid valve is structurally normal. There is trace tricuspid  regurgitation. The right ventricular systolic pressure is unable to be estimated.  Pulmonic Valve: The pulmonic valve is structurally normal. There is no indication of pulmonic valve regurgitation.  Pericardium: There is a trivial pericardial effusion. There is an anterior clear space.  Aorta: The aortic root is normal.  Systemic Veins: The inferior vena cava appears to be of normal size. There is IVC inspiratory collapse greater than 50%.  In comparison to the previous echocardiogram(s): Compared with study from 9/5/2023, RV size is decreased (was reported as severe), and RV function has improved (was severely decreased) and RA size has decreased. RV systolic pressures were moderately increased (~ 55 mmHg).      CONCLUSIONS:  1. Left ventricular systolic function is normal with a 60-65% estimated ejection fraction.  2. Poorly visualized anatomical structures due to suboptimal image quality.  3. Spectral Doppler shows an impaired relaxation pattern of left ventricular diastolic filling.    QUANTITATIVE DATA SUMMARY:  2D MEASUREMENTS:  Normal Ranges:  LAs:           3.99 cm   (2.7-4.0cm)  IVSd:          0.98 cm   (0.6-1.1cm)  LVPWd:         1.10 cm   (0.6-1.1cm)  LVIDd:         4.05 cm   (3.9-5.9cm)  LVIDs:         2.48 cm  LV Mass Index: 65.9 g/m2  LV % FS        38.8 %    LA VOLUME:  Normal Ranges:  LA Vol A4C:        56.2 ml    (22+/-6mL/m2)  LA Vol A2C:        43.5 ml  LA Vol BP:         50.0 ml  LA Vol Index A4C:  27.0 ml/m2  LA Vol Index A2C:  20.9 ml/m2  LA Vol Index BP:   24.0 ml/m2  LA Area A4C:       18.0 cm2  LA Area A2C:       16.0 cm2  LA Major Axis A4C: 4.9 cm  LA Major Axis A2C: 5.0 cm  LA Volume Index:   24.0 ml/m2  LA Vol A4C:        50.0 ml  LA Vol A2C:        39.0 ml    RA VOLUME BY A/L METHOD:  Normal Ranges:  RA Vol A4C:        29.9 ml    (8.3-19.5ml)  RA Vol Index A4C:  14.4 ml/m2  RA Area A4C:       13.0 cm2  RA Major Axis A4C: 4.8 cm    M-MODE MEASUREMENTS:  Normal Ranges:  Ao Root: 3.10 cm  "(2.0-3.7cm)  LAs:     4.00 cm (2.7-4.0cm)    AORTA MEASUREMENTS:  Normal Ranges:  Asc Ao, d: 3.10 cm (2.1-3.4cm)  Ao Arch:   3.10 cm (2.0-3.6cm)    LV SYSTOLIC FUNCTION BY 2D PLANIMETRY (MOD):  Normal Ranges:  EF-A4C View: 68.2 % (>=55%)  EF-A2C View: 63.7 %  EF-Biplane:  66.3 %    LV DIASTOLIC FUNCTION:  Normal Ranges:  MV Peak E:        0.60 m/s    (0.7-1.2 m/s)  MV Peak A:        0.86 m/s    (0.42-0.7 m/s)  E/A Ratio:        0.69        (1.0-2.2)  MV e'             0.08 m/s    (>8.0)  MV lateral e'     0.10 m/s  MV medial e'      0.07 m/s  MV A Dur:         131.94 msec  E/e' Ratio:       7.01        (<8.0)  PulmV Sys Tunde:    78.66 cm/s  PulmV Gage Tunde:   28.77 cm/s  PulmV S/D Tunde:    2.73  PulmV A Revs Tunde: 61.51 cm/s  PulmV A Revs Dur: 169.36 msec    MITRAL VALVE:  Normal Ranges:  MV DT: 260 msec (150-240msec)    AORTIC VALVE:  Normal Ranges:  AoV Vmax:      1.32 m/s (<=1.7m/s)  AoV Peak P.0 mmHg (<20mmHg)  LVOT Max Tunde:  0.99 m/s (<=1.1m/s)  LVOT VTI:      17.00 cm  LVOT Diameter: 2.22 cm  (1.8-2.4cm)  AoV Area,Vmax: 2.89 cm2 (2.5-4.5cm2)      RIGHT VENTRICLE:  RV Basal 3.50 cm  RV Mid   2.70 cm  RV Major 6.7 cm  TAPSE:   19.0 mm  RV s'    0.14 m/s    PULMONIC VALVE:  Normal Ranges:  PV Accel Time: 63 msec  (>120ms)  PV Max Tunde:    1.1 m/s  (0.6-0.9m/s)  PV Max P.5 mmHg    Pulmonary Veins:  PulmV A Revs Dur: 169.36 msec  PulmV A Revs Tunde: 61.51 cm/s  PulmV Gage Tunde:   28.77 cm/s  PulmV S/D Tunde:    2.73  PulmV Sys Tunde:    78.66 cm/s      [id number] Sesar Roman MD  Electronically signed on 10/4/2023 at 3:26:39 PM        ** Final **    Ejection Fractions:  No results found for: \"EF\"  Cath:  Coronary Angiography:   Adult Cath     Narrative  [facility], Cath Lab, [address] [place] [address]    Cardiovascular Catheterization Report    Patient Name:     TORREY HITCHCOCK Performing Physician:  Gulshan Preston MD  Study Date:       2023       Verifying Physician:   Gulshan Preston " MD  MRN/PID:          [id number]       Cardiologist/Co-scrub:  Accession/Order#: [id number]      Fellow:                [id number] Britni Krueger MD  YOB: 1955      Fellow:  Gender:           M              Referring Physician:   Regan Guido DO      Study: Pulmonary Intervention      Indications:  TORREY HITCHCOCK is a 68 year old male who presents with intermediate-high risk saddle pulmonary embolism. Appendiceal ca s/p right hemicolectomy (6/20/23) currently on chemotherapy (FOLFOX), atrial fibrillation on flecainide (not on anticoagulation). Presented with DVT sxs x1 week LLE. Syncopal episode on day of presentation found to have acute saddle PE.    Procedure Description:  After infiltration of local anesthetic, the right femoral vein was identified with two dimensional ultrasound. Under direct ultrasound visualization, the right femoral vein was cannulated with a percutaneous technique. A 16F, upsized to 24F sheath was placed in the vein. Post-procedure, the venous sheath was left intact and sutured in place.    Routine access obtained with US guidance. L CFV known to have thrombus based on preceding DUplex. Predilated and 16F DrySeal advanced. Heparin given at 50 units/kg to maintain ACT >250.    Versacore wire was advanced to main pulmonary artery using JR4 diagnostic catheter. Pre-RA and PA pressures and PA O2 obtained.    Pigtail catheter was advanced to left pulmonary artery selective pulmonary angiogram was performed and then pigtail catheter was advanced to right pulmonary artery and selective angiogram was performed to guide intervention.    Pulmonary Embolism:    We predilated with a 12F dilator, and placed a 16F sheath into the vein. Post-procedure, the venous sheath was pulled and pressure was applied to the site via figure-of-8.    Prior to access, we imaged the bilateral common femoral veins to evaluate for thrombus. Thrombus noted in the left common femoral vein. We selectively  engaged the pulmonary artery using a JR4 diagnostic catheter. Pre-thrombectomy RA and PA pressures were obtained.    Following angiogram, exchanged back for Amplatz super stiff wire using JR4 guidance. Viper mechanical and aspirational thrombectomy device was advanced sequentially to the RLL branches to perform extirpation of material. Multiple angiograms were performed with selective injection via Viper device to allow visualization of the thrombus. We reduced the thrombotic burden in the primary RLL but subsegmental thrombi remained.    We turned our attention to the L sided thrombus. Using JR4 mother-daughtered in the Viper device, we switched to the LLL using Versacore wire. Multiple attempts were made to perform thrombectomy of the LLL posterior basal thrombus. Thrombectomy also performed in the L main PA.    PA angiogram performed at this point using pigtail catheter. We again noted improvement in the right lower lobe thrombus but residual subsegmental thrombi on the right (including truncus). Significant thrombus was still noted of the left PA.    Repeat PA saturation decreased from 52% pre-thrombectomy to 46% post-thrombectomy. The PA pressures remained largely unchanged pre and post.    Given the amount of blood loss and significant difficulty with aspirating the left-sided thrombus, as well as ongoing compromise of the cardiac index and persistent tachycardia, we elected to switch to Inari thrombectomy device. Sheath was upsized to 24F Intri. We re-engaged into the left lower lobe posterior basal branch with JR4 and Versacore. Advanced curved T20 along with T24. Thrombectomy performed with only scant white fibrin retrieved. Attempted dwell with FT2 to improve thrombectomy mechanism. Additional angiogram via curved T20 showed residual thrombus involving the anterior branches. Additional attempts made at aspiration of the anterior segment of the LPA without significant output - suggestive of a more chronic  nature in this territory.    Given the amount of blood loss and contrast use (GFR 51 on morning labs, baseline in the 70s), we decided to defer any further attempts at thrombectomy. Hemostasis achieved with figure-of-8. Heparin drip resumed. Patient transferred back to CICU in stable fashion.    Hemo Personnel:  +---------------------+------------+  Name                 Duty          +---------------------+------------+  Sulaiman Preston MD              PROC MD 1  +---------------------+------------+  Brie Ruiz PROC SCRUB 1  +---------------------+------------+  Guillermo Posada RN PROC CIRC 1  +---------------------+------------+  Brie Pappas RN     PROC NURSE 1  +---------------------+------------+      Sedation Time:  +------------------------+----------------------------------------+  Sedation Start/End TimesTime                                      +------------------------+----------------------------------------+  Start                   9/5/2023 14:34:09                         +------------------------+----------------------------------------+  Drugs                   Fentanyl 25 mcg IV per physician for sed  +------------------------+----------------------------------------+  End                     9/5/2023 17:01:10                         +------------------------+----------------------------------------+          Fluoroscopy Time:  +--------------------------+---------+  X-Ray Summary Fluoro Time:45.80 min  +--------------------------+---------+      +----------+---------+  Contrast: Dose:      +----------+---------+  Visipaque:240.00 ml  +----------+---------+      Hemodynamic Pressures:    +----+---------+----------+------------+-------------+---------+-------+-------+  SiteDate TimePhase Name  Systolic    Diastolic  Mean mmHgA-Wave V-Wave                              mmHg        mmHg               mmHg   mmHg     +----+---------+----------+------------+-------------+---------+-------+-------+    RA 9/5/2023      Rest                                17     20     18        2:52:53                                                                       PM                                                            +----+---------+----------+------------+-------------+---------+-------+-------+    PA 9/5/2023      Rest          62           16       34                      2:54:51                                                                       PM                                                            +----+---------+----------+------------+-------------+---------+-------+-------+    PA 9/5/2023      Rest          66           21       40                      3:12:57                                                                       PM                                                            +----+---------+----------+------------+-------------+---------+-------+-------+    PA 9/5/2023      Rest          56           21       36                      3:15:01                                                                       PM                                                            +----+---------+----------+------------+-------------+---------+-------+-------+    PA 9/5/2023      Rest          42           27       34                      3:17:50                                                                       PM                                                            +----+---------+----------+------------+-------------+---------+-------+-------+    PA 9/5/2023      Rest          40           22       30                      3:18:13                                                                       PM                                                             +----+---------+----------+------------+-------------+---------+-------+-------+    PA 9/5/2023      Rest          55           11       32                      3:28:53                                                                       PM                                                            +----+---------+----------+------------+-------------+---------+-------+-------+    PA 9/5/2023      Rest          55           20       33                      3:33:45                                                                       PM                                                            +----+---------+----------+------------+-------------+---------+-------+-------+    PA 9/5/2023      Rest          54           19       33                      3:49:40                                                                       PM                                                            +----+---------+----------+------------+-------------+---------+-------+-------+    PA 9/5/2023      Rest          58           18       33                      3:50:09                                                                       PM                                                            +----+---------+----------+------------+-------------+---------+-------+-------+    PA 9/5/2023      Rest          45           20       29                      4:03:32                                                                       PM                                                            +----+---------+----------+------------+-------------+---------+-------+-------+    PA 9/5/2023      Rest          59           18       34                      4:17:41                                                                       PM                                                             +----+---------+----------+------------+-------------+---------+-------+-------+    PA 9/5/2023      Rest          59           23       37                      4:26:38                                                                       PM                                                            +----+---------+----------+------------+-------------+---------+-------+-------+        Oxygen Saturation %:  +-----------+----------+------------+  Sample SiteO2 Sat (%)HB (g/100ml)  +-----------+----------+------------+           FA        95        12.4  +-----------+----------+------------+           PA        52        12.4  +-----------+----------+------------+           FA        95        12.4  +-----------+----------+------------+           PA        52        12.4  +-----------+----------+------------+           FA        95        12.4  +-----------+----------+------------+           PA        46        12.4  +-----------+----------+------------+           FA        89        12.4  +-----------+----------+------------+           PA        47        12.4  +-----------+----------+------------+      SYS ART                  12.4  +-----------+----------+------------+      SYS BIBIANA                  12.4  +-----------+----------+------------+      PUL ART                  12.4  +-----------+----------+------------+      PUL BIBIANA                  12.4  +-----------+----------+------------+      Cardiac Outputs:  +---------------+------------------+-------+  ALETA CO (l/min)ALETA CI (l/min/m2)ALETA SV  +---------------+------------------+-------+              3.9               1.8   33.2  +---------------+------------------+-------+              3.2               1.5         +---------------+------------------+-------+      Vascular Resistance Calculated Values (Wood  Units):  +-----+---+----+  PhaseTPRTPRI  +-----+---+----+  0    [phone]  +-----+---+----+      Complications:  No in-lab complications observed.    Cardiac Cath Transition of Care Summary:  Post Procedure Diagnosis: Intermediate-high risk PE - s/p mechanical  thrombectomy (Viper catheter, ENGULF protocol).  Blood Loss:               Estimated blood loss during the procedure was 500 mls.  Specimens Removed:        Number of specimen(s) removed: subacute and acute  thrombus; some fibrin suggestive of chronic component.    ____________________________________________________________________________________  CONCLUSIONS:  1. Intermediate-high risk PE.  2. Mechanical aspirational thrombectomy with extirpation of material as noted.  3. Patient was enrolled in the ENGULF study.  4. Anti-coagulation strategy per Vascular Medicine colleagues.    ____________________________________________________________________________________  CPT Codes:  Angiography, pulmonary, bilateral [id number]&I-97707; Selective catheter placement, left or right pulmonary artery uni-46095; Selective catheter placement, left or right pulmonary artery bilat-63304.50; Selective catheter placement, segmental or subsegmental pulmonary artery uni-27747; Selective catheter placement, segmental or subsegmental pulmonary artery bilat-83155.50; Extirpation of material, unilateral PA-18853; Extirpation of material, contralateral PA-21211.50; Extirpation of material, unilateral secondary vessel Subsegment 1-45677; Extirpation of material, contralateral secondary vessel-07104.50; Moderate Sedation Services initial 15 minutes patient >5 years-52063; Moderate Sedation Services 1st additional 15 minutes patient >5 years-99153; Moderate Sedation Services 2nd additional 15 minutes patient >5 years-99153; Moderate Sedation Services 3rd additional 15 minutes patient >5 years-99153; Moderate Sedation Services 4th additional 15 minutes patient >5 years-99153; Moderate  Sedation  Services 5th additional 15 minutes patient >5 years-48847; Moderate Sedation Services 6th additional 15 minutes patient >5 years-18114; Moderate Sedation Services 7th additional 15 minutes patient >5 years-90453; Moderate Sedation Services 8th additional 15 minutes patient >5 years-91997    ICD 10 Codes:  I26.02-Saddle embolus of pulmonary artery with acute cor pulmonale    [id number] Sulaiman Preston MD  Performing Physician  Electronically signed by [id number] Sulaiman Preston MD on 9/8/2023 at 7:11:14 AM      cc Report to: [id number] Regan Guido DO              Right Heart Cath: No results found for this or any previous visit from the past 1800 days.    Stress Test:  Nuclear:No results found for this or any previous visit from the past 1800 days.    Metabolic Stress: No results found for this or any previous visit from the past 1800 days.    Cardiac Imaging:  Cardiac Scoring: No results found for this or any previous visit from the past 1800 days.    Cardiac MRI: No results found for this or any previous visit from the past 1800 days.         Assessment/Plan  Assessment/Plan   Active Problems:  There are no active Hospital Problems.        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 Mackey        NP discussed with Dr. Mackey regarding plan of care/ discharge plan      I spent 30 minutes in the professional and overall care of this patient.      Leta Eubanks, MARILY-CNP

## 2024-09-27 NOTE — POST-PROCEDURE NOTE
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     Anesthesia:  Local  1 mg Versed, 50 mcg Fentanyl    Complications: None    Estimated Blood Loss: minimal          See detailed result report with images in PACS.    The patient tolerated the procedure well without incident or complication and is in stable condition.

## 2024-09-27 NOTE — DISCHARGE INSTRUCTIONS
Central Venous Catheter Removal- Patient and Family Education    A trained health care provider, as ordered by your doctor has removed your Central Venous  Catheter. The following instructions will provide a guideline to decrease the risk of  complications while caring for your removal site as it heals.    Central Venous Catheter Removed:Friday, 9/27/24   ____X__ Port   ______ Pheresis Catheter   ______ Dialysis Catheter   ______ Tunneled Central Catheter   ______ PICC Line    Activity:  ? No exercising, lifting heavy objects, or strenuous activities for the next seven days.    Pain Management:  ? Expect some minor discomfort at the surgical site for the next few days.  ? You may use over-the-counter medications such as Acetaminophen (Tylenol) or  Ibuprofen (Advil or Motrin) for minor discomfort, unless restricted for some other  reasons.    Care of your incision after device removal:  ? Keep dressing dry for 2 days  ? If you have sterile paper strips over the incision allow them to fall off on their own.  ? You may shower 48 hours after the dressing is removed but do not soak the incision for 2 weeks  (no swimming, bathing or hot tubs until completely healed).    Medications:  ? Resume taking all your previous medications.  ? If you are taking blood-thinning medication, please follow special instructions by your  doctor.    Call your Doctor right away, if you have any of these signs:  ? Fever higher than a temperature of 101.5°F or chills.  ? Swelling or severe pain on the side the catheter was removed.  ? Increased bleeding, redness or drainage at or around the removal site.     How to Reach your Doctor:    Call [phone] with problems or questions.     This info is a general resource. It is not meant to replace your health care provider’s advice.  Ask your doctor or health care team any questions. Always follow their instructions.

## 2024-09-30 ENCOUNTER — OFFICE VISIT (OUTPATIENT)
Dept: CARDIOLOGY | Facility: CLINIC | Age: 69
End: 2024-09-30
Payer: MEDICARE

## 2024-09-30 VITALS
HEART RATE: 84 BPM | SYSTOLIC BLOOD PRESSURE: 142 MMHG | WEIGHT: 198.5 LBS | HEIGHT: 70 IN | OXYGEN SATURATION: 94 % | DIASTOLIC BLOOD PRESSURE: 85 MMHG | BODY MASS INDEX: 28.42 KG/M2

## 2024-09-30 DIAGNOSIS — I26.99 PULMONARY EMBOLISM AND INFARCTION (MULTI): Primary | ICD-10-CM

## 2024-09-30 PROCEDURE — 3051F HG A1C>EQUAL 7.0%<8.0%: CPT | Performed by: INTERNAL MEDICINE

## 2024-09-30 PROCEDURE — 3062F POS MACROALBUMINURIA REV: CPT | Performed by: INTERNAL MEDICINE

## 2024-09-30 PROCEDURE — 99213 OFFICE O/P EST LOW 20 MIN: CPT | Performed by: INTERNAL MEDICINE

## 2024-09-30 PROCEDURE — 3048F LDL-C <100 MG/DL: CPT | Performed by: INTERNAL MEDICINE

## 2024-09-30 PROCEDURE — 1126F AMNT PAIN NOTED NONE PRSNT: CPT | Performed by: INTERNAL MEDICINE

## 2024-09-30 PROCEDURE — 3008F BODY MASS INDEX DOCD: CPT | Performed by: INTERNAL MEDICINE

## 2024-09-30 PROCEDURE — 1157F ADVNC CARE PLAN IN RCRD: CPT | Performed by: INTERNAL MEDICINE

## 2024-09-30 PROCEDURE — 4010F ACE/ARB THERAPY RXD/TAKEN: CPT | Performed by: INTERNAL MEDICINE

## 2024-09-30 PROCEDURE — 1036F TOBACCO NON-USER: CPT | Performed by: INTERNAL MEDICINE

## 2024-09-30 PROCEDURE — 3077F SYST BP >= 140 MM HG: CPT | Performed by: INTERNAL MEDICINE

## 2024-09-30 PROCEDURE — 1160F RVW MEDS BY RX/DR IN RCRD: CPT | Performed by: INTERNAL MEDICINE

## 2024-09-30 PROCEDURE — 3079F DIAST BP 80-89 MM HG: CPT | Performed by: INTERNAL MEDICINE

## 2024-09-30 PROCEDURE — 1159F MED LIST DOCD IN RCRD: CPT | Performed by: INTERNAL MEDICINE

## 2024-09-30 ASSESSMENT — PAIN SCALES - GENERAL: PAINLEVEL: 0-NO PAIN

## 2024-09-30 ASSESSMENT — ENCOUNTER SYMPTOMS
DEPRESSION: 0
LOSS OF SENSATION IN FEET: 0
OCCASIONAL FEELINGS OF UNSTEADINESS: 0

## 2024-09-30 NOTE — PROGRESS NOTES
No chief complaint on file.      History of Present Illness:  Bunny Abarca is a/an 69 y.o. man who follows up for cancer-associated PE and left common femoral, femoral, popliteal, and calf DVT 9/5/2023. He underwent mechanical thrombectomy with evidence of some chronicity of thrombus. He is on anticoagulation with apixaban. Cancer is appendiceal. He is finished with adjuvant chemo and doing well, with no evidence of disease. Port came out last Friday.    History of paroxysmal atrial fibrillation. Dr. Ibanez recommends indefinite anticoagulation.     He denies bleeding including epistaxis, gingival bleeding, hemoptysis, hematemesis, hematochezia, melena, and hematuria.         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    Type 2 diabetes mellitus (Multi) 03/08/2023     Past Surgical History:   Procedure Laterality Date    BACK SURGERY  03/28/2016    Back Surgery    CATARACT EXTRACTION  02/15/2018    Cataract Surgery    HERNIA REPAIR  03/28/2016    Hernia Repair    SINUS SURGERY  03/28/2016    Sinus Surgery     Social History     Tobacco Use    Smoking status: Never    Smokeless tobacco: Never   Vaping Use    Vaping status: Never Used   Substance Use Topics    Alcohol use: Not Currently    Drug use: Not Currently     Family History   Problem Relation Name Age of Onset    Congenital heart disease Mother      Hyperlipidemia Father      Hypertension Father      Colon cancer Sister      Other (Multiple System Atrophy) Sister      Other (Cystic Fibrosis Gene Carrier) Sister      Other (Malignant Neoplasm Of Ovary) Sibling       Current Outpatient Medications   Medication Sig Dispense Refill    Accu-Chek Fastclix  "Lancet Drum misc test blood sugar TWICE DAILY 200 each 3    apixaban (Eliquis) 5 mg tablet TAKE 1 TABLET BY MOUTH TWO TIMES A  tablet 1    atorvastatin (Lipitor) 40 mg tablet Take 1 tablet (40 mg) by mouth once daily at bedtime. 90 tablet 1    doxazosin (Cardura) 2 mg tablet Take 1 tablet (2 mg) by mouth once daily at bedtime. 90 tablet 1    fenofibrate (Tricor) 145 mg tablet Take 1 tablet (145 mg) by mouth once daily. WITH FOOD 90 tablet 1    insulin asp prt-insulin aspart (NovoLOG Mix 70-30FlexPen U-100) 100 unit/mL (70-30) injection Inject 38 Units under the skin 2 times a day. 15 each 5    Jardiance 25 mg Take 1 tablet (25 mg) by mouth once daily. 30 tablet 5    metFORMIN  mg 24 hr tablet Take 2 tablets (1,000 mg) by mouth 2 times a day. 120 tablet 5    olmesartan (BENIcar) 40 mg tablet Take 1 tablet (40 mg) by mouth once daily. 90 tablet 1    pen needle, diabetic (BD Ultra-Fine Short Pen Needle) 31 gauge x 5/16\" needle Inject 1 each under the skin 2 times a day. 200 each 3    cyanocobalamin (Vitamin B-12) 250 mcg tablet Take 1 tablet (250 mcg) by mouth once daily.       No current facility-administered medications for this visit.       Physical Examination:  Blood pressure 142/85, pulse 84, height 1.778 m (5' 10\"), weight 90 kg (198 lb 8 oz), SpO2 94%.  No distress  No JVD or carotid bruits  Lungs clear bilaterally  Heart regular and without murmurs  Abdomen soft and non-tender  No leg swelling  Pulses intact    Pertinent Labs:    Pertinent Imaging:    Diagnoses and all orders for this visit:  Pulmonary embolism and infarction (Multi) (Primary)  We discussed that since his port is out and his cancer is in remission, he does not have a VTE indication for anticoagulation anymore. But he has paroxysmal AF and recommendation from Dr. Ibanez is indefinite anticoagulation. Will [facility] from [facility]; he can continue to follow with Dr. Ibanez.    Beatriz Sanchez MD, MS        "

## 2024-10-03 PROBLEM — E78.1 HYPERTRIGLYCERIDEMIA: Status: RESOLVED | Noted: 2023-03-08 | Resolved: 2024-10-03

## 2024-10-03 PROBLEM — I12.9 HYPERTENSIVE CHRONIC KIDNEY DISEASE WITH STAGE 1 THROUGH STAGE 4 CHRONIC KIDNEY DISEASE, OR UNSPECIFIED CHRONIC KIDNEY DISEASE: Status: RESOLVED | Noted: 2022-12-22 | Resolved: 2024-10-03

## 2024-10-03 PROBLEM — I25.10 ATHEROSCLEROTIC HEART DISEASE OF NATIVE CORONARY ARTERY WITHOUT ANGINA PECTORIS: Status: RESOLVED | Noted: 2022-12-22 | Resolved: 2024-10-03

## 2024-10-03 PROBLEM — R55 SYNCOPE AND COLLAPSE: Status: RESOLVED | Noted: 2023-09-05 | Resolved: 2024-10-03

## 2024-10-03 PROBLEM — B37.0 CANDIDIASIS OF MOUTH AND ESOPHAGUS (MULTI): Status: RESOLVED | Noted: 2023-12-18 | Resolved: 2024-10-03

## 2024-10-03 PROBLEM — I48.20 CHRONIC ATRIAL FIBRILLATION, UNSPECIFIED (MULTI): Status: RESOLVED | Noted: 2023-06-22 | Resolved: 2024-10-03

## 2024-10-03 PROBLEM — Z51.81 ANTICOAGULATION MANAGEMENT ENCOUNTER: Status: RESOLVED | Noted: 2024-01-03 | Resolved: 2024-10-03

## 2024-10-03 PROBLEM — Z79.4 LONG-TERM INSULIN USE (MULTI): Status: RESOLVED | Noted: 2023-11-30 | Resolved: 2024-10-03

## 2024-10-03 PROBLEM — C77.2 SECONDARY AND UNSPECIFIED MALIGNANT NEOPLASM OF INTRA-ABDOMINAL LYMPH NODES (MULTI): Status: RESOLVED | Noted: 2023-06-22 | Resolved: 2024-10-03

## 2024-10-03 PROBLEM — C78.6 SECONDARY MALIGNANT NEOPLASM OF RETROPERITONEUM AND PERITONEUM (MULTI): Status: RESOLVED | Noted: 2023-06-22 | Resolved: 2024-10-03

## 2024-10-03 PROBLEM — R10.9 ABDOMINAL PAIN, UNSPECIFIED ABDOMINAL LOCATION: Status: RESOLVED | Noted: 2024-05-10 | Resolved: 2024-10-03

## 2024-10-03 PROBLEM — K57.30 DIVERTICULOSIS OF LARGE INTESTINE WITHOUT PERFORATION OR ABSCESS WITHOUT BLEEDING: Status: RESOLVED | Noted: 2023-05-17 | Resolved: 2024-10-03

## 2024-10-03 PROBLEM — B37.81 CANDIDIASIS OF MOUTH AND ESOPHAGUS (MULTI): Status: RESOLVED | Noted: 2023-12-18 | Resolved: 2024-10-03

## 2024-10-03 PROBLEM — Z79.01 ANTICOAGULATION MANAGEMENT ENCOUNTER: Status: RESOLVED | Noted: 2024-01-03 | Resolved: 2024-10-03

## 2024-10-03 PROBLEM — C80.1 ADENOCARCINOMA (MULTI): Status: RESOLVED | Noted: 2023-07-18 | Resolved: 2024-10-03

## 2024-10-03 PROBLEM — Z79.4 TYPE 2 DIABETES MELLITUS, WITH LONG-TERM CURRENT USE OF INSULIN: Status: RESOLVED | Noted: 2023-03-08 | Resolved: 2024-10-03

## 2024-10-03 PROBLEM — E11.9 TYPE 2 DIABETES MELLITUS, WITH LONG-TERM CURRENT USE OF INSULIN: Status: RESOLVED | Noted: 2023-03-08 | Resolved: 2024-10-03

## 2024-10-04 LAB — SCAN RESULT: NORMAL

## 2024-10-10 ENCOUNTER — PATIENT MESSAGE (OUTPATIENT)
Dept: CARDIOLOGY | Facility: CLINIC | Age: 69
End: 2024-10-10
Payer: MEDICARE

## 2024-10-10 DIAGNOSIS — I82.532 CHRONIC DEEP VEIN THROMBOSIS (DVT) OF POPLITEAL VEIN OF LEFT LOWER EXTREMITY (MULTI): Primary | ICD-10-CM

## 2024-10-10 DIAGNOSIS — I48.91 ATRIAL FIBRILLATION, UNSPECIFIED TYPE (MULTI): ICD-10-CM

## 2024-10-23 RX ORDER — HEPARIN SODIUM,PORCINE/PF 10 UNIT/ML
50 SYRINGE (ML) INTRAVENOUS AS NEEDED
OUTPATIENT
Start: 2024-10-23

## 2024-10-23 RX ORDER — HEPARIN 100 UNIT/ML
500 SYRINGE INTRAVENOUS AS NEEDED
OUTPATIENT
Start: 2024-10-23

## 2024-11-19 DIAGNOSIS — I48.91 ATRIAL FIBRILLATION, UNSPECIFIED TYPE (MULTI): ICD-10-CM

## 2024-11-20 ENCOUNTER — OFFICE VISIT (OUTPATIENT)
Dept: CARDIOLOGY | Facility: CLINIC | Age: 69
End: 2024-11-20
Payer: MEDICARE

## 2024-11-20 VITALS
SYSTOLIC BLOOD PRESSURE: 111 MMHG | BODY MASS INDEX: 28.77 KG/M2 | HEIGHT: 70 IN | HEART RATE: 78 BPM | WEIGHT: 201 LBS | OXYGEN SATURATION: 96 % | DIASTOLIC BLOOD PRESSURE: 64 MMHG

## 2024-11-20 DIAGNOSIS — I48.0 PAROXYSMAL ATRIAL FIBRILLATION (MULTI): Primary | ICD-10-CM

## 2024-11-20 DIAGNOSIS — E78.2 MIXED HYPERLIPIDEMIA: ICD-10-CM

## 2024-11-20 DIAGNOSIS — I82.532 CHRONIC DEEP VEIN THROMBOSIS (DVT) OF POPLITEAL VEIN OF LEFT LOWER EXTREMITY (MULTI): ICD-10-CM

## 2024-11-20 DIAGNOSIS — E11.9 TYPE 2 DIABETES MELLITUS WITHOUT COMPLICATION, WITHOUT LONG-TERM CURRENT USE OF INSULIN (MULTI): ICD-10-CM

## 2024-11-20 DIAGNOSIS — C18.1 CANCER OF APPENDIX METASTATIC TO INTRA-ABDOMINAL LYMPH NODE (MULTI): ICD-10-CM

## 2024-11-20 DIAGNOSIS — I10 ESSENTIAL (PRIMARY) HYPERTENSION: ICD-10-CM

## 2024-11-20 DIAGNOSIS — C77.2 CANCER OF APPENDIX METASTATIC TO INTRA-ABDOMINAL LYMPH NODE (MULTI): ICD-10-CM

## 2024-11-20 PROCEDURE — 3078F DIAST BP <80 MM HG: CPT | Performed by: INTERNAL MEDICINE

## 2024-11-20 PROCEDURE — 1126F AMNT PAIN NOTED NONE PRSNT: CPT | Performed by: INTERNAL MEDICINE

## 2024-11-20 PROCEDURE — 99214 OFFICE O/P EST MOD 30 MIN: CPT | Performed by: INTERNAL MEDICINE

## 2024-11-20 PROCEDURE — 1157F ADVNC CARE PLAN IN RCRD: CPT | Performed by: INTERNAL MEDICINE

## 2024-11-20 PROCEDURE — 1160F RVW MEDS BY RX/DR IN RCRD: CPT | Performed by: INTERNAL MEDICINE

## 2024-11-20 PROCEDURE — 3062F POS MACROALBUMINURIA REV: CPT | Performed by: INTERNAL MEDICINE

## 2024-11-20 PROCEDURE — 3074F SYST BP LT 130 MM HG: CPT | Performed by: INTERNAL MEDICINE

## 2024-11-20 PROCEDURE — 4010F ACE/ARB THERAPY RXD/TAKEN: CPT | Performed by: INTERNAL MEDICINE

## 2024-11-20 PROCEDURE — 3051F HG A1C>EQUAL 7.0%<8.0%: CPT | Performed by: INTERNAL MEDICINE

## 2024-11-20 PROCEDURE — 3048F LDL-C <100 MG/DL: CPT | Performed by: INTERNAL MEDICINE

## 2024-11-20 PROCEDURE — 3008F BODY MASS INDEX DOCD: CPT | Performed by: INTERNAL MEDICINE

## 2024-11-20 PROCEDURE — 1159F MED LIST DOCD IN RCRD: CPT | Performed by: INTERNAL MEDICINE

## 2024-11-20 PROCEDURE — 1036F TOBACCO NON-USER: CPT | Performed by: INTERNAL MEDICINE

## 2024-11-20 ASSESSMENT — ENCOUNTER SYMPTOMS
DEPRESSION: 0
CARDIOVASCULAR NEGATIVE: 1
EYES NEGATIVE: 1
CONSTITUTIONAL NEGATIVE: 1
PSYCHIATRIC NEGATIVE: 1
HEMATOLOGIC/LYMPHATIC NEGATIVE: 1
ALLERGIC/IMMUNOLOGIC NEGATIVE: 1
NEUROLOGICAL NEGATIVE: 1
MUSCULOSKELETAL NEGATIVE: 1
RESPIRATORY NEGATIVE: 1
ENDOCRINE NEGATIVE: 1
GASTROINTESTINAL NEGATIVE: 1

## 2024-11-20 ASSESSMENT — PAIN SCALES - GENERAL: PAINLEVEL_OUTOF10: 0-NO PAIN

## 2024-11-20 NOTE — PROGRESS NOTES
Preventive Cardiology Clinic Note    Reason for Visit: Follow-up visit  Referring Clinician: No ref. provider found     History of Present Illness: Bunny Abarca is a 69 y.o. male with paroxysmal atrial fibrillation, chronic cancer related DVT, hypertension, type 2 diabetes mellitus, and hyperlipidemia who is here for a follow-up visit.  Since his last visit he has been doing well.  He completed chemotherapy and is back to exercising regularly without cardiopulmonary limitation.  He does not report any exertional chest pain, shortness of breath, palpitations, or syncope.  He has not experienced any recurrent atrial fibrillation to his knowledge and he is adherent to his medications including his oral anticoagulant without any bleeding issues.  His blood pressure has been slightly higher than previous as he has been tracking it well and his average in the ambulatory setting is systolic BP between 130 and 140 mmHg.  However, he does have several that are much lower including lower diastolics in the 60s.  His blood pressure in the office today is 111/64 mmHg.    Past Medical History:  He has a past medical history of Atrial fibrillation (Multi) (03/08/2023), Benign essential hypertension (03/08/2023), Hyperlipidemia (03/08/2023), Obstructive sleep apnea (03/08/2023), Personal history of other diseases of the respiratory system (09/04/2020), Primary malignant neoplasm of appendix (Multi) (10/03/2023), and Type 2 diabetes mellitus (03/08/2023).    Past Surgical History:  He has a past surgical history that includes Back surgery (03/28/2016); Hernia repair (03/28/2016); Sinus surgery (03/28/2016); and Cataract extraction (02/15/2018).    Social History:  He reports that he has never smoked. He has never used smokeless tobacco. He reports that he does not currently use alcohol. He reports that he does not currently use drugs.    Family History:  Family History   Problem Relation Name Age of Onset    Congenital heart  "disease Mother      Hyperlipidemia Father      Hypertension Father      Colon cancer Sister      Other (Multiple System Atrophy) Sister      Other (Cystic Fibrosis Gene Carrier) Sister      Other (Malignant Neoplasm Of Ovary) Sibling         Allergies:  Patient has no known allergies.    Outpatient Medications:  Current Outpatient Medications   Medication Instructions    Accu-Chek Fastclix Lancet Drum misc test blood sugar TWICE DAILY    atorvastatin (LIPITOR) 40 mg, oral, Nightly    doxazosin (CARDURA) 2 mg, oral, Nightly    fenofibrate (TRICOR) 145 mg, oral, Daily, WITH FOOD    insulin asp prt-insulin aspart (NovoLOG Mix 70-30FlexPen U-100) 100 unit/mL (70-30) injection 38 Units, subcutaneous, 2 times daily    Jardiance 25 mg, oral, Daily    metFORMIN XR (GLUCOPHAGE-XR) 1,000 mg, oral, 2 times daily    olmesartan (BENICAR) 40 mg, oral, Daily    pen needle, diabetic (BD Ultra-Fine Short Pen Needle) 31 gauge x 5/16\" needle 1 each, subcutaneous, 2 times daily    rivaroxaban (XARELTO) 20 mg, oral, Daily with evening meal, Take with food.       Review of Systems:  Review of Systems   Constitutional: Negative.   HENT: Negative.     Eyes: Negative.    Cardiovascular: Negative.    Respiratory: Negative.     Endocrine: Negative.    Hematologic/Lymphatic: Negative.    Skin: Negative.    Musculoskeletal: Negative.    Gastrointestinal: Negative.    Genitourinary: Negative.    Neurological: Negative.    Psychiatric/Behavioral: Negative.     Allergic/Immunologic: Negative.        Last Recorded Vitals:  Vitals:    11/20/24 1131   BP: 111/64   BP Location: Right arm   Patient Position: Sitting   BP Cuff Size: Large adult   Pulse: 78   SpO2: 96%   Weight: 91.2 kg (201 lb)   Height: 1.778 m (5' 10\")       Physical Examination:  General: Well appearing, well-nourished, in no acute distress.  HEENT: Normocephalic atraumatic, pupils equal and reactive to light, extraocular muscles intact, no conjunctival injection, oropharynx clear " "without exudates.  Neck: Normal carotid arterial pulses, no arterial bruits, no thyromegaly.  Cardiac: Regular rhythm and normal heart rate.  S1, S2 present and normal.  No murmurs, rubs, or gallops.  PMI is nondisplaced. Jugular venous pulsations are normal.  Pulmonary: Normal breath sounds, no increased work of breathing, no wheezes or crackles.  GI: Normal bowel sounds, abdominal aorta not enlarged, no hepatosplenomegaly, no abdominal bruits.  Lower extremities: No cyanosis, clubbing, or edema.  No xanthelasma present. Normal distal pulses.  Skin: Skin intact. No significant rashes or lesions present.  Neuro: Alert and oriented x 3, normal attention and cognition, no focal motor or sensory neurologic deficits.  Psych: Normal affect and mood.  Musculoskeletal: Normal gait normal muscle tone.    Laboratory Studies:  Lab Results   Component Value Date    GLUCOSE 71 (L) 09/12/2024    CALCIUM 9.6 09/12/2024     09/12/2024    K 4.0 09/12/2024    CO2 26 09/12/2024     09/12/2024    BUN 19 09/12/2024    CREATININE 1.18 09/12/2024     Lab Results   Component Value Date    ALT 36 09/12/2024    AST 31 09/12/2024    ALKPHOS 35 09/12/2024    BILITOT 0.9 09/12/2024         Lab Results   Component Value Date    CHOL 100 08/01/2024    CHOL 89 05/24/2024    CHOL 136 12/04/2023     Lab Results   Component Value Date    HDL 41.8 08/01/2024    HDL 42.7 05/24/2024    HDL 56.3 12/04/2023     Lab Results   Component Value Date    LDLCALC 42 08/01/2024    LDLCALC 25 05/24/2024    LDLCALC 43 12/04/2023     Lab Results   Component Value Date    TRIG 82 08/01/2024    TRIG 109 05/24/2024    TRIG 183 (H) 12/04/2023     No components found for: \"CHOLHDL\"  Lab Results   Component Value Date    HGBA1C 7.6 (H) 08/01/2024       Cardiology Tests:  ECG:  Electrocardiogram, 12-lead PRN ACS symptoms 05/11/2024  Sinus rhythm with Premature supraventricular complexes with frequent and consecutive Premature ventricular complexes  Abnormal " ECG    Echo:  Transthoracic echo (TTE) complete 10/04/2023   1. Left ventricular systolic function is normal with a 60-65% estimated ejection fraction.   2. Poorly visualized anatomical structures due to suboptimal image quality.   3. Spectral Doppler shows an impaired relaxation pattern of left ventricular diastolic filling.      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 male with paroxysmal atrial fibrillation, chronic cancer related DVT, hypertension, type 2 diabetes mellitus, and hyperlipidemia who is here for a follow-up visit.  Overall he is doing well and is asymptomatic from a cardiovascular standpoint.  He is currently in sinus rhythm today by my exam and he is on an oral anticoagulant for stroke prophylaxis.  We reviewed his blood pressures and discussed adding an additional agent versus increasing lifestyle modification such as increasing his physical activity as a for step and we have chosen the latter given some concern for hypotension with lower diastolics that could occur with the addition of another antihypertensive agent.  He will continue to keep an eye on it and track at home and let me know by MyChart how he is doing.  His lipids are well-controlled on current therapy.  I will see him again in 1 year here in the office for routine follow-up.  Please do not hesitate to call or contact me with any questions or concerns.    Jerry Ibanez MD, LULY, FACC  Director,  [facility]  Director, [facility]  Associate Professor of Medicine  [employer]

## 2024-11-22 ENCOUNTER — LAB (OUTPATIENT)
Dept: LAB | Facility: LAB | Age: 69
End: 2024-11-22
Payer: MEDICARE

## 2024-11-22 DIAGNOSIS — C77.2 CANCER OF APPENDIX METASTATIC TO INTRA-ABDOMINAL LYMPH NODE (MULTI): ICD-10-CM

## 2024-11-22 DIAGNOSIS — C18.1 CANCER OF APPENDIX METASTATIC TO INTRA-ABDOMINAL LYMPH NODE (MULTI): ICD-10-CM

## 2024-11-22 LAB
ALBUMIN SERPL BCP-MCNC: 4.4 G/DL (ref 3.4–5)
ALP SERPL-CCNC: 53 U/L (ref 33–136)
ALT SERPL W P-5'-P-CCNC: 36 U/L (ref 10–52)
ANION GAP SERPL CALC-SCNC: 11 MMOL/L (ref 10–20)
AST SERPL W P-5'-P-CCNC: 35 U/L (ref 9–39)
BASOPHILS # BLD AUTO: 0.06 X10*3/UL (ref 0–0.1)
BASOPHILS NFR BLD AUTO: 0.9 %
BILIRUB SERPL-MCNC: 2.1 MG/DL (ref 0–1.2)
BUN SERPL-MCNC: 23 MG/DL (ref 6–23)
CALCIUM SERPL-MCNC: 9.3 MG/DL (ref 8.6–10.3)
CEA SERPL-MCNC: <0.5 UG/L
CHLORIDE SERPL-SCNC: 104 MMOL/L (ref 98–107)
CO2 SERPL-SCNC: 27 MMOL/L (ref 21–32)
CREAT SERPL-MCNC: 0.97 MG/DL (ref 0.5–1.3)
EGFRCR SERPLBLD CKD-EPI 2021: 85 ML/MIN/1.73M*2
EOSINOPHIL # BLD AUTO: 0.14 X10*3/UL (ref 0–0.7)
EOSINOPHIL NFR BLD AUTO: 2.1 %
ERYTHROCYTE [DISTWIDTH] IN BLOOD BY AUTOMATED COUNT: 13.5 % (ref 11.5–14.5)
GLUCOSE SERPL-MCNC: 63 MG/DL (ref 74–99)
HCT VFR BLD AUTO: 43.7 % (ref 41–52)
HGB BLD-MCNC: 14.5 G/DL (ref 13.5–17.5)
IMM GRANULOCYTES # BLD AUTO: 0.02 X10*3/UL (ref 0–0.7)
IMM GRANULOCYTES NFR BLD AUTO: 0.3 % (ref 0–0.9)
LYMPHOCYTES # BLD AUTO: 1.06 X10*3/UL (ref 1.2–4.8)
LYMPHOCYTES NFR BLD AUTO: 16 %
MCH RBC QN AUTO: 29.1 PG (ref 26–34)
MCHC RBC AUTO-ENTMCNC: 33.2 G/DL (ref 32–36)
MCV RBC AUTO: 88 FL (ref 80–100)
MONOCYTES # BLD AUTO: 0.5 X10*3/UL (ref 0.1–1)
MONOCYTES NFR BLD AUTO: 7.5 %
NEUTROPHILS # BLD AUTO: 4.86 X10*3/UL (ref 1.2–7.7)
NEUTROPHILS NFR BLD AUTO: 73.2 %
NRBC BLD-RTO: 0 /100 WBCS (ref 0–0)
PLATELET # BLD AUTO: 274 X10*3/UL (ref 150–450)
POTASSIUM SERPL-SCNC: 4.2 MMOL/L (ref 3.5–5.3)
PROT SERPL-MCNC: 7 G/DL (ref 6.4–8.2)
RBC # BLD AUTO: 4.99 X10*6/UL (ref 4.5–5.9)
SODIUM SERPL-SCNC: 138 MMOL/L (ref 136–145)
WBC # BLD AUTO: 6.6 X10*3/UL (ref 4.4–11.3)

## 2024-11-22 PROCEDURE — 36415 COLL VENOUS BLD VENIPUNCTURE: CPT

## 2024-11-22 PROCEDURE — 80053 COMPREHEN METABOLIC PANEL: CPT

## 2024-11-22 PROCEDURE — 81422 FETAL CHRMOML MICRODELTJ: CPT

## 2024-11-22 PROCEDURE — 82378 CARCINOEMBRYONIC ANTIGEN: CPT

## 2024-11-22 PROCEDURE — 81420 FETAL CHRMOML ANEUPLOIDY: CPT

## 2024-11-22 PROCEDURE — 85025 COMPLETE CBC W/AUTO DIFF WBC: CPT

## 2024-11-25 ENCOUNTER — OFFICE VISIT (OUTPATIENT)
Dept: PRIMARY CARE | Facility: CLINIC | Age: 69
End: 2024-11-25
Payer: MEDICARE

## 2024-11-25 VITALS
OXYGEN SATURATION: 95 % | HEART RATE: 100 BPM | SYSTOLIC BLOOD PRESSURE: 132 MMHG | DIASTOLIC BLOOD PRESSURE: 76 MMHG | TEMPERATURE: 98.5 F

## 2024-11-25 DIAGNOSIS — R52 BODY ACHES: ICD-10-CM

## 2024-11-25 DIAGNOSIS — J01.90 ACUTE SINUSITIS, RECURRENCE NOT SPECIFIED, UNSPECIFIED LOCATION: Primary | ICD-10-CM

## 2024-11-25 PROCEDURE — 3075F SYST BP GE 130 - 139MM HG: CPT | Performed by: STUDENT IN AN ORGANIZED HEALTH CARE EDUCATION/TRAINING PROGRAM

## 2024-11-25 PROCEDURE — 3078F DIAST BP <80 MM HG: CPT | Performed by: STUDENT IN AN ORGANIZED HEALTH CARE EDUCATION/TRAINING PROGRAM

## 2024-11-25 PROCEDURE — 3051F HG A1C>EQUAL 7.0%<8.0%: CPT | Performed by: STUDENT IN AN ORGANIZED HEALTH CARE EDUCATION/TRAINING PROGRAM

## 2024-11-25 PROCEDURE — 1159F MED LIST DOCD IN RCRD: CPT | Performed by: STUDENT IN AN ORGANIZED HEALTH CARE EDUCATION/TRAINING PROGRAM

## 2024-11-25 PROCEDURE — 1157F ADVNC CARE PLAN IN RCRD: CPT | Performed by: STUDENT IN AN ORGANIZED HEALTH CARE EDUCATION/TRAINING PROGRAM

## 2024-11-25 PROCEDURE — 99214 OFFICE O/P EST MOD 30 MIN: CPT | Performed by: STUDENT IN AN ORGANIZED HEALTH CARE EDUCATION/TRAINING PROGRAM

## 2024-11-25 PROCEDURE — 1036F TOBACCO NON-USER: CPT | Performed by: STUDENT IN AN ORGANIZED HEALTH CARE EDUCATION/TRAINING PROGRAM

## 2024-11-25 PROCEDURE — 4010F ACE/ARB THERAPY RXD/TAKEN: CPT | Performed by: STUDENT IN AN ORGANIZED HEALTH CARE EDUCATION/TRAINING PROGRAM

## 2024-11-25 PROCEDURE — 3048F LDL-C <100 MG/DL: CPT | Performed by: STUDENT IN AN ORGANIZED HEALTH CARE EDUCATION/TRAINING PROGRAM

## 2024-11-25 PROCEDURE — 3062F POS MACROALBUMINURIA REV: CPT | Performed by: STUDENT IN AN ORGANIZED HEALTH CARE EDUCATION/TRAINING PROGRAM

## 2024-11-25 RX ORDER — AMOXICILLIN AND CLAVULANATE POTASSIUM 875; 125 MG/1; MG/1
875 TABLET, FILM COATED ORAL 2 TIMES DAILY
Qty: 20 TABLET | Refills: 0 | Status: SHIPPED | OUTPATIENT
Start: 2024-11-25 | End: 2024-12-05

## 2024-11-25 RX ORDER — FLUTICASONE PROPIONATE 50 MCG
1 SPRAY, SUSPENSION (ML) NASAL 2 TIMES DAILY
Qty: 16 G | Refills: 1 | Status: SHIPPED | OUTPATIENT
Start: 2024-11-25

## 2024-11-25 RX ORDER — AZELASTINE 1 MG/ML
1 SPRAY, METERED NASAL 2 TIMES DAILY
Qty: 30 ML | Refills: 2 | Status: SHIPPED | OUTPATIENT
Start: 2024-11-25

## 2024-11-25 ASSESSMENT — PATIENT HEALTH QUESTIONNAIRE - PHQ9
1. LITTLE INTEREST OR PLEASURE IN DOING THINGS: NOT AT ALL
SUM OF ALL RESPONSES TO PHQ9 QUESTIONS 1 AND 2: 0
2. FEELING DOWN, DEPRESSED OR HOPELESS: NOT AT ALL

## 2024-11-25 NOTE — PROGRESS NOTES
"Subjective   Patient ID: Bunny Abarca \"Michael\" is a 69 y.o. male who presents for Sick Visit.         Reviewed all medications by prescribing practitioner or clinical pharmacist (such as prescriptions, OTCs, herbal therapies and supplements) and documented in the medical record.    HPI  1.  Acute Sinusitis/acute URI  Began one week ago with a severe sore throat which progressed to laryngitis on Friday  Has had a productive cough  Tried flonase, tylenol and robitussin which have helped his symptoms  Reports fever on first day or two, chest pain with coughing, sinus headache, some left ear pain when symptoms first began, myalgias and fatigue  Denies shortness of breath, chills  He has been around his grand kids who have been coughing and his son in law was just recently diagnosed with a sinus infection  Has not tested for anything    Review of Systems  All pertinent positive symptoms are included in the history of present illness.    All other systems have been reviewed and are negative and noncontributory to this patient's current ailments.    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    Type 2 diabetes mellitus 03/08/2023     Past Surgical History:   Procedure Laterality Date    BACK SURGERY  03/28/2016    Back Surgery    CATARACT EXTRACTION  02/15/2018    Cataract Surgery    HERNIA REPAIR  03/28/2016    Hernia Repair    SINUS SURGERY  03/28/2016    Sinus Surgery     Social History     Tobacco Use    Smoking status: Never    Smokeless tobacco: Never   Vaping Use    Vaping status: Never Used   Substance Use Topics    Alcohol use: Not Currently    Drug use: Not Currently     Family History   Problem Relation Name " Age of Onset    Congenital heart disease Mother      Hyperlipidemia Father      Hypertension Father      Colon cancer Sister      Other (Multiple System Atrophy) Sister      Other (Cystic Fibrosis Gene Carrier) Sister      Other (Malignant Neoplasm Of Ovary) Sibling       Immunization History   Administered Date(s) Administered    Flu vaccine (IIV4), preservative free *Check age/dose* 10/24/2016, 11/10/2017, 10/04/2018, 11/01/2019, 11/01/2020    Flu vaccine, quadrivalent, high-dose, preservative free, age 65y+ (FLUZONE) 09/23/2023    Flu vaccine, trivalent, preservative free, HIGH-DOSE, age 65y+ (Fluzone) 10/05/2021, 10/04/2022    Influenza, Unspecified 10/04/2022, 10/03/2024    Influenza, seasonal, injectable 12/20/2004, 10/26/2015, 10/04/2022    Moderna COVID-19 vaccine, 12 years and older (50mcg/0.5mL)(Spikevax) 11/04/2023    Moderna COVID-19 vaccine, bivalent, blue cap/gray label *Check age/dose* 10/11/2022    Moderna SARS-CoV-2 Vaccination 04/16/2022    Pfizer Purple Cap SARS-CoV-2 03/05/2021, 03/25/2021, 10/21/2021, 10/03/2024    Pneumococcal conjugate vaccine, 13-valent (PREVNAR 13) 10/05/2021    Pneumococcal polysaccharide vaccine, 23-valent, age 2 years and older (PNEUMOVAX 23) 10/05/2022    RESPIRATORY SYNCYTIAL VIRUS (RSV), ELIGIBLE PREGNANT PTS, 0.5 ML (ABRYSVO) 10/02/2023    Tdap vaccine, age 7 year and older (BOOSTRIX, ADACEL) 11/10/2017    Zoster vaccine, recombinant, adult (SHINGRIX) 08/24/2021, 10/25/2021     Current Outpatient Medications   Medication Instructions    Accu-Chek Fastclix Lancet Drum misc test blood sugar TWICE DAILY    amoxicillin-pot clavulanate (Augmentin) 875-125 mg tablet 875 mg, oral, 2 times daily    atorvastatin (LIPITOR) 40 mg, oral, Nightly    azelastine (Astelin) 137 mcg (0.1 %) nasal spray 1 spray, Each Nostril, 2 times daily    doxazosin (CARDURA) 2 mg, oral, Nightly    fenofibrate (TRICOR) 145 mg, oral, Daily, WITH FOOD    fluticasone (Flonase) 50 mcg/actuation nasal  "spray 1 spray, Each Nostril, 2 times daily    insulin asp prt-insulin aspart (NovoLOG Mix 70-30FlexPen U-100) 100 unit/mL (70-30) injection 38 Units, subcutaneous, 2 times daily    Jardiance 25 mg, oral, Daily    metFORMIN XR (GLUCOPHAGE-XR) 1,000 mg, oral, 2 times daily    olmesartan (BENICAR) 40 mg, oral, Daily    pen needle, diabetic (BD Ultra-Fine Short Pen Needle) 31 gauge x 5/16\" needle 1 each, subcutaneous, 2 times daily    rivaroxaban (XARELTO) 20 mg, oral, Daily with evening meal, Take with food.     No Known Allergies    Objective   Vitals:    11/25/24 1137   BP: 132/76   BP Location: Left arm   Patient Position: Sitting   BP Cuff Size: Adult   Pulse: 100   Temp: 36.9 °C (98.5 °F)   SpO2: 95%     There is no height or weight on file to calculate BMI.    BP Readings from Last 3 Encounters:   11/25/24 132/76   11/20/24 111/64   09/30/24 142/85      Wt Readings from Last 3 Encounters:   11/20/24 91.2 kg (201 lb)   09/30/24 90 kg (198 lb 8 oz)   09/27/24 89.7 kg (197 lb 12 oz)        Lab on 11/22/2024   Component Date Value    Glucose 11/22/2024 63 (L)     Sodium 11/22/2024 138     Potassium 11/22/2024 4.2     Chloride 11/22/2024 104     Bicarbonate 11/22/2024 27     Anion Gap 11/22/2024 11     Urea Nitrogen 11/22/2024 23     Creatinine 11/22/2024 0.97     eGFR 11/22/2024 85     Calcium 11/22/2024 9.3     Albumin 11/22/2024 4.4     Alkaline Phosphatase 11/22/2024 53     Total Protein 11/22/2024 7.0     AST 11/22/2024 35     Bilirubin, Total 11/22/2024 2.1 (H)     ALT 11/22/2024 36     Carcinoembryonic AG 11/22/2024 <0.5     WBC 11/22/2024 6.6     nRBC 11/22/2024 0.0     RBC 11/22/2024 4.99     Hemoglobin 11/22/2024 14.5     Hematocrit 11/22/2024 43.7     MCV 11/22/2024 88     MCH 11/22/2024 29.1     MCHC 11/22/2024 33.2     RDW 11/22/2024 13.5     Platelets 11/22/2024 274     Neutrophils % 11/22/2024 73.2     Immature Granulocytes %,* 11/22/2024 0.3     Lymphocytes % 11/22/2024 16.0     Monocytes % 11/22/2024 " 7.5     Eosinophils % 11/22/2024 2.1     Basophils % 11/22/2024 0.9     Neutrophils Absolute 11/22/2024 4.86     Immature Granulocytes Ab* 11/22/2024 0.02     Lymphocytes Absolute 11/22/2024 1.06 (L)     Monocytes Absolute 11/22/2024 0.50     Eosinophils Absolute 11/22/2024 0.14     Basophils Absolute 11/22/2024 0.06      Physical Exam  CONSTITUTIONAL - well nourished, well developed, looks like stated age, in no acute distress, not ill-appearing, and not tired appearing  SKIN - normal skin color and pigmentation, normal skin turgor without rash, lesions, or nodules visualized  HEAD - no trauma, normocephalic  EYES - normal external exam  ENT - TM's intact, no injection, no signs of infection, uvula midline, normal tongue movement and throat normal, no exudate, tenderness to palpation over left frontal sinus otherwise nontender to palpation over right frontal sinus and bilateral maxillary sinus  NECK - supple without rigidity, no neck mass was observed  CHEST - no wheezing, mild crackles over right upper lobe, no rales, good effort  CARDIAC - regular rate and regular rhythm, no skipped beats, no murmur  EXTREMITIES - no obvious or evident edema, no obvious or evident deformities  NEUROLOGICAL - normal gait, normal balance, normal motor, no ataxia; alert, oriented and no focal signs  PSYCHIATRIC - alert, pleasant and cordial, age-appropriate  IMMUNOLOGIC - no cervical lymphadenopathy    Assessment/Plan   Problem List Items Addressed This Visit    None  Visit Diagnoses       Acute sinusitis, recurrence not specified, unspecified location    -  Primary    Relevant Medications    amoxicillin-pot clavulanate (Augmentin) 875-125 mg tablet    azelastine (Astelin) 137 mcg (0.1 %) nasal spray    fluticasone (Flonase) 50 mcg/actuation nasal spray    Body aches        Relevant Medications    amoxicillin-pot clavulanate (Augmentin) 875-125 mg tablet    azelastine (Astelin) 137 mcg (0.1 %) nasal spray    fluticasone (Flonase) 50  mcg/actuation nasal spray            Acute Sinusitis/acute URI  Would like you to take Augmentin as well as flonase and astelin nasal spray  This would cover for both acute sinusitis as well as a possible and probable pneumonia    Risks, benefits, and options of treatment(s) were discussed after reviewing all current medication(s) and drug allergy(ies)  I opted for the treatment that we discussed with instructions on the medication use for your underlying medical ailment(s)  I encouraged supportive care such as rest, fluids and Advil/Tylenol as warranted    Contact our office in 3-5 days if not improving so we can add an additional medication/antibiotics    Offered chest xray, however, declined and we can follow this closely if not improving    At any point if you notice worsening or acute signs/symptoms please notify me immediately and/or go to nearest emergency room to be acutely evaluated    We will check up on this in 2 weeks for your annual medicare wellness visit and discuss further

## 2024-12-09 ENCOUNTER — OFFICE VISIT (OUTPATIENT)
Dept: HEMATOLOGY/ONCOLOGY | Facility: HOSPITAL | Age: 69
End: 2024-12-09
Payer: MEDICARE

## 2024-12-09 VITALS
DIASTOLIC BLOOD PRESSURE: 68 MMHG | SYSTOLIC BLOOD PRESSURE: 149 MMHG | BODY MASS INDEX: 29.07 KG/M2 | RESPIRATION RATE: 16 BRPM | HEART RATE: 84 BPM | WEIGHT: 202.6 LBS | OXYGEN SATURATION: 100 %

## 2024-12-09 DIAGNOSIS — C18.1 CANCER OF APPENDIX METASTATIC TO INTRA-ABDOMINAL LYMPH NODE (MULTI): ICD-10-CM

## 2024-12-09 DIAGNOSIS — C77.2 CANCER OF APPENDIX METASTATIC TO INTRA-ABDOMINAL LYMPH NODE (MULTI): ICD-10-CM

## 2024-12-09 PROCEDURE — 99214 OFFICE O/P EST MOD 30 MIN: CPT | Performed by: STUDENT IN AN ORGANIZED HEALTH CARE EDUCATION/TRAINING PROGRAM

## 2024-12-09 PROCEDURE — 3051F HG A1C>EQUAL 7.0%<8.0%: CPT | Performed by: STUDENT IN AN ORGANIZED HEALTH CARE EDUCATION/TRAINING PROGRAM

## 2024-12-09 PROCEDURE — 1157F ADVNC CARE PLAN IN RCRD: CPT | Performed by: STUDENT IN AN ORGANIZED HEALTH CARE EDUCATION/TRAINING PROGRAM

## 2024-12-09 PROCEDURE — 99214 OFFICE O/P EST MOD 30 MIN: CPT | Mod: GC | Performed by: STUDENT IN AN ORGANIZED HEALTH CARE EDUCATION/TRAINING PROGRAM

## 2024-12-09 PROCEDURE — 1126F AMNT PAIN NOTED NONE PRSNT: CPT | Performed by: STUDENT IN AN ORGANIZED HEALTH CARE EDUCATION/TRAINING PROGRAM

## 2024-12-09 PROCEDURE — 1159F MED LIST DOCD IN RCRD: CPT | Performed by: STUDENT IN AN ORGANIZED HEALTH CARE EDUCATION/TRAINING PROGRAM

## 2024-12-09 PROCEDURE — 3078F DIAST BP <80 MM HG: CPT | Performed by: STUDENT IN AN ORGANIZED HEALTH CARE EDUCATION/TRAINING PROGRAM

## 2024-12-09 PROCEDURE — 3062F POS MACROALBUMINURIA REV: CPT | Performed by: STUDENT IN AN ORGANIZED HEALTH CARE EDUCATION/TRAINING PROGRAM

## 2024-12-09 PROCEDURE — 3048F LDL-C <100 MG/DL: CPT | Performed by: STUDENT IN AN ORGANIZED HEALTH CARE EDUCATION/TRAINING PROGRAM

## 2024-12-09 PROCEDURE — 3077F SYST BP >= 140 MM HG: CPT | Performed by: STUDENT IN AN ORGANIZED HEALTH CARE EDUCATION/TRAINING PROGRAM

## 2024-12-09 PROCEDURE — 4010F ACE/ARB THERAPY RXD/TAKEN: CPT | Performed by: STUDENT IN AN ORGANIZED HEALTH CARE EDUCATION/TRAINING PROGRAM

## 2024-12-09 PROCEDURE — G2211 COMPLEX E/M VISIT ADD ON: HCPCS | Performed by: STUDENT IN AN ORGANIZED HEALTH CARE EDUCATION/TRAINING PROGRAM

## 2024-12-09 RX ORDER — GABAPENTIN 100 MG/1
100 CAPSULE ORAL 3 TIMES DAILY
Qty: 90 CAPSULE | Refills: 3 | Status: SHIPPED | OUTPATIENT
Start: 2024-12-09

## 2024-12-09 ASSESSMENT — PAIN SCALES - GENERAL: PAINLEVEL_OUTOF10: 0-NO PAIN

## 2024-12-09 NOTE — PROGRESS NOTES
Cancer History:  DIAGNOSIS: Appendiceal adenocarcinoma    STAGING: pT4a pN1a Mx    CURRENT SITES OF DISEASE:    MOLECULAR/GENOMICS:  MMR-intact    ctDNA Signatera:  8/14/23: (0) negative  9/25/23: (0) negative  11/6/23: (0) negative  1/2/24: (0) negative  2/2/24: (0) negative  6/11/24: (0) negative  9/12/24: (0) negative  11/22/24: (0) negative    TUMOR MARKER:  5/15/23 CEA: <0.5  8/14/23 CEA: <0.5  11/7/23 CEA: <0.5  12/18/23 CEA: 1.3  1/2/24 CEA: 0.5  1/29/24 CEA: <0.5  2/12/24 CEA: <0.5  3/11/24 CEA: <0.5  6/11/23 CEA: <0.5  9/12/24 CEA: <0.5  11/22/24 CEA: <0.5    CURRENT THERAPY:  Surveillance     PREVIOUS THERAPY:  6/20/23: S/p R hemicolectomy  8/29/23--2/13/24: Adjuvant FOLFOX every 2 weeks x 12 cycles; Dose reduction of Oxaliplatin with C4 due to lasting cold sensitivity. Dose reduced Oxaliplatin with C7 due to increasing neuropathy. Cycle 12 given 2/13/24    ONCOLOGIC ISSUES:  Neuropathy  Thrush    PROVIDERS:  CRS: Jazmin Faulkner    HISTORY:   4/2023: presented with R inguinal pain. Thought to have a hernia.  4/17/23: CT abdomen pelvis showed indeterminate masslike lesion at the cecum posteriorly at the expected level of the appendix measuring 3 x 4 cm in dimension.  Also within the left pelvis near the origin of the left inguinal canal there is an indeterminate cavitary mass measuring 2.5 x 2 cm  5/5/23: Underwent colonoscopy.  Report not available.  Pathology of cecal/appendiceal orifice mass biopsy showed poorly differentiated adenocarcinoma with signet ring cell differentiation  5/17/23: CT chest abdomen pelvis showed no evidence of metastatic disease, again noted soft tissue mass near the base of the cecum measuring 4.2 x 2.8 cm, compatible with reported history of appendiceal carcinoma  6/20/23: Underwent right hemicolectomy. Final pathology showed appendiceal invasive moderately differentiated mucinous adenocarcinoma measuring 5 cm.  Tumor invades the visceral peritoneum.  No lymphovascular or  perineural invasion. 1/44 LNs involved.  MMR protein expression intact    PMH: HTN, Gilbert's disease, HLD, DM  SH: , no tobacco, EtOH, illicits  FH: Sister and nephew with Rodriguez syndrome.      Subjective    Mr. Abarca doing quite well. He notes his neuropathy (tingling and numbness, no longer burning) is still present bilaterally, slightly worse on the left than the right, on the distal part of the hands and feet. It has affected his ability to walk (states he is walking very carefully having to see where he puts his feet, like a 90 year old). Tylenol and Advil have not helped significantly. He also has noticed it is worse with the cold. His oral issues have resolved, as it was a bacterial infection and not actually thrush, so he received antibiotics. He did recently get diagnosed with pneumonia a few days ago and has mostly recovered from it. Otherwise denies any fever/chills, N/V, chest pain, abdominal pain, rash, or leg swelling.   ROS as above. Remainder of 10-point review of systems elicited and negative.     Objective:  /68 (BP Location: Left arm, Patient Position: Sitting, BP Cuff Size: Adult)   Pulse 84   Resp 16   Wt 91.9 kg (202 lb 9.6 oz)   SpO2 100%   BMI 29.07 kg/m²      Daily Weight  12/09/24 : 91.9 kg (202 lb 9.6 oz)  11/20/24 : 91.2 kg (201 lb)  09/30/24 : 90 kg (198 lb 8 oz)  09/27/24 : 89.7 kg (197 lb 12 oz)  09/16/24 : 89.7 kg (197 lb 12 oz)  09/10/24 : 91.2 kg (201 lb)  08/07/24 : 88.9 kg (196 lb)      Physical Exam  Gen: A&O, NAD  Head: Normocephalic, atraumatic  Eyes: no scleral icterus  ENT: mucous membranes moist, no oropharyngeal lesions. White plaque noted over surface of tongue  Resp: Lungs CTAB  Cardiac: Tachycardiac, regular rhythm, no murmurs appreciated  Abdomen: Soft, nondistended, nontender, +BS  Neuro: CNII-XII grossly intact  Psych: appropriate mood & affect  Skin: warm, dry, no apparent rashes     ECOG Performance Status: 0     Recent labs (11/2/24):  Notable:  T-bili 2.1    CT C/A/P 9/12/24:  CHEST:  1. No intrathoracic metastatic disease.      ABDOMEN-PELVIS:  1. Status post right hemicolectomy with ileocolonic anastomosis. No  definite locoregional recurrence or metastatic disease in the abdomen  or pelvis  2. Other chronic and ancillary findings are unchanged.    Assessment/Plan   Mr. 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 Faulkner.     He presents today for discussion of adjuvant chemotherapy. I personally reviewed the images from the recent CT, which show no evidence of metastatic disease.  I reviewed the results of his pathology synoptic report, which show pT4a pN1a, Stage III cancer.     I discussed with him and his wife at length that given the stage of his appendiceal cancer, I recommend systemic chemotherapy.  The options are for FOLFOX versus capecitabine plus oxaliplatin.  Given the high risk nature of the diagnosis, my preference is for 6 months of adjuvant chemotherapy, and FOLFOX is better tolerated for this duration.    I discussed the risks and benefits and side effect profile of FOLFOX chemotherapy, and he agrees to proceed.    After 1st cycle of FOLFOX, found to have extensive PE throughout B/L lungs including saddle embolus of main pulmonary artery. On 9/5/23 he had aspiration thrombectomy and was placed on Apixaban. Discussed admission with Dr. Webb. Ok to proceed with chemotherapy as long as patient is feeling well.     10/9/23: After 2nd cycle, he experienced fatigue, cold sensitivity, nausea and diarrhea.    10/23/23: Having more cold and now light sensitivity. Will decrease dose of Oxaliplatin.     11/20/23: Improved fatigue. Still with taste and cold sensitivity, which is stable.   12/18/23: Improving energy, taste is worse, + thrush  1/2/24: Doing well, improving energy. Still with thrush but taste is improving. Proceed with chemo today.  2/12/24: Will proceed with final cycle of  FOLFOX, will get post-tx CT in approx 3 weeks with RTC 4 weeks then proceed with surveillance  3/11/24: I personally reviewed the images from the recent CT, which show no evidence of disease. CEA undetectable. Will proceed with surveillance.   6/11/24. Doing well. Neuropathy is slowly improving. Continues with white 'coating' on tongue. Continue with surveillance every 3 months with lab work and CT scan every 6 months. Colonoscopy done in May 2024 was unremarkable. Will need it repeated in 3 years.  9/16/24: Doing exceedingly well. CEA remains undetectable, Signatera pending. Repeat CT with no evidence of disease recurrence or metastasis  12/9/24: CEA and Signatera are negative. Still doing well, but still with remnant neuropathy including tingling, numbness, cold sensitivity and altered proprioception    PLAN:  Stage III appendiceal cancer  - S/p 6mo adjuvant chemotherapy with FOLFOX 8/2023-2/2024  - CEA undetectable, most recent scans (9/12/24) without evidence of disease recurrence or mets  - Proceed with surveillance with labs including Signatera & CEA q3mo and CT q6mo for 2 years, then will space out  - Repeat colonoscopy 5/2027  - RTC 3mo with repeat CBC, CMP, CEA, and Signatera     Oxaliplatin-induced neuropathy  - symptoms appear to be consistent with altered proprioception  - will begin low dose gabapentin 100 mg TID today, and uptitrate as needed  - patient interested in Dr. Perry's Scrambler therapy    Pulmonary Embolism including saddle embolism  - continue on Apixaban  - has follow up with vascular medicine who will manage apixaban , should be able to discontinue once Mediport is removed    Oral leukoplakia, resolved    RTC: 3 months    Patient was seen and discussed with Dr. Webb.    Theron Stallings MD  Medical Oncology Fellow  12/9/2024

## 2024-12-10 ENCOUNTER — APPOINTMENT (OUTPATIENT)
Dept: PRIMARY CARE | Facility: CLINIC | Age: 69
End: 2024-12-10
Payer: MEDICARE

## 2024-12-10 ENCOUNTER — LAB (OUTPATIENT)
Dept: LAB | Facility: LAB | Age: 69
End: 2024-12-10
Payer: MEDICARE

## 2024-12-10 VITALS
DIASTOLIC BLOOD PRESSURE: 80 MMHG | SYSTOLIC BLOOD PRESSURE: 130 MMHG | WEIGHT: 203.8 LBS | HEIGHT: 70 IN | OXYGEN SATURATION: 97 % | HEART RATE: 94 BPM | BODY MASS INDEX: 29.18 KG/M2

## 2024-12-10 DIAGNOSIS — Z79.4 TYPE 2 DIABETES MELLITUS WITHOUT COMPLICATION, WITH LONG-TERM CURRENT USE OF INSULIN (MULTI): ICD-10-CM

## 2024-12-10 DIAGNOSIS — C80.1 ADENOCARCINOMA (MULTI): ICD-10-CM

## 2024-12-10 DIAGNOSIS — Z00.00 MEDICARE ANNUAL WELLNESS VISIT, SUBSEQUENT: ICD-10-CM

## 2024-12-10 DIAGNOSIS — E11.9 TYPE 2 DIABETES MELLITUS WITHOUT COMPLICATION, WITH LONG-TERM CURRENT USE OF INSULIN (MULTI): ICD-10-CM

## 2024-12-10 DIAGNOSIS — I10 PRIMARY HYPERTENSION: ICD-10-CM

## 2024-12-10 DIAGNOSIS — I48.20 CHRONIC ATRIAL FIBRILLATION, UNSPECIFIED (MULTI): ICD-10-CM

## 2024-12-10 DIAGNOSIS — Z12.5 PROSTATE CANCER SCREENING: ICD-10-CM

## 2024-12-10 DIAGNOSIS — C18.1 ADENOCARCINOMA, APPENDIX (MULTI): ICD-10-CM

## 2024-12-10 DIAGNOSIS — E78.2 MIXED HYPERLIPIDEMIA: ICD-10-CM

## 2024-12-10 DIAGNOSIS — Z00.00 MEDICARE ANNUAL WELLNESS VISIT, SUBSEQUENT: Primary | ICD-10-CM

## 2024-12-10 LAB
ALBUMIN SERPL BCP-MCNC: 4.3 G/DL (ref 3.4–5)
ALP SERPL-CCNC: 47 U/L (ref 33–136)
ALT SERPL W P-5'-P-CCNC: 34 U/L (ref 10–52)
ANION GAP SERPL CALC-SCNC: 12 MMOL/L (ref 10–20)
AST SERPL W P-5'-P-CCNC: 26 U/L (ref 9–39)
BILIRUB SERPL-MCNC: 1.2 MG/DL (ref 0–1.2)
BUN SERPL-MCNC: 19 MG/DL (ref 6–23)
CALCIUM SERPL-MCNC: 8.8 MG/DL (ref 8.6–10.3)
CHLORIDE SERPL-SCNC: 102 MMOL/L (ref 98–107)
CHOLEST SERPL-MCNC: 104 MG/DL (ref 0–199)
CHOLESTEROL/HDL RATIO: 2.4
CO2 SERPL-SCNC: 27 MMOL/L (ref 21–32)
CREAT SERPL-MCNC: 0.85 MG/DL (ref 0.5–1.3)
EGFRCR SERPLBLD CKD-EPI 2021: >90 ML/MIN/1.73M*2
EST. AVERAGE GLUCOSE BLD GHB EST-MCNC: 154 MG/DL
GLUCOSE SERPL-MCNC: 103 MG/DL (ref 74–99)
HBA1C MFR BLD: 7 %
HDLC SERPL-MCNC: 43.3 MG/DL
LDLC SERPL CALC-MCNC: 24 MG/DL
NON HDL CHOLESTEROL: 61 MG/DL (ref 0–149)
POTASSIUM SERPL-SCNC: 4.1 MMOL/L (ref 3.5–5.3)
PROT SERPL-MCNC: 6.6 G/DL (ref 6.4–8.2)
PSA SERPL-MCNC: 0.55 NG/ML
SODIUM SERPL-SCNC: 137 MMOL/L (ref 136–145)
TRIGL SERPL-MCNC: 186 MG/DL (ref 0–149)
VLDL: 37 MG/DL (ref 0–40)

## 2024-12-10 PROCEDURE — 3079F DIAST BP 80-89 MM HG: CPT | Performed by: STUDENT IN AN ORGANIZED HEALTH CARE EDUCATION/TRAINING PROGRAM

## 2024-12-10 PROCEDURE — 80061 LIPID PANEL: CPT

## 2024-12-10 PROCEDURE — 3048F LDL-C <100 MG/DL: CPT | Performed by: STUDENT IN AN ORGANIZED HEALTH CARE EDUCATION/TRAINING PROGRAM

## 2024-12-10 PROCEDURE — G0103 PSA SCREENING: HCPCS

## 2024-12-10 PROCEDURE — 1159F MED LIST DOCD IN RCRD: CPT | Performed by: STUDENT IN AN ORGANIZED HEALTH CARE EDUCATION/TRAINING PROGRAM

## 2024-12-10 PROCEDURE — 1170F FXNL STATUS ASSESSED: CPT | Performed by: STUDENT IN AN ORGANIZED HEALTH CARE EDUCATION/TRAINING PROGRAM

## 2024-12-10 PROCEDURE — 1160F RVW MEDS BY RX/DR IN RCRD: CPT | Performed by: STUDENT IN AN ORGANIZED HEALTH CARE EDUCATION/TRAINING PROGRAM

## 2024-12-10 PROCEDURE — 36415 COLL VENOUS BLD VENIPUNCTURE: CPT

## 2024-12-10 PROCEDURE — 83036 HEMOGLOBIN GLYCOSYLATED A1C: CPT

## 2024-12-10 PROCEDURE — 80053 COMPREHEN METABOLIC PANEL: CPT

## 2024-12-10 PROCEDURE — 3051F HG A1C>EQUAL 7.0%<8.0%: CPT | Performed by: STUDENT IN AN ORGANIZED HEALTH CARE EDUCATION/TRAINING PROGRAM

## 2024-12-10 PROCEDURE — 3008F BODY MASS INDEX DOCD: CPT | Performed by: STUDENT IN AN ORGANIZED HEALTH CARE EDUCATION/TRAINING PROGRAM

## 2024-12-10 PROCEDURE — 3062F POS MACROALBUMINURIA REV: CPT | Performed by: STUDENT IN AN ORGANIZED HEALTH CARE EDUCATION/TRAINING PROGRAM

## 2024-12-10 PROCEDURE — 4010F ACE/ARB THERAPY RXD/TAKEN: CPT | Performed by: STUDENT IN AN ORGANIZED HEALTH CARE EDUCATION/TRAINING PROGRAM

## 2024-12-10 PROCEDURE — G0439 PPPS, SUBSEQ VISIT: HCPCS | Performed by: STUDENT IN AN ORGANIZED HEALTH CARE EDUCATION/TRAINING PROGRAM

## 2024-12-10 PROCEDURE — 1157F ADVNC CARE PLAN IN RCRD: CPT | Performed by: STUDENT IN AN ORGANIZED HEALTH CARE EDUCATION/TRAINING PROGRAM

## 2024-12-10 PROCEDURE — 1124F ACP DISCUSS-NO DSCNMKR DOCD: CPT | Performed by: STUDENT IN AN ORGANIZED HEALTH CARE EDUCATION/TRAINING PROGRAM

## 2024-12-10 PROCEDURE — 1036F TOBACCO NON-USER: CPT | Performed by: STUDENT IN AN ORGANIZED HEALTH CARE EDUCATION/TRAINING PROGRAM

## 2024-12-10 PROCEDURE — 99214 OFFICE O/P EST MOD 30 MIN: CPT | Performed by: STUDENT IN AN ORGANIZED HEALTH CARE EDUCATION/TRAINING PROGRAM

## 2024-12-10 PROCEDURE — 3075F SYST BP GE 130 - 139MM HG: CPT | Performed by: STUDENT IN AN ORGANIZED HEALTH CARE EDUCATION/TRAINING PROGRAM

## 2024-12-10 RX ORDER — DOXAZOSIN 2 MG/1
2 TABLET ORAL NIGHTLY
Qty: 90 TABLET | Refills: 1 | Status: SHIPPED | OUTPATIENT
Start: 2024-12-10

## 2024-12-10 RX ORDER — OLMESARTAN MEDOXOMIL 40 MG/1
40 TABLET ORAL DAILY
Qty: 90 TABLET | Refills: 1 | Status: SHIPPED | OUTPATIENT
Start: 2024-12-10

## 2024-12-10 RX ORDER — ATORVASTATIN CALCIUM 40 MG/1
40 TABLET, FILM COATED ORAL NIGHTLY
Qty: 90 TABLET | Refills: 1 | Status: SHIPPED | OUTPATIENT
Start: 2024-12-10

## 2024-12-10 ASSESSMENT — ACTIVITIES OF DAILY LIVING (ADL)
DOING_HOUSEWORK: INDEPENDENT
TAKING_MEDICATION: INDEPENDENT
GROCERY_SHOPPING: INDEPENDENT
DRESSING: INDEPENDENT
MANAGING_FINANCES: INDEPENDENT
BATHING: INDEPENDENT

## 2024-12-10 ASSESSMENT — ENCOUNTER SYMPTOMS
DEPRESSION: 0
OCCASIONAL FEELINGS OF UNSTEADINESS: 1
LOSS OF SENSATION IN FEET: 1

## 2024-12-10 NOTE — PROGRESS NOTES
Subjective   Reason for Visit: Bunny Abarca is an 69 y.o. male here for a Medicare Wellness visit.     Past Medical, Surgical, and Family History reviewed and updated in chart.    Reviewed all medications by prescribing practitioner or clinical pharmacist (such as prescriptions, OTCs, herbal therapies and supplements) and documented in the medical record.    HPI    1. Health Maintenance  Overall patient is doing well.   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 Was on flecainide for his atrial fibrillation but was discontinued while inpatient. Cardiologist decided not to start back up outpatient.    He has started chemotherapy and will complete 12 cycles in total for chemotherapy. Started on 8/29/23   Does state that he feels like the chemotherapy has been causing some numbness and tingling in his hands and feet.  His oncologist is aware and has prescribed him on gabapentin, still needs to pick a prescription.    3. Hypertension  Established h/o atrial fibrillation, followed by Cardiology   Currently on olmesartan 40 mg daily, doxazosin 2 mg nightly  Tolerating well.   BP reading today 134/80  Denies any headaches, dizziness, vision changes, SOB, GARDUNO, LE edema, or any other complaints.    4. Hyperlipidemia and hypertriglyceridemia  Currently on Atorvastatin 40 mg daily and  fenofibrate 145 mg tablet  Tolerating well and denies side effects.  Has not been taking the fenofibrate as he has had issues with his refill.  Last took it in October, states that he would like to stay off of it if his lipid panel is still stable.     5. Type II Diabetes Mellitus  Takes Metformin 500 mg 2 tablets twice a day and on NovoLog.  He also takes Jardiance 25 mg daily.  Follows with Endocrinology, Dr. Calvillo  Last A1c 12/04/23 showing 7.6%. Performed at Endocrinologist's office.     6. Atrial fibrillation  Follows Cardiology, Dr. Ibanez  Previously on metoprolol and then Flecainide, as of now, cardiology does not recommend any antiarrhythmic medication.   Currently on apixaban 5 mg for anticoagulation, followed by vascular medicine  He is tolerating the medication well.     7. Nocturia  He is experiencing insomnia due to having to get up to urinate several times throughout the night.  He believes this is due to Jardiance  Wishing to discuss with his endocrinologist about coming off of Jardiance       No Known Allergies      Immunization History   Administered Date(s) Administered    COVID-19, mRNA, LNP-S, PF, 30 mcg/0.3 mL dose 03/05/2021, 03/25/2021, 10/21/2021    Flu vaccine (IIV4), preservative free *Check age/dose* 10/24/2016, 11/10/2017, 10/04/2018, 11/01/2019, 11/01/2020    Flu vaccine, quadrivalent, high-dose, preservative free, age 65y+ (FLUZONE) 09/23/2023    Flu vaccine, trivalent, preservative free, HIGH-DOSE, age 65y+ (Fluzone) 10/05/2021, 10/04/2022, 10/03/2024    Influenza, Unspecified 10/04/2022, 10/03/2024    Influenza, seasonal, injectable 12/20/2004, 10/26/2015, 10/04/2022    Moderna COVID-19 vaccine, 12 years and older (50mcg/0.5mL)(Spikevax) 11/04/2023, 10/03/2024    Moderna COVID-19 vaccine, bivalent, blue cap/gray label *Check age/dose* 10/11/2022    Moderna SARS-CoV-2 Vaccination 04/16/2022    Pfizer Purple Cap SARS-CoV-2 10/03/2024    Pneumococcal conjugate vaccine, 13-valent  "(PREVNAR 13) 10/05/2021    Pneumococcal polysaccharide vaccine, 23-valent, age 2 years and older (PNEUMOVAX 23) 10/05/2022    RESPIRATORY SYNCYTIAL VIRUS (RSV), ELIGIBLE PREGNANT PTS, 0.5 ML (ABRYSVO) 10/02/2023, 10/17/2024    Tdap vaccine, age 7 year and older (BOOSTRIX, ADACEL) 11/10/2017    Zoster vaccine, recombinant, adult (SHINGRIX) 08/24/2021, 10/25/2021       Patient Self Assessment of Health Status  Patient Self Assessment: Good    Nutrition and Exercise  Current Diet: Heart Healthy Diet  Adequate Fluid Intake: No  Caffeine: Yes  Exercise Frequency: Regularly    Functional Ability/Level of Safety  Cognitive Impairment Observed: No cognitive impairment observed    Home Safety Risk Factors: None    Patient Care Team:  Ante T Pletikosic, DO as PCP - General  Ante T Pletikosic, DO as PCP - [facility] Attributed Provider  Anastasiya Webb MD PhD as Consulting Physician (Hematology and Oncology)  MARILY Garcia-CNP as Nurse Practitioner (Hematology and Oncology)  Beatriz Sanchez MD, MS as Consulting Physician (Vascular Medicine)     Review of Systems  All pertinent positive symptoms are included in the history of present illness.    All other systems have been reviewed and are negative and noncontributory to this patient's current ailments.    Objective   Vitals:  /80 (BP Location: Left arm, Patient Position: Sitting, BP Cuff Size: Adult)   Pulse 94   Ht 1.778 m (5' 10\")   Wt 92.4 kg (203 lb 12.8 oz)   SpO2 97%   BMI 29.24 kg/m²     Lab on 11/22/2024   Component Date Value Ref Range Status    Glucose 11/22/2024 63 (L)  74 - 99 mg/dL Final    Sodium 11/22/2024 138  136 - 145 mmol/L Final    Potassium 11/22/2024 4.2  3.5 - 5.3 mmol/L Final    Chloride 11/22/2024 104  98 - 107 mmol/L Final    Bicarbonate 11/22/2024 27  21 - 32 mmol/L Final    Anion Gap 11/22/2024 11  10 - 20 mmol/L Final    Urea Nitrogen 11/22/2024 23  6 - 23 mg/dL Final    Creatinine 11/22/2024 0.97  0.50 - 1.30 mg/dL Final    " eGFR 11/22/2024 85  >60 mL/min/1.73m*2 Final    Calculations of estimated GFR are performed using the 2021 CKD-EPI Study Refit equation without the race variable for the IDMS-Traceable creatinine methods.  https://jasn.asnjournals.org/content/early/2021/09/22/ASN.9580004744    Calcium 11/22/2024 9.3  8.6 - 10.3 mg/dL Final    Albumin 11/22/2024 4.4  3.4 - 5.0 g/dL Final    Alkaline Phosphatase 11/22/2024 53  33 - 136 U/L Final    Total Protein 11/22/2024 7.0  6.4 - 8.2 g/dL Final    AST 11/22/2024 35  9 - 39 U/L Final    Bilirubin, Total 11/22/2024 2.1 (H)  0.0 - 1.2 mg/dL Final    ALT 11/22/2024 36  10 - 52 U/L Final    Patients treated with Sulfasalazine may generate falsely decreased results for ALT.    Carcinoembryonic AG 11/22/2024 <0.5  ug/L Final    WBC 11/22/2024 6.6  4.4 - 11.3 x10*3/uL Final    nRBC 11/22/2024 0.0  0.0 - 0.0 /100 WBCs Final    RBC 11/22/2024 4.99  4.50 - 5.90 x10*6/uL Final    Hemoglobin 11/22/2024 14.5  13.5 - 17.5 g/dL Final    Hematocrit 11/22/2024 43.7  41.0 - 52.0 % Final    MCV 11/22/2024 88  80 - 100 fL Final    MCH 11/22/2024 29.1  26.0 - 34.0 pg Final    MCHC 11/22/2024 33.2  32.0 - 36.0 g/dL Final    RDW 11/22/2024 13.5  11.5 - 14.5 % Final    Platelets 11/22/2024 274  150 - 450 x10*3/uL Final    Neutrophils % 11/22/2024 73.2  40.0 - 80.0 % Final    Immature Granulocytes %, Automated 11/22/2024 0.3  0.0 - 0.9 % Final    Immature Granulocyte Count (IG) includes promyelocytes, myelocytes and metamyelocytes but does not include bands. Percent differential counts (%) should be interpreted in the context of the absolute cell counts (cells/UL).    Lymphocytes % 11/22/2024 16.0  13.0 - 44.0 % Final    Monocytes % 11/22/2024 7.5  2.0 - 10.0 % Final    Eosinophils % 11/22/2024 2.1  0.0 - 6.0 % Final    Basophils % 11/22/2024 0.9  0.0 - 2.0 % Final    Neutrophils Absolute 11/22/2024 4.86  1.20 - 7.70 x10*3/uL Final    Percent differential counts (%) should be interpreted in the context of  the absolute cell counts (cells/uL).    Immature Granulocytes Absolute, Au* 11/22/2024 0.02  0.00 - 0.70 x10*3/uL Final    Lymphocytes Absolute 11/22/2024 1.06 (L)  1.20 - 4.80 x10*3/uL Final    Monocytes Absolute 11/22/2024 0.50  0.10 - 1.00 x10*3/uL Final    Eosinophils Absolute 11/22/2024 0.14  0.00 - 0.70 x10*3/uL Final    Basophils Absolute 11/22/2024 0.06  0.00 - 0.10 x10*3/uL Final   Hospital Outpatient Visit on 09/27/2024   Component Date Value Ref Range Status    POCT Glucose 09/27/2024 105 (H)  74 - 99 mg/dL Final   Lab on 09/12/2024   Component Date Value Ref Range Status    Carcinoembryonic AG 09/12/2024 <0.5  ug/L Final    WBC 09/12/2024 5.5  4.4 - 11.3 x10*3/uL Final    nRBC 09/12/2024    Final    Not Measured    RBC 09/12/2024 4.89  4.50 - 5.90 x10*6/uL Final    Hemoglobin 09/12/2024 13.9  13.5 - 17.5 g/dL Final    Hematocrit 09/12/2024 42.7  41.0 - 52.0 % Final    MCV 09/12/2024 87  80 - 100 fL Final    MCH 09/12/2024 28.4  26.0 - 34.0 pg Final    MCHC 09/12/2024 32.6  32.0 - 36.0 g/dL Final    RDW 09/12/2024 12.9  11.5 - 14.5 % Final    Platelets 09/12/2024 286  150 - 450 x10*3/uL Final    Neutrophils % 09/12/2024 63.8  40.0 - 80.0 % Final    Immature Granulocytes %, Automated 09/12/2024 0.2  0.0 - 0.9 % Final    Immature Granulocyte Count (IG) includes promyelocytes, myelocytes and metamyelocytes but does not include bands. Percent differential counts (%) should be interpreted in the context of the absolute cell counts (cells/UL).    Lymphocytes % 09/12/2024 25.1  13.0 - 44.0 % Final    Monocytes % 09/12/2024 7.5  2.0 - 10.0 % Final    Eosinophils % 09/12/2024 2.9  0.0 - 6.0 % Final    Basophils % 09/12/2024 0.5  0.0 - 2.0 % Final    Neutrophils Absolute 09/12/2024 3.51  1.20 - 7.70 x10*3/uL Final    Percent differential counts (%) should be interpreted in the context of the absolute cell counts (cells/uL).    Immature Granulocytes Absolute, Au* 09/12/2024 0.01  0.00 - 0.70 x10*3/uL Final     Lymphocytes Absolute 09/12/2024 1.38  1.20 - 4.80 x10*3/uL Final    Monocytes Absolute 09/12/2024 0.41  0.10 - 1.00 x10*3/uL Final    Eosinophils Absolute 09/12/2024 0.16  0.00 - 0.70 x10*3/uL Final    Basophils Absolute 09/12/2024 0.03  0.00 - 0.10 x10*3/uL Final    Glucose 09/12/2024 71 (L)  74 - 99 mg/dL Final    Sodium 09/12/2024 139  136 - 145 mmol/L Final    Potassium 09/12/2024 4.0  3.5 - 5.3 mmol/L Final    Chloride 09/12/2024 104  98 - 107 mmol/L Final    Bicarbonate 09/12/2024 26  21 - 32 mmol/L Final    Anion Gap 09/12/2024 13  10 - 20 mmol/L Final    Urea Nitrogen 09/12/2024 19  6 - 23 mg/dL Final    Creatinine 09/12/2024 1.18  0.50 - 1.30 mg/dL Final    eGFR 09/12/2024 67  >60 mL/min/1.73m*2 Final    Calculations of estimated GFR are performed using the 2021 CKD-EPI Study Refit equation without the race variable for the IDMS-Traceable creatinine methods.  https://jasn.asnjournals.org/content/early/2021/09/22/ASN.5820830582    Calcium 09/12/2024 9.6  8.6 - 10.6 mg/dL Final    Albumin 09/12/2024 4.8  3.4 - 5.0 g/dL Final    Alkaline Phosphatase 09/12/2024 35  33 - 136 U/L Final    Total Protein 09/12/2024 7.2  6.4 - 8.2 g/dL Final    AST 09/12/2024 31  9 - 39 U/L Final    Bilirubin, Total 09/12/2024 0.9  0.0 - 1.2 mg/dL Final    ALT 09/12/2024 36  10 - 52 U/L Final    Patients treated with Sulfasalazine may generate falsely decreased results for ALT.    Scan Result 09/12/2024 See Scanned Result   Final   Lab on 08/01/2024   Component Date Value Ref Range Status    Hemoglobin A1C 08/01/2024 7.6 (H)  see below % Final    Estimated Average Glucose 08/01/2024 171  Not Established mg/dL Final    Cholesterol 08/01/2024 100  0 - 199 mg/dL Final          Age      Desirable   Borderline High   High     0-19 Y     0 - 169       170 - 199     >/= 200    20-24 Y     0 - 189       190 - 224     >/= 225         >24 Y     0 - 199       200 - 239     >/= 240   **All ranges are based on fasting samples. Specific    therapeutic targets will vary based on patient-specific   cardiac risk.    Pediatric guidelines reference:Pediatrics 2011, 128(S5).Adult guidelines reference: NCEP ATPIII Guidelines,RYAN 2001, 258:2486-97    Venipuncture immediately after or during the administration of Metamizole may lead to falsely low results. Testing should be performed immediately prior to Metamizole dosing.    HDL-Cholesterol 08/01/2024 41.8  mg/dL Final      Age       Very Low   Low     Normal    High    0-19 Y    < 35      < 40     40-45     ----  20-24 Y    ----     < 40      >45      ----        >24 Y      ----     < 40     40-60      >60      Cholesterol/HDL Ratio 08/01/2024 2.4   Final      Ref Values  Desirable  < 3.4  High Risk  > 5.0    LDL Calculated 08/01/2024 42  <=99 mg/dL Final                                Near   Borderline      AGE      Desirable  Optimal    High     High     Very High     0-19 Y     0 - 109     ---    110-129   >/= 130     ----    20-24 Y     0 - 119     ---    120-159   >/= 160     ----      >24 Y     0 -  99   100-129  130-159   160-189     >/=190      VLDL 08/01/2024 16  0 - 40 mg/dL Final    Triglycerides 08/01/2024 82  0 - 149 mg/dL Final       Age         Desirable   Borderline High   High     Very High   0 D-90 D    19 - 174         ----         ----        ----  91 D- 9 Y     0 -  74        75 -  99     >/= 100      ----    10-19 Y     0 -  89        90 - 129     >/= 130      ----    20-24 Y     0 - 114       115 - 149     >/= 150      ----         >24 Y     0 - 149       150 - 199    200- 499    >/= 500    Venipuncture immediately after or during the administration of Metamizole may lead to falsely low results. Testing should be performed immediately prior to Metamizole dosing.    Non HDL Cholesterol 08/01/2024 58  0 - 149 mg/dL Final          Age       Desirable   Borderline High   High     Very High     0-19 Y     0 - 119       120 - 144     >/= 145    >/= 160    20-24 Y     0 - 149       150 - 189      >/= 190      ----         >24 Y    30 mg/dL above LDL Cholesterol goal      Glucose 08/01/2024 103 (H)  74 - 99 mg/dL Final    Sodium 08/01/2024 138  136 - 145 mmol/L Final    Potassium 08/01/2024 4.1  3.5 - 5.3 mmol/L Final    Chloride 08/01/2024 104  98 - 107 mmol/L Final    Bicarbonate 08/01/2024 26  21 - 32 mmol/L Final    Anion Gap 08/01/2024 12  10 - 20 mmol/L Final    Urea Nitrogen 08/01/2024 20  6 - 23 mg/dL Final    Creatinine 08/01/2024 1.24  0.50 - 1.30 mg/dL Final    eGFR 08/01/2024 63  >60 mL/min/1.73m*2 Final    Calculations of estimated GFR are performed using the 2021 CKD-EPI Study Refit equation without the race variable for the IDMS-Traceable creatinine methods.  https://jasn.asnjournals.org/content/early/2021/09/22/ASN.1350580014    Calcium 08/01/2024 9.4  8.6 - 10.3 mg/dL Final    Albumin 08/01/2024 4.5  3.4 - 5.0 g/dL Final    Alkaline Phosphatase 08/01/2024 34  33 - 136 U/L Final    Total Protein 08/01/2024 6.8  6.4 - 8.2 g/dL Final    AST 08/01/2024 32  9 - 39 U/L Final    Bilirubin, Total 08/01/2024 0.7  0.0 - 1.2 mg/dL Final    ALT 08/01/2024 38  10 - 52 U/L Final    Patients treated with Sulfasalazine may generate falsely decreased results for ALT.    Albumin, Urine Random 08/01/2024 <7.0  Not established mg/L Final    Creatinine, Urine Random 08/01/2024 56.2  20.0 - 370.0 mg/dL Final    Albumin/Creatinine Ratio 08/01/2024    Final    One or more analytes used in this calculation is outside of the analytical measurement range. Calculation cannot be performed.   Lab Requisition on 06/11/2024   Component Date Value Ref Range Status    Tissue/Wound Culture/Smear 06/11/2024 (4+) Abundant Mixed Gram-Positive Bacteria   Final    Tissue/Wound Culture/Smear 06/11/2024 (3+) Moderate Mixed Anaerobic Bacteria   Final    Beta Lactamase (Cefinase) 06/11/2024 Positive   Final    Gram Stain 06/11/2024 No polymorphonuclear leukocytes seen (A)   Final    Gram Stain 06/11/2024 (1+) Rare Gram negative  bacilli (A)   Final   Lab on 06/11/2024   Component Date Value Ref Range Status    Carcinoembryonic AG 06/11/2024 <0.5  ug/L Final    WBC 06/11/2024 5.0  4.4 - 11.3 x10*3/uL Final    nRBC 06/11/2024 0.0  0.0 - 0.0 /100 WBCs Final    RBC 06/11/2024 4.60  4.50 - 5.90 x10*6/uL Final    Hemoglobin 06/11/2024 12.9 (L)  13.5 - 17.5 g/dL Final    Hematocrit 06/11/2024 39.8 (L)  41.0 - 52.0 % Final    MCV 06/11/2024 87  80 - 100 fL Final    MCH 06/11/2024 28.0  26.0 - 34.0 pg Final    MCHC 06/11/2024 32.4  32.0 - 36.0 g/dL Final    RDW 06/11/2024 14.0  11.5 - 14.5 % Final    Platelets 06/11/2024 264  150 - 450 x10*3/uL Final    Neutrophils % 06/11/2024 63.8  40.0 - 80.0 % Final    Immature Granulocytes %, Automated 06/11/2024 0.2  0.0 - 0.9 % Final    Immature Granulocyte Count (IG) includes promyelocytes, myelocytes and metamyelocytes but does not include bands. Percent differential counts (%) should be interpreted in the context of the absolute cell counts (cells/UL).    Lymphocytes % 06/11/2024 26.6  13.0 - 44.0 % Final    Monocytes % 06/11/2024 7.4  2.0 - 10.0 % Final    Eosinophils % 06/11/2024 1.2  0.0 - 6.0 % Final    Basophils % 06/11/2024 0.8  0.0 - 2.0 % Final    Neutrophils Absolute 06/11/2024 3.19  1.20 - 7.70 x10*3/uL Final    Percent differential counts (%) should be interpreted in the context of the absolute cell counts (cells/uL).    Immature Granulocytes Absolute, Au* 06/11/2024 0.01  0.00 - 0.70 x10*3/uL Final    Lymphocytes Absolute 06/11/2024 1.33  1.20 - 4.80 x10*3/uL Final    Monocytes Absolute 06/11/2024 0.37  0.10 - 1.00 x10*3/uL Final    Eosinophils Absolute 06/11/2024 0.06  0.00 - 0.70 x10*3/uL Final    Basophils Absolute 06/11/2024 0.04  0.00 - 0.10 x10*3/uL Final    Glucose 06/11/2024 140 (H)  74 - 99 mg/dL Final    Sodium 06/11/2024 139  136 - 145 mmol/L Final    Potassium 06/11/2024 4.1  3.5 - 5.3 mmol/L Final    Chloride 06/11/2024 104  98 - 107 mmol/L Final    Bicarbonate 06/11/2024 25  21 -  32 mmol/L Final    Anion Gap 06/11/2024 14  10 - 20 mmol/L Final    Urea Nitrogen 06/11/2024 23  6 - 23 mg/dL Final    Creatinine 06/11/2024 1.12  0.50 - 1.30 mg/dL Final    eGFR 06/11/2024 72  >60 mL/min/1.73m*2 Final    Calculations of estimated GFR are performed using the 2021 CKD-EPI Study Refit equation without the race variable for the IDMS-Traceable creatinine methods.  https://jasn.asnjournals.org/content/early/2021/09/22/ASN.8987719174    Calcium 06/11/2024 9.5  8.6 - 10.3 mg/dL Final    Albumin 06/11/2024 4.6  3.4 - 5.0 g/dL Final    Alkaline Phosphatase 06/11/2024 34  33 - 136 U/L Final    Total Protein 06/11/2024 7.4  6.4 - 8.2 g/dL Final    AST 06/11/2024 23  9 - 39 U/L Final    Bilirubin, Total 06/11/2024 0.7  0.0 - 1.2 mg/dL Final    ALT 06/11/2024 26  10 - 52 U/L Final    Patients treated with Sulfasalazine may generate falsely decreased results for ALT.       Physical Exam  CONSTITUTIONAL - well nourished, well developed, looks like stated age, in no acute distress, not ill-appearing, and not tired appearing  SKIN - normal skin color and pigmentation, normal skin turgor without rash, lesions, or nodules visualized  HEAD - no trauma, normocephalic  EYES - pupils are equal and reactive to light, extraocular muscles are intact, and normal external exam  ENT - impacted ceruman in left ear, no injection, no signs of infection, uvula midline, normal tongue movement and throat normal, no exudate, nasal passage without discharge and patent  NECK - supple without rigidity, no neck mass was observed,   LUNG - clear to auscultation, no wheezing, no crackles and no rales, good effort  CARDIAC - tachy rate and regular rhythm, no skipped beats, no murmur  ABDOMEN - no organomegaly, soft, nontender, nondistended, normal bowel sounds  EXTREMITIES - no edema, no deformities  NEUROLOGICAL - normal gait, normal balance, normal motor, alert, oriented and no focal signs  PSYCHIATRIC - alert, pleasant and cordial,  age-appropriate    Assessment/Plan     1. Health Maintenance/[facility]  Complete history and physical examination was performed  We will order blood work.  We will notify of test results once available and make treatment recommendations accordingly.  Your immunizations are up to date.  Continue to eat a well balanced diet as well as regular exercise.    2. Adenocarcinoma of appendix   Continue close management of this with GI/Oncology at upcoming follow-up with them.   If Abdominal pain, nausea, vomiting, or severe constipation returns, return to ED immediately.   Continue with your gabapentin as prescribed by oncology, please  your prescription.    3. Hypertension  Continue olmesartan 40 mg daily, doxazosin 2 mg nightly  I would like to continue to monitor and record blood pressures at home   Blood pressure goal should be below 130/80, ideally 120/80     4. Hyperlipidemia and hypertriglyceridemia  Continue taking atorvastatin  We will recheck a lipid panel and notify results and treatment records accordingly.  We will hold off on your fenofibrate pending your lipid panel.  We will send refills to your pharmacy as needed.     5. Type II Diabetes Mellitus  Continue taking metformin, Jardiance, insulin and monitoring your blood sugars regularly.  Please continue to follow with your endocrinologist regularly.     6. Atrial fibrillation  Continue taking apixaban as prescribed.  Please continue to follow with your cardiologist regularly.     7. Nocturia  Continue doxazosin 2 mg as prescribed.   Stable, will continue to monitor.        Please contact the office if you have any questions/concerns, otherwise follow-up in the office in 6 months.

## 2024-12-11 NOTE — RESULT ENCOUNTER NOTE
Hemoglobin A1c did decrease now down to 7.0% which is much improved down from 7.6%  I would recommend he continues his current regimen and work on diet and exercise as this continues to improve    Cholesterol looks great at 104, HDL 43, LDL 24, triglycerides 186    Sugar slightly elevated 103 but otherwise liver, kidneys, electrolytes within normal limits    Prostate within normal limits

## 2024-12-13 LAB — SCAN RESULT: NORMAL

## 2024-12-16 ENCOUNTER — APPOINTMENT (OUTPATIENT)
Dept: HEMATOLOGY/ONCOLOGY | Facility: HOSPITAL | Age: 69
End: 2024-12-16
Payer: MEDICARE

## 2024-12-20 ENCOUNTER — PATIENT MESSAGE (OUTPATIENT)
Dept: ENDOCRINOLOGY | Facility: CLINIC | Age: 69
End: 2024-12-20
Payer: MEDICARE

## 2024-12-20 DIAGNOSIS — E11.69 TYPE 2 DIABETES MELLITUS WITH OTHER SPECIFIED COMPLICATION, WITH LONG-TERM CURRENT USE OF INSULIN: ICD-10-CM

## 2024-12-20 DIAGNOSIS — Z79.4 TYPE 2 DIABETES MELLITUS WITH OTHER SPECIFIED COMPLICATION, WITH LONG-TERM CURRENT USE OF INSULIN: ICD-10-CM

## 2024-12-21 RX ORDER — INSULIN ASPART 100 [IU]/ML
38 INJECTION, SUSPENSION SUBCUTANEOUS 2 TIMES DAILY
Qty: 15 EACH | Refills: 5 | Status: SHIPPED | OUTPATIENT
Start: 2024-12-21 | End: 2025-06-19

## 2024-12-23 ENCOUNTER — NURSE TRIAGE (OUTPATIENT)
Dept: ADMISSION | Facility: HOSPITAL | Age: 69
End: 2024-12-23
Payer: MEDICARE

## 2024-12-23 NOTE — TELEPHONE ENCOUNTER
Pt called reporting that he experienced some dizziness after he increased gabapentin dosing from 100mg daily to 100mg BID.  For several days after the medication increase, he had a few brief episodes where he felt like he was spinning.  He denies any associated symptoms of dehydration.  These episodes have resolved and he is now tolerating BID dosing without issue and reports improvement in his neuropathy.    He wanted to make sure this was okay before proceeding to TID dosing of gabapentin?  Additional Information  • Commented on: How long have you been having this problem?     Experienced for a few days, usually mid-day between doses of gabapentin  Now resolved  • Commented on: How long have they been going on?     Lasted a few days, now resolved  • Commented on: What else do you want to tell me about this problem?     Denies any other associated symptoms    Protocols used: Dizziness

## 2025-01-02 ENCOUNTER — OFFICE VISIT (OUTPATIENT)
Dept: PRIMARY CARE | Facility: CLINIC | Age: 70
End: 2025-01-02
Payer: MEDICARE

## 2025-01-02 VITALS
SYSTOLIC BLOOD PRESSURE: 118 MMHG | OXYGEN SATURATION: 95 % | HEART RATE: 88 BPM | TEMPERATURE: 99 F | DIASTOLIC BLOOD PRESSURE: 70 MMHG

## 2025-01-02 DIAGNOSIS — J06.9 UPPER RESPIRATORY TRACT INFECTION, UNSPECIFIED TYPE: Primary | ICD-10-CM

## 2025-01-02 PROCEDURE — 4010F ACE/ARB THERAPY RXD/TAKEN: CPT | Performed by: STUDENT IN AN ORGANIZED HEALTH CARE EDUCATION/TRAINING PROGRAM

## 2025-01-02 PROCEDURE — 3074F SYST BP LT 130 MM HG: CPT | Performed by: STUDENT IN AN ORGANIZED HEALTH CARE EDUCATION/TRAINING PROGRAM

## 2025-01-02 PROCEDURE — 99214 OFFICE O/P EST MOD 30 MIN: CPT | Performed by: STUDENT IN AN ORGANIZED HEALTH CARE EDUCATION/TRAINING PROGRAM

## 2025-01-02 PROCEDURE — 1159F MED LIST DOCD IN RCRD: CPT | Performed by: STUDENT IN AN ORGANIZED HEALTH CARE EDUCATION/TRAINING PROGRAM

## 2025-01-02 PROCEDURE — 1157F ADVNC CARE PLAN IN RCRD: CPT | Performed by: STUDENT IN AN ORGANIZED HEALTH CARE EDUCATION/TRAINING PROGRAM

## 2025-01-02 PROCEDURE — 3078F DIAST BP <80 MM HG: CPT | Performed by: STUDENT IN AN ORGANIZED HEALTH CARE EDUCATION/TRAINING PROGRAM

## 2025-01-02 PROCEDURE — 1036F TOBACCO NON-USER: CPT | Performed by: STUDENT IN AN ORGANIZED HEALTH CARE EDUCATION/TRAINING PROGRAM

## 2025-01-02 RX ORDER — BENZONATATE 200 MG/1
200 CAPSULE ORAL 3 TIMES DAILY PRN
Qty: 42 CAPSULE | Refills: 0 | Status: SHIPPED | OUTPATIENT
Start: 2025-01-02 | End: 2025-02-01

## 2025-01-02 RX ORDER — AMOXICILLIN AND CLAVULANATE POTASSIUM 875; 125 MG/1; MG/1
875 TABLET, FILM COATED ORAL 2 TIMES DAILY
Qty: 14 TABLET | Refills: 0 | Status: SHIPPED | OUTPATIENT
Start: 2025-01-02 | End: 2025-01-09

## 2025-01-02 ASSESSMENT — ENCOUNTER SYMPTOMS: COUGH: 1

## 2025-01-02 NOTE — PROGRESS NOTES
"Subjective   Patient ID: Bunny Abarca \"Michael\" is a 69 y.o. male who presents for Cough.  Today he is accompanied by accompanied by spouse.     Cough  This is a recurrent problem. The current episode started 1 to 4 weeks ago. The problem has been gradually improving. The problem occurs hourly. The cough is Non-productive. Associated symptoms include chest pain. The symptoms are aggravated by lying down.       Patient presents with his wife for an upper respiratory infection.  Patient got sick on [place] and has had symptoms since.  Initially had bodyaches for 2 days with upper respiratory symptoms, congestion, rhinorrhea, cough.  States that his congestion and rhinorrhea have improved but he still has an ongoing cough.  Complains of a dry hacking cough, states that he is bringing up a lot of phlegm.  States that the coughing is hurting his hiatal hernia.  He has been using Flonase which improved his congestion.  Denies any fevers, shortness of breath, chest pain, diarrhea, constipation.      Current Outpatient Medications on File Prior to Visit   Medication Sig Dispense Refill    atorvastatin (Lipitor) 40 mg tablet Take 1 tablet (40 mg) by mouth once daily at bedtime. 90 tablet 1    doxazosin (Cardura) 2 mg tablet Take 1 tablet (2 mg) by mouth once daily at bedtime. 90 tablet 1    fenofibrate (Tricor) 145 mg tablet Take 1 tablet (145 mg) by mouth once daily. WITH FOOD 90 tablet 1    gabapentin (Neurontin) 100 mg capsule Take 1 capsule (100 mg) by mouth 3 times a day. For first prescription only: start with 1 capsule at bedtime for 3 days, then 1 capsule in the morning & at bedtime for 3 days, then 1 capsule 3 times daily 90 capsule 3    insulin asp prt-insulin aspart (NovoLOG Mix 70-30FlexPen U-100) 100 unit/mL (70-30) injection Inject 38 Units under the skin 2 times a day. 15 each 5    Jardiance 25 mg Take 1 tablet (25 mg) by mouth once daily. 30 tablet 5    metFORMIN  mg 24 hr tablet Take 2 tablets (1,000 " "mg) by mouth 2 times a day. 120 tablet 5    olmesartan (BENIcar) 40 mg tablet Take 1 tablet (40 mg) by mouth once daily. 90 tablet 1    pen needle, diabetic (BD Ultra-Fine Short Pen Needle) 31 gauge x 5/16\" needle Inject 1 each under the skin 2 times a day. 200 each 3    rivaroxaban (Xarelto) 20 mg tablet Take 1 tablet (20 mg) by mouth once daily in the evening. Take with meals. Take with food. 30 tablet 11     No current facility-administered medications on file prior to visit.        No Known Allergies    Immunization History   Administered Date(s) Administered    COVID-19, mRNA, LNP-S, PF, 30 mcg/0.3 mL dose 03/05/2021, 03/25/2021, 10/21/2021    Flu vaccine (IIV4), preservative free *Check age/dose* 10/24/2016, 11/10/2017, 10/04/2018, 11/01/2019, 11/01/2020    Flu vaccine, quadrivalent, high-dose, preservative free, age 65y+ (FLUZONE) 09/23/2023    Flu vaccine, trivalent, preservative free, HIGH-DOSE, age 65y+ (Fluzone) 10/05/2021, 10/04/2022, 10/03/2024    Influenza, Unspecified 10/04/2022, 10/03/2024    Influenza, seasonal, injectable 12/20/2004, 10/26/2015, 10/04/2022    Moderna COVID-19 vaccine, 12 years and older (50mcg/0.5mL)(Spikevax) 11/04/2023, 10/03/2024    Moderna COVID-19 vaccine, bivalent, blue cap/gray label *Check age/dose* 10/11/2022    Moderna SARS-CoV-2 Vaccination 04/16/2022    Pfizer Purple Cap SARS-CoV-2 10/03/2024    Pneumococcal conjugate vaccine, 13-valent (PREVNAR 13) 10/05/2021    Pneumococcal polysaccharide vaccine, 23-valent, age 2 years and older (PNEUMOVAX 23) 10/05/2022    RESPIRATORY SYNCYTIAL VIRUS (RSV), ELIGIBLE PREGNANT PTS, 0.5 ML (ABRYSVO) 10/02/2023, 10/17/2024    Tdap vaccine, age 7 year and older (BOOSTRIX, ADACEL) 11/10/2017    Zoster vaccine, recombinant, adult (SHINGRIX) 08/24/2021, 10/25/2021         Review of Systems   Respiratory:  Positive for cough.    Cardiovascular:  Positive for chest pain.     All pertinent positive symptoms are included in the history of " present illness.  All other systems have been reviewed and are negative and noncontributory to this patient's current ailments.     Objective   /70 (BP Location: Left arm, Patient Position: Sitting, BP Cuff Size: Adult)   Pulse 88   Temp 37.2 °C (99 °F)   SpO2 95%   BSA: There is no height or weight on file to calculate BSA.  Lab on 12/10/2024   Component Date Value Ref Range Status    Cholesterol 12/10/2024 104  0 - 199 mg/dL Final          Age      Desirable   Borderline High   High     0-19 Y     0 - 169       170 - 199     >/= 200    20-24 Y     0 - 189       190 - 224     >/= 225         >24 Y     0 - 199       200 - 239     >/= 240   **All ranges are based on fasting samples. Specific   therapeutic targets will vary based on patient-specific   cardiac risk.    Pediatric guidelines reference:Pediatrics 2011, 128(S5).Adult guidelines reference: NCEP ATPIII Guidelines,RYAN 2001, 258:2486-97    Venipuncture immediately after or during the administration of Metamizole may lead to falsely low results. Testing should be performed immediately prior to Metamizole dosing.    HDL-Cholesterol 12/10/2024 43.3  mg/dL Final      Age       Very Low   Low     Normal    High    0-19 Y    < 35      < 40     40-45     ----  20-24 Y    ----     < 40      >45      ----        >24 Y      ----     < 40     40-60      >60      Cholesterol/HDL Ratio 12/10/2024 2.4   Final      Ref Values  Desirable  < 3.4  High Risk  > 5.0    LDL Calculated 12/10/2024 24  <=99 mg/dL Final                                Near   Borderline      AGE      Desirable  Optimal    High     High     Very High     0-19 Y     0 - 109     ---    110-129   >/= 130     ----    20-24 Y     0 - 119     ---    120-159   >/= 160     ----      >24 Y     0 -  99   100-129  130-159   160-189     >/=190      VLDL 12/10/2024 37  0 - 40 mg/dL Final    Triglycerides 12/10/2024 186 (H)  0 - 149 mg/dL Final    Age              Desirable        Borderline         High         Very High  SEX:B           mg/dL             mg/dL               mg/dL      mg/dL  <=14D                       ----               ----        ----  15D-365D                    ----               ----        ----  1Y-9Y           0-74               75-99             >=100       ----  10Y-19Y        0-89                            >=130       ----  20Y-24Y        0-114             115-149             >=150      ----  >= 25Y         0-149             150-199             200-499    >=500      Venipuncture immediately after or during the administration of Metamizole may lead to falsely low results. Testing should be performed immediately prior to Metamizole dosing.    Non HDL Cholesterol 12/10/2024 61  0 - 149 mg/dL Final          Age       Desirable   Borderline High   High     Very High     0-19 Y     0 - 119       120 - 144     >/= 145    >/= 160    20-24 Y     0 - 149       150 - 189     >/= 190      ----         >24 Y    30 mg/dL above LDL Cholesterol goal      Glucose 12/10/2024 103 (H)  74 - 99 mg/dL Final    Sodium 12/10/2024 137  136 - 145 mmol/L Final    Potassium 12/10/2024 4.1  3.5 - 5.3 mmol/L Final    Chloride 12/10/2024 102  98 - 107 mmol/L Final    Bicarbonate 12/10/2024 27  21 - 32 mmol/L Final    Anion Gap 12/10/2024 12  10 - 20 mmol/L Final    Urea Nitrogen 12/10/2024 19  6 - 23 mg/dL Final    Creatinine 12/10/2024 0.85  0.50 - 1.30 mg/dL Final    eGFR 12/10/2024 >90  >60 mL/min/1.73m*2 Final    Calculations of estimated GFR are performed using the 2021 CKD-EPI Study Refit equation without the race variable for the IDMS-Traceable creatinine methods.  https://jasn.asnjournals.org/content/early/2021/09/22/ASN.1441955154    Calcium 12/10/2024 8.8  8.6 - 10.3 mg/dL Final    Albumin 12/10/2024 4.3  3.4 - 5.0 g/dL Final    Alkaline Phosphatase 12/10/2024 47  33 - 136 U/L Final    Total Protein 12/10/2024 6.6  6.4 - 8.2 g/dL Final    AST 12/10/2024 26  9 - 39 U/L Final    Bilirubin, Total  12/10/2024 1.2  0.0 - 1.2 mg/dL Final    ALT 12/10/2024 34  10 - 52 U/L Final    Patients treated with Sulfasalazine may generate falsely decreased results for ALT.    Prostate Specific Antigen,Screen 12/10/2024 0.55  <=4.00 ng/mL Final    Hemoglobin A1C 12/10/2024 7.0 (H)  See comment % Final    Estimated Average Glucose 12/10/2024 154  Not Established mg/dL Final       Physical Exam  CONSTITUTIONAL - well nourished, well developed, looks like stated age, in no acute distress, not ill-appearing, and not tired appearing  SKIN - normal skin color and pigmentation, normal skin turgor without rash, lesions, or nodules visualized  HEAD - no trauma, normocephalic  EYES - normal external exam  LUNG -coarse but clear to auscultation, no wheezing, no rales, good effort  CARDIAC - regular rate and regular rhythm, no skipped beats, no murmur  ABDOMEN - no organomegaly, soft, nontender, nondistended, normal bowel sounds  EXTREMITIES - no edema, no deformities  NEUROLOGICAL - normal balance, normal motor, no ataxia  PSYCHIATRIC - alert, pleasant and cordial, age-appropriate      Assessment/Plan   URI infection  A prescription for Augmentin twice daily x 7 days has been sent to your pharmacy.  Please take as directed  Risks, benefits, and options of treatment(s) were discussed after reviewing all current medication(s) and drug allergy(ies)  I opted for the treatment that we discussed with instructions on the medication use for your underlying medical ailment(s)  I encouraged supportive care such as rest, fluids and Advil/Tylenol as warranted  In addition we will also order a chest x-ray for you to get done if your symptoms do not improve within 7 to 10 days.  Return to the clinic in 7-10 days or sooner if symptoms worsen or persist as we will then further evaluate

## 2025-01-09 ENCOUNTER — PATIENT MESSAGE (OUTPATIENT)
Dept: CARDIOLOGY | Facility: CLINIC | Age: 70
End: 2025-01-09
Payer: MEDICARE

## 2025-01-09 DIAGNOSIS — I48.91 ATRIAL FIBRILLATION, UNSPECIFIED TYPE (MULTI): ICD-10-CM

## 2025-01-21 ENCOUNTER — TELEPHONE (OUTPATIENT)
Dept: ENDOCRINOLOGY | Facility: CLINIC | Age: 70
End: 2025-01-21
Payer: MEDICARE

## 2025-01-21 NOTE — TELEPHONE ENCOUNTER
Pt moved out of town, pt is seeking new endocrinologist     Attempted to contact pt to inform him that the prior authorization for the Insulin Asp Prot & Asp flexpen susp-Pen-inj was denied and the preferred is Novolin 70/30. Left detailed vm.     ([facility]

## 2025-01-23 ENCOUNTER — HOSPITAL ENCOUNTER (OUTPATIENT)
Dept: RADIOLOGY | Facility: CLINIC | Age: 70
Discharge: HOME | End: 2025-01-23
Payer: MEDICARE

## 2025-01-23 DIAGNOSIS — J06.9 UPPER RESPIRATORY TRACT INFECTION, UNSPECIFIED TYPE: ICD-10-CM

## 2025-01-23 PROCEDURE — 71046 X-RAY EXAM CHEST 2 VIEWS: CPT | Performed by: RADIOLOGY

## 2025-01-23 PROCEDURE — 71046 X-RAY EXAM CHEST 2 VIEWS: CPT

## 2025-01-24 DIAGNOSIS — J06.9 UPPER RESPIRATORY TRACT INFECTION, UNSPECIFIED TYPE: ICD-10-CM

## 2025-01-24 RX ORDER — DOXYCYCLINE 100 MG/1
100 CAPSULE ORAL 2 TIMES DAILY
Qty: 14 CAPSULE | Refills: 0 | Status: SHIPPED | OUTPATIENT
Start: 2025-01-24 | End: 2025-01-31

## 2025-01-26 NOTE — RESULT ENCOUNTER NOTE
X-ray of the chest does show a mild linear atelectasis/scarring of the lingula    Otherwise, no acute pneumonia is noted    Please let me know how he is feeling

## 2025-02-03 ENCOUNTER — PATIENT MESSAGE (OUTPATIENT)
Dept: ENDOCRINOLOGY | Facility: CLINIC | Age: 70
End: 2025-02-03
Payer: MEDICARE

## 2025-02-17 DIAGNOSIS — E11.69 TYPE 2 DIABETES MELLITUS WITH OTHER SPECIFIED COMPLICATION, WITH LONG-TERM CURRENT USE OF INSULIN: ICD-10-CM

## 2025-02-17 DIAGNOSIS — Z79.4 TYPE 2 DIABETES MELLITUS WITH OTHER SPECIFIED COMPLICATION, WITH LONG-TERM CURRENT USE OF INSULIN: ICD-10-CM

## 2025-02-17 RX ORDER — INSULIN ASPART 100 [IU]/ML
38 INJECTION, SUSPENSION SUBCUTANEOUS 2 TIMES DAILY
Qty: 45 EACH | Refills: 0 | Status: SHIPPED | OUTPATIENT
Start: 2025-02-17 | End: 2025-05-18

## 2025-02-17 NOTE — TELEPHONE ENCOUNTER
(Patient seeing new endo)    Patient is asking for refill for novolog mix/aspart to be sent to [facility], spoke with pharmacy (paharmacy state prescription has , (please resend)

## 2025-02-27 ENCOUNTER — LAB (OUTPATIENT)
Dept: LAB | Facility: HOSPITAL | Age: 70
End: 2025-02-27
Payer: MEDICARE

## 2025-02-27 DIAGNOSIS — C77.2 CANCER OF APPENDIX METASTATIC TO INTRA-ABDOMINAL LYMPH NODE (MULTI): ICD-10-CM

## 2025-02-27 DIAGNOSIS — C18.1 MALIGNANT NEOPLASM OF APPENDIX (MULTI): Primary | ICD-10-CM

## 2025-02-27 DIAGNOSIS — C18.1 CANCER OF APPENDIX METASTATIC TO INTRA-ABDOMINAL LYMPH NODE (MULTI): ICD-10-CM

## 2025-02-27 LAB
ALBUMIN SERPL BCP-MCNC: 4.6 G/DL (ref 3.4–5)
ALP SERPL-CCNC: 53 U/L (ref 33–136)
ALT SERPL W P-5'-P-CCNC: 30 U/L (ref 10–52)
ANION GAP SERPL CALC-SCNC: 13 MMOL/L (ref 10–20)
AST SERPL W P-5'-P-CCNC: 23 U/L (ref 9–39)
BASOPHILS # BLD AUTO: 0.03 X10*3/UL (ref 0–0.1)
BASOPHILS NFR BLD AUTO: 0.6 %
BILIRUB SERPL-MCNC: 1.6 MG/DL (ref 0–1.2)
BUN SERPL-MCNC: 20 MG/DL (ref 6–23)
CALCIUM SERPL-MCNC: 9.5 MG/DL (ref 8.6–10.3)
CEA SERPL-MCNC: <0.5 UG/L
CHLORIDE SERPL-SCNC: 104 MMOL/L (ref 98–107)
CO2 SERPL-SCNC: 25 MMOL/L (ref 21–32)
CREAT SERPL-MCNC: 0.87 MG/DL (ref 0.5–1.3)
EGFRCR SERPLBLD CKD-EPI 2021: >90 ML/MIN/1.73M*2
EOSINOPHIL # BLD AUTO: 0.14 X10*3/UL (ref 0–0.7)
EOSINOPHIL NFR BLD AUTO: 3 %
ERYTHROCYTE [DISTWIDTH] IN BLOOD BY AUTOMATED COUNT: 13.5 % (ref 11.5–14.5)
GLUCOSE SERPL-MCNC: 94 MG/DL (ref 74–99)
HCT VFR BLD AUTO: 46.3 % (ref 41–52)
HGB BLD-MCNC: 15.1 G/DL (ref 13.5–17.5)
IMM GRANULOCYTES # BLD AUTO: 0.03 X10*3/UL (ref 0–0.7)
IMM GRANULOCYTES NFR BLD AUTO: 0.6 % (ref 0–0.9)
LYMPHOCYTES # BLD AUTO: 1.31 X10*3/UL (ref 1.2–4.8)
LYMPHOCYTES NFR BLD AUTO: 27.9 %
MCH RBC QN AUTO: 28.2 PG (ref 26–34)
MCHC RBC AUTO-ENTMCNC: 32.6 G/DL (ref 32–36)
MCV RBC AUTO: 86 FL (ref 80–100)
MONOCYTES # BLD AUTO: 0.35 X10*3/UL (ref 0.1–1)
MONOCYTES NFR BLD AUTO: 7.5 %
NEUTROPHILS # BLD AUTO: 2.83 X10*3/UL (ref 1.2–7.7)
NEUTROPHILS NFR BLD AUTO: 60.4 %
NRBC BLD-RTO: 0 /100 WBCS (ref 0–0)
PLATELET # BLD AUTO: 253 X10*3/UL (ref 150–450)
POTASSIUM SERPL-SCNC: 4.4 MMOL/L (ref 3.5–5.3)
PROT SERPL-MCNC: 7.1 G/DL (ref 6.4–8.2)
RBC # BLD AUTO: 5.36 X10*6/UL (ref 4.5–5.9)
SODIUM SERPL-SCNC: 138 MMOL/L (ref 136–145)
WBC # BLD AUTO: 4.7 X10*3/UL (ref 4.4–11.3)

## 2025-02-27 PROCEDURE — 80053 COMPREHEN METABOLIC PANEL: CPT

## 2025-02-27 PROCEDURE — 85025 COMPLETE CBC W/AUTO DIFF WBC: CPT

## 2025-02-27 PROCEDURE — 36415 COLL VENOUS BLD VENIPUNCTURE: CPT

## 2025-02-27 PROCEDURE — 82378 CARCINOEMBRYONIC ANTIGEN: CPT

## 2025-03-11 NOTE — ASSESSMENT & PLAN NOTE
Fish Oil dose should be 4g per day for triglycerides   To continue Atorvastatin 40mg once daily   To continue Fenofibrate 145mg once daily     For follow up in 4-5 months with glucose log   No

## 2025-03-12 ENCOUNTER — HOSPITAL ENCOUNTER (OUTPATIENT)
Dept: RADIOLOGY | Facility: CLINIC | Age: 70
Discharge: HOME | End: 2025-03-12
Payer: MEDICARE

## 2025-03-12 DIAGNOSIS — C18.1 CANCER OF APPENDIX METASTATIC TO INTRA-ABDOMINAL LYMPH NODE (MULTI): ICD-10-CM

## 2025-03-12 DIAGNOSIS — C77.2 CANCER OF APPENDIX METASTATIC TO INTRA-ABDOMINAL LYMPH NODE (MULTI): ICD-10-CM

## 2025-03-12 PROCEDURE — 74177 CT ABD & PELVIS W/CONTRAST: CPT | Performed by: RADIOLOGY

## 2025-03-12 PROCEDURE — 2550000001 HC RX 255 CONTRASTS

## 2025-03-12 PROCEDURE — 71260 CT THORAX DX C+: CPT

## 2025-03-12 PROCEDURE — 71260 CT THORAX DX C+: CPT | Performed by: RADIOLOGY

## 2025-03-12 RX ADMIN — IOHEXOL 70 ML: 350 INJECTION, SOLUTION INTRAVENOUS at 10:22

## 2025-03-17 ENCOUNTER — TELEPHONE (OUTPATIENT)
Dept: ADMISSION | Facility: HOSPITAL | Age: 70
End: 2025-03-17
Payer: MEDICARE

## 2025-03-17 NOTE — TELEPHONE ENCOUNTER
Patient wanted to report he tested positive for Covid on 3/9. He feels he has recovered and no symptoms currently. He will plan to wear a mask still for appt, tomorrow with Dr. Madrigal for new physician appt.   Wants to make sure there is no concern for still proceeding with appt

## 2025-03-18 ENCOUNTER — TELEMEDICINE (OUTPATIENT)
Dept: HEMATOLOGY/ONCOLOGY | Facility: CLINIC | Age: 70
End: 2025-03-18
Payer: MEDICARE

## 2025-03-18 ENCOUNTER — APPOINTMENT (OUTPATIENT)
Dept: HEMATOLOGY/ONCOLOGY | Facility: CLINIC | Age: 70
End: 2025-03-18
Payer: MEDICARE

## 2025-03-18 DIAGNOSIS — C77.2 CANCER OF APPENDIX METASTATIC TO INTRA-ABDOMINAL LYMPH NODE (MULTI): Primary | ICD-10-CM

## 2025-03-18 DIAGNOSIS — C18.1 CANCER OF APPENDIX METASTATIC TO INTRA-ABDOMINAL LYMPH NODE (MULTI): Primary | ICD-10-CM

## 2025-03-18 PROCEDURE — 99214 OFFICE O/P EST MOD 30 MIN: CPT | Performed by: INTERNAL MEDICINE

## 2025-03-18 PROCEDURE — 1157F ADVNC CARE PLAN IN RCRD: CPT | Performed by: INTERNAL MEDICINE

## 2025-03-18 PROCEDURE — 4010F ACE/ARB THERAPY RXD/TAKEN: CPT | Performed by: INTERNAL MEDICINE

## 2025-03-18 NOTE — PROGRESS NOTES
Cancer History:  DIAGNOSIS: Appendiceal adenocarcinoma    STAGING: pT4a pN1a Mx    CURRENT SITES OF DISEASE:    MOLECULAR/GENOMICS:  MMR-intact    ctDNA Signatera:  8/14/23: (0) negative  9/25/23: (0) negative  11/6/23: (0) negative  1/2/24: (0) negative  2/2/24: (0) negative  6/11/24: (0) negative  9/12/24: (0) negative  11/22/24: (0) negative    TUMOR MARKER:  5/15/23 CEA: <0.5  8/14/23 CEA: <0.5  11/7/23 CEA: <0.5  12/18/23 CEA: 1.3  1/2/24 CEA: 0.5  1/29/24 CEA: <0.5  2/12/24 CEA: <0.5  3/11/24 CEA: <0.5  6/11/23 CEA: <0.5  9/12/24 CEA: <0.5  11/22/24 CEA: <0.5    CURRENT THERAPY:  Surveillance     PREVIOUS THERAPY:  6/20/23: S/p R hemicolectomy  8/29/23--2/13/24: Adjuvant FOLFOX every 2 weeks x 12 cycles; Dose reduction of Oxaliplatin with C4 due to lasting cold sensitivity. Dose reduced Oxaliplatin with C7 due to increasing neuropathy. Cycle 12 given 2/13/24    ONCOLOGIC ISSUES:  Neuropathy  Thrush    PROVIDERS:  CRS: Jazmin Faulkner    HISTORY:   4/2023: presented with R inguinal pain. Thought to have a hernia.  4/17/23: CT abdomen pelvis showed indeterminate masslike lesion at the cecum posteriorly at the expected level of the appendix measuring 3 x 4 cm in dimension.  Also within the left pelvis near the origin of the left inguinal canal there is an indeterminate cavitary mass measuring 2.5 x 2 cm  5/5/23: Underwent colonoscopy.  Report not available.  Pathology of cecal/appendiceal orifice mass biopsy showed poorly differentiated adenocarcinoma with signet ring cell differentiation  5/17/23: CT chest abdomen pelvis showed no evidence of metastatic disease, again noted soft tissue mass near the base of the cecum measuring 4.2 x 2.8 cm, compatible with reported history of appendiceal carcinoma  6/20/23: Underwent right hemicolectomy. Final pathology showed appendiceal invasive moderately differentiated mucinous adenocarcinoma measuring 5 cm.  Tumor invades the visceral peritoneum.  No lymphovascular or  perineural invasion. 1/44 LNs involved.  MMR protein expression intact    PMH: HTN, Gilbert's disease, HLD, DM  SH: , no tobacco, EtOH, illicits  FH: Sister and nephew with Rodriguez syndrome.      Subjective    Mr. Abarca doing quite well. He notes his neuropathy (tingling and numbness, no longer burning) is still present bilaterally, slightly worse on the left than the right, on the distal part of the hands and feet. It has affected his ability to walk (states he is walking very carefully having to see where he puts his feet, like a 90 year old). Tylenol and Advil have not helped significantly. He also has noticed it is worse with the cold. His oral issues have resolved, as it was a bacterial infection and not actually thrush, so he received antibiotics. He did recently get diagnosed with pneumonia a few days ago and has mostly recovered from it. Otherwise denies any fever/chills, N/V, chest pain, abdominal pain, rash, or leg swelling.   ROS as above. Remainder of 10-point review of systems elicited and negative.     Objective:  There were no vitals taken for this visit.     Daily Weight  12/10/24 : 92.4 kg (203 lb 12.8 oz)  12/09/24 : 91.9 kg (202 lb 9.6 oz)  11/20/24 : 91.2 kg (201 lb)  09/30/24 : 90 kg (198 lb 8 oz)  09/27/24 : 89.7 kg (197 lb 12 oz)  09/16/24 : 89.7 kg (197 lb 12 oz)  09/10/24 : 91.2 kg (201 lb)      Physical Exam  Gen: A&O, NAD  Head: Normocephalic, atraumatic  Eyes: no scleral icterus  ENT: mucous membranes moist, no oropharyngeal lesions. White plaque noted over surface of tongue  Resp: Lungs CTAB  Cardiac: Tachycardiac, regular rhythm, no murmurs appreciated  Abdomen: Soft, nondistended, nontender, +BS  Neuro: CNII-XII grossly intact  Psych: appropriate mood & affect  Skin: warm, dry, no apparent rashes     ECOG Performance Status: 0     WBC   Date/Time Value Ref Range Status   02/27/2025 11:05 AM 4.7 4.4 - 11.3 x10*3/uL Final   11/22/2024 10:04 AM 6.6 4.4 - 11.3 x10*3/uL Final    09/12/2024 08:13 AM 5.5 4.4 - 11.3 x10*3/uL Final     Hemoglobin   Date Value Ref Range Status   02/27/2025 15.1 13.5 - 17.5 g/dL Final   11/22/2024 14.5 13.5 - 17.5 g/dL Final   09/12/2024 13.9 13.5 - 17.5 g/dL Final     MCV   Date/Time Value Ref Range Status   02/27/2025 11:05 AM 86 80 - 100 fL Final   11/22/2024 10:04 AM 88 80 - 100 fL Final   09/12/2024 08:13 AM 87 80 - 100 fL Final     Platelets   Date/Time Value Ref Range Status   02/27/2025 11:05  150 - 450 x10*3/uL Final   11/22/2024 10:04  150 - 450 x10*3/uL Final   09/12/2024 08:13  150 - 450 x10*3/uL Final     Neutrophils Absolute   Date/Time Value Ref Range Status   02/27/2025 11:05 AM 2.83 1.20 - 7.70 x10*3/uL Final     Comment:     Percent differential counts (%) should be interpreted in the context of the absolute cell counts (cells/uL).   11/22/2024 10:04 AM 4.86 1.20 - 7.70 x10*3/uL Final     Comment:     Percent differential counts (%) should be interpreted in the context of the absolute cell counts (cells/uL).   09/12/2024 08:13 AM 3.51 1.20 - 7.70 x10*3/uL Final     Comment:     Percent differential counts (%) should be interpreted in the context of the absolute cell counts (cells/uL).     Bilirubin, Total   Date/Time Value Ref Range Status   02/27/2025 11:05 AM 1.6 (H) 0.0 - 1.2 mg/dL Final   12/10/2024 11:51 AM 1.2 0.0 - 1.2 mg/dL Final   11/22/2024 10:04 AM 2.1 (H) 0.0 - 1.2 mg/dL Final     AST   Date/Time Value Ref Range Status   02/27/2025 11:05 AM 23 9 - 39 U/L Final   12/10/2024 11:51 AM 26 9 - 39 U/L Final   11/22/2024 10:04 AM 35 9 - 39 U/L Final     ALT   Date/Time Value Ref Range Status   02/27/2025 11:05 AM 30 10 - 52 U/L Final     Comment:     Patients treated with Sulfasalazine may generate falsely decreased results for ALT.   12/10/2024 11:51 AM 34 10 - 52 U/L Final     Comment:     Patients treated with Sulfasalazine may generate falsely decreased results for ALT.   11/22/2024 10:04 AM 36 10 - 52 U/L Final      "Comment:     Patients treated with Sulfasalazine may generate falsely decreased results for ALT.     Creatinine   Date/Time Value Ref Range Status   02/27/2025 11:05 AM 0.87 0.50 - 1.30 mg/dL Final   12/10/2024 11:51 AM 0.85 0.50 - 1.30 mg/dL Final   11/22/2024 10:04 AM 0.97 0.50 - 1.30 mg/dL Final     Urea Nitrogen   Date/Time Value Ref Range Status   02/27/2025 11:05 AM 20 6 - 23 mg/dL Final   12/10/2024 11:51 AM 19 6 - 23 mg/dL Final   11/22/2024 10:04 AM 23 6 - 23 mg/dL Final     Albumin   Date/Time Value Ref Range Status   02/27/2025 11:05 AM 4.6 3.4 - 5.0 g/dL Final   12/10/2024 11:51 AM 4.3 3.4 - 5.0 g/dL Final   11/22/2024 10:04 AM 4.4 3.4 - 5.0 g/dL Final     No results found for: \"\"  Carcinoembryonic AG   Date/Time Value Ref Range Status   02/27/2025 11:05 AM <0.5 ug/L Final   11/22/2024 10:04 AM <0.5 ug/L Final   09/12/2024 08:13 AM <0.5 ug/L Final       === 03/12/25 ===    CT CHEST ABDOMEN PELVIS W IV CONTRAST    - Impression -  Stable partial colectomy changes. No findings of disease progression.    Gallstones and hepatic steatosis again detected.    Signed by: Aidan Ware 3/13/2025 9:01 AM  Dictation workstation:   [id number]      Assessment/Plan   Mr. Abarca is a 69 y.o. 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 Faulkner.     He presents today for discussion of adjuvant chemotherapy. I personally reviewed the images from the recent CT, which show no evidence of metastatic disease.  I reviewed the results of his pathology synoptic report, which show pT4a pN1a, Stage III cancer.     I discussed with him and his wife at length that given the stage of his appendiceal cancer, I recommend systemic chemotherapy.  The options are for FOLFOX versus capecitabine plus oxaliplatin.  Given the high risk nature of the diagnosis, my preference is for 6 months of adjuvant chemotherapy, and FOLFOX is better tolerated for this duration.    I discussed the risks and " benefits and side effect profile of FOLFOX chemotherapy, and he agrees to proceed.    After 1st cycle of FOLFOX, found to have extensive PE throughout B/L lungs including saddle embolus of main pulmonary artery. On 9/5/23 he had aspiration thrombectomy and was placed on Apixaban. Discussed admission with Dr. Webb. Ok to proceed with chemotherapy as long as patient is feeling well.     10/9/23: After 2nd cycle, he experienced fatigue, cold sensitivity, nausea and diarrhea.    10/23/23: Having more cold and now light sensitivity. Will decrease dose of Oxaliplatin.     11/20/23: Improved fatigue. Still with taste and cold sensitivity, which is stable.   12/18/23: Improving energy, taste is worse, + thrush  1/2/24: Doing well, improving energy. Still with thrush but taste is improving. Proceed with chemo today.  2/12/24: Will proceed with final cycle of FOLFOX, will get post-tx CT in approx 3 weeks with RTC 4 weeks then proceed with surveillance  3/11/24: I personally reviewed the images from the recent CT, which show no evidence of disease. CEA undetectable. Will proceed with surveillance.   6/11/24. Doing well. Neuropathy is slowly improving. Continues with white 'coating' on tongue. Continue with surveillance every 3 months with lab work and CT scan every 6 months. Colonoscopy done in May 2024 was unremarkable. Will need it repeated in 3 years.  9/16/24: Doing exceedingly well. CEA remains undetectable, Signatera pending. Repeat CT with no evidence of disease recurrence or metastasis  12/9/24: CEA and Signatera are negative. Still doing well, but still with remnant neuropathy including tingling, numbness, cold sensitivity and altered proprioception    PLAN:  Stage III appendiceal cancer  - S/p 6mo adjuvant chemotherapy with FOLFOX 8/2023-2/2024  - CEA undetectable, most recent scans (3/2025) without evidence of disease recurrence or mets  - Proceed with surveillance with labs including Signatera & CEA q3mo and  CT q6mo for 2 years, then will space out  - Repeat colonoscopy 5/2027  - RTC 3mo with repeat CBC, CMP, CEA, and Signatera     Oxaliplatin-induced neuropathy  - symptoms appear to be consistent with altered proprioception  - will begin low dose gabapentin 100 mg TID today, and uptitrate as needed  - patient interested in Dr. Perry's Scrambler therapy    Pulmonary Embolism including saddle embolism  -- Mediport removed  - has follow up with vascular medicine who will manage apixaban , should be able to discontinue once Mediport is removed  - Switched to rivaroxaban for arrhythmias.      Oral leukoplakia, resolved    RTC: 4 months    Jalen Madrigal MD     [facility]/ [facility] [place]  Office: [phone]  Fax: [phone]

## 2025-03-18 NOTE — PROGRESS NOTES
Cancer History:  DIAGNOSIS: Appendiceal adenocarcinoma    STAGING: pT4a pN1a Mx    CURRENT SITES OF DISEASE:    MOLECULAR/GENOMICS:  MMR-intact    ctDNA Signatera:  8/14/23: (0) negative  9/25/23: (0) negative  11/6/23: (0) negative  1/2/24: (0) negative  2/2/24: (0) negative  6/11/24: (0) negative  9/12/24: (0) negative  11/22/24: (0) negative    TUMOR MARKER:  5/15/23 CEA: <0.5  8/14/23 CEA: <0.5  11/7/23 CEA: <0.5  12/18/23 CEA: 1.3  1/2/24 CEA: 0.5  1/29/24 CEA: <0.5  2/12/24 CEA: <0.5  3/11/24 CEA: <0.5  6/11/23 CEA: <0.5  9/12/24 CEA: <0.5  11/22/24 CEA: <0.5    CURRENT THERAPY:  Surveillance     PREVIOUS THERAPY:  6/20/23: S/p R hemicolectomy  8/29/23--2/13/24: Adjuvant FOLFOX every 2 weeks x 12 cycles; Dose reduction of Oxaliplatin with C4 due to lasting cold sensitivity. Dose reduced Oxaliplatin with C7 due to increasing neuropathy. Cycle 12 given 2/13/24    ONCOLOGIC ISSUES:  Neuropathy  Thrush    PROVIDERS:  CRS: Jazmin Faulkner    HISTORY:   4/2023: presented with R inguinal pain. Thought to have a hernia.  4/17/23: CT abdomen pelvis showed indeterminate masslike lesion at the cecum posteriorly at the expected level of the appendix measuring 3 x 4 cm in dimension.  Also within the left pelvis near the origin of the left inguinal canal there is an indeterminate cavitary mass measuring 2.5 x 2 cm  5/5/23: Underwent colonoscopy.  Report not available.  Pathology of cecal/appendiceal orifice mass biopsy showed poorly differentiated adenocarcinoma with signet ring cell differentiation  5/17/23: CT chest abdomen pelvis showed no evidence of metastatic disease, again noted soft tissue mass near the base of the cecum measuring 4.2 x 2.8 cm, compatible with reported history of appendiceal carcinoma  6/20/23: Underwent right hemicolectomy. Final pathology showed appendiceal invasive moderately differentiated mucinous adenocarcinoma measuring 5 cm.  Tumor invades the visceral peritoneum.  No lymphovascular or  perineural invasion. 1/44 LNs involved.  MMR protein expression intact    PMH: HTN, Gilbert's disease, HLD, DM  SH: , no tobacco, EtOH, illicits  FH: Sister and nephew with Rodriguez syndrome.      Subjective    Mr. Abarca doing quite well. He notes his neuropathy (tingling and numbness, no longer burning) is still present bilaterally, slightly worse on the left than the right, on the distal part of the hands and feet. It has affected his ability to walk (states he is walking very carefully having to see where he puts his feet, like a 90 year old). Tylenol and Advil have not helped significantly. He also has noticed it is worse with the cold. His oral issues have resolved, as it was a bacterial infection and not actually thrush, so he received antibiotics. He did recently get diagnosed with pneumonia a few days ago and has mostly recovered from it. Otherwise denies any fever/chills, N/V, chest pain, abdominal pain, rash, or leg swelling.   ROS as above. Remainder of 10-point review of systems elicited and negative.     Objective:  There were no vitals taken for this visit.     Daily Weight  12/10/24 : 92.4 kg (203 lb 12.8 oz)  12/09/24 : 91.9 kg (202 lb 9.6 oz)  11/20/24 : 91.2 kg (201 lb)  09/30/24 : 90 kg (198 lb 8 oz)  09/27/24 : 89.7 kg (197 lb 12 oz)  09/16/24 : 89.7 kg (197 lb 12 oz)  09/10/24 : 91.2 kg (201 lb)      Physical Exam  Gen: A&O, NAD  Head: Normocephalic, atraumatic  Eyes: no scleral icterus  ENT: mucous membranes moist, no oropharyngeal lesions. White plaque noted over surface of tongue  Resp: Lungs CTAB  Cardiac: Tachycardiac, regular rhythm, no murmurs appreciated  Abdomen: Soft, nondistended, nontender, +BS  Neuro: CNII-XII grossly intact  Psych: appropriate mood & affect  Skin: warm, dry, no apparent rashes     ECOG Performance Status: 0     WBC   Date/Time Value Ref Range Status   02/27/2025 11:05 AM 4.7 4.4 - 11.3 x10*3/uL Final   11/22/2024 10:04 AM 6.6 4.4 - 11.3 x10*3/uL Final    09/12/2024 08:13 AM 5.5 4.4 - 11.3 x10*3/uL Final     Hemoglobin   Date Value Ref Range Status   02/27/2025 15.1 13.5 - 17.5 g/dL Final   11/22/2024 14.5 13.5 - 17.5 g/dL Final   09/12/2024 13.9 13.5 - 17.5 g/dL Final     MCV   Date/Time Value Ref Range Status   02/27/2025 11:05 AM 86 80 - 100 fL Final   11/22/2024 10:04 AM 88 80 - 100 fL Final   09/12/2024 08:13 AM 87 80 - 100 fL Final     Platelets   Date/Time Value Ref Range Status   02/27/2025 11:05  150 - 450 x10*3/uL Final   11/22/2024 10:04  150 - 450 x10*3/uL Final   09/12/2024 08:13  150 - 450 x10*3/uL Final     Neutrophils Absolute   Date/Time Value Ref Range Status   02/27/2025 11:05 AM 2.83 1.20 - 7.70 x10*3/uL Final     Comment:     Percent differential counts (%) should be interpreted in the context of the absolute cell counts (cells/uL).   11/22/2024 10:04 AM 4.86 1.20 - 7.70 x10*3/uL Final     Comment:     Percent differential counts (%) should be interpreted in the context of the absolute cell counts (cells/uL).   09/12/2024 08:13 AM 3.51 1.20 - 7.70 x10*3/uL Final     Comment:     Percent differential counts (%) should be interpreted in the context of the absolute cell counts (cells/uL).     Bilirubin, Total   Date/Time Value Ref Range Status   02/27/2025 11:05 AM 1.6 (H) 0.0 - 1.2 mg/dL Final   12/10/2024 11:51 AM 1.2 0.0 - 1.2 mg/dL Final   11/22/2024 10:04 AM 2.1 (H) 0.0 - 1.2 mg/dL Final     AST   Date/Time Value Ref Range Status   02/27/2025 11:05 AM 23 9 - 39 U/L Final   12/10/2024 11:51 AM 26 9 - 39 U/L Final   11/22/2024 10:04 AM 35 9 - 39 U/L Final     ALT   Date/Time Value Ref Range Status   02/27/2025 11:05 AM 30 10 - 52 U/L Final     Comment:     Patients treated with Sulfasalazine may generate falsely decreased results for ALT.   12/10/2024 11:51 AM 34 10 - 52 U/L Final     Comment:     Patients treated with Sulfasalazine may generate falsely decreased results for ALT.   11/22/2024 10:04 AM 36 10 - 52 U/L Final      "Comment:     Patients treated with Sulfasalazine may generate falsely decreased results for ALT.     Creatinine   Date/Time Value Ref Range Status   02/27/2025 11:05 AM 0.87 0.50 - 1.30 mg/dL Final   12/10/2024 11:51 AM 0.85 0.50 - 1.30 mg/dL Final   11/22/2024 10:04 AM 0.97 0.50 - 1.30 mg/dL Final     Urea Nitrogen   Date/Time Value Ref Range Status   02/27/2025 11:05 AM 20 6 - 23 mg/dL Final   12/10/2024 11:51 AM 19 6 - 23 mg/dL Final   11/22/2024 10:04 AM 23 6 - 23 mg/dL Final     Albumin   Date/Time Value Ref Range Status   02/27/2025 11:05 AM 4.6 3.4 - 5.0 g/dL Final   12/10/2024 11:51 AM 4.3 3.4 - 5.0 g/dL Final   11/22/2024 10:04 AM 4.4 3.4 - 5.0 g/dL Final     No results found for: \"\"  Carcinoembryonic AG   Date/Time Value Ref Range Status   02/27/2025 11:05 AM <0.5 ug/L Final   11/22/2024 10:04 AM <0.5 ug/L Final   09/12/2024 08:13 AM <0.5 ug/L Final       === 03/12/25 ===    CT CHEST ABDOMEN PELVIS W IV CONTRAST    - Impression -  Stable partial colectomy changes. No findings of disease progression.    Gallstones and hepatic steatosis again detected.    Signed by: Aidan Ware 3/13/2025 9:01 AM  Dictation workstation:   [id number]      Assessment/Plan   Mr. Abarca is a 69 y.o. 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 Faulkner.     He presents today for discussion of adjuvant chemotherapy. I personally reviewed the images from the recent CT, which show no evidence of metastatic disease.  I reviewed the results of his pathology synoptic report, which show pT4a pN1a, Stage III cancer.     I discussed with him and his wife at length that given the stage of his appendiceal cancer, I recommend systemic chemotherapy.  The options are for FOLFOX versus capecitabine plus oxaliplatin.  Given the high risk nature of the diagnosis, my preference is for 6 months of adjuvant chemotherapy, and FOLFOX is better tolerated for this duration.    I discussed the risks and " benefits and side effect profile of FOLFOX chemotherapy, and he agrees to proceed.    After 1st cycle of FOLFOX, found to have extensive PE throughout B/L lungs including saddle embolus of main pulmonary artery. On 9/5/23 he had aspiration thrombectomy and was placed on Apixaban. Discussed admission with Dr. Webb. Ok to proceed with chemotherapy as long as patient is feeling well.     10/9/23: After 2nd cycle, he experienced fatigue, cold sensitivity, nausea and diarrhea.    10/23/23: Having more cold and now light sensitivity. Will decrease dose of Oxaliplatin.     11/20/23: Improved fatigue. Still with taste and cold sensitivity, which is stable.   12/18/23: Improving energy, taste is worse, + thrush  1/2/24: Doing well, improving energy. Still with thrush but taste is improving. Proceed with chemo today.  2/12/24: Will proceed with final cycle of FOLFOX, will get post-tx CT in approx 3 weeks with RTC 4 weeks then proceed with surveillance  3/11/24: I personally reviewed the images from the recent CT, which show no evidence of disease. CEA undetectable. Will proceed with surveillance.   6/11/24. Doing well. Neuropathy is slowly improving. Continues with white 'coating' on tongue. Continue with surveillance every 3 months with lab work and CT scan every 6 months. Colonoscopy done in May 2024 was unremarkable. Will need it repeated in 3 years.  9/16/24: Doing exceedingly well. CEA remains undetectable, Signatera pending. Repeat CT with no evidence of disease recurrence or metastasis  12/9/24: CEA and Signatera are negative. Still doing well, but still with remnant neuropathy including tingling, numbness, cold sensitivity and altered proprioception    PLAN:  Stage III appendiceal cancer  - S/p 6mo adjuvant chemotherapy with FOLFOX 8/2023-2/2024  - CEA undetectable, most recent scans (9/12/24) without evidence of disease recurrence or mets  - Proceed with surveillance with labs including Signatera & CEA q3mo and  CT q6mo for 2 years, then will space out  - Repeat colonoscopy 5/2027  - RTC 3mo with repeat CBC, CMP, CEA, and Signatera     Oxaliplatin-induced neuropathy  - symptoms appear to be consistent with altered proprioception  - will begin low dose gabapentin 100 mg TID today, and uptitrate as needed  - patient interested in Dr. Perry's Scrambler therapy    Pulmonary Embolism including saddle embolism  - continue on Apixaban  - has follow up with vascular medicine who will manage apixaban , should be able to discontinue once Mediport is removed    Oral leukoplakia, resolved    RTC: 3 months    Patient was seen and discussed with Dr. Webb.    Theron Stallings MD  Medical Oncology Fellow  3/18/2025

## 2025-03-20 ENCOUNTER — TELEPHONE (OUTPATIENT)
Dept: ADMISSION | Facility: HOSPITAL | Age: 70
End: 2025-03-20
Payer: MEDICARE

## 2025-03-20 NOTE — TELEPHONE ENCOUNTER
Pt called- he said Dr. Madrigal recommended OTC alpha-lipoic acid for his neuropathy. He wants to clarify which OTC dose to take. Message sent to the team.     Private Residence

## 2025-04-21 DIAGNOSIS — E11.9 TYPE 2 DIABETES MELLITUS WITHOUT COMPLICATION, WITHOUT LONG-TERM CURRENT USE OF INSULIN: ICD-10-CM

## 2025-04-21 RX ORDER — METFORMIN HYDROCHLORIDE 500 MG/1
1000 TABLET, EXTENDED RELEASE ORAL 2 TIMES DAILY
Qty: 120 TABLET | Refills: 0 | Status: SHIPPED | OUTPATIENT
Start: 2025-04-21 | End: 2025-05-21

## 2025-05-11 PROCEDURE — 88307 TISSUE EXAM BY PATHOLOGIST: CPT

## 2025-05-11 PROCEDURE — 88307 TISSUE EXAM BY PATHOLOGIST: CPT | Performed by: STUDENT IN AN ORGANIZED HEALTH CARE EDUCATION/TRAINING PROGRAM

## 2025-05-13 ENCOUNTER — LAB REQUISITION (OUTPATIENT)
Dept: LAB | Facility: HOSPITAL | Age: 70
End: 2025-05-13
Payer: MEDICARE

## 2025-05-13 DIAGNOSIS — K56.609 UNSPECIFIED INTESTINAL OBSTRUCTION, UNSPECIFIED AS TO PARTIAL VERSUS COMPLETE OBSTRUCTION (MULTI): ICD-10-CM

## 2025-05-22 LAB
LABORATORY COMMENT REPORT: NORMAL
PATH REPORT.FINAL DX SPEC: NORMAL
PATH REPORT.GROSS SPEC: NORMAL
PATH REPORT.RELEVANT HX SPEC: NORMAL
PATH REPORT.TOTAL CANCER: NORMAL
RESIDENT REVIEW: NORMAL

## 2025-06-03 ENCOUNTER — PATIENT OUTREACH (OUTPATIENT)
Dept: CARE COORDINATION | Facility: CLINIC | Age: 70
End: 2025-06-03
Payer: MEDICARE

## 2025-07-15 ENCOUNTER — OFFICE VISIT (OUTPATIENT)
Dept: HEMATOLOGY/ONCOLOGY | Facility: CLINIC | Age: 70
End: 2025-07-15
Payer: MEDICARE

## 2025-07-15 VITALS
SYSTOLIC BLOOD PRESSURE: 151 MMHG | WEIGHT: 200.8 LBS | RESPIRATION RATE: 18 BRPM | OXYGEN SATURATION: 96 % | DIASTOLIC BLOOD PRESSURE: 71 MMHG | TEMPERATURE: 97.7 F | BODY MASS INDEX: 28.81 KG/M2 | HEART RATE: 81 BPM

## 2025-07-15 DIAGNOSIS — C77.2 CANCER OF APPENDIX METASTATIC TO INTRA-ABDOMINAL LYMPH NODE (MULTI): ICD-10-CM

## 2025-07-15 DIAGNOSIS — C18.1 CANCER OF APPENDIX METASTATIC TO INTRA-ABDOMINAL LYMPH NODE (MULTI): ICD-10-CM

## 2025-07-15 PROCEDURE — 1126F AMNT PAIN NOTED NONE PRSNT: CPT | Performed by: INTERNAL MEDICINE

## 2025-07-15 PROCEDURE — 4010F ACE/ARB THERAPY RXD/TAKEN: CPT | Performed by: INTERNAL MEDICINE

## 2025-07-15 PROCEDURE — 3077F SYST BP >= 140 MM HG: CPT | Performed by: INTERNAL MEDICINE

## 2025-07-15 PROCEDURE — 1036F TOBACCO NON-USER: CPT | Performed by: INTERNAL MEDICINE

## 2025-07-15 PROCEDURE — 1159F MED LIST DOCD IN RCRD: CPT | Performed by: INTERNAL MEDICINE

## 2025-07-15 PROCEDURE — 99214 OFFICE O/P EST MOD 30 MIN: CPT | Performed by: INTERNAL MEDICINE

## 2025-07-15 PROCEDURE — 3078F DIAST BP <80 MM HG: CPT | Performed by: INTERNAL MEDICINE

## 2025-07-15 RX ORDER — METFORMIN HYDROCHLORIDE 500 MG/1
1000 TABLET ORAL
COMMUNITY
Start: 2025-06-04

## 2025-07-15 RX ORDER — DOXAZOSIN 1 MG/1
1 TABLET ORAL NIGHTLY
COMMUNITY

## 2025-07-15 RX ORDER — GLIMEPIRIDE 1 MG/1
TABLET ORAL
COMMUNITY
Start: 2025-06-30

## 2025-07-15 RX ORDER — PERPHENAZINE 16 MG
TABLET ORAL
COMMUNITY

## 2025-07-15 RX ORDER — CHOLECALCIFEROL (VITAMIN D3) 25 MCG
25 TABLET ORAL DAILY
COMMUNITY

## 2025-07-15 RX ORDER — ACETAMINOPHEN 500 MG
2 TABLET ORAL EVERY 8 HOURS PRN
COMMUNITY
Start: 2025-05-16

## 2025-07-15 RX ORDER — BLOOD-GLUCOSE SENSOR
EACH MISCELLANEOUS
COMMUNITY

## 2025-07-15 RX ORDER — RSV VACC, PREF A AND PREF B/PF 120MCG/0.5
VIAL (EA) INTRAMUSCULAR
COMMUNITY
Start: 2024-10-17

## 2025-07-15 ASSESSMENT — PAIN SCALES - GENERAL: PAINLEVEL_OUTOF10: 0-NO PAIN

## 2025-07-15 NOTE — PROGRESS NOTES
Michael is here with his wife Jinny for follow up and lab review with Dr Madrigal. He reports he is doing well with no new complaints at this time. He stated he had a bowel blockage in May 2025 and had a bowel resx at [facility].  Meds and allergies reviewed and updated. MD made aware.  Education Documentation  When and How to Contact Clinic, taught by Kori Gurrola RN at 7/15/2025 10:18 AM.  Learner: Significant Other, Patient  Readiness: Acceptance  Method: Explanation  Response: Verbalizes Understanding    Tips for Daily Living, taught by Kori Gurrola RN at 7/15/2025 10:18 AM.  Learner: Significant Other, Patient  Readiness: Acceptance  Method: Explanation  Response: Verbalizes Understanding    General Medication Information, taught by Kori Gurrola RN at 7/15/2025 10:18 AM.  Learner: Significant Other, Patient  Readiness: Acceptance  Method: Explanation  Response: Verbalizes Understanding    Supportive Medications, taught by Kori Gurrola RN at 7/15/2025 10:18 AM.  Learner: Significant Other, Patient  Readiness: Acceptance  Method: Explanation  Response: Verbalizes Understanding    Treatment Plan and Schedule, taught by Kori uGrrola RN at 7/15/2025 10:18 AM.  Learner: Significant Other, Patient  Readiness: Acceptance  Method: Explanation  Response: Verbalizes Understanding    Diagnostic Studies, taught by Kori Gurrola RN at 7/15/2025 10:18 AM.  Learner: Significant Other, Patient  Readiness: Acceptance  Method: Explanation  Response: Verbalizes Understanding    Tumor Markers, taught by Kori Gurrola RN at 7/15/2025 10:18 AM.  Learner: Significant Other, Patient  Readiness: Acceptance  Method: Explanation  Response: Verbalizes Understanding    Comprehensive Metabolic Panel (CMP), taught by Kori Gurrola RN at 7/15/2025 10:18 AM.  Learner: Significant Other, Patient  Readiness: Acceptance  Method: Explanation  Response: Verbalizes Understanding    Complete Blood  Count with Differential (CBC w/ Diff), taught by Kori Gurrola RN at 7/15/2025 10:18 AM.  Learner: Significant Other, Patient  Readiness: Acceptance  Method: Explanation  Response: Verbalizes Understanding    Education Comments  No comments found.

## 2025-07-15 NOTE — PROGRESS NOTES
Cancer History:  DIAGNOSIS: Appendiceal adenocarcinoma    STAGING: pT4a pN1a Mx    CURRENT SITES OF DISEASE:    MOLECULAR/GENOMICS:  MMR-intact    ctDNA Signatera:  8/14/23: (0) negative  9/25/23: (0) negative  11/6/23: (0) negative  1/2/24: (0) negative  2/2/24: (0) negative  6/11/24: (0) negative  9/12/24: (0) negative  11/22/24: (0) negative    TUMOR MARKER:  5/15/23 CEA: <0.5  8/14/23 CEA: <0.5  11/7/23 CEA: <0.5  12/18/23 CEA: 1.3  1/2/24 CEA: 0.5  1/29/24 CEA: <0.5  2/12/24 CEA: <0.5  3/11/24 CEA: <0.5  6/11/23 CEA: <0.5  9/12/24 CEA: <0.5  11/22/24 CEA: <0.5    CURRENT THERAPY:  Surveillance     PREVIOUS THERAPY:  6/20/23: S/p R hemicolectomy  8/29/23--2/13/24: Adjuvant FOLFOX every 2 weeks x 12 cycles; Dose reduction of Oxaliplatin with C4 due to lasting cold sensitivity. Dose reduced Oxaliplatin with C7 due to increasing neuropathy. Cycle 12 given 2/13/24    ONCOLOGIC ISSUES:  Neuropathy  Thrush    PROVIDERS:  CRS: Jazmin Faulkner    HISTORY:   4/2023: presented with R inguinal pain. Thought to have a hernia.  4/17/23: CT abdomen pelvis showed indeterminate masslike lesion at the cecum posteriorly at the expected level of the appendix measuring 3 x 4 cm in dimension.  Also within the left pelvis near the origin of the left inguinal canal there is an indeterminate cavitary mass measuring 2.5 x 2 cm  5/5/23: Underwent colonoscopy.  Report not available.  Pathology of cecal/appendiceal orifice mass biopsy showed poorly differentiated adenocarcinoma with signet ring cell differentiation  5/17/23: CT chest abdomen pelvis showed no evidence of metastatic disease, again noted soft tissue mass near the base of the cecum measuring 4.2 x 2.8 cm, compatible with reported history of appendiceal carcinoma  6/20/23: Underwent right hemicolectomy. Final pathology showed appendiceal invasive moderately differentiated mucinous adenocarcinoma measuring 5 cm.  Tumor invades the visceral peritoneum.  No lymphovascular or  perineural invasion. 1/44 LNs involved.  MMR protein expression intact  Hospitalized 5/2025 with bowel obstruction.  Path c/w adhesions only.     PMH: HTN, Gilbert's disease, HLD, DM  SH: , no tobacco, EtOH, illicits  FH: Sister and nephew with Rodriguez syndrome.      Subjective    Mr. Abarca doing quite well. He notes his neuropathy (tingling and numbness, no longer burning) is still present bilaterally, slightly worse on the left than the right, on the distal part of the hands and feet. It has affected his ability to walk (states he is walking very carefully having to see where he puts his feet, like a 90 year old). Tylenol and Advil have not helped significantly. He also has noticed it is worse with the cold. His oral issues have resolved, as it was a bacterial infection and not actually thrush, so he received antibiotics. He did recently get diagnosed with pneumonia a few days ago and has mostly recovered from it. Otherwise denies any fever/chills, N/V, chest pain, abdominal pain, rash, or leg swelling.   ROS as above. Remainder of 10-point review of systems elicited and negative.     Objective:  /71 (BP Location: Left arm, Patient Position: Sitting, BP Cuff Size: Large adult)   Pulse 81   Temp 36.5 °C (97.7 °F) (Core)   Resp 18   Wt 91.1 kg (200 lb 12.8 oz)   SpO2 96%   BMI 28.81 kg/m²      Daily Weight  07/15/25 : 91.1 kg (200 lb 12.8 oz)  12/10/24 : 92.4 kg (203 lb 12.8 oz)  12/09/24 : 91.9 kg (202 lb 9.6 oz)  11/20/24 : 91.2 kg (201 lb)  09/30/24 : 90 kg (198 lb 8 oz)  09/27/24 : 89.7 kg (197 lb 12 oz)  09/16/24 : 89.7 kg (197 lb 12 oz)      Physical Exam  Gen: A&O, NAD  Head: Normocephalic, atraumatic  Eyes: no scleral icterus  ENT: mucous membranes moist, no oropharyngeal lesions. White plaque noted over surface of tongue  Resp: Lungs CTAB  Cardiac: Tachycardiac, regular rhythm, no murmurs appreciated  Abdomen: Soft, nondistended, nontender, +BS  Neuro: CNII-XII grossly intact  Psych:  appropriate mood & affect  Skin: warm, dry, no apparent rashes     ECOG Performance Status: 0     WBC   Date/Time Value Ref Range Status   02/27/2025 11:05 AM 4.7 4.4 - 11.3 x10*3/uL Final   11/22/2024 10:04 AM 6.6 4.4 - 11.3 x10*3/uL Final   09/12/2024 08:13 AM 5.5 4.4 - 11.3 x10*3/uL Final     Hemoglobin   Date Value Ref Range Status   02/27/2025 15.1 13.5 - 17.5 g/dL Final   11/22/2024 14.5 13.5 - 17.5 g/dL Final   09/12/2024 13.9 13.5 - 17.5 g/dL Final     MCV   Date/Time Value Ref Range Status   02/27/2025 11:05 AM 86 80 - 100 fL Final   11/22/2024 10:04 AM 88 80 - 100 fL Final   09/12/2024 08:13 AM 87 80 - 100 fL Final     Platelets   Date/Time Value Ref Range Status   02/27/2025 11:05  150 - 450 x10*3/uL Final   11/22/2024 10:04  150 - 450 x10*3/uL Final   09/12/2024 08:13  150 - 450 x10*3/uL Final     Neutrophils Absolute   Date/Time Value Ref Range Status   02/27/2025 11:05 AM 2.83 1.20 - 7.70 x10*3/uL Final     Comment:     Percent differential counts (%) should be interpreted in the context of the absolute cell counts (cells/uL).   11/22/2024 10:04 AM 4.86 1.20 - 7.70 x10*3/uL Final     Comment:     Percent differential counts (%) should be interpreted in the context of the absolute cell counts (cells/uL).   09/12/2024 08:13 AM 3.51 1.20 - 7.70 x10*3/uL Final     Comment:     Percent differential counts (%) should be interpreted in the context of the absolute cell counts (cells/uL).     Bilirubin, Total   Date/Time Value Ref Range Status   02/27/2025 11:05 AM 1.6 (H) 0.0 - 1.2 mg/dL Final   12/10/2024 11:51 AM 1.2 0.0 - 1.2 mg/dL Final   11/22/2024 10:04 AM 2.1 (H) 0.0 - 1.2 mg/dL Final     AST   Date/Time Value Ref Range Status   02/27/2025 11:05 AM 23 9 - 39 U/L Final   12/10/2024 11:51 AM 26 9 - 39 U/L Final   11/22/2024 10:04 AM 35 9 - 39 U/L Final     ALT   Date/Time Value Ref Range Status   02/27/2025 11:05 AM 30 10 - 52 U/L Final     Comment:     Patients treated with  "Sulfasalazine may generate falsely decreased results for ALT.   12/10/2024 11:51 AM 34 10 - 52 U/L Final     Comment:     Patients treated with Sulfasalazine may generate falsely decreased results for ALT.   11/22/2024 10:04 AM 36 10 - 52 U/L Final     Comment:     Patients treated with Sulfasalazine may generate falsely decreased results for ALT.     Creatinine   Date/Time Value Ref Range Status   02/27/2025 11:05 AM 0.87 0.50 - 1.30 mg/dL Final   12/10/2024 11:51 AM 0.85 0.50 - 1.30 mg/dL Final   11/22/2024 10:04 AM 0.97 0.50 - 1.30 mg/dL Final     Urea Nitrogen   Date/Time Value Ref Range Status   02/27/2025 11:05 AM 20 6 - 23 mg/dL Final   12/10/2024 11:51 AM 19 6 - 23 mg/dL Final   11/22/2024 10:04 AM 23 6 - 23 mg/dL Final     Albumin   Date/Time Value Ref Range Status   02/27/2025 11:05 AM 4.6 3.4 - 5.0 g/dL Final   12/10/2024 11:51 AM 4.3 3.4 - 5.0 g/dL Final   11/22/2024 10:04 AM 4.4 3.4 - 5.0 g/dL Final     No results found for: \"\"  Carcinoembryonic AG   Date/Time Value Ref Range Status   02/27/2025 11:05 AM <0.5 ug/L Final   11/22/2024 10:04 AM <0.5 ug/L Final   09/12/2024 08:13 AM <0.5 ug/L Final       === 03/12/25 ===    CT CHEST ABDOMEN PELVIS W IV CONTRAST    - Impression -  Stable partial colectomy changes. No findings of disease progression.    Gallstones and hepatic steatosis again detected.    Signed by: Aidan Ware 3/13/2025 9:01 AM  Dictation workstation:   [id number]      Assessment/Plan   Mr. Abarca is a 70 y.o. 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 Faulkner.     He presents today for discussion of adjuvant chemotherapy. I personally reviewed the images from the recent CT, which show no evidence of metastatic disease.  I reviewed the results of his pathology synoptic report, which show pT4a pN1a, Stage III cancer.     I discussed with him and his wife at length that given the stage of his appendiceal cancer, I recommend systemic " chemotherapy.  The options are for FOLFOX versus capecitabine plus oxaliplatin.  Given the high risk nature of the diagnosis, my preference is for 6 months of adjuvant chemotherapy, and FOLFOX is better tolerated for this duration.    I discussed the risks and benefits and side effect profile of FOLFOX chemotherapy, and he agrees to proceed.    After 1st cycle of FOLFOX, found to have extensive PE throughout B/L lungs including saddle embolus of main pulmonary artery. On 9/5/23 he had aspiration thrombectomy and was placed on Apixaban. Discussed admission with Dr. Webb. Ok to proceed with chemotherapy as long as patient is feeling well.     10/9/23: After 2nd cycle, he experienced fatigue, cold sensitivity, nausea and diarrhea.    10/23/23: Having more cold and now light sensitivity. Will decrease dose of Oxaliplatin.     11/20/23: Improved fatigue. Still with taste and cold sensitivity, which is stable.   12/18/23: Improving energy, taste is worse, + thrush  1/2/24: Doing well, improving energy. Still with thrush but taste is improving. Proceed with chemo today.  2/12/24: Will proceed with final cycle of FOLFOX, will get post-tx CT in approx 3 weeks with RTC 4 weeks then proceed with surveillance  3/11/24: I personally reviewed the images from the recent CT, which show no evidence of disease. CEA undetectable. Will proceed with surveillance.   6/11/24. Doing well. Neuropathy is slowly improving. Continues with white 'coating' on tongue. Continue with surveillance every 3 months with lab work and CT scan every 6 months. Colonoscopy done in May 2024 was unremarkable. Will need it repeated in 3 years.  9/16/24: Doing exceedingly well. CEA remains undetectable, Signatera pending. Repeat CT with no evidence of disease recurrence or metastasis  12/9/24: CEA and Signatera are negative. Still doing well, but still with remnant neuropathy including tingling, numbness, cold sensitivity and altered  proprioception    PLAN:  Stage III appendiceal cancer  - S/p 6mo adjuvant chemotherapy with FOLFOX 8/2023-2/2024  - CEA undetectable, most recent scans (3/2025) without evidence of disease recurrence or mets  - Proceed with surveillance with labs including Signatera & CEA q3mo and CT q6mo for 2 years, then will space out  - Repeat colonoscopy 5/2027  - RTC 2.5 mo with repeat CBC, CMP, CEA, and Signatera     Oxaliplatin-induced neuropathy  - symptoms appear to be consistent with altered proprioception  - will begin low dose gabapentin 100 mg TID today, and uptitrate as needed  - patient interested in Dr. Perry's Scrambler therapy  - PT/ vestibular therapy     Pulmonary Embolism including saddle embolism  -- Mediport removed  - has follow up with vascular medicine who will manage apixaban , should be able to discontinue once Mediport is removed  - Switched to rivaroxaban for arrhythmias.      Oral leukoplakia, resolved    RTC:  2 months for signatera and ~5 months for CT and signatera    Jalen Madrigal MD     [facility]/ [facility]  Office: [phone]  Fax: [phone]

## 2025-08-12 ENCOUNTER — CLINICAL SUPPORT (OUTPATIENT)
Dept: PHYSICAL THERAPY | Facility: CLINIC | Age: 70
End: 2025-08-12
Payer: MEDICARE

## 2025-08-12 DIAGNOSIS — R26.89 IMBALANCE: Primary | ICD-10-CM

## 2025-08-12 PROCEDURE — 97161 PT EVAL LOW COMPLEX 20 MIN: CPT | Mod: GP | Performed by: PHYSICAL THERAPIST

## 2025-08-12 PROCEDURE — 97110 THERAPEUTIC EXERCISES: CPT | Mod: GP | Performed by: PHYSICAL THERAPIST

## 2025-09-02 ENCOUNTER — LAB (OUTPATIENT)
Dept: LAB | Facility: CLINIC | Age: 70
End: 2025-09-02
Payer: MEDICARE

## 2025-09-02 DIAGNOSIS — C77.2 CANCER OF APPENDIX METASTATIC TO INTRA-ABDOMINAL LYMPH NODE (MULTI): ICD-10-CM

## 2025-09-02 DIAGNOSIS — C18.1 CANCER OF APPENDIX METASTATIC TO INTRA-ABDOMINAL LYMPH NODE (MULTI): ICD-10-CM

## 2025-09-02 LAB
ALBUMIN SERPL BCP-MCNC: 4.6 G/DL (ref 3.4–5)
ALP SERPL-CCNC: 54 U/L (ref 33–136)
ALT SERPL W P-5'-P-CCNC: 27 U/L (ref 10–52)
ANION GAP SERPL CALC-SCNC: 13 MMOL/L (ref 10–20)
AST SERPL W P-5'-P-CCNC: 22 U/L (ref 9–39)
BASOPHILS # BLD AUTO: 0.02 X10*3/UL (ref 0–0.1)
BASOPHILS NFR BLD AUTO: 0.4 %
BILIRUB SERPL-MCNC: 1.6 MG/DL (ref 0–1.2)
BUN SERPL-MCNC: 15 MG/DL (ref 6–23)
CALCIUM SERPL-MCNC: 9.6 MG/DL (ref 8.6–10.6)
CEA SERPL-MCNC: <0.5 UG/L
CHLORIDE SERPL-SCNC: 102 MMOL/L (ref 98–107)
CO2 SERPL-SCNC: 27 MMOL/L (ref 21–32)
CREAT SERPL-MCNC: 0.86 MG/DL (ref 0.5–1.3)
EGFRCR SERPLBLD CKD-EPI 2021: >90 ML/MIN/1.73M*2
EOSINOPHIL # BLD AUTO: 0.14 X10*3/UL (ref 0–0.7)
EOSINOPHIL NFR BLD AUTO: 2.6 %
ERYTHROCYTE [DISTWIDTH] IN BLOOD BY AUTOMATED COUNT: 12.7 % (ref 11.5–14.5)
GLUCOSE SERPL-MCNC: 108 MG/DL (ref 74–99)
HCT VFR BLD AUTO: 42.2 % (ref 41–52)
HGB BLD-MCNC: 14 G/DL (ref 13.5–17.5)
IMM GRANULOCYTES # BLD AUTO: 0.01 X10*3/UL (ref 0–0.7)
IMM GRANULOCYTES NFR BLD AUTO: 0.2 % (ref 0–0.9)
LYMPHOCYTES # BLD AUTO: 1.34 X10*3/UL (ref 1.2–4.8)
LYMPHOCYTES NFR BLD AUTO: 24.8 %
MCH RBC QN AUTO: 28.7 PG (ref 26–34)
MCHC RBC AUTO-ENTMCNC: 33.2 G/DL (ref 32–36)
MCV RBC AUTO: 87 FL (ref 80–100)
MONOCYTES # BLD AUTO: 0.43 X10*3/UL (ref 0.1–1)
MONOCYTES NFR BLD AUTO: 8 %
NEUTROPHILS # BLD AUTO: 3.46 X10*3/UL (ref 1.2–7.7)
NEUTROPHILS NFR BLD AUTO: 64 %
NRBC BLD-RTO: NORMAL /100{WBCS}
PLATELET # BLD AUTO: 217 X10*3/UL (ref 150–450)
POTASSIUM SERPL-SCNC: 4.2 MMOL/L (ref 3.5–5.3)
PROT SERPL-MCNC: 6.9 G/DL (ref 6.4–8.2)
RBC # BLD AUTO: 4.87 X10*6/UL (ref 4.5–5.9)
SODIUM SERPL-SCNC: 138 MMOL/L (ref 136–145)
WBC # BLD AUTO: 5.4 X10*3/UL (ref 4.4–11.3)

## 2025-09-02 PROCEDURE — 80053 COMPREHEN METABOLIC PANEL: CPT

## 2025-09-02 PROCEDURE — 85025 COMPLETE CBC W/AUTO DIFF WBC: CPT

## 2025-09-02 PROCEDURE — 36415 COLL VENOUS BLD VENIPUNCTURE: CPT

## 2025-09-02 PROCEDURE — 82378 CARCINOEMBRYONIC ANTIGEN: CPT
